# Patient Record
Sex: FEMALE | Race: WHITE | NOT HISPANIC OR LATINO | Employment: UNEMPLOYED | URBAN - METROPOLITAN AREA
[De-identification: names, ages, dates, MRNs, and addresses within clinical notes are randomized per-mention and may not be internally consistent; named-entity substitution may affect disease eponyms.]

---

## 2020-09-29 ENCOUNTER — APPOINTMENT (OUTPATIENT)
Dept: RADIOLOGY | Facility: MEDICAL CENTER | Age: 45
DRG: 023 | End: 2020-09-29
Attending: INTERNAL MEDICINE
Payer: OTHER GOVERNMENT

## 2020-09-29 ENCOUNTER — HOSPITAL ENCOUNTER (INPATIENT)
Facility: MEDICAL CENTER | Age: 45
LOS: 8 days | DRG: 023 | End: 2020-10-07
Attending: EMERGENCY MEDICINE | Admitting: INTERNAL MEDICINE
Payer: OTHER GOVERNMENT

## 2020-09-29 ENCOUNTER — APPOINTMENT (OUTPATIENT)
Dept: RADIOLOGY | Facility: MEDICAL CENTER | Age: 45
DRG: 023 | End: 2020-09-29
Attending: PSYCHIATRY & NEUROLOGY
Payer: OTHER GOVERNMENT

## 2020-09-29 DIAGNOSIS — I63.22 ACUTE ISCHEMIC VERTEBROBASILAR ARTERY BRAINSTEM STROKE INVOLVING LEFT-SIDED VESSEL (HCC): ICD-10-CM

## 2020-09-29 DIAGNOSIS — J96.01 ACUTE RESPIRATORY FAILURE WITH HYPOXIA AND HYPERCAPNIA (HCC): ICD-10-CM

## 2020-09-29 DIAGNOSIS — I10 ESSENTIAL HYPERTENSION: ICD-10-CM

## 2020-09-29 DIAGNOSIS — J96.02 ACUTE RESPIRATORY FAILURE WITH HYPOXIA AND HYPERCAPNIA (HCC): ICD-10-CM

## 2020-09-29 DIAGNOSIS — I63.212 ACUTE ISCHEMIC VERTEBROBASILAR ARTERY BRAINSTEM STROKE INVOLVING LEFT-SIDED VESSEL (HCC): ICD-10-CM

## 2020-09-29 DIAGNOSIS — R56.9 SEIZURE (HCC): ICD-10-CM

## 2020-09-29 PROBLEM — R51.9 HEADACHE: Status: ACTIVE | Noted: 2020-09-29

## 2020-09-29 LAB
ACTION RANGE TRIGGERED IACRT: NO
ALBUMIN SERPL BCP-MCNC: 4.6 G/DL (ref 3.2–4.9)
ALBUMIN/GLOB SERPL: 1.6 G/DL
ALP SERPL-CCNC: 63 U/L (ref 30–99)
ALT SERPL-CCNC: 30 U/L (ref 2–50)
AMMONIA PLAS-SCNC: 25 UMOL/L (ref 11–45)
AMPHET UR QL SCN: NEGATIVE
ANION GAP SERPL CALC-SCNC: 14 MMOL/L (ref 7–16)
APPEARANCE UR: CLEAR
AST SERPL-CCNC: 28 U/L (ref 12–45)
BACTERIA #/AREA URNS HPF: NEGATIVE /HPF
BARBITURATES UR QL SCN: NEGATIVE
BASE EXCESS BLDA CALC-SCNC: -4 MMOL/L (ref -4–3)
BENZODIAZ UR QL SCN: POSITIVE
BILIRUB SERPL-MCNC: 0.7 MG/DL (ref 0.1–1.5)
BILIRUB UR QL STRIP.AUTO: NEGATIVE
BODY TEMPERATURE: ABNORMAL DEGREES
BUN SERPL-MCNC: 12 MG/DL (ref 8–22)
BZE UR QL SCN: NEGATIVE
CA-I SERPL-SCNC: 1.2 MMOL/L (ref 1.1–1.3)
CALCIUM SERPL-MCNC: 9.3 MG/DL (ref 8.5–10.5)
CANNABINOIDS UR QL SCN: NEGATIVE
CHLORIDE SERPL-SCNC: 101 MMOL/L (ref 96–112)
CO2 BLDA-SCNC: 22 MMOL/L (ref 20–33)
CO2 SERPL-SCNC: 21 MMOL/L (ref 20–33)
COLOR UR: YELLOW
CREAT SERPL-MCNC: 0.94 MG/DL (ref 0.5–1.4)
EPI CELLS #/AREA URNS HPF: NEGATIVE /HPF
ETHANOL BLD-MCNC: <10.1 MG/DL (ref 0–10.1)
GLOBULIN SER CALC-MCNC: 2.8 G/DL (ref 1.9–3.5)
GLUCOSE SERPL-MCNC: 153 MG/DL (ref 65–99)
GLUCOSE UR STRIP.AUTO-MCNC: NEGATIVE MG/DL
HCO3 BLDA-SCNC: 20.7 MMOL/L (ref 17–25)
HOROWITZ INDEX BLDA+IHG-RTO: 328 MM[HG]
HYALINE CASTS #/AREA URNS LPF: ABNORMAL /LPF
INST. QUALIFIED PATIENT IIQPT: YES
KETONES UR STRIP.AUTO-MCNC: NEGATIVE MG/DL
LEUKOCYTE ESTERASE UR QL STRIP.AUTO: NEGATIVE
MAGNESIUM SERPL-MCNC: 2 MG/DL (ref 1.5–2.5)
METHADONE UR QL SCN: NEGATIVE
MICRO URNS: ABNORMAL
NITRITE UR QL STRIP.AUTO: NEGATIVE
O2/TOTAL GAS SETTING VFR VENT: 40 %
OPIATES UR QL SCN: NEGATIVE
OXYCODONE UR QL SCN: NEGATIVE
PCO2 BLDA: 36.1 MMHG (ref 26–37)
PCO2 TEMP ADJ BLDA: 36.1 MMHG (ref 26–37)
PCP UR QL SCN: NEGATIVE
PH BLDA: 7.37 [PH] (ref 7.4–7.5)
PH TEMP ADJ BLDA: 7.37 [PH] (ref 7.4–7.5)
PH UR STRIP.AUTO: 5.5 [PH] (ref 5–8)
PHOSPHATE SERPL-MCNC: 2.1 MG/DL (ref 2.5–4.5)
PO2 BLDA: 131 MMHG (ref 64–87)
PO2 TEMP ADJ BLDA: 131 MMHG (ref 64–87)
POTASSIUM SERPL-SCNC: 4 MMOL/L (ref 3.6–5.5)
PROPOXYPH UR QL SCN: NEGATIVE
PROT SERPL-MCNC: 7.4 G/DL (ref 6–8.2)
PROT UR QL STRIP: NEGATIVE MG/DL
RBC # URNS HPF: ABNORMAL /HPF
RBC UR QL AUTO: ABNORMAL
SAO2 % BLDA: 99 % (ref 93–99)
SODIUM SERPL-SCNC: 136 MMOL/L (ref 135–145)
SP GR UR STRIP.AUTO: 1.01
SPECIMEN DRAWN FROM PATIENT: ABNORMAL
TSH SERPL DL<=0.005 MIU/L-ACNC: 2.23 UIU/ML (ref 0.38–5.33)
UROBILINOGEN UR STRIP.AUTO-MCNC: 0.2 MG/DL
VIT B12 SERPL-MCNC: 503 PG/ML (ref 211–911)
WBC #/AREA URNS HPF: ABNORMAL /HPF

## 2020-09-29 PROCEDURE — 94002 VENT MGMT INPAT INIT DAY: CPT

## 2020-09-29 PROCEDURE — 99223 1ST HOSP IP/OBS HIGH 75: CPT | Performed by: PSYCHIATRY & NEUROLOGY

## 2020-09-29 PROCEDURE — 82803 BLOOD GASES ANY COMBINATION: CPT

## 2020-09-29 PROCEDURE — 71045 X-RAY EXAM CHEST 1 VIEW: CPT

## 2020-09-29 PROCEDURE — 99291 CRITICAL CARE FIRST HOUR: CPT | Performed by: INTERNAL MEDICINE

## 2020-09-29 PROCEDURE — 83735 ASSAY OF MAGNESIUM: CPT

## 2020-09-29 PROCEDURE — 82533 TOTAL CORTISOL: CPT

## 2020-09-29 PROCEDURE — 84100 ASSAY OF PHOSPHORUS: CPT

## 2020-09-29 PROCEDURE — 86780 TREPONEMA PALLIDUM: CPT

## 2020-09-29 PROCEDURE — 96368 THER/DIAG CONCURRENT INF: CPT

## 2020-09-29 PROCEDURE — 96365 THER/PROPH/DIAG IV INF INIT: CPT

## 2020-09-29 PROCEDURE — 3E02340 INTRODUCTION OF INFLUENZA VACCINE INTO MUSCLE, PERCUTANEOUS APPROACH: ICD-10-PCS | Performed by: STUDENT IN AN ORGANIZED HEALTH CARE EDUCATION/TRAINING PROGRAM

## 2020-09-29 PROCEDURE — 96375 TX/PRO/DX INJ NEW DRUG ADDON: CPT

## 2020-09-29 PROCEDURE — 82607 VITAMIN B-12: CPT

## 2020-09-29 PROCEDURE — 81001 URINALYSIS AUTO W/SCOPE: CPT

## 2020-09-29 PROCEDURE — 82330 ASSAY OF CALCIUM: CPT

## 2020-09-29 PROCEDURE — 700111 HCHG RX REV CODE 636 W/ 250 OVERRIDE (IP)

## 2020-09-29 PROCEDURE — 5A1945Z RESPIRATORY VENTILATION, 24-96 CONSECUTIVE HOURS: ICD-10-PCS | Performed by: INTERNAL MEDICINE

## 2020-09-29 PROCEDURE — 82140 ASSAY OF AMMONIA: CPT

## 2020-09-29 PROCEDURE — 84443 ASSAY THYROID STIM HORMONE: CPT

## 2020-09-29 PROCEDURE — 36600 WITHDRAWAL OF ARTERIAL BLOOD: CPT

## 2020-09-29 PROCEDURE — 96366 THER/PROPH/DIAG IV INF ADDON: CPT

## 2020-09-29 PROCEDURE — C9803 HOPD COVID-19 SPEC COLLECT: HCPCS | Performed by: INTERNAL MEDICINE

## 2020-09-29 PROCEDURE — 36415 COLL VENOUS BLD VENIPUNCTURE: CPT

## 2020-09-29 PROCEDURE — G0475 HIV COMBINATION ASSAY: HCPCS

## 2020-09-29 PROCEDURE — 700111 HCHG RX REV CODE 636 W/ 250 OVERRIDE (IP): Performed by: PSYCHIATRY & NEUROLOGY

## 2020-09-29 PROCEDURE — 80053 COMPREHEN METABOLIC PANEL: CPT

## 2020-09-29 PROCEDURE — 770022 HCHG ROOM/CARE - ICU (200)

## 2020-09-29 PROCEDURE — 80307 DRUG TEST PRSMV CHEM ANLYZR: CPT

## 2020-09-29 PROCEDURE — 96376 TX/PRO/DX INJ SAME DRUG ADON: CPT

## 2020-09-29 PROCEDURE — 84425 ASSAY OF VITAMIN B-1: CPT

## 2020-09-29 PROCEDURE — 99291 CRITICAL CARE FIRST HOUR: CPT

## 2020-09-29 PROCEDURE — 700111 HCHG RX REV CODE 636 W/ 250 OVERRIDE (IP): Performed by: INTERNAL MEDICINE

## 2020-09-29 PROCEDURE — 85652 RBC SED RATE AUTOMATED: CPT

## 2020-09-29 RX ORDER — BISACODYL 10 MG
10 SUPPOSITORY, RECTAL RECTAL
Status: DISCONTINUED | OUTPATIENT
Start: 2020-09-29 | End: 2020-10-02

## 2020-09-29 RX ORDER — POLYETHYLENE GLYCOL 3350 17 G/17G
1 POWDER, FOR SOLUTION ORAL
Status: DISCONTINUED | OUTPATIENT
Start: 2020-09-29 | End: 2020-10-02

## 2020-09-29 RX ORDER — IPRATROPIUM BROMIDE AND ALBUTEROL SULFATE 2.5; .5 MG/3ML; MG/3ML
3 SOLUTION RESPIRATORY (INHALATION)
Status: DISCONTINUED | OUTPATIENT
Start: 2020-09-29 | End: 2020-10-07 | Stop reason: HOSPADM

## 2020-09-29 RX ORDER — AMOXICILLIN 250 MG
2 CAPSULE ORAL 2 TIMES DAILY
Status: DISCONTINUED | OUTPATIENT
Start: 2020-09-29 | End: 2020-10-02

## 2020-09-29 RX ORDER — FAMOTIDINE 20 MG/1
20 TABLET, FILM COATED ORAL EVERY 12 HOURS
Status: DISCONTINUED | OUTPATIENT
Start: 2020-09-29 | End: 2020-10-01

## 2020-09-29 RX ORDER — LABETALOL HYDROCHLORIDE 5 MG/ML
10 INJECTION, SOLUTION INTRAVENOUS EVERY 4 HOURS PRN
Status: DISCONTINUED | OUTPATIENT
Start: 2020-09-29 | End: 2020-09-30

## 2020-09-29 RX ORDER — LEVETIRACETAM 5 MG/ML
500 INJECTION INTRAVASCULAR EVERY 12 HOURS
Status: DISCONTINUED | OUTPATIENT
Start: 2020-09-29 | End: 2020-10-03

## 2020-09-29 RX ADMIN — FENTANYL CITRATE 100 MCG: 50 INJECTION, SOLUTION INTRAMUSCULAR; INTRAVENOUS at 23:05

## 2020-09-29 RX ADMIN — FENTANYL CITRATE 50 MCG: 50 INJECTION, SOLUTION INTRAMUSCULAR; INTRAVENOUS at 22:01

## 2020-09-29 RX ADMIN — PROPOFOL 5 MCG/KG/MIN: 10 INJECTION, EMULSION INTRAVENOUS at 20:30

## 2020-09-29 RX ADMIN — LEVETIRACETAM INJECTION 500 MG: 5 INJECTION INTRAVENOUS at 21:19

## 2020-09-29 RX ADMIN — FENTANYL CITRATE 100 MCG: 50 INJECTION, SOLUTION INTRAMUSCULAR; INTRAVENOUS at 21:08

## 2020-09-29 RX ADMIN — PROPOFOL 20 MCG/KG/MIN: 10 INJECTION, EMULSION INTRAVENOUS at 21:30

## 2020-09-30 ENCOUNTER — APPOINTMENT (OUTPATIENT)
Dept: RADIOLOGY | Facility: MEDICAL CENTER | Age: 45
DRG: 023 | End: 2020-09-30
Attending: INTERNAL MEDICINE
Payer: OTHER GOVERNMENT

## 2020-09-30 ENCOUNTER — APPOINTMENT (OUTPATIENT)
Dept: RADIOLOGY | Facility: MEDICAL CENTER | Age: 45
DRG: 023 | End: 2020-09-30
Attending: NURSE PRACTITIONER
Payer: OTHER GOVERNMENT

## 2020-09-30 ENCOUNTER — APPOINTMENT (OUTPATIENT)
Dept: RADIOLOGY | Facility: MEDICAL CENTER | Age: 45
DRG: 023 | End: 2020-09-30
Attending: PSYCHIATRY & NEUROLOGY
Payer: OTHER GOVERNMENT

## 2020-09-30 ENCOUNTER — APPOINTMENT (OUTPATIENT)
Dept: RADIOLOGY | Facility: MEDICAL CENTER | Age: 45
DRG: 023 | End: 2020-09-30
Attending: RADIOLOGY
Payer: OTHER GOVERNMENT

## 2020-09-30 PROBLEM — I63.22 ACUTE ISCHEMIC VERTEBROBASILAR ARTERY BRAINSTEM STROKE INVOLVING LEFT-SIDED VESSEL (HCC): Status: ACTIVE | Noted: 2020-09-30

## 2020-09-30 PROBLEM — I63.212 ACUTE ISCHEMIC VERTEBROBASILAR ARTERY BRAINSTEM STROKE INVOLVING LEFT-SIDED VESSEL (HCC): Status: ACTIVE | Noted: 2020-09-30

## 2020-09-30 LAB
ACTION RANGE TRIGGERED IACRT: NO
ANION GAP SERPL CALC-SCNC: 11 MMOL/L (ref 7–16)
ANION GAP SERPL CALC-SCNC: 11 MMOL/L (ref 7–16)
APTT PPP: 43.3 SEC (ref 24.7–36)
BASE EXCESS BLDA CALC-SCNC: -2 MMOL/L (ref -4–3)
BASOPHILS # BLD AUTO: 0.3 % (ref 0–1.8)
BASOPHILS # BLD: 0.03 K/UL (ref 0–0.12)
BODY TEMPERATURE: ABNORMAL DEGREES
BUN SERPL-MCNC: 8 MG/DL (ref 8–22)
BUN SERPL-MCNC: 9 MG/DL (ref 8–22)
CA-I SERPL-SCNC: 1.1 MMOL/L (ref 1.1–1.3)
CALCIUM SERPL-MCNC: 7.4 MG/DL (ref 8.5–10.5)
CALCIUM SERPL-MCNC: 8.3 MG/DL (ref 8.5–10.5)
CFT BLD TEG: 17.9 MIN (ref 5–10)
CHLORIDE SERPL-SCNC: 103 MMOL/L (ref 96–112)
CHLORIDE SERPL-SCNC: 111 MMOL/L (ref 96–112)
CLOT ANGLE BLD TEG: 45.6 DEGREES (ref 53–72)
CLOT LYSIS 30M P MA LENFR BLD TEG: 0 % (ref 0–8)
CO2 BLDA-SCNC: 24 MMOL/L (ref 20–33)
CO2 SERPL-SCNC: 19 MMOL/L (ref 20–33)
CO2 SERPL-SCNC: 22 MMOL/L (ref 20–33)
CORTIS SERPL-MCNC: 14.9 UG/DL (ref 0–23)
COVID ORDER STATUS COVID19: NORMAL
CREAT SERPL-MCNC: 0.6 MG/DL (ref 0.5–1.4)
CREAT SERPL-MCNC: 0.8 MG/DL (ref 0.5–1.4)
CT.EXTRINSIC BLD ROTEM: 3.8 MIN (ref 1–3)
EOSINOPHIL # BLD AUTO: 0.03 K/UL (ref 0–0.51)
EOSINOPHIL NFR BLD: 0.3 % (ref 0–6.9)
ERYTHROCYTE [DISTWIDTH] IN BLOOD BY AUTOMATED COUNT: 42.3 FL (ref 35.9–50)
ERYTHROCYTE [SEDIMENTATION RATE] IN BLOOD BY WESTERGREN METHOD: 2 MM/HOUR (ref 0–20)
FIBRINOGEN PPP-MCNC: 436 MG/DL (ref 215–460)
GLUCOSE SERPL-MCNC: 121 MG/DL (ref 65–99)
GLUCOSE SERPL-MCNC: 93 MG/DL (ref 65–99)
HCO3 BLDA-SCNC: 23 MMOL/L (ref 17–25)
HCT VFR BLD AUTO: 31.9 % (ref 37–47)
HGB BLD-MCNC: 10.8 G/DL (ref 12–16)
HIV 1+2 AB+HIV1 P24 AG SERPL QL IA: NORMAL
HOROWITZ INDEX BLDA+IHG-RTO: 350 MM[HG]
IMM GRANULOCYTES # BLD AUTO: 0.02 K/UL (ref 0–0.11)
IMM GRANULOCYTES NFR BLD AUTO: 0.2 % (ref 0–0.9)
INR PPP: 1.02 (ref 0.87–1.13)
INR PPP: 1.06 (ref 0.87–1.13)
INST. QUALIFIED PATIENT IIQPT: YES
LYMPHOCYTES # BLD AUTO: 1.36 K/UL (ref 1–4.8)
LYMPHOCYTES NFR BLD: 14.3 % (ref 22–41)
MAGNESIUM SERPL-MCNC: 1.6 MG/DL (ref 1.5–2.5)
MCF BLD TEG: 60.1 MM (ref 50–70)
MCH RBC QN AUTO: 31.2 PG (ref 27–33)
MCHC RBC AUTO-ENTMCNC: 33.9 G/DL (ref 33.6–35)
MCV RBC AUTO: 92.2 FL (ref 81.4–97.8)
MONOCYTES # BLD AUTO: 0.75 K/UL (ref 0–0.85)
MONOCYTES NFR BLD AUTO: 7.9 % (ref 0–13.4)
NEUTROPHILS # BLD AUTO: 7.34 K/UL (ref 2–7.15)
NEUTROPHILS NFR BLD: 77 % (ref 44–72)
NRBC # BLD AUTO: 0 K/UL
NRBC BLD-RTO: 0 /100 WBC
O2/TOTAL GAS SETTING VFR VENT: 30 %
PA AA BLD-ACNC: 78.3 %
PA ADP BLD-ACNC: 0 %
PCO2 BLDA: 37.2 MMHG (ref 26–37)
PCO2 TEMP ADJ BLDA: 36.3 MMHG (ref 26–37)
PH BLDA: 7.4 [PH] (ref 7.4–7.5)
PH TEMP ADJ BLDA: 7.41 [PH] (ref 7.4–7.5)
PHOSPHATE SERPL-MCNC: 2.1 MG/DL (ref 2.5–4.5)
PLATELET # BLD AUTO: 234 K/UL (ref 164–446)
PMV BLD AUTO: 9.4 FL (ref 9–12.9)
PO2 BLDA: 105 MMHG (ref 64–87)
PO2 TEMP ADJ BLDA: 102 MMHG (ref 64–87)
POTASSIUM SERPL-SCNC: 3.3 MMOL/L (ref 3.6–5.5)
POTASSIUM SERPL-SCNC: 3.8 MMOL/L (ref 3.6–5.5)
PROTHROMBIN TIME: 13.7 SEC (ref 12–14.6)
PROTHROMBIN TIME: 14.1 SEC (ref 12–14.6)
RBC # BLD AUTO: 3.46 M/UL (ref 4.2–5.4)
SAO2 % BLDA: 98 % (ref 93–99)
SARS-COV-2 RNA RESP QL NAA+PROBE: NOTDETECTED
SODIUM SERPL-SCNC: 136 MMOL/L (ref 135–145)
SODIUM SERPL-SCNC: 141 MMOL/L (ref 135–145)
SPECIMEN DRAWN FROM PATIENT: ABNORMAL
SPECIMEN SOURCE: NORMAL
TEG ALGORITHM TGALG: ABNORMAL
TREPONEMA PALLIDUM IGG+IGM AB [PRESENCE] IN SERUM OR PLASMA BY IMMUNOASSAY: NORMAL
UFH PPP CHRO-ACNC: 0.11 IU/ML
UFH PPP CHRO-ACNC: 0.25 IU/ML
WBC # BLD AUTO: 9.5 K/UL (ref 4.8–10.8)

## 2020-09-30 PROCEDURE — 70551 MRI BRAIN STEM W/O DYE: CPT

## 2020-09-30 PROCEDURE — 85384 FIBRINOGEN ACTIVITY: CPT

## 2020-09-30 PROCEDURE — 95816 EEG AWAKE AND DROWSY: CPT | Performed by: PSYCHIATRY & NEUROLOGY

## 2020-09-30 PROCEDURE — 81240 F2 GENE: CPT

## 2020-09-30 PROCEDURE — A9576 INJ PROHANCE MULTIPACK: HCPCS | Performed by: PSYCHIATRY & NEUROLOGY

## 2020-09-30 PROCEDURE — 82803 BLOOD GASES ANY COMBINATION: CPT

## 2020-09-30 PROCEDURE — 99233 SBSQ HOSP IP/OBS HIGH 50: CPT | Performed by: NURSE PRACTITIONER

## 2020-09-30 PROCEDURE — 85347 COAGULATION TIME ACTIVATED: CPT

## 2020-09-30 PROCEDURE — 700111 HCHG RX REV CODE 636 W/ 250 OVERRIDE (IP): Performed by: RADIOLOGY

## 2020-09-30 PROCEDURE — 700111 HCHG RX REV CODE 636 W/ 250 OVERRIDE (IP): Performed by: INTERNAL MEDICINE

## 2020-09-30 PROCEDURE — 85576 BLOOD PLATELET AGGREGATION: CPT | Mod: 91

## 2020-09-30 PROCEDURE — 85730 THROMBOPLASTIN TIME PARTIAL: CPT

## 2020-09-30 PROCEDURE — 70450 CT HEAD/BRAIN W/O DYE: CPT

## 2020-09-30 PROCEDURE — 36600 WITHDRAWAL OF ARTERIAL BLOOD: CPT

## 2020-09-30 PROCEDURE — 80048 BASIC METABOLIC PNL TOTAL CA: CPT

## 2020-09-30 PROCEDURE — 700101 HCHG RX REV CODE 250: Performed by: INTERNAL MEDICINE

## 2020-09-30 PROCEDURE — 86146 BETA-2 GLYCOPROTEIN ANTIBODY: CPT | Mod: 91

## 2020-09-30 PROCEDURE — 84100 ASSAY OF PHOSPHORUS: CPT

## 2020-09-30 PROCEDURE — 700111 HCHG RX REV CODE 636 W/ 250 OVERRIDE (IP): Performed by: PSYCHIATRY & NEUROLOGY

## 2020-09-30 PROCEDURE — U0003 INFECTIOUS AGENT DETECTION BY NUCLEIC ACID (DNA OR RNA); SEVERE ACUTE RESPIRATORY SYNDROME CORONAVIRUS 2 (SARS-COV-2) (CORONAVIRUS DISEASE [COVID-19]), AMPLIFIED PROBE TECHNIQUE, MAKING USE OF HIGH THROUGHPUT TECHNOLOGIES AS DESCRIBED BY CMS-2020-01-R: HCPCS

## 2020-09-30 PROCEDURE — 85303 CLOT INHIBIT PROT C ACTIVITY: CPT

## 2020-09-30 PROCEDURE — 99153 MOD SED SAME PHYS/QHP EA: CPT

## 2020-09-30 PROCEDURE — 99233 SBSQ HOSP IP/OBS HIGH 50: CPT | Mod: 25 | Performed by: PSYCHIATRY & NEUROLOGY

## 2020-09-30 PROCEDURE — 85301 ANTITHROMBIN III ANTIGEN: CPT

## 2020-09-30 PROCEDURE — 94003 VENT MGMT INPAT SUBQ DAY: CPT

## 2020-09-30 PROCEDURE — 770022 HCHG ROOM/CARE - ICU (200)

## 2020-09-30 PROCEDURE — 85610 PROTHROMBIN TIME: CPT | Mod: 91

## 2020-09-30 PROCEDURE — 81241 F5 GENE: CPT

## 2020-09-30 PROCEDURE — 700117 HCHG RX CONTRAST REV CODE 255: Performed by: RADIOLOGY

## 2020-09-30 PROCEDURE — 85025 COMPLETE CBC W/AUTO DIFF WBC: CPT

## 2020-09-30 PROCEDURE — 4A10X4Z MONITORING OF CENTRAL NERVOUS ELECTRICAL ACTIVITY, EXTERNAL APPROACH: ICD-10-PCS | Performed by: PSYCHIATRY & NEUROLOGY

## 2020-09-30 PROCEDURE — 99291 CRITICAL CARE FIRST HOUR: CPT | Performed by: INTERNAL MEDICINE

## 2020-09-30 PROCEDURE — 85306 CLOT INHIBIT PROT S FREE: CPT

## 2020-09-30 PROCEDURE — 700111 HCHG RX REV CODE 636 W/ 250 OVERRIDE (IP): Performed by: NURSE PRACTITIONER

## 2020-09-30 PROCEDURE — 83735 ASSAY OF MAGNESIUM: CPT

## 2020-09-30 PROCEDURE — 70544 MR ANGIOGRAPHY HEAD W/O DYE: CPT

## 2020-09-30 PROCEDURE — 85300 ANTITHROMBIN III ACTIVITY: CPT

## 2020-09-30 PROCEDURE — 700117 HCHG RX CONTRAST REV CODE 255: Performed by: NURSE PRACTITIONER

## 2020-09-30 PROCEDURE — 95816 EEG AWAKE AND DROWSY: CPT | Mod: 26 | Performed by: PSYCHIATRY & NEUROLOGY

## 2020-09-30 PROCEDURE — 85520 HEPARIN ASSAY: CPT | Mod: 91

## 2020-09-30 PROCEDURE — 99292 CRITICAL CARE ADDL 30 MIN: CPT | Performed by: INTERNAL MEDICINE

## 2020-09-30 PROCEDURE — 86147 CARDIOLIPIN ANTIBODY EA IG: CPT

## 2020-09-30 PROCEDURE — 96366 THER/PROPH/DIAG IV INF ADDON: CPT

## 2020-09-30 PROCEDURE — 700117 HCHG RX CONTRAST REV CODE 255: Performed by: PSYCHIATRY & NEUROLOGY

## 2020-09-30 PROCEDURE — 94770 HCHG CO2 EXPIRED GAS DETERMINATION: CPT

## 2020-09-30 PROCEDURE — 700111 HCHG RX REV CODE 636 W/ 250 OVERRIDE (IP)

## 2020-09-30 PROCEDURE — 70553 MRI BRAIN STEM W/O & W/DYE: CPT

## 2020-09-30 PROCEDURE — 82330 ASSAY OF CALCIUM: CPT

## 2020-09-30 PROCEDURE — 0042T CT-CEREBRAL PERFUSION ANALYSIS: CPT

## 2020-09-30 PROCEDURE — 70498 CT ANGIOGRAPHY NECK: CPT

## 2020-09-30 PROCEDURE — 70496 CT ANGIOGRAPHY HEAD: CPT

## 2020-09-30 RX ORDER — MIDAZOLAM HYDROCHLORIDE 1 MG/ML
INJECTION INTRAMUSCULAR; INTRAVENOUS
Status: COMPLETED
Start: 2020-09-30 | End: 2020-09-30

## 2020-09-30 RX ORDER — HEPARIN SODIUM,PORCINE 1000/ML
VIAL (ML) INJECTION
Status: COMPLETED
Start: 2020-09-30 | End: 2020-09-30

## 2020-09-30 RX ORDER — HEPARIN SODIUM 5000 [USP'U]/100ML
0-30 INJECTION, SOLUTION INTRAVENOUS CONTINUOUS
Status: DISCONTINUED | OUTPATIENT
Start: 2020-09-30 | End: 2020-10-04

## 2020-09-30 RX ORDER — HEPARIN SODIUM 1000 [USP'U]/ML
5000 INJECTION, SOLUTION INTRAVENOUS; SUBCUTANEOUS ONCE
Status: COMPLETED | OUTPATIENT
Start: 2020-09-30 | End: 2020-09-30

## 2020-09-30 RX ORDER — HEPARIN SODIUM 5000 [USP'U]/100ML
INJECTION, SOLUTION INTRAVENOUS CONTINUOUS
Status: DISCONTINUED | OUTPATIENT
Start: 2020-09-30 | End: 2020-09-30

## 2020-09-30 RX ORDER — MAGNESIUM SULFATE HEPTAHYDRATE 40 MG/ML
2 INJECTION, SOLUTION INTRAVENOUS ONCE
Status: COMPLETED | OUTPATIENT
Start: 2020-09-30 | End: 2020-09-30

## 2020-09-30 RX ORDER — MIDAZOLAM HYDROCHLORIDE 1 MG/ML
.5-2 INJECTION INTRAMUSCULAR; INTRAVENOUS PRN
Status: ACTIVE | OUTPATIENT
Start: 2020-09-30 | End: 2020-09-30

## 2020-09-30 RX ORDER — SODIUM CHLORIDE 9 MG/ML
500 INJECTION, SOLUTION INTRAVENOUS
Status: ACTIVE | OUTPATIENT
Start: 2020-09-30 | End: 2020-09-30

## 2020-09-30 RX ADMIN — MIDAZOLAM HYDROCHLORIDE 1 MG: 1 INJECTION INTRAMUSCULAR; INTRAVENOUS at 13:41

## 2020-09-30 RX ADMIN — GADOTERIDOL 20 ML: 279.3 INJECTION, SOLUTION INTRAVENOUS at 01:29

## 2020-09-30 RX ADMIN — FENTANYL CITRATE 25 MCG: 50 INJECTION INTRAMUSCULAR; INTRAVENOUS at 15:59

## 2020-09-30 RX ADMIN — FAMOTIDINE 20 MG: 10 INJECTION INTRAVENOUS at 07:35

## 2020-09-30 RX ADMIN — PROPOFOL 40 MCG/KG/MIN: 10 INJECTION, EMULSION INTRAVENOUS at 15:05

## 2020-09-30 RX ADMIN — IOHEXOL 40 ML: 350 INJECTION, SOLUTION INTRAVENOUS at 11:15

## 2020-09-30 RX ADMIN — FENTANYL CITRATE 100 MCG: 50 INJECTION, SOLUTION INTRAMUSCULAR; INTRAVENOUS at 06:31

## 2020-09-30 RX ADMIN — MAGNESIUM SULFATE 2 G: 2 INJECTION INTRAVENOUS at 15:02

## 2020-09-30 RX ADMIN — PROPOFOL 40 MCG/KG/MIN: 10 INJECTION, EMULSION INTRAVENOUS at 07:33

## 2020-09-30 RX ADMIN — POTASSIUM PHOSPHATE, MONOBASIC AND POTASSIUM PHOSPHATE, DIBASIC 30 MMOL: 224; 236 INJECTION, SOLUTION, CONCENTRATE INTRAVENOUS at 15:04

## 2020-09-30 RX ADMIN — IOHEXOL 100 ML: 350 INJECTION, SOLUTION INTRAVENOUS at 11:52

## 2020-09-30 RX ADMIN — IOHEXOL 150 ML: 300 INJECTION, SOLUTION INTRAVENOUS at 14:12

## 2020-09-30 RX ADMIN — PROPOFOL 60 MCG/KG/MIN: 10 INJECTION, EMULSION INTRAVENOUS at 11:43

## 2020-09-30 RX ADMIN — LEVETIRACETAM INJECTION 500 MG: 5 INJECTION INTRAVENOUS at 06:10

## 2020-09-30 RX ADMIN — HEPARIN SODIUM 5000 UNITS: 1000 INJECTION, SOLUTION INTRAVENOUS; SUBCUTANEOUS at 13:19

## 2020-09-30 RX ADMIN — FENTANYL CITRATE 25 MCG: 50 INJECTION INTRAMUSCULAR; INTRAVENOUS at 13:41

## 2020-09-30 RX ADMIN — MIDAZOLAM HYDROCHLORIDE 1 MG: 1 INJECTION, SOLUTION INTRAMUSCULAR; INTRAVENOUS at 13:41

## 2020-09-30 RX ADMIN — PROPOFOL 40 MCG/KG/MIN: 10 INJECTION, EMULSION INTRAVENOUS at 01:38

## 2020-09-30 RX ADMIN — FAMOTIDINE 20 MG: 10 INJECTION INTRAVENOUS at 20:20

## 2020-09-30 RX ADMIN — FENTANYL CITRATE 100 MCG: 50 INJECTION INTRAMUSCULAR; INTRAVENOUS at 18:26

## 2020-09-30 RX ADMIN — HEPARIN SODIUM 11.39 UNITS/KG/HR: 5000 INJECTION, SOLUTION INTRAVENOUS at 18:03

## 2020-09-30 RX ADMIN — LEVETIRACETAM INJECTION 500 MG: 5 INJECTION INTRAVENOUS at 20:20

## 2020-09-30 ASSESSMENT — PATIENT HEALTH QUESTIONNAIRE - PHQ9
SUM OF ALL RESPONSES TO PHQ9 QUESTIONS 1 AND 2: 0
2. FEELING DOWN, DEPRESSED, IRRITABLE, OR HOPELESS: NOT AT ALL
1. LITTLE INTEREST OR PLEASURE IN DOING THINGS: NOT AT ALL

## 2020-09-30 ASSESSMENT — FIBROSIS 4 INDEX: FIB4 SCORE: 0.96

## 2020-09-30 NOTE — CARE PLAN
Problem: Safety - Medical Restraint  Goal: Remains free of injury from restraints (Restraint for Interference with Medical Device)  Description: INTERVENTIONS:  1. Determine that other, less restrictive measures have been tried or would not be effective before applying the restraint  2. Evaluate the patient's condition at the time of restraint application  3. Inform patient/family regarding the reason for restraint  4. Q2H: Monitor safety, psychosocial status, comfort, nutrition and hydration  Outcome: PROGRESSING AS EXPECTED  Flowsheets (Taken 9/30/2020 7966)  Addressed this shift: Remains free of injury from restraints (restraint for interference with medical device):   Determine that other, less restrictive measures have been tried or would not be effective before applying the restraint   Inform patient/family regarding the reason for restraint   Every 2 hours: Monitor safety, psychosocial status, comfort, nutrition and hydration   Evaluate the patient's condition at the time of restraint application     Problem: Infection  Goal: Will remain free from infection  Outcome: PROGRESSING AS EXPECTED  Intervention: Assess for removal of potential routes of infection, such as IV, central line, intra-arterial or urinary catheters  Note: Only indicated lines in place.

## 2020-09-30 NOTE — PROGRESS NOTES
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 Upon my re-examination at the bedside at appx 1530, patient is moving all extremities; Left > Right, not to command. Eyes midline, pupils briskly reactive, 3-4 mm. Strong cough/gag, + corneal reflexes as well.     Will plan for STAT repeat CT head now to assess for hemorrhage post-thrombectomy; if negative for hemorrhage, will start low-dose Heparin gtt.     Continue q1h neuro assessment. SBP goal 110-180; absolutely avoid hypotension given hypoperfusion risk.     EEG complete; read pending. Continue Keppra.     Please refer to progress note from earlier today, 9/30/20 for full assessment, recommendations and plan.     The plan of care above has been discussed with Dr. Foreman.     RAZA Teran.      **Addendum, 1800 9/30/20: Reviewed CT head wo contrast with Dr. Escalante, Dr. Kim, and with Dr. Fischer; likely contrast-staining (note patient had CTA and formal cerebral angio today, both of which use IV contrast) vs interval development of small subarachnoid hemorrhage bilaterally.   As was discussed with the providers noted above, given high risk for re-occlusion, will plan to initiate low-dose Heparin gtt STAT now (ie, potential benefit outweigh potential risk); will also get STAT MRI Brain wo contrast now to formally rule out/rule in evolving hemorrhage. Will continue to follow closely; will also be followed overnight by Dr. Fischer.

## 2020-09-30 NOTE — H&P
Patient was admitted in critical condition please see critical care consultation note for H&P.     Benigno Mart MD  Critical Care Medicine

## 2020-09-30 NOTE — DISCHARGE PLANNING
Medical Social Work    MSW received a call from bedside RN that pt's spouse and teenage son are in Family Support Room and can come to bedside.  MSW met with pt's spouse, Bill and son.  Pt's spouse states that he was updated by transferring facility and MD here.  Pt's spouse states that he spoke with son about what's going on and requested that son be able to see his mom briefly.  Due to circumstances this worker allowed teenage son to be at bedside with father.  Bedside RN aware.  Emotional support provided to pt's family.

## 2020-09-30 NOTE — CARE PLAN
Ventilator Daily Summary    Vent Day # 2    8.0 @ 22    CMV 18, 420, +8, 30%    Ventilator settings changed this shift: Adjusted Vt      Weaning trials if appropriate    Respiratory Procedures: AM ABG    Plan: Continue current ventilator settings and wean mechanical ventilation as tolerated per physician orders.

## 2020-09-30 NOTE — PROGRESS NOTES
Chief Complaint   Patient presents with   • Seizure   • Headache     Neurology Progress Note    I saw this patient in consultation.  We reviewed the MRI this morning that showed evidence of thrombus in the basilar artery as well as the left PCA.  Since we obtain this information, she was emergently taken for CT angiogram and CT perfusion.  I immediately contacted neuro IR who took her for thrombectomy.  I spoke with  who is a family physician and understood the implications and the emergent need for this procedure.    We are getting an MRI of the brain this evening to look for worsening stroke symptoms.    The initial MRI did show some ischemic changes in the right midbrain anteriorly.  CT perfusion showed bihemispheric involvement in the posterior circulation.  There was no evidence of cord infarction in the occipital lobes.    I had a long conversation with  and reviewed the films with him and explained the need for heparinization.    CT head that was performed after thrombectomy did show some increased contrast versus hemorrhage.  Radiologist was concerned about small amount of subarachnoid hemorrhage in the left parietal convexity.  Given the fact that she has a left vertebral artery dissection and thrombosis, I felt very strongly to treat with anticoagulation.  We will be administering heparin without a bolus.    I appreciate all of the specialties coming together.    There is equipoise in the literature for antiplatelet versus anticoagulation, but given size and territory of infarct, patient may benefit from short-term anticoagulation with a DOAC/NOAC per CADISS Trial.    Brief History of present illness:  45-year old female with unknown PMhx who presented to [facility] on 9/30/20 as a transfer from [facility] ED where she presented for a chief complaint of a ?2-day history of retro orbital headache, Left facial numbness and seizure-like activity. Patient unable to provide details  regarding her HPI, further details obtained via review of medical record.   The patient reportedly presented to the OSH with the above noted complaints around 1900 on 9/29/20; upon arrival to OSH ED, patient had witnessed seizure-like activity. Further details including characteristics and duration of event are unknown. No leukocytosis or fever at OSH. She received Ativan; was ultimately intubated for airway protection and was transferred to [facility] for further work up/higher level of care.   On arrival here around 2000, patient received Keppra 500 mg IV. She had STAT MRI Brain last night; this ultimately revealed brain stem/pontine/midbrain ischemia, prompting STAT CTA this morning. This revealed diffuse thrombosis involving Left and Right PCAs, Left Vert (likely dissected), and Basilar arteries. Patient to STAT thrombectomy at 1130.     Neurology has been consulted by Dr. Benigno Duffy to further evaluate the findings noted above.     Interval, 9/30/20:   Patient to IR thrombectomy at appx 1130; results pending. Upon my examination this morning, patient remains intubated/sedated; not following commands. Further ROS is thus limited.       Past medical history:   Unknown     Past surgical history:   Unknown    Family history:   Unknown    Social history:   Social History     Socioeconomic History   • Marital status:      Spouse name: Not on file   • Number of children: Not on file   • Years of education: Not on file   • Highest education level: Not on file   Occupational History   • Not on file   Social Needs   • Financial resource strain: Not on file   • Food insecurity     Worry: Not on file     Inability: Not on file   • Transportation needs     Medical: Not on file     Non-medical: Not on file   Tobacco Use   • Smoking status: Not on file   Substance and Sexual Activity   • Alcohol use: Not on file   • Drug use: Not on file   • Sexual activity: Not on file   Lifestyle   • Physical activity     Days per  week: Not on file     Minutes per session: Not on file   • Stress: Not on file   Relationships   • Social connections     Talks on phone: Not on file     Gets together: Not on file     Attends Jainism service: Not on file     Active member of club or organization: Not on file     Attends meetings of clubs or organizations: Not on file     Relationship status: Not on file   • Intimate partner violence     Fear of current or ex partner: Not on file     Emotionally abused: Not on file     Physically abused: Not on file     Forced sexual activity: Not on file   Other Topics Concern   • Not on file   Social History Narrative   • Not on file       Current medications:   Current Facility-Administered Medications   Medication Dose   • fentaNYL (SUBLIMAZE) injection 50 mcg  50 mcg    And   • fentaNYL (SUBLIMAZE) injection 100 mcg  100 mcg    And   • fentaNYL (SUBLIMAZE) 50 mcg/mL in 50mL (Continuous Infusion)      And   • propofol (DIPRIVAN) injection  0-80 mcg/kg/min   • potassium phosphate IVPB 30 mmol in 500 mL D5W (premix)  30 mmol   • magnesium sulfate IVPB premix 2 g  2 g   • levETIRAcetam (Keppra) 500 mg in 100 mL NaCl IV premix  500 mg   • Respiratory Therapy Consult     • ipratropium-albuterol (DUONEB) nebulizer solution  3 mL   • famotidine (PEPCID) tablet 20 mg  20 mg    Or   • famotidine (PEPCID) injection 20 mg  20 mg   • senna-docusate (PERICOLACE or SENOKOT S) 8.6-50 MG per tablet 2 Tab  2 Tab    And   • polyethylene glycol/lytes (MIRALAX) PACKET 1 Packet  1 Packet    And   • magnesium hydroxide (MILK OF MAGNESIA) suspension 30 mL  30 mL    And   • bisacodyl (DULCOLAX) suppository 10 mg  10 mg   • MD Alert...ICU Electrolyte Replacement per Pharmacy     • lidocaine (XYLOCAINE) 1 % injection 1-2 mL  1-2 mL       Medication Allergy:  No Known Allergies      Review of systems:   Unable as patient is intubated/sedated.       Physical examination:   Vitals:    09/30/20 1205 09/30/20 1210 09/30/20 1215 09/30/20  1220   BP: 136/82 131/83 139/82 139/86   Pulse: 62 68 63 67   Resp: 12 12 12 12   Temp:       TempSrc:       SpO2: 98% 98% 98% 98%   Weight:       Height:         General: Patient in no acute distress.  HEENT: Normocephalic, no signs of acute trauma.   Neck: supple, no meningeal signs or carotid bruits. There is normal range of motion. No tenderness on exam.   Chest: clear to auscultation. No cough.   CV: RRR, no murmurs.   Skin: no signs of acute rashes or trauma.   Musculoskeletal: joints exhibit full range of motion, without any pain to palpation. There are no signs of joint or muscle swelling. There is no tenderness to deep palpation of muscles.   Psychiatric: No hallucinatory behavior.      NEUROLOGICAL EXAM:   Mental status, orientation: Intubated/sedated.   Speech and language: No verbal output, does not follow commands.   Cranial nerve exam: Pupils are 3-2 mm bilaterally and equally reactive to light. Does not blink to visual threat. Eyes midline. Does not track. Face appears symmetric. Corneal weakly intact, cough/gag weakly intact.  Tongue is midline and without any signs of tongue biting or fasciculations.  Motor/Sensory exam: No spontaneous or purposeful motor activity; patient will intermittently posture (decerebrate) to nox stim, more biskly on the Left. Does not withdrawal or localize. Tone is normal. No abnormal movements (ie seizure like activity) were seen on exam.   Deep tendon reflexes:  Plantar responses are mute. There is no clonus.   Coordination: deferred, patient does not participate.   Gait: Not assessed at this time as patient is unable.       NIH Stroke Scale    1a Level of Consciousness 3  1b Orientation Questions 2  1c Response to Commands 2  2 Gaze   3 Visual Fields   4 Facial Movement   5 Motor Function (arm)   a Left 4  b Right 4  6 Motor Function (leg)   a Left 4  b Right 4  7 Limb Ataxia   8 Sensory 2  9 Language 3  10 Articulation   11 Extinction/Inattention     Score:  28      ANCILLARY DATA REVIEWED:     Lab Data Review:  Recent Results (from the past 24 hour(s))   URINALYSIS    Collection Time: 09/29/20  8:29 PM    Specimen: Urine, Clean Catch   Result Value Ref Range    Color Yellow     Character Clear     Specific Gravity 1.010 <1.035    Ph 5.5 5.0 - 8.0    Glucose Negative Negative mg/dL    Ketones Negative Negative mg/dL    Protein Negative Negative mg/dL    Bilirubin Negative Negative    Urobilinogen, Urine 0.2 Negative    Nitrite Negative Negative    Leukocyte Esterase Negative Negative    Occult Blood Small (A) Negative    Micro Urine Req Microscopic    URINE DRUG SCREEN    Collection Time: 09/29/20  8:29 PM   Result Value Ref Range    Amphetamines Urine Negative Negative    Barbiturates Negative Negative    Benzodiazepines Positive (A) Negative    Cocaine Metabolite Negative Negative    Methadone Negative Negative    Opiates Negative Negative    Oxycodone Negative Negative    Phencyclidine -Pcp Negative Negative    Propoxyphene Negative Negative    Cannabinoid Metab Negative Negative   URINE MICROSCOPIC (W/UA)    Collection Time: 09/29/20  8:29 PM   Result Value Ref Range    WBC 0-2 /hpf    RBC 2-5 (A) /hpf    Bacteria Negative None /hpf    Epithelial Cells Negative /hpf    Hyaline Cast 0-2 /lpf   TSH WITH REFLEX TO FT4    Collection Time: 09/29/20  8:40 PM   Result Value Ref Range    TSH 2.230 0.380 - 5.330 uIU/mL   VITAMIN B12    Collection Time: 09/29/20  8:40 PM   Result Value Ref Range    Vitamin B12 -True Cobalamin 503 211 - 911 pg/mL   T.PALLIDUM AB EIA    Collection Time: 09/29/20  8:40 PM   Result Value Ref Range    Syphilis, Treponemal Qual Non-Reactive Non-Reactive   HIV AG/AB COMBO ASSAY DIAGNOSTIC    Collection Time: 09/29/20  8:40 PM   Result Value Ref Range    HIV Ag/Ab Combo Assay Non-Reactive Non Reactive   Comp Metabolic Panel    Collection Time: 09/29/20  8:40 PM   Result Value Ref Range    Sodium 136 135 - 145 mmol/L    Potassium 4.0 3.6 - 5.5 mmol/L     Chloride 101 96 - 112 mmol/L    Co2 21 20 - 33 mmol/L    Anion Gap 14.0 7.0 - 16.0    Glucose 153 (H) 65 - 99 mg/dL    Bun 12 8 - 22 mg/dL    Creatinine 0.94 0.50 - 1.40 mg/dL    Calcium 9.3 8.5 - 10.5 mg/dL    AST(SGOT) 28 12 - 45 U/L    ALT(SGPT) 30 2 - 50 U/L    Alkaline Phosphatase 63 30 - 99 U/L    Total Bilirubin 0.7 0.1 - 1.5 mg/dL    Albumin 4.6 3.2 - 4.9 g/dL    Total Protein 7.4 6.0 - 8.2 g/dL    Globulin 2.8 1.9 - 3.5 g/dL    A-G Ratio 1.6 g/dL   MAGNESIUM    Collection Time: 09/29/20  8:40 PM   Result Value Ref Range    Magnesium 2.0 1.5 - 2.5 mg/dL   IONIZED CALCIUM    Collection Time: 09/29/20  8:40 PM   Result Value Ref Range    Ionized Calcium 1.2 1.1 - 1.3 mmol/L   PHOSPHORUS    Collection Time: 09/29/20  8:40 PM   Result Value Ref Range    Phosphorus 2.1 (L) 2.5 - 4.5 mg/dL   DIAGNOSTIC ALCOHOL    Collection Time: 09/29/20  8:40 PM   Result Value Ref Range    Diagnostic Alcohol <10.1 0.0 - 10.1 mg/dL   Sed Rate    Collection Time: 09/29/20  8:40 PM   Result Value Ref Range    Sed Rate Westergren 2 0 - 20 mm/hour   CORTISOL    Collection Time: 09/29/20  8:40 PM   Result Value Ref Range    Cortisol 14.9 0.0 - 23.0 ug/dL   ESTIMATED GFR    Collection Time: 09/29/20  8:40 PM   Result Value Ref Range    GFR If African American >60 >60 mL/min/1.73 m 2    GFR If Non African American >60 >60 mL/min/1.73 m 2   ISTAT ARTERIAL BLOOD GAS    Collection Time: 09/29/20  9:03 PM   Result Value Ref Range    Ph 7.367 (L) 7.400 - 7.500    Pco2 36.1 26.0 - 37.0 mmHg    Po2 131 (H) 64 - 87 mmHg    Tco2 22 20 - 33 mmol/L    S02 99 93 - 99 %    Hco3 20.7 17.0 - 25.0 mmol/L    BE -4 -4 - 3 mmol/L    Body Temp 37.0 C degrees    O2 Therapy 40 %    iPF Ratio 328     Ph Temp Vignesh 7.367 (L) 7.400 - 7.500    Pco2 Temp Co 36.1 26.0 - 37.0 mmHg    Po2 Temp Cor 131 (H) 64 - 87 mmHg    Specimen Arterial     Action Range Triggered NO     Inst. Qualified Patient YES    AMMONIA    Collection Time: 09/29/20  9:50 PM   Result Value  Ref Range    Ammonia 25 11 - 45 umol/L   ISTAT ARTERIAL BLOOD GAS    Collection Time: 09/30/20  3:48 AM   Result Value Ref Range    Ph 7.398 (L) 7.400 - 7.500    Pco2 37.2 (H) 26.0 - 37.0 mmHg    Po2 105 (H) 64 - 87 mmHg    Tco2 24 20 - 33 mmol/L    S02 98 93 - 99 %    Hco3 23.0 17.0 - 25.0 mmol/L    BE -2 -4 - 3 mmol/L    Body Temp 97.6 F degrees    O2 Therapy 30 %    iPF Ratio 350     Ph Temp Vignesh 7.406 7.400 - 7.500    Pco2 Temp Co 36.3 26.0 - 37.0 mmHg    Po2 Temp Cor 102 (H) 64 - 87 mmHg    Specimen Arterial     Action Range Triggered NO     Inst. Qualified Patient YES    CBC with Differential    Collection Time: 09/30/20  8:16 AM   Result Value Ref Range    WBC 9.5 4.8 - 10.8 K/uL    RBC 3.46 (L) 4.20 - 5.40 M/uL    Hemoglobin 10.8 (L) 12.0 - 16.0 g/dL    Hematocrit 31.9 (L) 37.0 - 47.0 %    MCV 92.2 81.4 - 97.8 fL    MCH 31.2 27.0 - 33.0 pg    MCHC 33.9 33.6 - 35.0 g/dL    RDW 42.3 35.9 - 50.0 fL    Platelet Count 234 164 - 446 K/uL    MPV 9.4 9.0 - 12.9 fL    Neutrophils-Polys 77.00 (H) 44.00 - 72.00 %    Lymphocytes 14.30 (L) 22.00 - 41.00 %    Monocytes 7.90 0.00 - 13.40 %    Eosinophils 0.30 0.00 - 6.90 %    Basophils 0.30 0.00 - 1.80 %    Immature Granulocytes 0.20 0.00 - 0.90 %    Nucleated RBC 0.00 /100 WBC    Neutrophils (Absolute) 7.34 (H) 2.00 - 7.15 K/uL    Lymphs (Absolute) 1.36 1.00 - 4.80 K/uL    Monos (Absolute) 0.75 0.00 - 0.85 K/uL    Eos (Absolute) 0.03 0.00 - 0.51 K/uL    Baso (Absolute) 0.03 0.00 - 0.12 K/uL    Immature Granulocytes (abs) 0.02 0.00 - 0.11 K/uL    NRBC (Absolute) 0.00 K/uL   Basic Metabolic Panel (BMP)    Collection Time: 09/30/20  8:16 AM   Result Value Ref Range    Sodium 141 135 - 145 mmol/L    Potassium 3.3 (L) 3.6 - 5.5 mmol/L    Chloride 111 96 - 112 mmol/L    Co2 19 (L) 20 - 33 mmol/L    Glucose 93 65 - 99 mg/dL    Bun 9 8 - 22 mg/dL    Creatinine 0.60 0.50 - 1.40 mg/dL    Calcium 7.4 (L) 8.5 - 10.5 mg/dL    Anion Gap 11.0 7.0 - 16.0   Magnesium    Collection Time:  09/30/20  8:16 AM   Result Value Ref Range    Magnesium 1.6 1.5 - 2.5 mg/dL   Phosphorus    Collection Time: 09/30/20  8:16 AM   Result Value Ref Range    Phosphorus 2.1 (L) 2.5 - 4.5 mg/dL   ESTIMATED GFR    Collection Time: 09/30/20  8:16 AM   Result Value Ref Range    GFR If African American >60 >60 mL/min/1.73 m 2    GFR If Non African American >60 >60 mL/min/1.73 m 2   Routine (COVID/SARS COV-2 In-House PCR up to 24 hours)    Collection Time: 09/30/20  8:19 AM    Specimen: Nasopharyngeal; Respirate   Result Value Ref Range    COVID Order Status Received    SARS-CoV-2, PCR (In-House)    Collection Time: 09/30/20  8:19 AM   Result Value Ref Range    SARS-CoV-2 Source NP Swab     SARS-CoV-2 by PCR NotDetected        Labs reviewed by me.       Imaging reviewed by me:     CT-CTA HEAD WITH & W/O-POST PROCESS   Final Result      1.  There is acute mid and distal basilar artery occlusion extending into the left greater than right posterior cerebral arteries.   2.  Findings have already been discussed with the ordering physician and neurologist by Dr. Albert of the IR radiology service.      CT-CEREBRAL PERFUSION ANALYSIS   Final Result      1.  Cerebral blood flow less than 30% likely representing completed infarct = 0 mL.      2.  T Max more than 6 seconds likely representing combination of completed infarct and ischemia = 100 mL.      3.  Mismatched volume likely representing ischemic brain/penumbra = 100 mL      4.  Please note that the cerebral perfusion was performed on the limited brain tissue around the basal ganglia region. Infarct/ischemia outside the CT perfusion sections can be missed in this study.      MR-VENOGRAM (MRV) HEAD   Final Result      Cerebral magnetic resonance venogram within normal limits with no evidence of dural venous sinus thrombosis.      MR-BRAIN-WITH & W/O   Final Result      1.  Findings suggesting distal basilar artery and left posterior cerebral artery thrombosis.   2.  Acute  ischemia within the midbrain/khang.   3.  Partially empty sella and slightly prominent fluid about the optic nerve sheaths. Correlate for any evidence of intracranial hypertension.      These findings were discussed with Dr. Best on 9/30/2020 10:11 AM.      DX-CHEST-PORTABLE (1 VIEW)   Final Result         1.  No acute cardiopulmonary disease.      CT-CTA NECK WITH & W/O-POST PROCESSING    (Results Pending)   IR-THROMBO MECHANICAL ARTERY,INIT    (Results Pending)       Modified Goldonna Scale (MRS): 0 = No symptoms      ASSESSMENT AND PLAN:  45-year old female with unknown PMhx who presented to [facility] on 9/30/20 as a transfer from [facility] ED where she presented for a chief complaint of a ?2-day history of retro orbital headache, Left facial numbness and seizure-like activity on arrival; was ultimately intubated for airway protection and transferred to [facility]. Here, she received Keppra 500 mg IV; later in the evening underwent MRV that revealed no sinus venous thrombosis. She had MRI Brain w/wo contrast as well; this revealed pontine/midbrain restricted diffusion consistent with evolving ischemia; no hemorrhage. STAT CTA head/neck this morning revealed thromboses involving BL PCAs, Basilar, and Left vert with probable dissection. Patient now STAT to Thrombectomy for intervention. NIHSS is 28. Etiology of the above remains unclear.     Recommendations/Plan:     -Will follow up with results of IR thrombectomy and make additional recommendations accordingly.   -Continued ICU care post-thrombectomy; q1h and PRN Neuro assessment and VS. Permissive HTN OK; likely up to 180 systolic (will adjust parameters depending on result of IR thromb)  -Likely plan for repeat MRI Brain wo contrast in coming 1-2 days.   -EEG ordered; pending. For now continue Keppra 500 mg IV q12h.  -Heparin gtt vs antiplatelet tx/DAPT; will await IR input   -Telemetry; currently SR. Screen for Afib/arrhythmia. Obtain TTE with bubble  study.   -Atorvastatin 80 mg PO q HS. Check lipid panel.   -Recommend aggressive BG management per primary team. Avoid IVF with Dextrose. BG goal 140-180. Check hemoglobin A1c.   -In coming days, will consider hypercoag studies (and hope to gain further information regarding patient's PMHX/HPI) given unclear nature and etiology of the above.   -PT/OT/SLP eval and treat, when appropriate. Physiatry consult when appropriate.   -Will follow up with results of the above and make additional recommendations accordingly. All other medical management per primary/ICU team.   -DVT PPX: SCDs.      The plan of care above has been discussed with Dr. Foreman.     NANO Teran.[email or web address].  [facility]

## 2020-09-30 NOTE — PROGRESS NOTES
1045- Patient transported off unit to STAT CT with ACLS RN, CCT, and RT. Transported with monitor and consent.     1145- This RN transported patient to IR and gave report to IR RN.

## 2020-09-30 NOTE — PROCEDURES
ROUTINE ELECTROENCEPHALOGRAM REPORT      Referring provider: ALLEN Hastings.     DOS: 9/30/2020 (total recording of 23 minutes)    INDICATION:  Obdulia High 45 y.o. female presenting with seizure.     CURRENT ANTIEPILEPTIC REGIMEN: Levetiracetam. Propofol for sedation was stopped prior to test.     TECHNIQUE: 30 channel routine electroencephalogram (EEG) was performed in accordance with the international 10-20 system. The study was reviewed in bipolar and referential montages. The recording examined the patient during wakeful state.    DESCRIPTION OF THE RECORD:  During the wakefulness, the background showed a symmetrical 10 Hz alpha activity posteriorly with amplitude of 70 mV.  There was reactivity to eye closure/opening.  A normal anterior-posterior gradient was noted with faster beta frequencies seen anteriorly.      ACTIVATION PROCEDURES:   Not performed.       ICTAL AND/OR INTERICTAL FINDINGS:   No focal or generalized epileptiform activity noted. No regional slowing was seen during this routine study.  No seizures were reported or recorded during the study. Patient was agitated and confused.     EKG: sampling of the EKG recording demonstrated sinus rhythm.       INTERPRETATION:  This is a normal routine EEG recording in the awake state.  Patient was agitated and confused. No seizures captured. Clinical correlation is recommended.    Note: A normal EEG does not rule out epilepsy.  If the clinical suspicion remains high for seizures, a prolonged recording to capture clinical or subclinical events may be helpful.        Andrea Mckeon MD   Epilepsy and Neurodiagnostics.   Clinical  of Neurology [facility] [employer] [facility] [employer].   Diplomate in Neurology, Epilepsy, and Electrodiagnostic Medicine.   Office: [phone]  Fax: [phone]

## 2020-09-30 NOTE — PROGRESS NOTES
"Critical Care Progress Note    Date of admission  9/29/2020    Chief Complaint  45 y.o. female admitted 9/29/2020 with seizure, intubated PTA    Hospital Course    \"45 y.o. female who presented 9/29/2020 with unknown past medical hx that presented to [place] ER for 2 days of retro-orbital headache, nausea and left side facial numbness. She had a 10 minutes seizure requiring valium and ativan and then required intubation with etomidate and succ. She presented with normal vitals afebrile other then hypertension to 180/105 had normal CBC and no cmp could be seen on paper work. CT head that was negative and post intubated CXR that showed good placement of ET tube. She was transferred here and was given rocuronium and fentanyl in route. She present on propofol with ET in place so limited history is able to be obtained and significant other is a flight medical personnel that is on his way to the hospital. With lifting sedation she was able to follow commands. Neurology was consulted and orders were placed. I was asked to admit the patient for ventilator management.      Further hx per  a family practice physician. She had left occipital headache starting on Thursday that progressed to severe today with visual scotoma and left facial numbness. She has had about 6 prior episodes of complex ocular migraines in pass. No family hx of sah, no trauma or hypercoagulable states no recent sickness or fever chills. No hx of hypertension only hx of migraines and PCOS. No prior seizures. No substance abuse.\"       Interval Problem Update  Reviewed last 24 hour events:  Moves all ext, moves L spont, decerb to stim; w/d's R nl  Not following,   PERRL  Propofol 40  SR    - 160  PIVs x 3  CXR Clear  Vent day 2 18/8/30  7.4/36/102  Pepcid, no anticoag  Replace Mg, K    Update: MRI this am - basilar artery and L posterior cerebral artery thrombosis; acute ischemia in midbrain/khang; Partially empty sella and slightly prominent " fluid about the optic nerve sheaths. Correlate for any evidence of intracranial hypertension    Reviewed with Neurology; Stat CTA and IR thrombectomy       Review of Systems  Review of Systems   Unable to perform ROS: Intubated        Vital Signs for last 24 hours   Temp:  [36.4 °C (97.5 °F)-36.9 °C (98.4 °F)] 36.4 °C (97.5 °F)  Pulse:  [63-96] 66  Resp:  [13-38] 18  BP: ()/() 98/63  SpO2:  [97 %-100 %] 99 %    Hemodynamic parameters for last 24 hours       Respiratory Information for the last 24 hours  Vent Mode: APVCMV  Rate (breaths/min): 18  Vt Target (mL): 420  PEEP/CPAP: 8  MAP: 11  Control VTE (exp VT): 533    Physical Exam   Physical Exam  Vitals signs and nursing note reviewed.   Constitutional:       Comments: Intubated, sedated   HENT:      Head: Normocephalic.      Mouth/Throat:      Mouth: Mucous membranes are moist.      Comments: ETT in place  Eyes:      Pupils: Pupils are equal, round, and reactive to light.   Neck:      Musculoskeletal: No neck rigidity or muscular tenderness.   Cardiovascular:      Rate and Rhythm: Normal rate.      Heart sounds: No murmur.   Pulmonary:      Breath sounds: No wheezing.      Comments: Full ventilator support  Abdominal:      General: There is no distension.      Palpations: Abdomen is soft.      Tenderness: There is no guarding.      Comments: Abdominal stria   Musculoskeletal:         General: No swelling or tenderness.   Skin:     General: Skin is warm and dry.      Capillary Refill: Capillary refill takes less than 2 seconds.   Neurological:      Comments: Intubated and sedated; not following, moves R side, L decerebrate to stim   Psychiatric:      Comments: Unable to determine         Medications  Current Facility-Administered Medications   Medication Dose Route Frequency Provider Last Rate Last Dose   • levETIRAcetam (Keppra) 500 mg in 100 mL NaCl IV premix  500 mg Intravenous Q12HRS Eusebio Fischer M.D.   Stopped at 09/30/20 0625   • labetalol  (NORMODYNE/TRANDATE) injection 10 mg  10 mg Intravenous Q4HRS PRN Benigno Mart M.D.       • Respiratory Therapy Consult   Nebulization Continuous RT Benigno Mart M.D.       • ipratropium-albuterol (DUONEB) nebulizer solution  3 mL Nebulization Q2HRS PRN (RT) Benigno Mart M.D.       • famotidine (PEPCID) tablet 20 mg  20 mg Enteral Tube Q12HRS Benigno Mart M.D.   Stopped at 09/30/20 0600    Or   • famotidine (PEPCID) injection 20 mg  20 mg Intravenous Q12HRS Benigno Mart M.D.   20 mg at 09/30/20 0735   • senna-docusate (PERICOLACE or SENOKOT S) 8.6-50 MG per tablet 2 Tab  2 Tab Enteral Tube BID Benigno Mart M.D.   Stopped at 09/30/20 0600    And   • polyethylene glycol/lytes (MIRALAX) PACKET 1 Packet  1 Packet Enteral Tube QDAY PRN Benigno Mart M.D.        And   • magnesium hydroxide (MILK OF MAGNESIA) suspension 30 mL  30 mL Enteral Tube QDAY PRN Benigno Mart M.D.        And   • bisacodyl (DULCOLAX) suppository 10 mg  10 mg Rectal QDAY PRN Benigno Mart M.D.       • MD Alert...ICU Electrolyte Replacement per Pharmacy   Other PHARMACY TO DOSE Benigno Mart M.D.       • lidocaine (XYLOCAINE) 1 % injection 1-2 mL  1-2 mL Tracheal Tube Q30 MIN PRN Benigno Mart M.D.       • fentaNYL (SUBLIMAZE) injection 50 mcg  50 mcg Intravenous Q15 MIN PRN Benigno Mart M.D.   50 mcg at 09/29/20 2201    And   • fentaNYL (SUBLIMAZE) injection 100 mcg  100 mcg Intravenous Q15 MIN PRN Benigno Mart M.D.   100 mcg at 09/30/20 0631    And   • fentaNYL (SUBLIMAZE) 50 mcg/mL in 50mL (Continuous Infusion)   Intravenous Continuous Benigno Mart M.D.   Stopped at 09/29/20 2130    And   • propofol (DIPRIVAN) injection  0-40 mcg/kg/min Intravenous Continuous Benigno Mart M.D. 21.4 mL/hr at 09/30/20 0733 40 mcg/kg/min at 09/30/20 0733       Fluids    Intake/Output Summary (Last 24 hours) at 9/30/2020 0836  Last data filed at 9/30/2020 0800  Gross per 24 hour   Intake 379.98 ml    Output 650 ml   Net -270.02 ml       Laboratory  Recent Labs     09/29/20  2103 09/30/20  0348   ISTATAPH 7.367* 7.398*   ISTATAPCO2 36.1 37.2*   ISTATAPO2 131* 105*   ISTATATCO2 22 24   PRGICYL8QOQ 99 98   ISTATARTHCO3 20.7 23.0   ISTATARTBE -4 -2   ISTATTEMP 37.0 C 97.6 F   ISTATFIO2 40 30   ISTATSPEC Arterial Arterial   ISTATAPHTC 7.367* 7.406   [id number] 131* 102*         Recent Labs     09/29/20 2040   SODIUM 136   POTASSIUM 4.0   CHLORIDE 101   CO2 21   BUN 12   CREATININE 0.94   MAGNESIUM 2.0   PHOSPHORUS 2.1*   CALCIUM 9.3     Recent Labs     09/29/20 2040   ALTSGPT 30   ASTSGOT 28   ALKPHOSPHAT 63   TBILIRUBIN 0.7   GLUCOSE 153*     Recent Labs     09/29/20 2040   ASTSGOT 28   ALTSGPT 30   ALKPHOSPHAT 63   TBILIRUBIN 0.7     No results for input(s): RBC, HEMOGLOBIN, HEMATOCRIT, PLATELETCT, PROTHROMBTM, APTT, INR, IRON, FERRITIN, TOTIRONBC in the last 72 hours.    Imaging  X-Ray:  I have personally reviewed the images and compared with prior images.    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     Seizure (HCC)  Assessment & Plan  Continue keppra, f/u EEG      Acute respiratory failure with hypoxia and hypercapnia (HCC)  Assessment & Plan  Intubated date: 9/29 for airway protection for seizure   LTV/LPS, full support;     Essential hypertension  Assessment & Plan  Hypertension on presentation  Now with basilar art thrombus/disection; SBP < 180        VTE:  On hold pending results of procedure  Ulcer: H2 Antagonist  Lines: None    I have performed a physical exam and reviewed and updated ROS and Plan today (9/30/2020). In review of yesterday's note (9/29/2020), there are no changes except as documented above.     Discussed patient condition and risk of morbidity and/or mortality with RN, RT, Pharmacy, QA team and neurology  The patient remains critically ill.  Critical care time = 80 minutes in directly providing and coordinating critical care and extensive data review.  No time overlap and excludes procedures.

## 2020-09-30 NOTE — ED NOTES
/Obdulia High    Chief Complaint   Patient presents with   • Seizure   • Headache     Pt bib [facility] [employer], transfer from [facility]. Per EMS, pt presented to ED d/t HA that started on 9/27. Per report, pt also c/o vision changed, photophobia, and numbness to LEFT side of face. Pt's  reports once pt was back to ED room, pt had a seizure. Pt was evaluated at out lying facility for stroke as well. CT pta was negative for any acute findings as well as chest XR. Pt was given valium, ativan, fentanyl at [facility] as well as RSI meds, succ and etomidate. EMS reports pt was given 100mg fentanyl, 4mg versed, and 50mg rocc en route to [facility]. EMS reports pt has no known med hx and does not take prescription medications. Upon arrival to ED and prior to propofol gtts initiated, pt was able to respond to verbal commands.   Chart up and ready for ERP now.

## 2020-09-30 NOTE — PROGRESS NOTES
Patient back to unit, EEG tech present for EEG.   Neuro checks and groin checks completed per protocol, see flowsheets.

## 2020-09-30 NOTE — ED PROVIDER NOTES
ED Provider Note    CHIEF COMPLAINT  No chief complaint on file.      HPI  Obdulia High is a 45 y.o. female here for evaluation of seizure.  Patient was seen and evaluated outlying facility at [facility] in [place], because she was having some headaches.  The patient was coming into the hospital, when she began having a seizure in the car.  She was brought into the ER, at the possible stroke versus seizure, and was further evaluated.  The patient was given 4 mg of Ativan, and 10 mg of Valium, and was still seizing.  She was then intubated.  And transferred here for evaluation.  No other history is obtained.   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  He is on his way here.  Patient has no reported history of seizures.  CT scan obtained was negative for any acute finding as was chest x-ray.  Blood work is still pending prior to arrival being sent here.  Patient was intubated prior to arrival for airway management.  No other antiepileptics were given prior to arrival.      ROS  See HPI for further details, o/w negative.     PAST MEDICAL HISTORY   Migraine headache    SOCIAL HISTORY  Social History     Tobacco Use   • Smoking status: Not on file   Substance and Sexual Activity   • Alcohol use: Not on file   • Drug use: Not on file   • Sexual activity: Not on file       Family History  No bleeding disorders noted    SURGICAL HISTORY  patient denies any surgical history    CURRENT MEDICATIONS  Home Medications    **Home medications have not yet been reviewed for this encounter**         ALLERGIES  Not on File    REVIEW OF SYSTEMS  See HPI for further details. Review of systems as above, otherwise all other systems are negative.     PHYSICAL EXAM  Constitutional: Well developed, intubated  HEENT:  atraumatic. Posterior pharynx clear moist but with ET tube in place  Eyes:  Normal sclera.  3 mm and reactive  Neck: Supple, Full range of motion,   Chest/Pulmonary: clear to ausculation. Symmetrical expansion.    Cardio: Regular rate and rhythm with no murmur.   Abdomen: Soft, No palpable masses.  Musculoskeletal: No deformity, no edema, neurovascular intact.   Neuro: Sedated, downgoing toes.  GCS 3  Skin; warm and dry, no petechial rash    PROCEDURES     MEDICAL RECORD  I have reviewed patient's medical record and pertinent results are listed.    COURSE & MEDICAL DECISION MAKING  I have reviewed any medical record information, laboratory studies and radiographic results as noted above.    Results for orders placed or performed during the hospital encounter of 09/29/20   URINALYSIS    Specimen: Urine, Clean Catch   Result Value Ref Range    Color Yellow     Character Clear     Specific Gravity 1.010 <1.035    Ph 5.5 5.0 - 8.0    Glucose Negative Negative mg/dL    Ketones Negative Negative mg/dL    Protein Negative Negative mg/dL    Bilirubin Negative Negative    Urobilinogen, Urine 0.2 Negative    Nitrite Negative Negative    Leukocyte Esterase Negative Negative    Occult Blood Small (A) Negative    Micro Urine Req Microscopic    URINE MICROSCOPIC (W/UA)   Result Value Ref Range    WBC 0-2 /hpf    RBC 2-5 (A) /hpf    Bacteria Negative None /hpf    Epithelial Cells Negative /hpf    Hyaline Cast 0-2 /lpf     MR-BRAIN-WITH & W/O    (Results Pending)         8:20 PM  Patient intubated, without sedation prior to arrival other than fentanyl and some Versed pushes.  I will order propofol at this time.  Patient is hypertensive with a systolic in the 180s.    8:39 PM  I spoke to Dr. Fischer, regarding the patient.  He states nothing further tonight, no antibiotics necessary at this time.  He will likely order Keppra and EEG.  He will be placed in these orders.  He will see as consult.    8:55 PM  icu paged.     9:05 PM  Dr. Mart is here to see the pt.  He will take primarily.    Her blood pressure is trending downward, and she is on the propofol.      CRITICAL CARE  The very real possibility of a deterioration of this patient's  condition required the highest level of my preparedness for sudden, emergent intervention.  I provided critical care services, which included medication orders, frequent reevaluations of the patient's condition and response to treatment, ordering and reviewing test results, and discussing the case with various consultants.  The critical care time associated with the care of the patient was 45 minutes. Review chart for interventions. This time is exclusive of any other billable procedures.       HYDRATION: Based on the patient's presentation of Dehydration the patient was given IV fluids. IV Hydration was used because oral hydration was not adequate alone. Upon recheck following hydration, the patient was improved.           FINAL IMPRESSION  Seizure  Critical care time 45 minutes.       Electronically signed by: Dragan Griffith D.O., 9/29/2020 8:34 PM

## 2020-09-30 NOTE — CONSULTS
Critical Care Consultation    Date of consult: 9/29/2020    Referring Physician  Dragan Griffith D.O.    Reason for Consultation  Seizure respiratory failure requiring intubation    History of Presenting Illness  45 y.o. female who presented 9/29/2020 with unknown past medical hx that presented to [facility] for 2 days of retro-orbital headache, nausea and left side facial numbness. She had a 10 minutes seizure requiring valium and ativan and then required intubation with etomidate and succ. She presented with normal vitals afebrile other then hypertension to 180/105 had normal CBC and no cmp could be seen on paper work. CT head that was negative and post intubated CXR that showed good placement of ET tube. She was transferred here and was given rocuronium and fentanyl in route. She present on propofol with ET in place so limited history is able to be obtained and significant other is a flight medical personnel that is on his way to the hospital. With lifting sedation she was able to follow commands. Neurology was consulted and orders were placed. I was asked to admit the patient for ventilator management.     Further hx per  a family practice physician. She had left occipital headache starting on Thursday that progressed to severe today with visual scotoma and left facial numbness. She has had about 6 prior episodes of complex ocular migraines in pass. No family hx of sah, no trauma or hypercoagulable states no recent sickness or fever chills. No hx of hypertension only hx of migraines and PCOS. No prior seizures. No substance abuse.     Code Status  Full Code    Review of Systems  Review of Systems   Unable to perform ROS: Intubated       Past Medical History   has no past medical history on file.    Surgical History   has no past surgical history on file.    Family History  family history is not on file.    Social History       Medications  Home Medications    **Home medications have not yet been reviewed  for this encounter**       Current Facility-Administered Medications   Medication Dose Route Frequency Provider Last Rate Last Dose   • levETIRAcetam (Keppra) 500 mg in 100 mL NaCl IV premix  500 mg Intravenous Q12HRS Eusebio Fischer M.D. 400 mL/hr at 09/29/20 2119 500 mg at 09/29/20 2119   • labetalol (NORMODYNE/TRANDATE) injection 10 mg  10 mg Intravenous Q4HRS PRN Benigno Mart M.D.       • Respiratory Therapy Consult   Nebulization Continuous RT Benigno Mart M.D.       • ipratropium-albuterol (DUONEB) nebulizer solution  3 mL Nebulization Q2HRS PRN (RT) Benigno Mart M.D.       • famotidine (PEPCID) tablet 20 mg  20 mg Enteral Tube Q12HRS Benigno Mart M.D.        Or   • famotidine (PEPCID) injection 20 mg  20 mg Intravenous Q12HRS Benigno Mart M.D.       • senna-docusate (PERICOLACE or SENOKOT S) 8.6-50 MG per tablet 2 Tab  2 Tab Enteral Tube BID Benigno Mart M.D.        And   • polyethylene glycol/lytes (MIRALAX) PACKET 1 Packet  1 Packet Enteral Tube QDAY PRN Benigno Mart M.D.        And   • magnesium hydroxide (MILK OF MAGNESIA) suspension 30 mL  30 mL Enteral Tube QDAY PRN Benigno Mart M.D.        And   • bisacodyl (DULCOLAX) suppository 10 mg  10 mg Rectal QDAY PRN Benigno Mart M.D.       • MD Alert...ICU Electrolyte Replacement per Pharmacy   Other PHARMACY TO DOSE Benigno Mart M.D.       • lidocaine (XYLOCAINE) 1 % injection 1-2 mL  1-2 mL Tracheal Tube Q30 MIN PRN Benigno Mart M.D.       • fentaNYL (SUBLIMAZE) injection 50 mcg  50 mcg Intravenous Q15 MIN PRN Benigno Mart M.D.        And   • fentaNYL (SUBLIMAZE) injection 100 mcg  100 mcg Intravenous Q15 MIN PRN Benigno Mart M.D.        And   • fentaNYL (SUBLIMAZE) 50 mcg/mL in 50mL (Continuous Infusion)   Intravenous Continuous Benigno Mart M.D.        And   • propofol (DIPRIVAN) injection  0-40 mcg/kg/min Intravenous Continuous Benigno Mart M.D.         No current outpatient  medications on file.       Allergies  No Known Allergies    Vital Signs last 24 hours  Temp:  [36.6 °C (97.9 °F)] 36.6 °C (97.9 °F)  Pulse:  [71-96] 78  Resp:  [15-26] 15  BP: (134-192)/() 144/88  SpO2:  [99 %-100 %] 100 %    Physical Exam  Physical Exam  Vitals signs reviewed.   Constitutional:       General: She is not in acute distress.     Appearance: She is not ill-appearing, toxic-appearing or diaphoretic.      Comments: Intubated sitting upright   HENT:      Head: Normocephalic.      Mouth/Throat:      Mouth: Mucous membranes are moist.      Comments: ET in place  Eyes:      Pupils: Pupils are equal, round, and reactive to light.   Neck:      Musculoskeletal: No neck rigidity or muscular tenderness.   Cardiovascular:      Rate and Rhythm: Normal rate.      Heart sounds: No murmur.   Pulmonary:      Effort: No respiratory distress.      Breath sounds: No stridor. Rhonchi present. No wheezing or rales.      Comments: Mechanical breath bilateral ronchi  Chest:      Chest wall: No tenderness.   Abdominal:      General: There is no distension.      Palpations: There is no mass.      Tenderness: There is no abdominal tenderness. There is no guarding or rebound.      Hernia: No hernia is present.      Comments: Abdominal stria   Musculoskeletal:         General: No swelling or tenderness.   Skin:     General: Skin is warm and dry.      Coloration: Skin is not jaundiced or pale.      Findings: No bruising, erythema, lesion or rash.   Neurological:      General: No focal deficit present.      Mental Status: She is alert.      Cranial Nerves: No cranial nerve deficit.      Sensory: No sensory deficit.      Motor: No weakness.      Coordination: Coordination normal.      Comments: Intubated and sedated but with lifting sedation she can follow commands bilateral   Psychiatric:      Comments: Unable to determine         Fluids  No intake or output data in the 24 hours ending 09/29/20 2633    Laboratory  Recent  Results (from the past 48 hour(s))   URINALYSIS    Collection Time: 09/29/20  8:29 PM    Specimen: Urine, Clean Catch   Result Value Ref Range    Color Yellow     Character Clear     Specific Gravity 1.010 <1.035    Ph 5.5 5.0 - 8.0    Glucose Negative Negative mg/dL    Ketones Negative Negative mg/dL    Protein Negative Negative mg/dL    Bilirubin Negative Negative    Urobilinogen, Urine 0.2 Negative    Nitrite Negative Negative    Leukocyte Esterase Negative Negative    Occult Blood Small (A) Negative    Micro Urine Req Microscopic    URINE MICROSCOPIC (W/UA)    Collection Time: 09/29/20  8:29 PM   Result Value Ref Range    WBC 0-2 /hpf    RBC 2-5 (A) /hpf    Bacteria Negative None /hpf    Epithelial Cells Negative /hpf    Hyaline Cast 0-2 /lpf   ISTAT ARTERIAL BLOOD GAS    Collection Time: 09/29/20  9:03 PM   Result Value Ref Range    Ph 7.367 (L) 7.400 - 7.500    Pco2 36.1 26.0 - 37.0 mmHg    Po2 131 (H) 64 - 87 mmHg    Tco2 22 20 - 33 mmol/L    S02 99 93 - 99 %    Hco3 20.7 17.0 - 25.0 mmol/L    BE -4 -4 - 3 mmol/L    Body Temp 37.0 C degrees    O2 Therapy 40 %    iPF Ratio 328     Ph Temp Vignesh 7.367 (L) 7.400 - 7.500    Pco2 Temp Co 36.1 26.0 - 37.0 mmHg    Po2 Temp Cor 131 (H) 64 - 87 mmHg    Specimen Arterial     Action Range Triggered NO     Inst. Qualified Patient YES        Imaging  MR-BRAIN-WITH & W/O    (Results Pending)   DX-CHEST-PORTABLE (1 VIEW)    (Results Pending)   DX-CHEST-PORTABLE (1 VIEW)    (Results Pending)       Assessment/Plan  Seizure (HCC)  Assessment & Plan  Monitor neuro exam and EEG  Titrate and monitor AED's drug/levels.   Avoid seizure lowering medication/antibiotics  Seizure precautions    It status epilepticus: provide rapid control of seizure (<5mins) and or burst suppression for 48-72 hrs    Consider: meningitis/encephalitis, CVA/structural, drug withdrawal, toxicologic, NMDA encephalitis, TCA's or electrolyte disturbances, hypertension.      Lorazepam 0.1mg/kg IV stat up to  8mg  Keppra 60mg/kg IV over 10 minutes or up to 4.5gr/day (maintenance 1-2gr Q12)  Valproic acid 40mg/kg over 10 minutes up to 3000mg/day can rebolus 20mg/kg (maintenance 20-60mg/kg divided dose)    Refractory status epilepticus 2+ drugs  Consider Ketamine for glutamate receptor antagonist in refractory cases    Neurology consulted seems unlikely bacterial meningitis, check ESR for temporal arteritis, follow up MRI/MRA  Aggressive BP control to rule out PRESS        Acute respiratory failure with hypoxia and hypercapnia (HCC)  Assessment & Plan  Intubated date: 9/29 for airway protection for seizure   Goal saturation > 92%    Monitor pulse oximetry and adjust peep and FIO2 to abg/vbg, and peep optimization.  Monitor ventilator waveforms and titrate flow/peep and volumes according.   CXR: monitor lung volumes and tube/line placement  VAP bundle prevention, oral care, post pyloric feeding  Head of bed > 30 degree  GI prophylaxis  Daily awakening and SBT trials unless contraindicated  Monitor for liberation/extubation  Respiratory treatments: prn    SBT in am with likely extubation since following commands    Essential hypertension  Assessment & Plan  Hypertension on presentation  Check UDS  Labetalol prn goal SBP < 160    Headache  Assessment & Plan  Complex migraine, temporal arteritis  Follow up MRI/MRA, ESR neurology consulting  Consider MRV    May need LP   Seems unlikely SAH with seizure and no blood seen on CT head but this is > 48 hours since symptoms onset      Discussed patient condition and risk of morbidity and/or mortality with RN, RT, Pharmacy, Charge nurse / hot rounds, Patient and neurology.    The patient remains critically ill from seizure headache and ventilator dependency.  Critical care time = 59 minutes in directly providing and coordinating critical care and extensive data review.  No time overlap and excludes procedures.

## 2020-09-30 NOTE — PROGRESS NOTES
Patient brought to IR suite by Sophia ICU RN and Ovidio CCT, monitored, patient on ventilator, intubated and sedated. Report received by Sophia ICU RN. Unable to complete NIH score pre procedure as patient is sedated. Patient was placed in a supine position. Patient underwent a cerebral angiogram with thrombectomy by Dr. Albert. MD Abel began case and established femoral access, MD Albert took over at 1212. Second time out for patient safety taken at this time. Vitals were taken every 5 minutes and remained stable during procedure (see doc flow sheet for results). Penumbra and Trevo systems used during procedure. Angioseal deployed to right femoral artery, then a tegaderm and gauze dressing was placed over surgical site. Report called to Sophia ANDUJAR. Pt transported by ICU bed with Sophia ANDUJAR, Ovidio CCT and RT to [place] [id number].     Terumo Angio-Seal VIP Vascular Closure Device 8Fr placed in right femoral artery  Ref# [id number] Lot# [id number] Exp Date 05/31/2021     Angioseal at : 1408  NIH score post procedure : unable to perform, patient is sedated/intubated

## 2020-09-30 NOTE — ED NOTES
Bedside report given to Kerry ANDUJAR, questions and comments addressed. Pt transported now to R102.

## 2020-09-30 NOTE — CARE PLAN
Ventilator Daily Summary    Vent Day #  2  Ventilator settings changed this shift:  no  Weaning trials:  no  Respiratory Procedures:  no  Plan: Continue current ventilator settings and wean mechanical ventilation as tolerated per physician orders.   [phone]

## 2020-09-30 NOTE — ASSESSMENT & PLAN NOTE
Intubated date: 9/29 for airway protection for seizure   Liberated from mechanical ventilation 10/1

## 2020-09-30 NOTE — ED NOTES
Unable to complete med rec at this time. Pt is unable to participate in an interview at this time. No home pharmacy listed. Unable to reach family at this time.

## 2020-09-30 NOTE — OR SURGEON
Immediate Post- Operative Note        PostOp Diagnosis: Basilar artery thrombosis       Procedure(s): Mechanical thrombectomy.    Findings:The basilar artery is ope.There is a thrombus in the left Vertebral artery due to the dissection.There is antegrade flow.There is also thrombus in the left P2 segment.      Estimated Blood Loss: Less than 5 ml        Complications: None            9/30/2020     2:17 PM     Gallito Albert M.D.

## 2020-09-30 NOTE — CONSULTS
"Neurology Initial Consult H&P  Neurohospitalist Service, [facility]    Referring Physician: Benigno Mart M.D.    Chief Complaint   Patient presents with   • Seizure   • Headache       HPI: Obdulia High is a 45 y.o. woman presenting for whom neurology has been consulted for seizure.  Unfortunately, patient is currently intubated and sedated thus unable to obtain any subjective history.  No family at bedside to provide corollary.  As documented by Dr. Benigno Mart 9/29/20, \"presented 9/29/2020 with unknown past medical hx that presented to [place] [facility] for 2 days of retro-orbital headache, nausea and left side facial numbness. She had a 10 minutes seizure requiring valium and ativan and then required intubation with etomidate and succ. She presented with normal vitals afebrile other then hypertension to 180/105 had normal CBC and no cmp could be seen on paper work. CT head that was negative and post intubated CXR that showed good placement of ET tube. She was transferred here and was given rocuronium and fentanyl in route. She present on propofol with ET in place.\"  Auxillary history suggests patient has a history of migraines and was LKN Thursday, 9/29/20 when the current bout of headaches started.  Seizure semiology described as GTC, as per Dr. Mart who spoke with family earlier tonight 9/29/20.    Review of systems: In addition to what is detailed in the HPI above, all other systems reviewed and are negative.    Past Medical History:   Migraines, PCOS    FHx:  Unable to obtain given intubation and sedation    SHx:    Unable to obtain given intubation and sedation    Allergies:  No Known Allergies    Medications:    Current Facility-Administered Medications:   •  levETIRAcetam (Keppra) 500 mg in 100 mL NaCl IV premix, 500 mg, Intravenous, Q12HRS, Eusebio Fischer M.D., Stopped at 09/29/20 2134  •  labetalol (NORMODYNE/TRANDATE) injection 10 mg, 10 mg, Intravenous, Q4HRS PRN, Benigno MODI " CADE Mart  •  Respiratory Therapy Consult, , Nebulization, Continuous RT, Benigno Mart M.D.  •  ipratropium-albuterol (DUONEB) nebulizer solution, 3 mL, Nebulization, Q2HRS PRN (RT), Benigno Mart M.D.  •  famotidine (PEPCID) tablet 20 mg, 20 mg, Enteral Tube, Q12HRS **OR** famotidine (PEPCID) injection 20 mg, 20 mg, Intravenous, Q12HRS, Benigno Mart M.D.  •  senna-docusate (PERICOLACE or SENOKOT S) 8.6-50 MG per tablet 2 Tab, 2 Tab, Enteral Tube, BID **AND** polyethylene glycol/lytes (MIRALAX) PACKET 1 Packet, 1 Packet, Enteral Tube, QDAY PRN **AND** magnesium hydroxide (MILK OF MAGNESIA) suspension 30 mL, 30 mL, Enteral Tube, QDAY PRN **AND** bisacodyl (DULCOLAX) suppository 10 mg, 10 mg, Rectal, QDAY PRN, Benigno Mart M.D.  •  MD Alert...ICU Electrolyte Replacement per Pharmacy, , Other, PHARMACY TO DOSE, Benigno Mart M.D.  •  lidocaine (XYLOCAINE) 1 % injection 1-2 mL, 1-2 mL, Tracheal Tube, Q30 MIN PRN, Benigno Mart M.D.  •  fentaNYL (SUBLIMAZE) injection 50 mcg, 50 mcg, Intravenous, Q15 MIN PRN, 50 mcg at 09/29/20 2201 **AND** fentaNYL (SUBLIMAZE) injection 100 mcg, 100 mcg, Intravenous, Q15 MIN PRN **AND** fentaNYL (SUBLIMAZE) 50 mcg/mL in 50mL (Continuous Infusion), , Intravenous, Continuous **AND** propofol (DIPRIVAN) injection, 0-40 mcg/kg/min, Intravenous, Continuous **AND** [START ON 9/30/2020] Triglyceride, , , Every 3 Days (0300), Benigno Mart M.D.  No current outpatient medications on file.    Physical Examination:     Vitals:    09/29/20 2145 09/29/20 2150 09/29/20 2155 09/29/20 2200   BP: 136/103 138/96 157/87 143/83   Pulse: 73 75 86 78   Resp: (!) 23 20 14 19   Temp:       TempSrc:       SpO2: 100% 100% 100% 100%   Weight:       Height:           General: Patient is intubated and sedated with propofol  Eyes: examination of optic disks not indicated at this time given acuity of consult  CV: RRR    NEUROLOGICAL EXAM:     Mental status: does not open eyes to  noxious stimulus, does not follow commands  Speech and language: intubated  Cranial nerve exam: Pupils are equal, round and reactive to light bilaterally. Visual fields: does not blink to threat bilaterally. Gaze in neutral position. Corneal reflexes intact bilaterally.  Extraocular muscles are intact to oculocephalic maneuver ie VOR intact. Face is symmetric. Unable to assess sensation in the face due to mental status. Cough and gag reflexes are intact.  Motor exam: Does not participate in formal strength testing. Tone is normal. No abnormal movements were seen on exam.  Sensory exam: no withdrawal from noxious stim RUE, brisk withdrawal from noxious LUE, triple flexion b/l LE  Deep tendon reflexes:  1+ throughout. Toes mute bilaterally  Coordination: not participatory due to mental status  Gait: deferred due to ICU status    Objective Data:    Labs:  No results found for: PROTHROMBTM, INR   No results found for: WBC, RBC, HEMOGLOBIN, HEMATOCRIT, MCV, MCH, MCHC, MPV, NEUTSPOLYS, LYMPHOCYTES, MONOCYTES, EOSINOPHILS, BASOPHILS, HYPOCHROMIA, ANISOCYTOSIS   No results found for: SODIUM, POTASSIUM, CHLORIDE, CO2, GLUCOSE, BUN, CREATININE, BUNCREATRAT, GLOMRATE   No results found for: CHOLSTRLTOT, LDL, HDL, TRIGLYCERIDE    No results found for: ALKPHOSPHAT, ASTSGOT, ALTSGPT, TBILIRUBIN     Imaging/Testing:    I interpreted and/or reviewed the patient's neuroimaging    DX-CHEST-PORTABLE (1 VIEW)   Final Result         1.  No acute cardiopulmonary disease.      MR-BRAIN-WITH & W/O    (Results Pending)   DX-CHEST-PORTABLE (1 VIEW)    (Results Pending)   MR-VENOGRAM (MRV) HEAD    (Results Pending)       Assessment and Plan:    Obdulia High is a 45 y.o. woman presenting for whom neurology has been consulted for seizure and HA.  Differential includes CVT and warrants dedicated neuroimaging.  Patient now stable and pending transfer to the ICU.  Will workup factors that could serve to lower seizure threshold.    Plan:    - MRI  brain and MRV head  - wean sedation and extubate when able  - continue Keppra 500mg BID; may convert IV to PO when safe to swallow 1:1 conversion  - seizure precautions  - workup for altered mental status: TSH, B12, thiamine, RPR, HIV, ammonia, UA, utox, EEG    The evaluation of the patient, and recommended management, was discussed with Dr. Mart at bedside in the ED.    Eusebio Fischer MD  Neurohospitalist, Acute Care Services   of Neurology

## 2020-10-01 ENCOUNTER — APPOINTMENT (OUTPATIENT)
Dept: RADIOLOGY | Facility: MEDICAL CENTER | Age: 45
DRG: 023 | End: 2020-10-01
Attending: INTERNAL MEDICINE
Payer: OTHER GOVERNMENT

## 2020-10-01 ENCOUNTER — APPOINTMENT (OUTPATIENT)
Dept: CARDIOLOGY | Facility: MEDICAL CENTER | Age: 45
DRG: 023 | End: 2020-10-01
Attending: NURSE PRACTITIONER
Payer: OTHER GOVERNMENT

## 2020-10-01 LAB
ACTION RANGE TRIGGERED IACRT: NO
ANION GAP SERPL CALC-SCNC: 10 MMOL/L (ref 7–16)
BASE EXCESS BLDA CALC-SCNC: -2 MMOL/L (ref -4–3)
BASOPHILS # BLD AUTO: 0.3 % (ref 0–1.8)
BASOPHILS # BLD: 0.04 K/UL (ref 0–0.12)
BODY TEMPERATURE: ABNORMAL DEGREES
BUN SERPL-MCNC: 7 MG/DL (ref 8–22)
CALCIUM SERPL-MCNC: 8 MG/DL (ref 8.5–10.5)
CHLORIDE SERPL-SCNC: 103 MMOL/L (ref 96–112)
CHOLEST SERPL-MCNC: 112 MG/DL (ref 100–199)
CO2 BLDA-SCNC: 23 MMOL/L (ref 20–33)
CO2 SERPL-SCNC: 23 MMOL/L (ref 20–33)
CREAT SERPL-MCNC: 0.8 MG/DL (ref 0.5–1.4)
EOSINOPHIL # BLD AUTO: 0.05 K/UL (ref 0–0.51)
EOSINOPHIL NFR BLD: 0.4 % (ref 0–6.9)
ERYTHROCYTE [DISTWIDTH] IN BLOOD BY AUTOMATED COUNT: 42.5 FL (ref 35.9–50)
EST. AVERAGE GLUCOSE BLD GHB EST-MCNC: 97 MG/DL
GLUCOSE SERPL-MCNC: 109 MG/DL (ref 65–99)
HBA1C MFR BLD: 5 % (ref 0–5.6)
HCO3 BLDA-SCNC: 21.8 MMOL/L (ref 17–25)
HCT VFR BLD AUTO: 34.2 % (ref 37–47)
HDLC SERPL-MCNC: 58 MG/DL
HGB BLD-MCNC: 11.9 G/DL (ref 12–16)
HOROWITZ INDEX BLDA+IHG-RTO: 100 MM[HG]
IMM GRANULOCYTES # BLD AUTO: 0.04 K/UL (ref 0–0.11)
IMM GRANULOCYTES NFR BLD AUTO: 0.3 % (ref 0–0.9)
INR PPP: 1.05 (ref 0.87–1.13)
INST. QUALIFIED PATIENT IIQPT: YES
LDLC SERPL CALC-MCNC: 32 MG/DL
LV EJECT FRACT  99904: 60
LV EJECT FRACT MOD 2C 99903: 61.3
LV EJECT FRACT MOD 4C 99902: 60.1
LV EJECT FRACT MOD BP 99901: 61.01
LYMPHOCYTES # BLD AUTO: 2.81 K/UL (ref 1–4.8)
LYMPHOCYTES NFR BLD: 22.4 % (ref 22–41)
MAGNESIUM SERPL-MCNC: 2.1 MG/DL (ref 1.5–2.5)
MCH RBC QN AUTO: 31.8 PG (ref 27–33)
MCHC RBC AUTO-ENTMCNC: 34.8 G/DL (ref 33.6–35)
MCV RBC AUTO: 91.4 FL (ref 81.4–97.8)
MONOCYTES # BLD AUTO: 1.02 K/UL (ref 0–0.85)
MONOCYTES NFR BLD AUTO: 8.1 % (ref 0–13.4)
NEUTROPHILS # BLD AUTO: 8.61 K/UL (ref 2–7.15)
NEUTROPHILS NFR BLD: 68.5 % (ref 44–72)
NRBC # BLD AUTO: 0 K/UL
NRBC BLD-RTO: 0 /100 WBC
O2/TOTAL GAS SETTING VFR VENT: 98 %
PCO2 BLDA: 31.9 MMHG (ref 26–37)
PCO2 TEMP ADJ BLDA: 31.1 MMHG (ref 26–37)
PH BLDA: 7.44 [PH] (ref 7.4–7.5)
PH TEMP ADJ BLDA: 7.45 [PH] (ref 7.4–7.5)
PHOSPHATE SERPL-MCNC: 3.4 MG/DL (ref 2.5–4.5)
PLATELET # BLD AUTO: 270 K/UL (ref 164–446)
PMV BLD AUTO: 9.5 FL (ref 9–12.9)
PO2 BLDA: 98 MMHG (ref 64–87)
PO2 TEMP ADJ BLDA: 95 MMHG (ref 64–87)
POTASSIUM SERPL-SCNC: 3.4 MMOL/L (ref 3.6–5.5)
PROTHROMBIN TIME: 14 SEC (ref 12–14.6)
RBC # BLD AUTO: 3.74 M/UL (ref 4.2–5.4)
SAO2 % BLDA: 98 % (ref 93–99)
SODIUM SERPL-SCNC: 136 MMOL/L (ref 135–145)
SPECIMEN DRAWN FROM PATIENT: ABNORMAL
TRIGL SERPL-MCNC: 109 MG/DL (ref 0–149)
UFH PPP CHRO-ACNC: 0.28 IU/ML
UFH PPP CHRO-ACNC: 0.43 IU/ML
UFH PPP CHRO-ACNC: 0.48 IU/ML
WBC # BLD AUTO: 12.6 K/UL (ref 4.8–10.8)

## 2020-10-01 PROCEDURE — A9270 NON-COVERED ITEM OR SERVICE: HCPCS | Performed by: INTERNAL MEDICINE

## 2020-10-01 PROCEDURE — 85520 HEPARIN ASSAY: CPT

## 2020-10-01 PROCEDURE — 80048 BASIC METABOLIC PNL TOTAL CA: CPT

## 2020-10-01 PROCEDURE — 99291 CRITICAL CARE FIRST HOUR: CPT | Performed by: INTERNAL MEDICINE

## 2020-10-01 PROCEDURE — 94770 HCHG CO2 EXPIRED GAS DETERMINATION: CPT

## 2020-10-01 PROCEDURE — 93306 TTE W/DOPPLER COMPLETE: CPT | Mod: 26 | Performed by: INTERNAL MEDICINE

## 2020-10-01 PROCEDURE — 94003 VENT MGMT INPAT SUBQ DAY: CPT

## 2020-10-01 PROCEDURE — 83735 ASSAY OF MAGNESIUM: CPT

## 2020-10-01 PROCEDURE — 83036 HEMOGLOBIN GLYCOSYLATED A1C: CPT

## 2020-10-01 PROCEDURE — 700102 HCHG RX REV CODE 250 W/ 637 OVERRIDE(OP): Performed by: NURSE PRACTITIONER

## 2020-10-01 PROCEDURE — 82803 BLOOD GASES ANY COMBINATION: CPT

## 2020-10-01 PROCEDURE — 36600 WITHDRAWAL OF ARTERIAL BLOOD: CPT

## 2020-10-01 PROCEDURE — 85025 COMPLETE CBC W/AUTO DIFF WBC: CPT

## 2020-10-01 PROCEDURE — 700105 HCHG RX REV CODE 258: Performed by: INTERNAL MEDICINE

## 2020-10-01 PROCEDURE — 700102 HCHG RX REV CODE 250 W/ 637 OVERRIDE(OP): Performed by: INTERNAL MEDICINE

## 2020-10-01 PROCEDURE — 03CQ3Z7 EXTIRPATION OF MATTER FROM LEFT VERTEBRAL ARTERY USING STENT RETRIEVER, PERCUTANEOUS APPROACH: ICD-10-PCS | Performed by: RADIOLOGY

## 2020-10-01 PROCEDURE — 84100 ASSAY OF PHOSPHORUS: CPT

## 2020-10-01 PROCEDURE — 93306 TTE W/DOPPLER COMPLETE: CPT

## 2020-10-01 PROCEDURE — 700111 HCHG RX REV CODE 636 W/ 250 OVERRIDE (IP): Performed by: PSYCHIATRY & NEUROLOGY

## 2020-10-01 PROCEDURE — A9270 NON-COVERED ITEM OR SERVICE: HCPCS | Performed by: NURSE PRACTITIONER

## 2020-10-01 PROCEDURE — 770022 HCHG ROOM/CARE - ICU (200)

## 2020-10-01 PROCEDURE — 80061 LIPID PANEL: CPT

## 2020-10-01 PROCEDURE — B31R1ZZ FLUOROSCOPY OF INTRACRANIAL ARTERIES USING LOW OSMOLAR CONTRAST: ICD-10-PCS | Performed by: RADIOLOGY

## 2020-10-01 PROCEDURE — 700111 HCHG RX REV CODE 636 W/ 250 OVERRIDE (IP): Performed by: INTERNAL MEDICINE

## 2020-10-01 PROCEDURE — 71045 X-RAY EXAM CHEST 1 VIEW: CPT

## 2020-10-01 PROCEDURE — 74018 RADEX ABDOMEN 1 VIEW: CPT

## 2020-10-01 PROCEDURE — 85610 PROTHROMBIN TIME: CPT

## 2020-10-01 PROCEDURE — 99233 SBSQ HOSP IP/OBS HIGH 50: CPT | Performed by: NURSE PRACTITIONER

## 2020-10-01 PROCEDURE — 700111 HCHG RX REV CODE 636 W/ 250 OVERRIDE (IP): Performed by: NURSE PRACTITIONER

## 2020-10-01 RX ORDER — ATORVASTATIN CALCIUM 40 MG/1
80 TABLET, FILM COATED ORAL EVERY EVENING
Status: DISCONTINUED | OUTPATIENT
Start: 2020-10-01 | End: 2020-10-01

## 2020-10-01 RX ORDER — ENALAPRILAT 1.25 MG/ML
1.25 INJECTION INTRAVENOUS EVERY 6 HOURS PRN
Status: DISCONTINUED | OUTPATIENT
Start: 2020-10-01 | End: 2020-10-07 | Stop reason: HOSPADM

## 2020-10-01 RX ORDER — ATORVASTATIN CALCIUM 40 MG/1
40 TABLET, FILM COATED ORAL EVERY EVENING
Status: DISCONTINUED | OUTPATIENT
Start: 2020-10-01 | End: 2020-10-02

## 2020-10-01 RX ORDER — NOREPINEPHRINE BITARTRATE 0.03 MG/ML
0-30 INJECTION, SOLUTION INTRAVENOUS CONTINUOUS
Status: DISCONTINUED | OUTPATIENT
Start: 2020-10-01 | End: 2020-10-03

## 2020-10-01 RX ORDER — LABETALOL HYDROCHLORIDE 5 MG/ML
10 INJECTION, SOLUTION INTRAVENOUS EVERY 4 HOURS PRN
Status: DISCONTINUED | OUTPATIENT
Start: 2020-10-01 | End: 2020-10-07 | Stop reason: HOSPADM

## 2020-10-01 RX ADMIN — PROPOFOL 30 MCG/KG/MIN: 10 INJECTION, EMULSION INTRAVENOUS at 01:26

## 2020-10-01 RX ADMIN — ATORVASTATIN CALCIUM 40 MG: 40 TABLET, FILM COATED ORAL at 18:12

## 2020-10-01 RX ADMIN — LEVETIRACETAM INJECTION 500 MG: 5 INJECTION INTRAVENOUS at 05:33

## 2020-10-01 RX ADMIN — CALCIUM CHLORIDE 1000 MG: 100 INJECTION, SOLUTION INTRAVENOUS at 11:00

## 2020-10-01 RX ADMIN — PROPOFOL 40 MCG/KG/MIN: 10 INJECTION, EMULSION INTRAVENOUS at 05:32

## 2020-10-01 RX ADMIN — LEVETIRACETAM INJECTION 500 MG: 5 INJECTION INTRAVENOUS at 18:12

## 2020-10-01 RX ADMIN — HEPARIN SODIUM 13.4 UNITS/KG/HR: 5000 INJECTION, SOLUTION INTRAVENOUS at 16:01

## 2020-10-01 RX ADMIN — FENTANYL CITRATE 100 MCG: 50 INJECTION INTRAMUSCULAR; INTRAVENOUS at 02:52

## 2020-10-01 RX ADMIN — DOCUSATE SODIUM 50 MG AND SENNOSIDES 8.6 MG 2 TABLET: 8.6; 5 TABLET, FILM COATED ORAL at 05:32

## 2020-10-01 RX ADMIN — POTASSIUM BICARBONATE 50 MEQ: 978 TABLET, EFFERVESCENT ORAL at 08:12

## 2020-10-01 RX ADMIN — FAMOTIDINE 20 MG: 10 INJECTION INTRAVENOUS at 05:32

## 2020-10-01 RX ADMIN — DOCUSATE SODIUM 50 MG AND SENNOSIDES 8.6 MG 2 TABLET: 8.6; 5 TABLET, FILM COATED ORAL at 18:12

## 2020-10-01 ASSESSMENT — COPD QUESTIONNAIRES
HAVE YOU SMOKED AT LEAST 100 CIGARETTES IN YOUR ENTIRE LIFE: NO/DON'T KNOW
DURING THE PAST 4 WEEKS HOW MUCH DID YOU FEEL SHORT OF BREATH: NONE/LITTLE OF THE TIME
COPD SCREENING SCORE: 0
DO YOU EVER COUGH UP ANY MUCUS OR PHLEGM?: NO/ONLY WITH OCCASIONAL COLDS OR INFECTIONS

## 2020-10-01 NOTE — CARE PLAN
Ventilator Daily Summary    Vent Day #3    8.0@22    APVCMV  18  420  +8  30%    AM ABG  7.45  31  95  21    Ventilator settings changed this shift:None    Weaning trials: No pt agitated off sedation    Respiratory Procedures: None    Plan: Continue current ventilator settings and wean mechanical ventilation as tolerated per physician orders.

## 2020-10-01 NOTE — PROGRESS NOTES
"Critical Care Progress Note    Date of admission  9/29/2020    Chief Complaint  45 y.o. female admitted 9/29/2020 with seizure, intubated PTA    Hospital Course    \"45 y.o. female who presented 9/29/2020 with unknown past medical hx that presented to [facility] for 2 days of retro-orbital headache, nausea and left side facial numbness. She had a 10 minutes seizure requiring valium and ativan and then required intubation with etomidate and succ. She presented with normal vitals afebrile other then hypertension to 180/105 had normal CBC and no cmp could be seen on paper work. CT head that was negative and post intubated CXR that showed good placement of ET tube. She was transferred here and was given rocuronium and fentanyl in route. She present on propofol with ET in place so limited history is able to be obtained and significant other is a flight medical personnel that is on his way to the hospital. With lifting sedation she was able to follow commands. Neurology was consulted and orders were placed. I was asked to admit the patient for ventilator management.      Further hx per  a family practice physician. She had left occipital headache starting on Thursday that progressed to severe today with visual scotoma and left facial numbness. She has had about 6 prior episodes of complex ocular migraines in pass. No family hx of sah, no trauma or hypercoagulable states no recent sickness or fever chills. No hx of hypertension only hx of migraines and PCOS. No prior seizures. No substance abuse.\"     10/1 thrombectomy yesterday; now awake following commands, extubated today        Interval Problem Update  Reviewed last 24 hour events:  Off prop  Moves all 4, L weaker  Follows, nods, denies pain  SR  Goal -180  Groin site intact  elder TF  Vent day 3, 8/30  ABG  Heparin gtt for thrombus  Replace K  Replace calcium      Review of Systems  Review of Systems   Unable to perform ROS: Intubated        Vital Signs for " last 24 hours   Temp:  [36.1 °C (96.9 °F)-37.5 °C (99.5 °F)] 37 °C (98.6 °F)  Pulse:  [61-89] 79  Resp:  [10-63] 18  BP: (109-173)/(69-96) 114/71  SpO2:  [96 %-100 %] 99 %    Hemodynamic parameters for last 24 hours       Respiratory Information for the last 24 hours  Vent Mode: APVCMV  Rate (breaths/min): 18  Vt Target (mL): 420  PEEP/CPAP: 8  MAP: 11  Control VTE (exp VT): 418    Physical Exam   Physical Exam  Vitals signs and nursing note reviewed.   Constitutional:       Comments: Intubated, sedated   HENT:      Head: Normocephalic.      Mouth/Throat:      Mouth: Mucous membranes are moist.      Comments: ETT in place  Eyes:      Pupils: Pupils are equal, round, and reactive to light.   Neck:      Musculoskeletal: No neck rigidity or muscular tenderness.   Cardiovascular:      Rate and Rhythm: Normal rate.      Heart sounds: No murmur.   Pulmonary:      Breath sounds: No wheezing.      Comments: Full ventilator support  Abdominal:      General: There is no distension.      Palpations: Abdomen is soft.      Tenderness: There is no guarding.      Comments: Abdominal stria   Musculoskeletal:         General: No swelling or tenderness.   Skin:     General: Skin is warm and dry.      Capillary Refill: Capillary refill takes less than 2 seconds.   Neurological:      Comments: Intubated and sedated; not following, moves R side, L decerebrate to stim   Psychiatric:      Comments: Unable to determine         Medications  Current Facility-Administered Medications   Medication Dose Route Frequency Provider Last Rate Last Dose   • Pharmacy Consult: Enteral tube insertion - review meds/change route/product selection  1 Each Other PHARMACY TO DOSE Constantine Sanon D.O.       • potassium bicarbonate (KLYTE) effervescent tablet 50 mEq  50 mEq Enteral Tube Once Wang Best M.D.       • fentaNYL (SUBLIMAZE) injection 50 mcg  50 mcg Intravenous Q15 MIN PRN Wang Best M.D.   25 mcg at 09/30/20 1559    And   • fentaNYL  (SUBLIMAZE) injection 100 mcg  100 mcg Intravenous Q15 MIN PRN Wang Best M.D.   100 mcg at 10/01/20 0252    And   • fentaNYL (SUBLIMAZE) 50 mcg/mL in 50mL (Continuous Infusion)   Intravenous Continuous Wang Best M.D.   Stopped at 09/30/20 1045    And   • propofol (DIPRIVAN) injection  0-80 mcg/kg/min Intravenous Continuous Wang Best M.D. 10.7 mL/hr at 10/01/20 0700 20 mcg/kg/min at 10/01/20 0700   • heparin infusion 25,000 units in 500 mL 0.45% NACL  0-30 Units/kg/hr Intravenous Continuous MATTHEW Teran 23.5 mL/hr at 10/01/20 0702 13.4 Units/kg/hr at 10/01/20 0702   • levETIRAcetam (Keppra) 500 mg in 100 mL NaCl IV premix  500 mg Intravenous Q12HRS Eusebio Fischer M.D.   Stopped at 10/01/20 0548   • Respiratory Therapy Consult   Nebulization Continuous RT Benigno aMrt M.D.       • ipratropium-albuterol (DUONEB) nebulizer solution  3 mL Nebulization Q2HRS PRN (RT) Benigno Mart M.D.       • famotidine (PEPCID) tablet 20 mg  20 mg Enteral Tube Q12HRS Benigno Mart M.D.   Stopped at 09/30/20 0600    Or   • famotidine (PEPCID) injection 20 mg  20 mg Intravenous Q12HRS Benigno Mart M.D.   20 mg at 10/01/20 0532   • senna-docusate (PERICOLACE or SENOKOT S) 8.6-50 MG per tablet 2 Tab  2 Tab Enteral Tube BID Benigno Mart M.D.   2 Tab at 10/01/20 0532    And   • polyethylene glycol/lytes (MIRALAX) PACKET 1 Packet  1 Packet Enteral Tube QDAY PRN Benigno Mart M.D.        And   • magnesium hydroxide (MILK OF MAGNESIA) suspension 30 mL  30 mL Enteral Tube QDAY PRN Benigno Mart M.D.        And   • bisacodyl (DULCOLAX) suppository 10 mg  10 mg Rectal QDAY PRN Benigno Mart M.D.       • MD Alert...ICU Electrolyte Replacement per Pharmacy   Other PHARMACY TO DOSE Benigno Mart M.D.       • lidocaine (XYLOCAINE) 1 % injection 1-2 mL  1-2 mL Tracheal Tube Q30 MIN PRN Benigno Mart M.D.           Fluids    Intake/Output Summary (Last 24 hours) at 10/1/2020  0806  Last data filed at 10/1/2020 0600  Gross per 24 hour   Intake 1047.46 ml   Output 1225 ml   Net -177.54 ml       Laboratory  Recent Labs     09/29/20  2103 09/30/20  0348 10/01/20  0247   ISTATAPH 7.367* 7.398* 7.443   ISTATAPCO2 36.1 37.2* 31.9   ISTATAPO2 131* 105* 98*   ISTATATCO2 22 24 23   NOENMJL4IYD 99 98 98   ISTATARTHCO3 20.7 23.0 21.8   ISTATARTBE -4 -2 -2   ISTATTEMP 37.0 C 97.6 F 97.5 F   ISTATFIO2 40 30 98   ISTATSPEC Arterial Arterial Arterial   ISTATAPHTC 7.367* 7.406 7.452   OBSKNDPU1FN 131* 102* 95*         Recent Labs     09/29/20 2040 09/30/20  0816 09/30/20  1600 10/01/20  0620   SODIUM 136 141 136 136   POTASSIUM 4.0 3.3* 3.8 3.4*   CHLORIDE 101 111 103 103   CO2 21 19* 22 23   BUN 12 9 8 7*   CREATININE 0.94 0.60 0.80 0.80   MAGNESIUM 2.0 1.6  --  2.1   PHOSPHORUS 2.1* 2.1*  --  3.4   CALCIUM 9.3 7.4* 8.3* 8.0*     Recent Labs     09/29/20 2040 09/30/20  0816 09/30/20  1600 10/01/20  0620   ALTSGPT 30  --   --   --    ASTSGOT 28  --   --   --    ALKPHOSPHAT 63  --   --   --    TBILIRUBIN 0.7  --   --   --    GLUCOSE 153* 93 121* 109*     Recent Labs     09/29/20 2040 09/30/20  0816 10/01/20  0620   WBC  --  9.5 12.6*   NEUTSPOLYS  --  77.00* 68.50   LYMPHOCYTES  --  14.30* 22.40   MONOCYTES  --  7.90 8.10   EOSINOPHILS  --  0.30 0.40   BASOPHILS  --  0.30 0.30   ASTSGOT 28  --   --    ALTSGPT 30  --   --    ALKPHOSPHAT 63  --   --    TBILIRUBIN 0.7  --   --      Recent Labs     09/30/20  0816 09/30/20  1600 09/30/20  2035 10/01/20  0015 10/01/20  0620   RBC 3.46*  --   --   --  3.74*   HEMOGLOBIN 10.8*  --   --   --  11.9*   HEMATOCRIT 31.9*  --   --   --  34.2*   PLATELETCT 234  --   --   --  270   PROTHROMBTM  --  14.1 13.7 14.0  --    APTT  --   --  43.3*  --   --    INR  --  1.06 1.02 1.05  --        Imaging  X-Ray:  I have personally reviewed the images and compared with prior images.    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  Now on heparin drip, modified protocol with no bolus  SAT/SBT and extubate if appropriate  Follow closely in ICU  Replace electrolytes    VTE:  On hold pending results of procedure  Ulcer: H2 Antagonist  Lines: None    I have performed a physical exam and reviewed and updated ROS and Plan today (10/1/2020). In review of yesterday's note (9/30/2020), there are no changes except as documented above.     Discussed patient condition and risk of morbidity and/or mortality with RN, RT, Pharmacy, QA team and neurology  The patient remains critically ill.  Critical care time = 35 minutes in directly providing and coordinating critical care and extensive data review.  No time overlap and excludes procedures.

## 2020-10-01 NOTE — CARE PLAN
Problem: Nutritional:  Goal: Nutrition support tolerated and meeting greater than 85% of estimated needs  Outcome: NOT MET   Start Replete @ 25 mL/hr and advance per protocol to goal rate of 80 mL/hr. See RD note.    RD following.

## 2020-10-01 NOTE — CARE PLAN
Problem: Safety - Medical Restraint  Goal: Remains free of injury from restraints (Restraint for Interference with Medical Device)  Description: INTERVENTIONS:  1. Determine that other, less restrictive measures have been tried or would not be effective before applying the restraint  2. Evaluate the patient's condition at the time of restraint application  3. Inform patient/family regarding the reason for restraint  4. Q2H: Monitor safety, psychosocial status, comfort, nutrition and hydration  Outcome: MET  Goal: Free from restraint(s) (Restraint for Interference with Medical Device)  Description: INTERVENTIONS:  1. ONCE/SHIFT or MINIMUM Q12H: Assess and document the continuing need for restraints  2. Q24H: Continued use of restraint requires LIP to perform face to face examination and written order  3. Identify and implement measures to help patient regain control  Outcome: MET     Problem: Communication  Goal: The ability to communicate needs accurately and effectively will improve  Outcome: PROGRESSING AS EXPECTED  Intervention: [place] patient and significant other/support system to call light to alert staff of needs  Note: Able to nod appropriately to questions       Problem: Safety  Goal: Will remain free from injury  Outcome: MET  Goal: Will remain free from falls  Outcome: MET     Problem: Bowel/Gastric:  Goal: Normal bowel function is maintained or improved  Outcome: PROGRESSING AS EXPECTED     Problem: Knowledge Deficit  Goal: Knowledge of disease process/condition, treatment plan, diagnostic tests, and medications will improve  Outcome: PROGRESSING AS EXPECTED  Goal: Knowledge of the prescribed therapeutic regimen will improve  Outcome: PROGRESSING AS EXPECTED     Problem: Respiratory:  Goal: Respiratory status will improve  Outcome: PROGRESSING AS EXPECTED     Problem: Pain Management  Goal: Pain level will decrease to patient's comfort goal  Outcome: PROGRESSING AS EXPECTED     Problem: Psychosocial  Needs:  Goal: Level of anxiety will decrease  Outcome: MET     Problem: Communication:  Goal: The ability to communicate needs accurately and effectively will improve  Outcome: PROGRESSING AS EXPECTED     Problem: Safety:  Goal: Will remain free from injury  Outcome: MET  Goal: Risk of aspiration will decrease  Outcome: MET     Problem: Psychosocial Needs:  Goal: Ability to identify and develop effective coping behavior will improve  Outcome: PROGRESSING AS EXPECTED  Goal: Ability to identify appropriate support needs will improve  Outcome: PROGRESSING AS EXPECTED  Goal: Ability to verbalize feelings about condition will improve  Outcome: PROGRESSING AS EXPECTED     Problem: Peripheral Vascular Perfusion:  Goal: Neurologic status will improve  Outcome: MET  Goal: Will show no signs and symptoms of excessive bleeding  Outcome: MET     Problem: Respiratory:  Goal: Ability to maintain a clear airway will improve  Outcome: PROGRESSING AS EXPECTED  Goal: Ability to maintain adequate ventilation will improve  Outcome: PROGRESSING AS EXPECTED     Problem: Urinary:  Goal: Ability to maintain continence will improve  Outcome: MET  Goal: Ability to reestablish a normal urinary elimination pattern will improve  Outcome: MET     Problem: Knowledge Deficit:  Goal: Knowledge of disease process/condition, treatment plan, diagnostic tests, and medications will improve  Outcome: PROGRESSING AS EXPECTED

## 2020-10-01 NOTE — DIETARY
"Nutrition Support Assessment:  Day 2 of admit.  Obdulia High is a 45 y.o. female with admitting DX of seizure.     Current problem list:  1. Acute ischemic vertebrobasilar artery brainstem stroke  2. Acute respiratory failure with hypoxia and hypercapnia  3. Seizure  4. Headache  5. Essential HTN     Assessment:  Estimated Nutritional Needs based on:   Height: 180.3 cm (5' 11\")  Weight: 87.8 kg (193 lb 9 oz) via bed scale 9/30.  Body mass index is 27 kg/m²., BMI classification: overweight    Calculation/Equation: MSJ x 1.2 = 1950.5 kcal/day; PSU = 1845 kcal/day (Ve 7.6, Tmax 37.5)  Calories: 2407-6616 kcal/day (21-24 kcal/kg admit wt)  Protein: 105-131 g/day (1.2-1.5 g/kg admit wt)     Evaluation:   1. Nutrition support indicated secondary to intubation.   2. Enteral access via gastric Cortrak.   3. Propofol stopped this AM per MAR.  4. K 3.4 (L)- ICU electrolyte replacement per pharmacy for repletion.  5. Last bowel movement prior to admit; on bowel protocol.   6. Unknown PMH, A1C pending. Glu  since admit (acceptable).   7. POD#1 thrombectomy.   8. High protein formula appropriate to meet patient's needs at this time.     Malnutrition Risk: NA     Recommendations/Plan:  1. Start Replete with Fiber @ 25 mL/h and advance per protocol to goal rate of 80 mL/hr to provide 1920 kcal, 123 g pro, and 1594 mL free water per day.    2. Fluids per MD.    RD following.               "

## 2020-10-01 NOTE — ASSESSMENT & PLAN NOTE
45-year-old female admitted on 9/30/2020 for 2-day history of retro-orbital headache, nausea and left-sided facial numbness.  Underwent emergent endotracheal intubation for airway protection secondary to seizure on admission  Further work-up revealed brainstem ischemia secondary to diffuse thrombosis in basilar, B/L PCA and vertebral artery.   Underwent emergent basilar artery thrombectomy on 9/30.  Neurology on board, due to diffuse thrombosis and increased risk of re thrombosis decision was made to start the patient on heparin drip.  EEG obtained on 9/30- for seizure.Neurology recommends to continue Keppra for now.  Started coumadin per neurology recs   PT/OT/Physiatry following, likely dc rehab   10/6 on coumadin 5mg, lovenox bridging. PT/OT/Physiatry following  pending auth;

## 2020-10-01 NOTE — PROGRESS NOTES
[facility] Progress Note      Admit Date: 9/29/2020    Resident(s): Traci Colon M.D.   Attending:  GUS SARABIA/ Dr. Best     Patient ID:    Name:  Obdulia High     YOB: 1975  Age:  45 y.o.  female   MRN:  [id number]    Hospital Course (carried forward and updated):  Obdulia High is a 45 y.o. female with unknown past medical history who was transferred from [place] ED on 9/30/2020 where she presented with 2-day history of retro-orbital headache and left-sided facial numbness.  In the ED in [place] she has had an episode of seizure requiring intubation .  She was transferred to [facility] for higher level of care.  In the ED she was hypertensive, afebrile.  CT head was unremarkable.  Neurology was consulted and patient was admitted to ICU for further monitoring and ventilator management.  MRI was reviewed on 9/30 which brainstem ischemia.  Her last CT we were all revealed diffuse thrombosis involving left and right PCA, vertebral with dissection and basilar artery.  Patient underwent stat thrombectomy on 9/30/2020 .     consultants:  Critical Care  Neurology     Interval Events:  No overnight events  On ventilator, off of propofol drip.  Patient able to move all extremities, left> right side.  Follows commands.  Denies pain.  Afebrile, SR 80-90, -130/70- 80, goal 110-180.  Blood work noted for potassium 3.4-repleted, mild increase in WBC down to 12.6.  Will closely monitor with every hour neurochecks.  On heparin drip for thrombosis per neurology recommendation.  EEG negative for seizure.  Per neurosurgery continue Keppra 500 mg Q12.         Vitals Range last 24h:  Temp:  [36.1 °C (96.9 °F)-37.5 °C (99.5 °F)] 36.9 °C (98.5 °F)  Pulse:  [] 100  Resp:  [15-63] 18  BP: (114-155)/(66-89) 136/76  SpO2:  [97 %-100 %] 100 %      Intake/Output Summary (Last 24 hours) at 10/1/2020 1435  Last data filed at 10/1/2020 1400  Gross per 24 hour   Intake 1678.76 ml   Output 1975 ml   Net -296.24 ml         Review of Systems   Unable to perform ROS: Intubated       PHYSICAL EXAM:  Vitals:    10/01/20 1200 10/01/20 1300 10/01/20 1400 10/01/20 1408   BP: 133/77 129/66 136/75 136/76   Pulse: 75 87 (!) 104 100   Resp: 18 18 19 18   Temp: 36.7 °C (98.1 °F)  36.9 °C (98.5 °F)    TempSrc: Temporal  Temporal    SpO2: 100% 100% 100% 100%   Weight:       Height:        Body mass index is 27.01 kg/m².    O2 therapy: Pulse Oximetry: 100 %, O2 Delivery Device: Ventilator    Date 10/01/20 0700 - 10/02/20 0659   Shift 7937-7666 2226-7250 8713-0493 24 Hour Total   INTAKE   I.V. 220.1   220.1     Propofol Volume 32.1   32.1     Heparin Volume 188   188   Other 120   120     Medications (PO/Enteral Liquids) 120   120   NG/   200     Intake (mL) (Enteral Tube 10/01/20 Cortrak - Gastric Left nare) 200   200   IV Piggyback 200   200     Volume (mL) (calcium CHLORIDE 1,000 mg in 5% dextrose 100 mL IVPB) 200   200   Shift Total 740.1   740.1   OUTPUT   Urine 850   850     Output (mL) (Urethral Catheter Latex 16 Fr.) 850   850   Shift Total 850   850   NET -109.9   -109.9        Physical Exam   Constitutional: No distress.   On ventilator    HENT:   Head: Normocephalic and atraumatic.   Eyes: Pupils are equal, round, and reactive to light.   Cardiovascular: Normal rate, regular rhythm, normal heart sounds and intact distal pulses.   No murmur heard.  Pulmonary/Chest: She has no wheezes. She has no rales.   On ventilator support    Abdominal: Soft. Bowel sounds are normal. She exhibits no distension. There is no abdominal tenderness.   Musculoskeletal:         General: No edema.   Neurological:   Intubated. Follow commands. Move all 4 extremities. Left > right.    Skin: Skin is warm.       Recent Labs     09/29/20  2103 09/30/20  0348 10/01/20  0247   ISTATAPH 7.367* 7.398* 7.443   ISTATAPCO2 36.1 37.2* 31.9   ISTATAPO2 131* 105* 98*   ISTATATCO2 22 24 23   [id number] 99 98 98   ISTATARTHCO3 20.7 23.0 21.8   ISTATARTBE -4 -2 -2    ISTATTEMP 37.0 C 97.6 F 97.5 F   ISTATFIO2 40 30 98   ISTATSPEC Arterial Arterial Arterial   ISTATAPHTC 7.367* 7.406 7.452   [id number] 131* 102* 95*     Recent Labs     09/29/20  2040 09/30/20  0816 09/30/20  1600 10/01/20  0620   SODIUM 136 141 136 136   POTASSIUM 4.0 3.3* 3.8 3.4*   CHLORIDE 101 111 103 103   CO2 21 19* 22 23   BUN 12 9 8 7*   CREATININE 0.94 0.60 0.80 0.80   MAGNESIUM 2.0 1.6  --  2.1   PHOSPHORUS 2.1* 2.1*  --  3.4   CALCIUM 9.3 7.4* 8.3* 8.0*     Recent Labs     09/29/20  2040 09/30/20  0816 09/30/20  1600 10/01/20  0620   ALTSGPT 30  --   --   --    ASTSGOT 28  --   --   --    ALKPHOSPHAT 63  --   --   --    TBILIRUBIN 0.7  --   --   --    GLUCOSE 153* 93 121* 109*     Recent Labs     09/30/20  0816 09/30/20  1600 09/30/20  2035 10/01/20  0015 10/01/20  0620   RBC 3.46*  --   --   --  3.74*   HEMOGLOBIN 10.8*  --   --   --  11.9*   HEMATOCRIT 31.9*  --   --   --  34.2*   PLATELETCT 234  --   --   --  270   PROTHROMBTM  --  14.1 13.7 14.0  --    APTT  --   --  43.3*  --   --    INR  --  1.06 1.02 1.05  --      Recent Labs     09/29/20  2040 09/30/20  0816 10/01/20  0620   WBC  --  9.5 12.6*   NEUTSPOLYS  --  77.00* 68.50   LYMPHOCYTES  --  14.30* 22.40   MONOCYTES  --  7.90 8.10   EOSINOPHILS  --  0.30 0.40   BASOPHILS  --  0.30 0.30   ASTSGOT 28  --   --    ALTSGPT 30  --   --    ALKPHOSPHAT 63  --   --    TBILIRUBIN 0.7  --   --        Meds:  • Pharmacy  1 Each     • atorvastatin  40 mg     • norepinephrine (Levophed) infusion  0-30 mcg/min Stopped (10/01/20 1030)   • labetalol  10 mg     • enalaprilat  1.25 mg     • fentaNYL  50 mcg      And   • fentaNYL  100 mcg      And   • fentaNYL   Stopped (09/30/20 1045)    And   • propofol  0-80 mcg/kg/min Stopped (10/01/20 0800)   • heparin  0-30 Units/kg/hr 13.4 Units/kg/hr (10/01/20 1319)   • levETIRAcetam (Keppra) IV  500 mg Stopped (10/01/20 0548)   • Respiratory Therapy Consult       • ipratropium-albuterol  3 mL     • famotidine  20 mg       Or   • famotidine  20 mg     • senna-docusate  2 Tab      And   • polyethylene glycol/lytes  1 Packet      And   • magnesium hydroxide  30 mL      And   • bisacodyl  10 mg     • MD Alert...Adult ICU Electrolyte Replacement per Pharmacy       • lidocaine  1-2 mL          Procedures:  Basilar thrombectomy on 9/30/20     Imaging:  EC-ECHOCARDIOGRAM COMPLETE W/O CONT   Final Result      [id number] VIEW   Final Result         1.  Nonspecific bowel gas pattern.   2.  Dobbhoff tube is coiled within the stomach, the tip terminates overlying the expected location of the gastric antrum.      DX-CHEST-PORTABLE (1 VIEW)   Final Result         1.  No acute cardiopulmonary disease.      MR-BRAIN-W/O   Final Result      1.  Subarachnoid hemorrhage within sulci near the vertex, left greater than right      2.  Acute mid brain and punctate left pontine infarct without mass effect      3.  No other significant finding      Findings were discussed with Dr. Mart on 2015 hours 9/30/2020      CT-HEAD W/O   Final Result   Addendum 1 of 1   Addendum:   Please note that the patient has had a CTA head and neck as well as    angiogram with thrombectomy earlier today. A portion of the high    attenuation along the arterial vascular structures and dural venous    sinuses is likely related to contrast staining from    the previous contrast-enhanced procedures. However, the high attenuation    in the sulci over the vertex is suspicious for a small amount of new    subarachnoid hemorrhage. Follow-up MRI could be performed for    confirmation.      Findings were discussed with LAMXI BROWN on 9/30/2020 at 5:21 PM.      Final      IR-THROMBO MECHANICAL ARTERY,INIT   Final Result      1.  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   2.  Basilar artery thrombosis.   3.  Successful mechanical thrombectomy of a basilar artery thrombosis.   4.  The final angiogram demonstrates patent basilar, bilateral superior cerebellar and right  posterior cerebral arteries. The left P1 segment is patent. The left distal P2 segment is not visualized.      CT-CTA HEAD WITH & W/O-POST PROCESS   Final Result      1.  There is acute mid and distal basilar artery occlusion extending into the left greater than right posterior cerebral arteries.   2.  Findings have already been discussed with the ordering physician and neurologist by Dr. Albert of the IR radiology service.      CT-CTA NECK WITH & W/O-POST PROCESSING   Final Result      1.  There is abnormality involving the distal left vertebral artery at the level of C2 which may represent combination of focal dissection/thrombus with possible less likely partially thrombosed vascular malformation.   2.  There is an occlusion of the mid and distal basilar artery extending into the left greater than right posterior cerebral arteries, P1 segments.   3.  These findings have already been discussed with the clinical service by Dr. Albert of the IR radiology service.      CT-CEREBRAL PERFUSION ANALYSIS   Final Result      1.  Cerebral blood flow less than 30% likely representing completed infarct = 0 mL.      2.  T Max more than 6 seconds likely representing combination of completed infarct and ischemia = 100 mL.      3.  Mismatched volume likely representing ischemic brain/penumbra = 100 mL      4.  Please note that the cerebral perfusion was performed on the limited brain tissue around the basal ganglia region. Infarct/ischemia outside the CT perfusion sections can be missed in this study.      MR-VENOGRAM (MRV) HEAD   Final Result      Cerebral magnetic resonance venogram within normal limits with no evidence of dural venous sinus thrombosis.      MR-BRAIN-WITH & W/O   Final Result      1.  Findings suggesting distal basilar artery and left posterior cerebral artery thrombosis.   2.  Acute ischemia within the midbrain/khang.   3.  Partially empty sella and slightly prominent fluid about the optic nerve  sheaths. Correlate for any evidence of intracranial hypertension.      These findings were discussed with Dr. Best on 9/30/2020 10:11 AM.      DX-CHEST-PORTABLE (1 VIEW)   Final Result         1.  No acute cardiopulmonary disease.           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  Underwent emergent endotracheal intubation for airway protection an episode of seizure.    Further work-up revealed brainstem ischemia secondary to diffuse thrombosis in basilar, B/L PCA and vertebral artery.   Underwent emergent basilar artery thrombectomy on 9/30.  Neurology on board, due to diffuse thrombosis and increased risk of rethrombosis decision was made to start the patient on heparin drip.  EEG obtained on 9/30- for seizure.  Neurology recommends to continue Keppra for now.    Plan  Neurology following, appreciate recommendation  Will monitor on telemetry.  Close monitoring with neuro checks every 1 hour  Consider stat head CT in case of new onset neurological deficits  Continue heparin drip per neuro recs, transition to oral when indicated  PT/OT/SP when appropriate      Headache  Assessment & Plan  History of complex migraine  Diagnosed with vertebral artery dissection, basilar and PCA thrombosis  Neurology following, appreciate recommendations    Seizure (HCC)  Assessment & Plan  No history of seizures in the past  One episode of seizure in the ED, intubated for airway protection  Currently on Keppra per neurology  EEG on 9/30-negative for seizure    Acute respiratory failure with hypoxia and hypercapnia (HCC)  Assessment & Plan  Intubated on 9/29 for airway protection in the setting of seizure  Currently off sedation.  Following commands.  Plan  Monitor pulse oximetry  Daily chest x-ray  Daily SBT trials  per critical care team    Essential hypertension  Assessment & Plan  Hypertension on presentation  Unsure if patient has history of hypertension  Labetalol, enalaprilat prn.  Goal SBP less than 160        CODE STATUS: full code      Quality Measures:  Feeding: feeding tube   Sedation: Fentanyl/propofol   Thromboprophylaxis: Heparin drip   Head of bed: >30 degrees  Ulcer prophylaxis: Famotidine   Glycemic control: Correctional: Non-diabetic   Bowel care: bowel regimen PRN   Indwelling lines:  Deescalation of antibiotics: Not on antibiotics     Traci Colon M.D.

## 2020-10-01 NOTE — CARE PLAN
Problem: Infection  Goal: Will remain free from infection  Outcome: PROGRESSING AS EXPECTED     Problem: Bowel/Gastric:  Goal: Normal bowel function is maintained or improved  Outcome: PROGRESSING SLOWER THAN EXPECTED     Problem: Respiratory:  Goal: Respiratory status will improve  Outcome: PROGRESSING SLOWER THAN EXPECTED

## 2020-10-01 NOTE — ASSESSMENT & PLAN NOTE
History of complex migraine  Diagnosed with vertebral artery dissection, basilar and PCA thrombosis  Neurology following  Continue to monitor

## 2020-10-01 NOTE — RESPIRATORY CARE
COPD EDUCATION by COPD CLINICAL EDUCATOR  10/1/2020 at 11:33 AM by Zelda Caballero RRT     Patient reviewed by COPD education team. Patient does not have a history or diagnosis of COPD and is a non-smoker.  Therefore does not qualify for the COPD program.

## 2020-10-01 NOTE — PROGRESS NOTES
Chief Complaint   Patient presents with   • Seizure   • Headache     Neurology Progress Note    Brief History of present illness:  45-year old female with unknown PMhx who presented to [facility] on 9/30/20 as a transfer from [facility] ED where she presented for a chief complaint of a ?2-day history of retro orbital headache, Left facial numbness and seizure-like activity. Patient unable to provide details regarding her HPI, further details obtained via review of medical record.   The patient reportedly presented to the OSH with the above noted complaints around 1800 on 9/29/20; upon arrival to OSH ED, patient had witnessed seizure-like activity. Further details including characteristics and duration of event are unknown. No leukocytosis or fever at OSH. She received Ativan; was ultimately intubated for airway protection and was transferred to [facility] for further work up/higher level of care.   On arrival here around 2000, patient received Keppra 500 mg IV. She had MRI Brain last night; this ultimately revealed brain stem/pontine/midbrain ischemia, prompting STAT CTA this morning. This revealed diffuse thrombosis involving Left and Right PCAs, Left Vert (likely dissected), and Basilar arteries. Patient to STAT thrombectomy at 1130.     Neurology has been consulted by Dr. Benigno Duffy to further evaluate the findings noted above.     Interval, 10/1/20:   Patient remains intubated; sedated. Sedation held for exam; patient moving all extremities; Left > Right. No seizure like events overnight. SBP 110s- 140s overnight. Heparin gtt infusing.     Repeat MRI Brain at 1800 yesterday (9/30) with no change to known area of midbrain infarct; several tiny/punctate areas of new infarction to Left internal capsule, Right cerebellum, and Left khang. Per my review, no overt hemorrhage per GRE sequence; radiology notes again [facility] (vs Contrast staining).     Of note, I had the opportunity to speak with patient's   Bill yesterday; he reports that patient had recently started a strenuous work out program several days before the onset of her symptoms-- likely attributing to dissection.       Past medical history:   Unknown     Past surgical history:   Unknown    Family history:   Unknown    Social history:   Social History     Socioeconomic History   • Marital status:      Spouse name: Not on file   • Number of children: Not on file   • Years of education: Not on file   • Highest education level: Not on file   Occupational History   • Not on file   Social Needs   • Financial resource strain: Not on file   • Food insecurity     Worry: Not on file     Inability: Not on file   • Transportation needs     Medical: Not on file     Non-medical: Not on file   Tobacco Use   • Smoking status: Not on file   Substance and Sexual Activity   • Alcohol use: Not on file   • Drug use: Not on file   • Sexual activity: Not on file   Lifestyle   • Physical activity     Days per week: Not on file     Minutes per session: Not on file   • Stress: Not on file   Relationships   • Social connections     Talks on phone: Not on file     Gets together: Not on file     Attends Rastafarian service: Not on file     Active member of club or organization: Not on file     Attends meetings of clubs or organizations: Not on file     Relationship status: Not on file   • Intimate partner violence     Fear of current or ex partner: Not on file     Emotionally abused: Not on file     Physically abused: Not on file     Forced sexual activity: Not on file   Other Topics Concern   • Not on file   Social History Narrative   • Not on file       Current medications:   Current Facility-Administered Medications   Medication Dose   • Pharmacy Consult: Enteral tube insertion - review meds/change route/product selection  1 Each   • fentaNYL (SUBLIMAZE) injection 50 mcg  50 mcg    And   • fentaNYL (SUBLIMAZE) injection 100 mcg  100 mcg    And   • fentaNYL (SUBLIMAZE)  50 mcg/mL in 50mL (Continuous Infusion)      And   • propofol (DIPRIVAN) injection  0-80 mcg/kg/min   • heparin infusion 25,000 units in 500 mL 0.45% NACL  0-30 Units/kg/hr   • levETIRAcetam (Keppra) 500 mg in 100 mL NaCl IV premix  500 mg   • Respiratory Therapy Consult     • ipratropium-albuterol (DUONEB) nebulizer solution  3 mL   • famotidine (PEPCID) tablet 20 mg  20 mg    Or   • famotidine (PEPCID) injection 20 mg  20 mg   • senna-docusate (PERICOLACE or SENOKOT S) 8.6-50 MG per tablet 2 Tab  2 Tab    And   • polyethylene glycol/lytes (MIRALAX) PACKET 1 Packet  1 Packet    And   • magnesium hydroxide (MILK OF MAGNESIA) suspension 30 mL  30 mL    And   • bisacodyl (DULCOLAX) suppository 10 mg  10 mg   • MD Alert...ICU Electrolyte Replacement per Pharmacy     • lidocaine (XYLOCAINE) 1 % injection 1-2 mL  1-2 mL       Medication Allergy:  No Known Allergies      Review of systems:   Unable as patient is intubated/sedated.       Physical examination:   Vitals:    10/01/20 0500 10/01/20 0600 10/01/20 0622 10/01/20 0700   BP: 115/76 122/69  114/71   Pulse: 75 85 83 79   Resp: 18 (!) 22 (!) 36 18   Temp:  37 °C (98.6 °F)     TempSrc:  Temporal     SpO2: 100% 99% 100% 99%   Weight:       Height:         General: Patient in no acute distress.  HEENT: Normocephalic, no signs of acute trauma.   Neck: supple, no meningeal signs or carotid bruits. There is normal range of motion. No tenderness on exam.   Chest: clear to auscultation. No cough.   CV: RRR, no murmurs.   Skin: no signs of acute rashes or trauma.   Musculoskeletal: joints exhibit full range of motion, without any pain to palpation. There are no signs of joint or muscle swelling. There is no tenderness to deep palpation of muscles.   Psychiatric: No hallucinatory behavior.      NEUROLOGICAL EXAM:   Mental status, orientation: Intubated/sedated.   Speech and language: No verbal output, opens eyes weakly to voice/own name. Follows commands bilaterally (shows  thumb, two fingers, wiggles toes to command), more briskly on the Left.   Cranial nerve exam: Pupils are 3-2 mm bilaterally and equally reactive to light. Blinks to visual threat bilaterally. Eyes midline. Does not track. Face appears symmetric. Corneals intact BL, cough/gag intact.  Tongue is midline and without any signs of tongue biting or fasciculations.  Motor/Sensory exam: Patient moves all extremities spontaneously, Left > Right. LUE 4+/5 and semi purposeful, 4/5 to LLE. Approx. 3-/5 to RUE with some/minimal antigravity, 2+/5 to RLE with side to side movement only. Briskly responsive to nox stim/localized on the Left; Delayed response to nox stim on the Right, withdrawal only.   Deep tendon reflexes:  Plantar responses are up-going on the Right; appear to be down going on the Left. There is no clonus.   Coordination: deferred, patient does not participate.   Gait: Not assessed at this time as patient is unable.       NIH Stroke Scale    1a Level of Consciousness 2  1b Orientation Questions 2  1c Response to Commands   2 Gaze   3 Visual Fields   4 Facial Movement   5 Motor Function (arm)   a Left 1  b Right 3  6 Motor Function (leg)   a Left 2  b Right 3  7 Limb Ataxia   8 Sensory 1  9 Language 3  10 Articulation   11 Extinction/Inattention     Score: 17      ANCILLARY DATA REVIEWED:     Lab Data Review:  Recent Results (from the past 24 hour(s))   IONIZED CALCIUM    Collection Time: 09/30/20  4:00 PM   Result Value Ref Range    Ionized Calcium 1.1 1.1 - 1.3 mmol/L   Basic Metabolic Panel    Collection Time: 09/30/20  4:00 PM   Result Value Ref Range    Sodium 136 135 - 145 mmol/L    Potassium 3.8 3.6 - 5.5 mmol/L    Chloride 103 96 - 112 mmol/L    Co2 22 20 - 33 mmol/L    Glucose 121 (H) 65 - 99 mg/dL    Bun 8 8 - 22 mg/dL    Creatinine 0.80 0.50 - 1.40 mg/dL    Calcium 8.3 (L) 8.5 - 10.5 mg/dL    Anion Gap 11.0 7.0 - 16.0   Prothrombin Time    Collection Time: 09/30/20  4:00 PM   Result Value Ref Range    PT  14.1 12.0 - 14.6 sec    INR 1.06 0.87 - 1.13   FIBRINOGEN    Collection Time: 09/30/20  4:00 PM   Result Value Ref Range    Fibrinogen 436 215 - 460 mg/dL   PLATELET MAPPING WITH BASIC TEG    Collection Time: 09/30/20  4:00 PM   Result Value Ref Range    Reaction Time Initial-R 17.9 (H) 5.0 - 10.0 min    Clot Kinetics-K 3.8 (H) 1.0 - 3.0 min    Clot Angle-Angle 45.6 (L) 53.0 - 72.0 degrees    Maximum Clot Strength-MA 60.1 50.0 - 70.0 mm    Lysis 30 minutes-LY30 0.0 0.0 - 8.0 %    % Inhibition ADP 0.0 %    % Inhibition AA 78.3 %    TEG Algorithm Link Algorithm    Heparin Xa (Unfractionated)    Collection Time: 09/30/20  4:00 PM   Result Value Ref Range    Heparin Xa (UFH) 0.25 IU/mL   ESTIMATED GFR    Collection Time: 09/30/20  4:00 PM   Result Value Ref Range    GFR If African American >60 >60 mL/min/1.73 m 2    GFR If Non African American >60 >60 mL/min/1.73 m 2   aPTT    Collection Time: 09/30/20  8:35 PM   Result Value Ref Range    APTT 43.3 (H) 24.7 - 36.0 sec   Prothrombin Time    Collection Time: 09/30/20  8:35 PM   Result Value Ref Range    PT 13.7 12.0 - 14.6 sec    INR 1.02 0.87 - 1.13   Heparin Xa (Unfractionated)    Collection Time: 09/30/20  8:35 PM   Result Value Ref Range    Heparin Xa (UFH) 0.11 IU/mL   Heparin Xa (Unfractionated)    Collection Time: 10/01/20 12:15 AM   Result Value Ref Range    Heparin Xa (UFH) 0.28 IU/mL   Prothrombin Time    Collection Time: 10/01/20 12:15 AM   Result Value Ref Range    PT 14.0 12.0 - 14.6 sec    INR 1.05 0.87 - 1.13   ISTAT ARTERIAL BLOOD GAS    Collection Time: 10/01/20  2:47 AM   Result Value Ref Range    Ph 7.443 7.400 - 7.500    Pco2 31.9 26.0 - 37.0 mmHg    Po2 98 (H) 64 - 87 mmHg    Tco2 23 20 - 33 mmol/L    S02 98 93 - 99 %    Hco3 21.8 17.0 - 25.0 mmol/L    BE -2 -4 - 3 mmol/L    Body Temp 97.5 F degrees    O2 Therapy 98 %    iPF Ratio 100     Ph Temp Vignesh 7.452 7.400 - 7.500    Pco2 Temp Co 31.1 26.0 - 37.0 mmHg    Po2 Temp Cor 95 (H) 64 - 87 mmHg     Specimen Arterial     Action Range Triggered NO     Inst. Qualified Patient YES    Heparin Xa (Unfractionated)    Collection Time: 10/01/20  6:20 AM   Result Value Ref Range    Heparin Xa (UFH) 0.43 IU/mL   Basic Metabolic Panel    Collection Time: 10/01/20  6:20 AM   Result Value Ref Range    Sodium 136 135 - 145 mmol/L    Potassium 3.4 (L) 3.6 - 5.5 mmol/L    Chloride 103 96 - 112 mmol/L    Co2 23 20 - 33 mmol/L    Glucose 109 (H) 65 - 99 mg/dL    Bun 7 (L) 8 - 22 mg/dL    Creatinine 0.80 0.50 - 1.40 mg/dL    Calcium 8.0 (L) 8.5 - 10.5 mg/dL    Anion Gap 10.0 7.0 - 16.0   CBC WITH DIFFERENTIAL    Collection Time: 10/01/20  6:20 AM   Result Value Ref Range    WBC 12.6 (H) 4.8 - 10.8 K/uL    RBC 3.74 (L) 4.20 - 5.40 M/uL    Hemoglobin 11.9 (L) 12.0 - 16.0 g/dL    Hematocrit 34.2 (L) 37.0 - 47.0 %    MCV 91.4 81.4 - 97.8 fL    MCH 31.8 27.0 - 33.0 pg    MCHC 34.8 33.6 - 35.0 g/dL    RDW 42.5 35.9 - 50.0 fL    Platelet Count 270 164 - 446 K/uL    MPV 9.5 9.0 - 12.9 fL    Neutrophils-Polys 68.50 44.00 - 72.00 %    Lymphocytes 22.40 22.00 - 41.00 %    Monocytes 8.10 0.00 - 13.40 %    Eosinophils 0.40 0.00 - 6.90 %    Basophils 0.30 0.00 - 1.80 %    Immature Granulocytes 0.30 0.00 - 0.90 %    Nucleated RBC 0.00 /100 WBC    Neutrophils (Absolute) 8.61 (H) 2.00 - 7.15 K/uL    Lymphs (Absolute) 2.81 1.00 - 4.80 K/uL    Monos (Absolute) 1.02 (H) 0.00 - 0.85 K/uL    Eos (Absolute) 0.05 0.00 - 0.51 K/uL    Baso (Absolute) 0.04 0.00 - 0.12 K/uL    Immature Granulocytes (abs) 0.04 0.00 - 0.11 K/uL    NRBC (Absolute) 0.00 K/uL   MAGNESIUM    Collection Time: 10/01/20  6:20 AM   Result Value Ref Range    Magnesium 2.1 1.5 - 2.5 mg/dL   PHOSPHORUS    Collection Time: 10/01/20  6:20 AM   Result Value Ref Range    Phosphorus 3.4 2.5 - 4.5 mg/dL   ESTIMATED GFR    Collection Time: 10/01/20  6:20 AM   Result Value Ref Range    GFR If African American >60 >60 mL/min/1.73 m 2    GFR If Non African American >60 >60 mL/min/1.73 m 2        Labs reviewed by me.       Imaging reviewed by me:     [id number] [id number]   Final Result         1.  Nonspecific bowel gas pattern.   2.  Dobbhoff tube is coiled within the stomach, the tip terminates overlying the expected location of the gastric antrum.      DX-CHEST-PORTABLE (1 VIEW)   Final Result         1.  No acute cardiopulmonary disease.      MR-BRAIN-W/O   Final Result      1.  Subarachnoid hemorrhage within sulci near the vertex, left greater than right      2.  Acute mid brain and punctate left pontine infarct without mass effect      3.  No other significant finding      Findings were discussed with Dr. Mart on 2015 hours 9/30/2020      CT-HEAD W/O   Final Result   Addendum 1 of 1   Addendum:   Please note that the patient has had a CTA head and neck as well as    angiogram with thrombectomy earlier today. A portion of the high    attenuation along the arterial vascular structures and dural venous    sinuses is likely related to contrast staining from    the previous contrast-enhanced procedures. However, the high attenuation    in the sulci over the vertex is suspicious for a small amount of new    subarachnoid hemorrhage. Follow-up MRI could be performed for    confirmation.      Findings were discussed with LAXMI BROWN on 9/30/2020 at 5:21 PM.      Final      CT-CTA HEAD WITH & W/O-POST PROCESS   Final Result      1.  There is acute mid and distal basilar artery occlusion extending into the left greater than right posterior cerebral arteries.   2.  Findings have already been discussed with the ordering physician and neurologist by Dr. Albert of the IR radiology service.      CT-CTA NECK WITH & W/O-POST PROCESSING   Final Result      1.  There is abnormality involving the distal left vertebral artery at the level of C2 which may represent combination of focal dissection/thrombus with possible less likely partially thrombosed vascular malformation.   2.  There is an occlusion of the mid  and distal basilar artery extending into the left greater than right posterior cerebral arteries, P1 segments.   3.  These findings have already been discussed with the clinical service by Dr. Albert of the IR radiology service.      CT-CEREBRAL PERFUSION ANALYSIS   Final Result      1.  Cerebral blood flow less than 30% likely representing completed infarct = 0 mL.      2.  T Max more than 6 seconds likely representing combination of completed infarct and ischemia = 100 mL.      3.  Mismatched volume likely representing ischemic brain/penumbra = 100 mL      4.  Please note that the cerebral perfusion was performed on the limited brain tissue around the basal ganglia region. Infarct/ischemia outside the CT perfusion sections can be missed in this study.      MR-VENOGRAM (MRV) HEAD   Final Result      Cerebral magnetic resonance venogram within normal limits with no evidence of dural venous sinus thrombosis.      MR-BRAIN-WITH & W/O   Final Result      1.  Findings suggesting distal basilar artery and left posterior cerebral artery thrombosis.   2.  Acute ischemia within the midbrain/khang.   3.  Partially empty sella and slightly prominent fluid about the optic nerve sheaths. Correlate for any evidence of intracranial hypertension.      These findings were discussed with Dr. Best on 9/30/2020 10:11 AM.      DX-CHEST-PORTABLE (1 VIEW)   Final Result         1.  No acute cardiopulmonary disease.      IR-THROMBO MECHANICAL ARTERY,INIT    (Results Pending)       Modified Keenes Scale (MRS): 0 = No symptoms      ASSESSMENT AND PLAN:  45-year old female with unknown PMhx who presented to [facility] on 9/30/20 as a transfer from [facility] ED where she presented for a chief complaint of a ?2-day history of retro orbital headache, Left facial numbness and seizure-like activity on arrival; was ultimately intubated for airway protection and transferred to [facility]. Here, she received Keppra 500 mg IV; later  in the evening underwent MRV that revealed no sinus venous thrombosis.   She had MRI Brain w/wo contrast as well; this revealed pontine/midbrain restricted diffusion consistent with evolving ischemia; no hemorrhage. STAT CTA head/neck on morning on 9/30 revealed thromboses involving BL PCAs, Basilar, and Left vert with probable dissection. Patient STAT to Thrombectomy for intervention; now s/p basilar artery thrombectomy with TICI 3; nearby vessels including BL PCAs however remain occluded-- posing high risk for re-occlusion/thrombosis. Patient was thus started on Heparin gtt following repeat CT head/MRI Brain (potential benefit of Heparin gtt outweighs risk of bleeding. NIHSS 28-->17 this morning.    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  Ischemic strokes involving midbrain, Left internal capsule, and Right cerebellum, secondary to the above.  New onset seizure, secondary to the above.     Recommendations/Plan:      -Continue q1h and PRN Neuro assessment, **Low threshold for repeat CT head with changes/decline in neurological exam. -180.   -Telemetry; currently SR. Screen for Afib/arrhythmia. Obtain TTE with bubble study.   -Continue Heparin gtt. Will likely plan to transition to DAPT vs full anticoagulation in coming days (per CADISS trial)  -Note, EEG from 9/30-- normal study. Continue Keppra 500 mg IV q12h.   -Check lipid panel, hemoglobin A1c; hypercoag studies also sent, pending (though suspect etiology likely related to dissection)  -PT/OT/SLP eval and treat, when appropriate. Physiatry consult when appropriate.   -Will follow up with results of the above and make additional recommendations accordingly. All other medical management per primary/ICU team.   -DVT PPX: SCDs.      The plan of care above has been discussed with Dr. Foreman.     Miya Lott, A.P.R.N.  [facility]

## 2020-10-01 NOTE — CARE PLAN
Ventilator Daily Summary    Vent Day #  3  Ventilator settings changed this shift:  no  Weaning trials:  no  Respiratory Procedures:  no  Plan: Continue current ventilator settings and wean mechanical ventilation as tolerated per physician orders.   [phone]

## 2020-10-01 NOTE — ASSESSMENT & PLAN NOTE
No history of seizures in the past  One episode of seizure in the ED, intubated for airway protection  Currently on Keppra per neurology  EEG on 9/30-negative for seizure

## 2020-10-01 NOTE — ASSESSMENT & PLAN NOTE
Intubated on 9/29 for airway protection in the setting of seizure  Extubated on 10/1, tolerated well, incentive spirometry, currently on room air  10/6 tolerating room air

## 2020-10-01 NOTE — PROGRESS NOTES
Pt extubated at 1455 placed on 4L NC by RT. Pt tolerated well VSS 's. No signs of distress. Education provided regarding resting her voice and not pulling at lines or IV's.

## 2020-10-02 ENCOUNTER — APPOINTMENT (OUTPATIENT)
Dept: RADIOLOGY | Facility: MEDICAL CENTER | Age: 45
DRG: 023 | End: 2020-10-02
Attending: INTERNAL MEDICINE
Payer: OTHER GOVERNMENT

## 2020-10-02 LAB
ANION GAP SERPL CALC-SCNC: 11 MMOL/L (ref 7–16)
B2 GLYCOPROT1 IGG SERPL IA-ACNC: 1 SGU (ref 0–20)
B2 GLYCOPROT1 IGM SERPL IA-ACNC: 4 SMU (ref 0–20)
BASOPHILS # BLD AUTO: 0.3 % (ref 0–1.8)
BASOPHILS # BLD: 0.04 K/UL (ref 0–0.12)
BUN SERPL-MCNC: 11 MG/DL (ref 8–22)
CALCIUM SERPL-MCNC: 9.2 MG/DL (ref 8.5–10.5)
CARDIOLIPIN IGA SER IA-ACNC: 1 APL (ref 0–11)
CARDIOLIPIN IGG SER IA-ACNC: 2 GPL (ref 0–14)
CARDIOLIPIN IGM SER IA-ACNC: 5 MPL (ref 0–12)
CHLORIDE SERPL-SCNC: 105 MMOL/L (ref 96–112)
CO2 SERPL-SCNC: 24 MMOL/L (ref 20–33)
CREAT SERPL-MCNC: 0.83 MG/DL (ref 0.5–1.4)
CRP SERPL HS-MCNC: 14.7 MG/DL (ref 0–0.75)
EOSINOPHIL # BLD AUTO: 0.07 K/UL (ref 0–0.51)
EOSINOPHIL NFR BLD: 0.5 % (ref 0–6.9)
ERYTHROCYTE [DISTWIDTH] IN BLOOD BY AUTOMATED COUNT: 42 FL (ref 35.9–50)
GLUCOSE SERPL-MCNC: 130 MG/DL (ref 65–99)
HCT VFR BLD AUTO: 34.4 % (ref 37–47)
HGB BLD-MCNC: 11.9 G/DL (ref 12–16)
IMM GRANULOCYTES # BLD AUTO: 0.08 K/UL (ref 0–0.11)
IMM GRANULOCYTES NFR BLD AUTO: 0.6 % (ref 0–0.9)
LYMPHOCYTES # BLD AUTO: 1.93 K/UL (ref 1–4.8)
LYMPHOCYTES NFR BLD: 14.2 % (ref 22–41)
MAGNESIUM SERPL-MCNC: 1.9 MG/DL (ref 1.5–2.5)
MCH RBC QN AUTO: 31.5 PG (ref 27–33)
MCHC RBC AUTO-ENTMCNC: 34.6 G/DL (ref 33.6–35)
MCV RBC AUTO: 91 FL (ref 81.4–97.8)
MONOCYTES # BLD AUTO: 1.02 K/UL (ref 0–0.85)
MONOCYTES NFR BLD AUTO: 7.5 % (ref 0–13.4)
NEUTROPHILS # BLD AUTO: 10.48 K/UL (ref 2–7.15)
NEUTROPHILS NFR BLD: 76.9 % (ref 44–72)
NRBC # BLD AUTO: 0 K/UL
NRBC BLD-RTO: 0 /100 WBC
PHOSPHATE SERPL-MCNC: 2.8 MG/DL (ref 2.5–4.5)
PLATELET # BLD AUTO: 294 K/UL (ref 164–446)
PMV BLD AUTO: 9.3 FL (ref 9–12.9)
POTASSIUM SERPL-SCNC: 3.8 MMOL/L (ref 3.6–5.5)
PREALB SERPL-MCNC: 14.5 MG/DL (ref 18–38)
RBC # BLD AUTO: 3.78 M/UL (ref 4.2–5.4)
SODIUM SERPL-SCNC: 140 MMOL/L (ref 135–145)
UFH PPP CHRO-ACNC: 0.69 IU/ML
WBC # BLD AUTO: 13.6 K/UL (ref 4.8–10.8)

## 2020-10-02 PROCEDURE — 84134 ASSAY OF PREALBUMIN: CPT

## 2020-10-02 PROCEDURE — 80048 BASIC METABOLIC PNL TOTAL CA: CPT

## 2020-10-02 PROCEDURE — 86140 C-REACTIVE PROTEIN: CPT

## 2020-10-02 PROCEDURE — 83735 ASSAY OF MAGNESIUM: CPT

## 2020-10-02 PROCEDURE — A9270 NON-COVERED ITEM OR SERVICE: HCPCS | Performed by: INTERNAL MEDICINE

## 2020-10-02 PROCEDURE — 770022 HCHG ROOM/CARE - ICU (200)

## 2020-10-02 PROCEDURE — 92610 EVALUATE SWALLOWING FUNCTION: CPT

## 2020-10-02 PROCEDURE — 700111 HCHG RX REV CODE 636 W/ 250 OVERRIDE (IP): Performed by: PSYCHIATRY & NEUROLOGY

## 2020-10-02 PROCEDURE — 700111 HCHG RX REV CODE 636 W/ 250 OVERRIDE (IP): Performed by: INTERNAL MEDICINE

## 2020-10-02 PROCEDURE — 700102 HCHG RX REV CODE 250 W/ 637 OVERRIDE(OP): Performed by: INTERNAL MEDICINE

## 2020-10-02 PROCEDURE — 700111 HCHG RX REV CODE 636 W/ 250 OVERRIDE (IP): Performed by: NURSE PRACTITIONER

## 2020-10-02 PROCEDURE — 700101 HCHG RX REV CODE 250: Performed by: INTERNAL MEDICINE

## 2020-10-02 PROCEDURE — 71045 X-RAY EXAM CHEST 1 VIEW: CPT

## 2020-10-02 PROCEDURE — 99233 SBSQ HOSP IP/OBS HIGH 50: CPT | Performed by: INTERNAL MEDICINE

## 2020-10-02 PROCEDURE — 99223 1ST HOSP IP/OBS HIGH 75: CPT | Performed by: PHYSICAL MEDICINE & REHABILITATION

## 2020-10-02 PROCEDURE — 85520 HEPARIN ASSAY: CPT

## 2020-10-02 PROCEDURE — 85025 COMPLETE CBC W/AUTO DIFF WBC: CPT

## 2020-10-02 PROCEDURE — A9270 NON-COVERED ITEM OR SERVICE: HCPCS | Performed by: PHYSICAL MEDICINE & REHABILITATION

## 2020-10-02 PROCEDURE — 700102 HCHG RX REV CODE 250 W/ 637 OVERRIDE(OP): Performed by: PHYSICAL MEDICINE & REHABILITATION

## 2020-10-02 PROCEDURE — 99232 SBSQ HOSP IP/OBS MODERATE 35: CPT | Performed by: NURSE PRACTITIONER

## 2020-10-02 PROCEDURE — 84100 ASSAY OF PHOSPHORUS: CPT

## 2020-10-02 RX ORDER — AMOXICILLIN 250 MG
2 CAPSULE ORAL 2 TIMES DAILY
Status: DISCONTINUED | OUTPATIENT
Start: 2020-10-02 | End: 2020-10-07 | Stop reason: HOSPADM

## 2020-10-02 RX ORDER — ACETAMINOPHEN 325 MG/1
650 TABLET ORAL EVERY 4 HOURS PRN
Status: DISCONTINUED | OUTPATIENT
Start: 2020-10-02 | End: 2020-10-02

## 2020-10-02 RX ORDER — POLYETHYLENE GLYCOL 3350 17 G/17G
1 POWDER, FOR SOLUTION ORAL
Status: DISCONTINUED | OUTPATIENT
Start: 2020-10-02 | End: 2020-10-07 | Stop reason: HOSPADM

## 2020-10-02 RX ORDER — ACETAMINOPHEN 325 MG/1
650 TABLET ORAL EVERY 4 HOURS PRN
Status: DISCONTINUED | OUTPATIENT
Start: 2020-10-02 | End: 2020-10-07 | Stop reason: HOSPADM

## 2020-10-02 RX ORDER — BUTALBITAL, ACETAMINOPHEN AND CAFFEINE 50; 325; 40 MG/1; MG/1; MG/1
1 TABLET ORAL EVERY 6 HOURS PRN
Status: DISCONTINUED | OUTPATIENT
Start: 2020-10-02 | End: 2020-10-07 | Stop reason: HOSPADM

## 2020-10-02 RX ORDER — POTASSIUM CHLORIDE 20 MEQ/1
40 TABLET, EXTENDED RELEASE ORAL ONCE
Status: COMPLETED | OUTPATIENT
Start: 2020-10-02 | End: 2020-10-02

## 2020-10-02 RX ORDER — MAGNESIUM SULFATE HEPTAHYDRATE 40 MG/ML
2 INJECTION, SOLUTION INTRAVENOUS ONCE
Status: COMPLETED | OUTPATIENT
Start: 2020-10-02 | End: 2020-10-02

## 2020-10-02 RX ORDER — ATORVASTATIN CALCIUM 40 MG/1
40 TABLET, FILM COATED ORAL EVERY EVENING
Status: DISCONTINUED | OUTPATIENT
Start: 2020-10-02 | End: 2020-10-07 | Stop reason: HOSPADM

## 2020-10-02 RX ORDER — BISACODYL 10 MG
10 SUPPOSITORY, RECTAL RECTAL
Status: DISCONTINUED | OUTPATIENT
Start: 2020-10-02 | End: 2020-10-07 | Stop reason: HOSPADM

## 2020-10-02 RX ORDER — GABAPENTIN 300 MG/1
300 CAPSULE ORAL 3 TIMES DAILY
Status: DISCONTINUED | OUTPATIENT
Start: 2020-10-02 | End: 2020-10-03

## 2020-10-02 RX ADMIN — LEVETIRACETAM INJECTION 500 MG: 5 INJECTION INTRAVENOUS at 17:46

## 2020-10-02 RX ADMIN — DOCUSATE SODIUM 50 MG AND SENNOSIDES 8.6 MG 2 TABLET: 8.6; 5 TABLET, FILM COATED ORAL at 05:44

## 2020-10-02 RX ADMIN — GABAPENTIN 300 MG: 300 CAPSULE ORAL at 14:03

## 2020-10-02 RX ADMIN — POTASSIUM CHLORIDE 40 MEQ: 1500 TABLET, EXTENDED RELEASE ORAL at 08:38

## 2020-10-02 RX ADMIN — ATORVASTATIN CALCIUM 40 MG: 40 TABLET, FILM COATED ORAL at 17:46

## 2020-10-02 RX ADMIN — HEPARIN SODIUM 13.4 UNITS/KG/HR: 5000 INJECTION, SOLUTION INTRAVENOUS at 14:03

## 2020-10-02 RX ADMIN — POLYETHYLENE GLYCOL 3350 1 PACKET: 17 POWDER, FOR SOLUTION ORAL at 05:44

## 2020-10-02 RX ADMIN — LEVETIRACETAM INJECTION 500 MG: 5 INJECTION INTRAVENOUS at 05:44

## 2020-10-02 RX ADMIN — GABAPENTIN 300 MG: 300 CAPSULE ORAL at 17:46

## 2020-10-02 RX ADMIN — MAGNESIUM SULFATE 2 G: 2 INJECTION INTRAVENOUS at 08:39

## 2020-10-02 NOTE — PREADMISSION SCREENING NOTE
Updated Pre-Screen Assessment     Name: Obdulia High  MRN: [id number]  : 1975    Medical Status/ Changes:     Livier Crowder M.D.   Physician   Hospital Medicine   Progress Notes   Signed   Date of Service:  10/6/2020  1:54 PM                    []Hide copied text    []Alisa for details  Hospital Medicine Daily Progress Note     Date of Service  10/6/2020     Chief Complaint  45 y.o. female admitted 2020 with headache and facial numbness      Hospital Course    45 y.o. female with unknown past medical history who was transferred from [place] ED on 2020 where she presented with 2-day history of retro-orbital headache and left-sided facial numbness.  In the ED she had an episode of seizure requiring intubation for airway protection.  She was transferred to [facility] for higher level of care.  In the ED she was hypertensive, afebrile.  CT head was unremarkable.  Neurology was consulted and patient was admitted to ICU for further monitoring and ventilator management.  MRI was reviewed on  which revealed  brainstem ischemia. CTA revealed diffuse thrombosis involving left and right PCA,basilar and  vertebral artery with dissection. Patient underwent stat thrombectomy on 2020. Transition to coumadin. INR goal is 2-3, today is 1.12.  Interval Problem Update  Yesterday patient started on Coumadin per neurology. Overnight nursing noted that patient had dysconjugate eye movements and left eye was not tracking, stat CT head done that showed diminishing parenchymal and sulcal contrast staining and or hemorrhage, no evidence of new hemorrhage again redemonstrates acute left thalamus and brainstem infarct seen on previous MRI.  Pending insurance authorization for DC to rehab. Patient has no complaints this morning states she is getting stronger.   10/6 Coumadin 5mg per neurology, int 1.12 today.  Complaints of diplopia which bothers her, eye patchy was not on; explained her about the diplopia is stroked related,  need have one eye patch up.  Mild weakness on right side of the body     Consultants/Specialty  Pulmonology/ Critical Care  Neurology   Physiatry         Code Status  Full Code     Disposition  Pending rehab placement       Review of Systems  Review of Systems   Constitutional: Negative for chills, fever, malaise/fatigue and weight loss.   HENT: Negative for congestion, ear discharge, ear pain, hearing loss, nosebleeds, sore throat and tinnitus.    Eyes: Positive for double vision. Negative for blurred vision, photophobia, pain, discharge and redness.   Respiratory: Negative for cough, hemoptysis, sputum production, shortness of breath, wheezing and stridor.    Cardiovascular: Negative for chest pain, palpitations, orthopnea, claudication and leg swelling.   Gastrointestinal: Negative for abdominal pain, blood in stool, constipation, diarrhea, heartburn, melena, nausea and vomiting.   Genitourinary: Negative for dysuria, flank pain, frequency, hematuria and urgency.   Musculoskeletal: Negative for back pain, falls, joint pain, myalgias and neck pain.        Rt side of body mild weakness   Skin: Negative for itching and rash.   Neurological: Negative for dizziness, tingling, tremors, sensory change, focal weakness, seizures, loss of consciousness and headaches.   Endo/Heme/Allergies: Negative for environmental allergies and polydipsia. Does not bruise/bleed easily.   Psychiatric/Behavioral: Negative for depression, hallucinations, substance abuse and suicidal ideas. The patient is not nervous/anxious and does not have insomnia.    All other systems reviewed and are negative.        Physical Exam  Temp:  [36.4 °C (97.5 °F)-36.7 °C (98.1 °F)] 36.4 °C (97.5 °F)  Pulse:  [] 92  Resp:  [16] 16  BP: (116-142)/(78-90) 116/84  SpO2:  [95 %-98 %] 95 %     Physical Exam  Constitutional:       General: She is not in acute distress.     Appearance: Normal appearance.   HENT:      Head: Normocephalic and atraumatic.   Eyes:       Conjunctiva/sclera: Conjunctivae normal.   Neck:      Musculoskeletal: Normal range of motion.   Cardiovascular:      Rate and Rhythm: Normal rate and regular rhythm.      Pulses: Normal pulses.   Pulmonary:      Effort: Pulmonary effort is normal. No respiratory distress.      Breath sounds: Normal breath sounds.   Abdominal:      General: Abdomen is flat. Bowel sounds are normal.      Palpations: Abdomen is soft.      Tenderness: There is no abdominal tenderness.   Skin:     General: Skin is warm and dry.   Neurological:      Mental Status: She is alert and oriented to person, place, and time.      Motor: Weakness (right upper and lower extremity, mild, improving) present.   Psychiatric:         Mood and Affect: Mood normal.         Behavior: Behavior normal.            Fluids     Intake/Output Summary (Last 24 hours) at 10/6/2020 1355  Last data filed at 10/6/2020 0900      Gross per 24 hour   Intake 290 ml   Output 850 ml   Net -560 ml         Laboratory      Recent Labs     10/04/20  0319   WBC 11.5*   RBC 4.07*   HEMOGLOBIN 12.4   HEMATOCRIT 37.2   MCV 91.4   MCH 30.5   MCHC 33.3*   RDW 41.2   PLATELETCT 372   MPV 9.1           Recent Labs     10/04/20  0319 10/05/20  0309   SODIUM 136 134*   POTASSIUM 4.2 4.1   CHLORIDE 100 99   CO2 23 22   GLUCOSE 116* 120*   BUN 14 16   CREATININE 0.70 0.76   CALCIUM 9.4 9.5            Recent Labs     10/04/20  1547 10/05/20  0309 10/06/20  0701   INR 1.02 1.06 1.12                 Imaging  CT-HEAD W/O   Final Result       1.  Diminishing parenchymal and sulcal contrast staining and/or hemorrhage in the parasagittal high frontal lobes. No evidence of new/progressive hemorrhage.   2.  Acute lacunar size infarct left thalamus.   3.  Acute brainstem infarct evident in the right side of the khang, best seen on MRI.       EC-ECHOCARDIOGRAM COMPLETE W/O CONT   Final Result       [id number] VIEW   Final Result           1.  Nonspecific bowel gas pattern.   2.  Dobbhoff tube  is coiled within the stomach, the tip terminates overlying the expected location of the gastric antrum.       MR-BRAIN-W/O   Final Result       1.  Subarachnoid hemorrhage within sulci near the vertex, left greater than right       2.  Acute mid brain and punctate left pontine infarct without mass effect       3.  No other significant finding       Findings were discussed with Dr. Mart on 2015 hours 9/30/2020       CT-HEAD W/O   Final Result   Addendum 1 of 1   Addendum:   Please note that the patient has had a CTA head and neck as well as    angiogram with thrombectomy earlier today. A portion of the high    attenuation along the arterial vascular structures and dural venous    sinuses is likely related to contrast staining from    the previous contrast-enhanced procedures. However, the high attenuation    in the sulci over the vertex is suspicious for a small amount of new    subarachnoid hemorrhage. Follow-up MRI could be performed for    confirmation.       Findings were discussed with LAXMI BROWN on 9/30/2020 at 5:21 PM.       Final       IR-THROMBO MECHANICAL ARTERY,INIT   Final Result       1.  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   2.  Basilar artery thrombosis.   3.  Successful mechanical thrombectomy of a basilar artery thrombosis.   4.  The final angiogram demonstrates patent basilar, bilateral superior cerebellar and right posterior cerebral arteries. The left P1 segment is patent. The left distal P2 segment is not visualized.       CT-CTA HEAD WITH & W/O-POST PROCESS   Final Result       1.  There is acute mid and distal basilar artery occlusion extending into the left greater than right posterior cerebral arteries.   2.  Findings have already been discussed with the ordering physician and neurologist by Dr. Albert of the IR radiology service.       CT-CTA NECK WITH & W/O-POST PROCESSING   Final Result       1.  There is abnormality involving the distal left vertebral artery at  the level of C2 which may represent combination of focal dissection/thrombus with possible less likely partially thrombosed vascular malformation.   2.  There is an occlusion of the mid and distal basilar artery extending into the left greater than right posterior cerebral arteries, P1 segments.   3.  These findings have already been discussed with the clinical service by Dr. Albert of the IR radiology service.       CT-CEREBRAL PERFUSION ANALYSIS   Final Result       1.  Cerebral blood flow less than 30% likely representing completed infarct = 0 mL.       2.  T Max more than 6 seconds likely representing combination of completed infarct and ischemia = 100 mL.       3.  Mismatched volume likely representing ischemic brain/penumbra = 100 mL       4.  Please note that the cerebral perfusion was performed on the limited brain tissue around the basal ganglia region. Infarct/ischemia outside the CT perfusion sections can be missed in this study.       MR-VENOGRAM (MRV) HEAD   Final Result       Cerebral magnetic resonance venogram within normal limits with no evidence of dural venous sinus thrombosis.       MR-BRAIN-WITH & W/O   Final Result       1.  Findings suggesting distal basilar artery and left posterior cerebral artery thrombosis.   2.  Acute ischemia within the midbrain/khang.   3.  Partially empty sella and slightly prominent fluid about the optic nerve sheaths. Correlate for any evidence of intracranial hypertension.       These findings were discussed with Dr. Best on 9/30/2020 10:11 AM.       DX-CHEST-PORTABLE (1 VIEW)   Final Result           1.  No acute cardiopulmonary disease.             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  Underwent emergent endotracheal intubation for airway  protection secondary to seizure on admission  Further work-up revealed brainstem ischemia secondary to diffuse thrombosis in basilar, B/L PCA and vertebral artery.   Underwent emergent basilar artery thrombectomy on 9/30.  Neurology on board, due to diffuse thrombosis and increased risk of re thrombosis decision was made to start the patient on heparin drip.  EEG obtained on 9/30- for seizure.Neurology recommends to continue Keppra for now.  Started coumadin per neurology recs   PT/OT/Physiatry following, likely dc rehab   10/6 on coumadin 5mg, pending auth; PT/OT/Physiatry following     Headache- (present on admission)  Assessment & Plan  History of complex migraine  Diagnosed with vertebral artery dissection, basilar and PCA thrombosis  Neurology following  Continue to monitor      Seizure (HCC)- (present on admission)  Assessment & Plan  No history of seizures in the past  One episode of seizure in the ED, intubated for airway protection  Currently on Keppra per neurology  EEG on 9/30-negative for seizure     Acute respiratory failure with hypoxia and hypercapnia (HCC)  Assessment & Plan  Intubated on 9/29 for airway protection in the setting of seizure  Extubated on 10/1, tolerated well, incentive spirometry, currently on room air  10/6 tolerating room air        Essential hypertension- (present on admission)  Assessment & Plan  Hypertension on presentation  BP at goal  Labetalol, enalaprilat prn.  Goal SBP less than 160        VTE prophylaxis: coumadin     I have performed a physical exam and reviewed and updated ROS and Plan today (10/6/2020). In review of yesterday's note (10/5/2020), there are no changes except as documented above.                  Functional Status/ Changes:     Geni Becker PTA   Physical Therapy Assistant      Therapy   Signed   Date of Service:  10/7/2020  9:45 AM                    []Hide copied text    []Alisa for details  Physical Therapy   Daily Treatment     Patient  Name: Obdulia High  Age:  45 y.o., Sex:  female  Medical Record #: [id number]  Today's Date: 10/7/2020     Precautions: (P) Fall Risk, Swallow Precautions ( See Comments)     Assessment     Pt fatigued again today, roommate keeping her up. Pt functioning @ min<->mod assist w/her tasks. Pt fatigued following showering/toileting, only managing 12' w/FWW today. Pt conts to demo Rt UE weakness > Rt LE. Pt following commands throughout her tx efforts, still demo hypophonia.   Excellent rehab candidate.      Plan     Continue current treatment plan.     DC Equipment Recommendations: Unable to determine at this time           Objective          10/07/20 0945   Other Treatments   Other Treatments Provided Assisted pt w/showering today. Pt cued to use her Lft UE to assist. Sit-> shower for pericare.    Balance   Sitting Balance (Static) Fair -   Sitting Balance (Dynamic) Poor +   Standing Balance (Static) Poor +   Standing Balance (Dynamic) Poor   Weight Shift Sitting Fair   Weight Shift Standing Poor   Comments standing w/FWW   Gait Analysis   Gait Level Of Assist Minimal Assist   Assistive Device Front Wheel Walker   Distance (Feet) 12   # of Times Distance was Traveled 1   Skilled Intervention Verbal Cuing;Postural Facilitation   Comments Pt amb out of the BR following showering/toileting. Pt fatigued, needing cueing for walker management.    Bed Mobility    Supine to Sit Minimal Assist  (out the Rt side w/railing assistance)   Sit to Supine Supervised  (HOB flat and no railing)   Scooting Minimal Assist  (seated)   Rolling Minimum Assist to Lt.   Comments Pt managing LE's on/off the bed. Pt needing trunk assist to get seated.    Functional Mobility   Sit to Stand Minimal Assist  (from shower chair/toilet)   Bed, Chair, Wheelchair Transfer Moderate Assist  (bed->shower chair->toilet)   Toilet Transfers Moderate Assist   Skilled Intervention Verbal Cuing;Postural Facilitation;Compensatory Strategies   Comments  Multiple transfers today to different height surfaces.                   Lo Ashley, SLP   Speech Language Pathologist      Therapy   Signed   Date of Service:  10/6/2020  2:45 PM                    []Hide copied text    []Alisa for details  Speech Language Pathology   Fiberoptic Endoscopic Evaluation of Swallow     Patient Name: Obdulia High  AGE:  45 y.o., SEX:  female  Medical Record #: [id number]  Today's Date: 10/6/2020     Precautions  Precautions: (P) Fall Risk, Swallow Precautions ( See Comments)  Comments: (P) left diplopia-uses eye patch.      Assessment     Patient is 45 y.o. 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  Additional factors influencing patient status/progress: pt with aphonia/diplopia with patch to left eye.  Pt agreed to the FEES and tolerated the procedure without difficulty. Pt with slight amount of thinnish and whitish pharyngeal secretions visualized prior to PO presentations. Right arytenoid and fold moving greater than left with incomplete fold adduction with phonation. Pt with pink and reddish appearing folds with possible contact ulcers posterior 1/3 of the folds with bilat white appearing tissue. Variable visualization of the vallecular space during the FEES related to positioning of the epiglottis. Slight to mild residue post swallow intermittently visualized after the first swallow with good clearance with repeat swallows. Pt with slight to mild pyriform sinus residue post swallow, greater on the left versus the right, that clears with a 2nd swallow. Deep penetration, to the epiglottis petiole area with sip of NTL from the straw with spont throat clearing. Penetration to the left aryepiglottic fold area with oatmeal probably occurring during the swallow with spontaneous coughing. 1/3-1/2 tsp amounts of oatmeal subsequently were assessed with no further penetration with 1/3-1/2  tsp amounts.  Silent penetration with slight amount of thin milk from the spoon to the lower 1/3 portion of the epiglottis. Overall, pt presenting with moderate dysphagia for assessed food items. Chopped fruit not assessed related to the penetration with the oatmeal. Consider f/u MBS in the next 1-2 weeks to further assess swallowing especially airway protection during the swallow. Consider request for ENT consult in the near future if voice continues to be at a whisper quality.      Plan  Cont with LQ3/MT2 with posted and recommended swallow strategies.   Recommend Speech Therapy 5 times per week until therapy goals are met for the following treatments:  Dysphagia Training.     Discharge Recommendations: (P) Recommend post-acute placement for additional speech therapy services prior to discharge home          10/06/20 6636   FEES Evaluation Prior To Procedure   Onset Date Of Dysphagia    (since admit)   Dysphagia Symptoms Warranting Video Swallow s/s of difficulty during intake of LQ3/MT2   Procedure Performed While Patient Sitting In Bed   Seated at (Degrees) 90   A Flexible Endoscope Was Introduced Transnasally In The Right Nare   FEES  Anatomy Assessment   Anatomy For A Functional Swallow: Small Vallecular Space;Other (Comments)  (decreased adduction bilat folds, post contact ulcers)   FEES Laryngeal Adduction Assessment   Breath Holding Inadequate   Clearing Throat Inadequate   Cough Inadequate   Phonation Inadequate   Onset Of Swallow Inadequate   FEES Velopharyngeal Assessment    Velopharyngeal Closure: Adequate   FEES Sensation Assessment    Presence of Scope Adequate   Presence of Residue Adequate   Presence of Penetration Immediate Response   Presence of Aspiration No Aspiration   FEES Swallow Function Assessment   Swallow Trigger Level of the Valleculae;Level of the Pyriform Sinuses   Base of Tongue Retraction Functional   Pharyngeal Constrictor Movement Functional   White Out Phase Complete White Out  "  Epiglottic Movement Functional   Laryngeal Elevation Functional   Cricopharyngeal Function Functional   Swallow Observations / Symptoms   Swallow Observations / Symptoms Yes   Vallecular Residue Mildly Thick (2) - (Nectar Thick);Liquidised (3);Pureed (4)   Mildly Thick (2) - (Nectar Thick) Teaspoon;Cup   Pyriform Sinus Residue Liquidised (3);Pureed (4);Slightly Thick (1);Thin (0)   Penetration Suspected During the Swallow Mildly Thick (2) - (Nectar Thick);Thin (0)  (penetration to left aeryepiglottic fold-cough triggered)   Mildly Thick (2) - (Nectar Thick)    (slight penetration with straw sip NTL)   Thin (0) Teaspoon  (slight penetration petiole of the epiglottis-deep pene)   Compensatory Strategies Attempted   Compensatory Strategies Attempted Yes   Patient Ability To Protect Airway   Patient Ability To Protect Airway  Adequate   Penetration Aspiration Scale   Penetration Aspiration Scale 3 - Material remains above vocal folds, visible stasis remains   [place] Pharyngeal Residue Severity   Vallecular Residue Mild, epiglottic ligament visable   Pyriform Sinus Residue  Mild, up wall to ¼ full   Dysphagia Rating   Nutritional Liquid Intake Rating Scale Thickened beverages (mildly thick unless otherwise specified)   Nutritional Food Intake Rating Scale Total oral diet of a single consistency   Problem List   Problem List Dysphagia;Voice Quality Deficit   Evaluation Recommendations   Swallow Evaluations Recommendations (Yes / No) Yes   Diet / Liquid Recommendation Mildly Thick (2) - (Nectar Thick);Liquidised (3) - (Nectar Thick Full Liquid)   Medication Administration  Crush all Medications in Puree   Evaluation Recommended Techniques   Swallow Techniques Yes   Techniques Oral Stage Chew Each Bite Thoroughly;Other (Comment)  (HOB 90 degrees, sm amounts, slow rate, dble swallow)   Patient / Family Goals   Patient / Family Goal #1 \"I just want to get better\"   Goal #1 Outcome Progressing as expected   Short Term Goals "   Short Term Goal # 1 Pt will consume PO diet of LQ3/MT2 with no clinical s/sx of aspiration/penetration.   Goal Outcome # 1 Progressing as expected   Short Term Goal # 2 Pt will verbalize 1-2 strategies to facilitate recall of information during SLP session with 100% accuracy and min cues   Goal Outcome # 2  Progressing as expected   Session Information   Priority 3  (5x,brainstemCVA/postextub,blurredvision, LQ3/[id number])                    Samantha Lund, OT   Occupational Therapist      Therapy   Signed   Date of Service:  10/6/2020 12:25 PM                    []Hide copied text    []Alisa for details  Occupational Therapy  Daily Treatment     Patient Name: Obdulia High  Age:  45 y.o., Sex:  female  Medical Record #: [id number]  Today's Date: 10/6/2020     Precautions: Fall Risk, Swallow Precautions  Comments: L diplopia, uses eye patch      Assessment     Pt seen for OT tx to include: bed mobility, sit to stands, LB dressing, seated grooming, neuromuscular re-education to RUE. Pt is L-hand dominant. Required cues to incorporate RUE into bimanual tasks (such as opening/closing toothpaste). Engaged pt in grasp/reach/release tasks with emphasis on completion of each motor component (i.e.: fully extending digits to release object without dragging hand). Pt's RUE in minimally limited by weakness, primary deficit is motor incoordination/apraxia. Pt able to sit statically with SBA but required min A during dynamic task of LB dressing. Pt expresses fear of falling. Pt continues to endorse diplopia, demos delayed tracking with L eye. Utilizing eye patch to compensate. Pt appears highly motivated with fair/good awareness of deficits. Voice is hypophonic, but no aphasia observed. Will continue to benefit from acute OT with recommendation for intensive post-acute therapies.      Plan     Continue current treatment plan.     DC Equipment Recommendations: Unable to determine at this time  Discharge Recommendations:  "Recommend post-acute placement for additional occupational therapy services prior to discharge home     Subjective     \"I'm still seeing double. It affects my balance.\"     Objective          10/06/20 1225   Cognition    Speech/ Communication    (Hypophonic, but clear articulation and no aphasia noted )   Ability To Follow Commands 2 Step   New Learning Impaired   Attention Impaired   Sequencing Impaired   Comments Pt oriented x 4, improved response time   Active ROM Upper Body   Dominant Hand Left   Rt Shoulder Flexion Degrees 150   Strength Upper Body   Comments RUE mild proximal weakness (shoulder 3+/5, elbow flex/ext 4+/5)   Fine Motor / Dexterity    Fine Motor / Dexterity Interventions Grasp/release/reach using RUE   Bed Mobility    Supine to Sit Minimal Assist  (to L via logroll)   Sit to Supine Minimal Assist   Scooting Minimal Assist  (seated)   Rolling Minimum Assist to Lt.   Activities of Daily Living   Grooming Minimal Assist;Seated  (oral care EOB)   Lower Body Dressing Maximal Assist  (doff/don L sock in tailor sit; unable to tailor sit RLE)   Functional Mobility   Sit to Stand Minimal Assist   Mobility Supine > < EOB, side-stepping towards HOB   Visual Perception   Comments mild L eye pstois; delayed tracking L eye all directions, limited tracking to lateral L   Short Term Goals   Short Term Goal # 1 Pt will perform LB dressing with mod a    Goal Outcome # 1 Goal not met   Short Term Goal # 2 Pt will demonstrate R shoulder AROM 120   Goal Outcome # 2 Goal met, new goal added   Short Term Goal # 2 B  Pt will complete seated grooming with supv    Goal Outcome # 2 B Progressing as expected   Short Term Goal # 3 Pt will improve R bicep strength to 4/5   Goal Outcome # 3 Goal met, new goal added   Short Term Goal # 3 B Pt will complete ADL transfers with supv    Goal Outcome # 3 B Goal not met   Short Term Goal # 4 Pt will incorporate RUE into bimanual task with no cues                   Reviewer: Fish " HELDER Shea L.P.N.  Date: 10/7/2020  Time: 11:55 AM  Pre-Admission Screening Form    Patient Information:   Name: Obdulia High     MRN: [id number]       : 1975      Age: 45 y.o.   Gender: female      Race: White [7]       Marital Status:  [2]  Family Contact: Bill High        Relationship: Spouse [17]  Home Phone:            Cell Phone: [phone]  Advanced Directives: None  Code Status:  FULL  Current Attending Provider: Wang Best M.D.  Referring Physician: ALLEN Hastings      Physiatrist Consult: Dr Conde      Referral Date: 10/2/2020   Primary Payor Source:  [facility]  Secondary Payor Source:      Medical Information:   Date of Admission to [facility] Care [facility]  Room Number: R102/00  Rehabilitation Diagnosis: IGC Code / Diagnosis to Support: 0001.3 - Stroke: Bilateral Involvement  There is no immunization history on file for this patient.  No Known Allergies  History reviewed. No pertinent past medical history.  History reviewed. No pertinent surgical history.    History Leading to Admission, Conditions that Caused the Need for Rehab (CMS):     Benigno Mart M.D.   Physician   Pulmonary        Critical Care Consultation     Date of consult: 2020     Referring Physician  Dragan Griffith D.O.     Reason for Consultation  Seizure respiratory failure requiring intubation     History of Presenting Illness  45 y.o. female who presented 2020 with unknown past medical hx that presented to [place] [facility] for 2 days of retro-orbital headache, nausea and left side facial numbness. She had a 10 minutes seizure requiring valium and ativan and then required intubation with etomidate and succ. She presented with normal vitals afebrile other then hypertension to 180/105 had normal CBC and no cmp could be seen on paper work. CT head that was negative and post intubated CXR that showed good placement of ET tube. She was transferred here and was given rocuronium and fentanyl in route.  She present on propofol with ET in place so limited history is able to be obtained and significant other is a flight medical personnel that is on his way to the hospital. With lifting sedation she was able to follow commands. Neurology was consulted and orders were placed. I was asked to admit the patient for ventilator management.      Further hx per  a family practice physician. She had left occipital headache starting on Thursday that progressed to severe today with visual scotoma and left facial numbness. She has had about 6 prior episodes of complex ocular migraines in pass. No family hx of sah, no trauma or hypercoagulable states no recent sickness or fever chills. No hx of hypertension only hx of migraines and PCOS. No prior seizures. No substance abuse.        Assessment/Plan  Seizure (HCC)  Assessment & Plan  Monitor neuro exam and EEG  Titrate and monitor AED's drug/levels.   Avoid seizure lowering medication/antibiotics  Seizure precautions     It status epilepticus: provide rapid control of seizure (<5mins) and or burst suppression for 48-72 hrs     Consider: meningitis/encephalitis, CVA/structural, drug withdrawal, toxicologic, NMDA encephalitis, TCA's or electrolyte disturbances, hypertension.       Lorazepam 0.1mg/kg IV stat up to 8mg  Keppra 60mg/kg IV over 10 minutes or up to 4.5gr/day (maintenance 1-2gr Q12)  Valproic acid 40mg/kg over 10 minutes up to 3000mg/day can rebolus 20mg/kg (maintenance 20-60mg/kg divided dose)     Refractory status epilepticus 2+ drugs  Consider Ketamine for glutamate receptor antagonist in refractory cases     Neurology consulted seems unlikely bacterial meningitis, check ESR for temporal arteritis, follow up MRI/MRA  Aggressive BP control to rule out PRESS          Acute respiratory failure with hypoxia and hypercapnia (HCC)  Assessment & Plan  Intubated date: 9/29 for airway protection for seizure   Goal saturation > 92%     Monitor pulse oximetry and adjust  "peep and FIO2 to abg/vbg, and peep optimization.  Monitor ventilator waveforms and titrate flow/peep and volumes according.   CXR: monitor lung volumes and tube/line placement  VAP bundle prevention, oral care, post pyloric feeding  Head of bed > 30 degree  GI prophylaxis  Daily awakening and SBT trials unless contraindicated  Monitor for liberation/extubation  Respiratory treatments: prn     SBT in am with likely extubation since following commands     Essential hypertension  Assessment & Plan  Hypertension on presentation  Check UDS  Labetalol prn goal SBP < 160     Headache  Assessment & Plan  Complex migraine, temporal arteritis  Follow up MRI/MRA, ESR neurology consulting  Consider MRV     May need LP   Seems unlikely SAH with seizure and no blood seen on CT head but this is > 48 hours since symptoms onset        Discussed patient condition and risk of morbidity and/or mortality with RN, RT, Pharmacy, Charge nurse / hot rounds, Patient and neurology.    The patient remains critically ill from seizure headache and ventilator dependency.  Critical care time = 59 minutes in directly providing and coordinating critical care and extensive data review.  No time overlap and excludes procedures.                      Eusebio Fischer M.D.   Physician   Neurology       Neurology Initial Consult H&P  Neurohospitalist Service, [facility]     Referring Physician: Benigno Mart M.D.         Chief Complaint   Patient presents with   • Seizure   • Headache         HPI: Obdulia High is a 45 y.o. woman presenting for whom neurology has been consulted for seizure.  Unfortunately, patient is currently intubated and sedated thus unable to obtain any subjective history.  No family at bedside to provide corollary.  As documented by Dr. Benigno Mart 9/29/20, \"presented 9/29/2020 with unknown past medical hx that presented to [place] ER for 2 days of retro-orbital headache, nausea and left side facial " "numbness. She had a 10 minutes seizure requiring valium and ativan and then required intubation with etomidate and succ. She presented with normal vitals afebrile other then hypertension to 180/105 had normal CBC and no cmp could be seen on paper work. CT head that was negative and post intubated CXR that showed good placement of ET tube. She was transferred here and was given rocuronium and fentanyl in route. She present on propofol with ET in place.\"  Auxillary history suggests patient has a history of migraines and was LKN Thursday, 9/29/20 when the current bout of headaches started.  Seizure semiology described as GTC, as per Dr. Mart who spoke with family earlier tonight 9/29/20.     Review of systems: In addition to what is detailed in the HPI above, all other systems reviewed and are negative.      General: Patient is intubated and sedated with propofol  Eyes: examination of optic disks not indicated at this time given acuity of consult  CV: RRR     NEUROLOGICAL EXAM:      Mental status: does not open eyes to noxious stimulus, does not follow commands  Speech and language: intubated  Cranial nerve exam: Pupils are equal, round and reactive to light bilaterally. Visual fields: does not blink to threat bilaterally. Gaze in neutral position. Corneal reflexes intact bilaterally.  Extraocular muscles are intact to oculocephalic maneuver ie VOR intact. Face is symmetric. Unable to assess sensation in the face due to mental status. Cough and gag reflexes are intact.  Motor exam: Does not participate in formal strength testing. Tone is normal. No abnormal movements were seen on exam.  Sensory exam: no withdrawal from noxious stim RUE, brisk withdrawal from noxious LUE, triple flexion b/l LE  Deep tendon reflexes:  1+ throughout. Toes mute bilaterally  Coordination: not participatory due to mental status  Gait: deferred due to ICU status    HEAD    (Results Pending)         Assessment and Plan:     Obdulia High is " a 45 y.o. woman presenting for whom neurology has been consulted for seizure and HA.  Differential includes CVT and warrants dedicated neuroimaging.  Patient now stable and pending transfer to the ICU.  Will workup factors that could serve to lower seizure threshold.     Plan:     - MRI brain and MRV head  - wean sedation and extubate when able  - continue Keppra 500mg BID; may convert IV to PO when safe to swallow 1:1 conversion  - seizure precautions  - workup for altered mental status: TSH, B12, thiamine, RPR, HIV, ammonia, UA, utox, EEG     The evaluation of the patient, and recommended management, was discussed with Dr. Mart at bedside in the ED.     Eusebio Fischer MD  Neurohospitalist, Acute Care Services   of Neurology     Delvin Conde D.O.   Physician   Physical Medicine & Rehab      Physical Medicine and Rehabilitation Consultation                                                                                      Initial Consult        Date of initial consultation: 10/2/2020  Consulting provider: AYO Hastings  Reason for consultation: assess for acute inpatient rehab appropriateness  LOS: 3 Day(s)     Chief complaint: right hemiparesis     HPI: The patient is a 45 y.o. left hand dominant female with no known past medical history;  who presented on 9/29/2020  8:22 PM as a transfer from outside hospital where she initially presented with a 2-day history of retro-orbital headache, left facial numbness and seizure-like activity.  Patient received Ativan and was intubated for airway protection prior to being transferred to [facility].  Work-up with MRI brain found brainstem/pontine/midbrain ischemia, and follow-up CTA showed diffuse thrombosis in the left and right PCAs, left vertebral, and basilar arteries. Patient then received basilar artery thrombectomy. NIH SS 28 on admission, and NIH SS of 9 today 10/2/2020.      Patient is feeling better today, however does report right arm  and leg numbness and tingling, constipation, Faust dependency, some ptosis, lethargy and weakness.      THERAPY:  Restrictions: none  PT: Functional mobility   Pending      OT: ADLs  Pending      SLP:   10/2 Dysphagia     Social Hx:  1 SH  1 TRE  With:  who is a family practice physician with the [employer].  Family lives in [place]     IMAGIN/30 MRI brain without  1.  Subarachnoid hemorrhage within sulci near the vertex, left greater than right  2.  Acute mid brain and punctate left pontine infarct without mass effect  3.  No other significant finding      MR brain with/without  1.  Findings suggesting distal basilar artery and left posterior cerebral artery thrombosis.  2.  Acute ischemia within the midbrain/khang.  3.  Partially empty sella and slightly prominent fluid about the optic nerve sheaths. Correlate for any evidence of intracranial hypertension.     PROCEDURES:   Gallito Albert MD   Mechanical thrombectomy. Findings:The basilar artery is ope.There is a thrombus in the left Vertebral artery due to the dissection.There is antegrade flow.There is also thrombus in the left P2 segment.       PMH:      Past Medical History       PSH:      Past Surgical History       FHX:    Non-pertinent to today's issues      Medications:       Current Facility-Administered Medications       Medication     Dose       •     acetaminophen (TYLENOL) tablet 650 mg      650 mg       •     Pharmacy Consult: Enteral tube insertion - review meds/change route/product selection      1 Each       •     atorvastatin (LIPITOR) tablet 40 mg      40 mg       •     norepinephrine (Levophed) infusion 8 mg/250 mL (premix)      0-30 mcg/min       •     labetalol (NORMODYNE/TRANDATE) injection 10 mg      10 mg       •     enalaprilat (VASOTEC) injection 1.25 mg      1.25 mg       •     heparin infusion 25,000 units in 500 mL 0.45% NACL      0-30 Units/kg/hr       •     levETIRAcetam (Keppra) 500 mg in 100 mL NaCl IV  "premix      500 mg       •     Respiratory Therapy Consult             •     ipratropium-albuterol (DUONEB) nebulizer solution      3 mL       •     senna-docusate (PERICOLACE or SENOKOT S) 8.6-50 MG per tablet 2 Tab      2 Tab             And       •     polyethylene glycol/lytes (MIRALAX) PACKET 1 Packet      1 Packet             And       •     magnesium hydroxide (MILK OF MAGNESIA) suspension 30 mL      30 mL             And       •     bisacodyl (DULCOLAX) suppository 10 mg      10 mg       •     MD Alert...ICU Electrolyte Replacement per Pharmacy                       Allergies:    No Known Allergies         Physical Exam:    Vitals: /79   Pulse 94   Temp 36.6 °C (97.8 °F) (Temporal)   Resp (!) 56   Ht 1.803 m (5' 10.98\")   Wt 87.8 kg (193 lb 9 oz)   SpO2 94%     Gen: NAD    Head: NC/AT    Eyes/ Nose/ Mouth: Eyes do not fully open, however PERRLA, moist mucous membranes    Cardio: RRR, no mumurs    Pulm: CTAB, with normal respiratory effort    Abd: Soft NTND, active bowel sounds,     Ext: No peripheral edema. No calf tenderness. No clubbing/cyanosis. +         Mental status: answers questions appropriately follows commands    Speech: fluent, no aphasia or dysarthria         CRANIAL NERVES:    2,3: visual acuity grossly intact, PERRL    3,4,6: EOMI bilaterally, no nystagmus or diplopia    5: sensation intact to light touch bilaterally and symmetric    7: slight right facial droop     8: hearing grossly intact    9,10: symmetric palate elevation    11: SCM/Trapezius strength 5/5 bilaterally    12: tongue protrudes midline         Motor:                                  Upper Extremity      Myotome     R     L       Shoulder flexion     C5     3/5     5       Elbow flexion     C5     4/5     5       Wrist extension     C6     4/5     5       Elbow extension     C7     4/5     5       Finger flexion     C8     4/5     5       Finger abduction     T1     4/5     5              Lower Extremity "     Myotome     R     L       Hip flexion     L2     3/5     5       Knee extension     L3     4/5     5       Ankle dorsiflexion     L4     4/5     5       Toe extension     L5     4/5     5       Ankle plantarflexion     S1     4/5     5            Sensory:     intact to light touch through out         DTRs: 2+ in bilateral  brachioradialis, 2+ in bilateral patellar tendons    No clonus at bilateral ankles    Positive babinski left    Negative Dominguez b/l          Tone: no spasticity noted, no cogwheeling noted         Labs: Reviewed and significant for                Recent Labs             09/30/20    0816     09/30/20    1600     09/30/20    2035     10/01/20    0015     10/01/20    0620     10/02/20    0500       RBC     3.46*      --       --       --      3.74*     3.78*       HEMOGLOBIN     10.8*      --       --       --      11.9*     11.9*       HEMATOCRIT     31.9*      --       --       --      34.2*     34.4*       PLATELETCT     234      --       --       --      270     294       PROTHROMBTM      --      14.1     13.7     14.0      --       --        APTT      --       --      43.3*      --       --       --        INR      --      1.06     1.02     1.05      --       --                     Recent Labs             09/30/20    1600     10/01/20    0620     10/02/20    0500       SODIUM     136     136     140       POTASSIUM     3.8     3.4*     3.8       CHLORIDE     103     103     105       CO2     22     23     24       GLUCOSE     121*     109*     130*       BUN     8     7*     11       CREATININE     0.80     0.80     0.83       CALCIUM     8.3*     8.0*     9.2            Recent Results               Recent Results (from the past 24 hour(s))       Lipid Profile             Collection Time: 10/01/20 12:40 PM       Result     Value     Ref Range             Cholesterol,Tot     112     100 - 199 mg/dL             Triglycerides     109     0 - 149 mg/dL             HDL     58     >=40 mg/dL              LDL     32     <100 mg/dL       HEMOGLOBIN A1C             Collection Time: 10/01/20 12:40 PM       Result     Value     Ref Range             Glycohemoglobin     5.0     0.0 - 5.6 %             Est Avg Glucose     97     mg/dL       Heparin Xa (Unfractionated)             Collection Time: 10/01/20 12:40 PM       Result     Value     Ref Range             Heparin Xa (UFH)     0.48     IU/mL       CBC with Differential             Collection Time: 10/02/20  5:00 AM       Result     Value     Ref Range             WBC     13.6 (H)     4.8 - 10.8 K/uL             RBC     3.78 (L)     4.20 - 5.40 M/uL             Hemoglobin     11.9 (L)     12.0 - 16.0 g/dL             Hematocrit     34.4 (L)     37.0 - 47.0 %             MCV     91.0     81.4 - 97.8 fL             MCH     31.5     27.0 - 33.0 pg             MCHC     34.6     33.6 - 35.0 g/dL             RDW     42.0     35.9 - 50.0 fL             Platelet Count     294     164 - 446 K/uL             MPV     9.3     9.0 - 12.9 fL             Neutrophils-Polys     76.90 (H)     44.00 - 72.00 %             Lymphocytes     14.20 (L)     22.00 - 41.00 %             Monocytes     7.50     0.00 - 13.40 %             Eosinophils     0.50     0.00 - 6.90 %             Basophils     0.30     0.00 - 1.80 %             Immature Granulocytes     0.60     0.00 - 0.90 %             Nucleated RBC     0.00     /100 WBC             Neutrophils (Absolute)     10.48 (H)     2.00 - 7.15 K/uL             Lymphs (Absolute)     1.93     1.00 - 4.80 K/uL             Monos (Absolute)     1.02 (H)     0.00 - 0.85 K/uL             Eos (Absolute)     0.07     0.00 - 0.51 K/uL             Baso (Absolute)     0.04     0.00 - 0.12 K/uL             Immature Granulocytes (abs)     0.08     0.00 - 0.11 K/uL             NRBC (Absolute)     0.00     K/uL       Basic Metabolic Panel (BMP)             Collection Time: 10/02/20  5:00 AM       Result     Value     Ref Range             Sodium      140     135 - 145 mmol/L             Potassium     3.8     3.6 - 5.5 mmol/L             Chloride     105     96 - 112 mmol/L             Co2     24     20 - 33 mmol/L             Glucose     130 (H)     65 - 99 mg/dL             Bun     11     8 - 22 mg/dL             Creatinine     0.83     0.50 - 1.40 mg/dL             Calcium     9.2     8.5 - 10.5 mg/dL             Anion Gap     11.0     7.0 - 16.0       Magnesium             Collection Time: 10/02/20  5:00 AM       Result     Value     Ref Range             Magnesium     1.9     1.5 - 2.5 mg/dL       Phosphorus             Collection Time: 10/02/20  5:00 AM       Result     Value     Ref Range             Phosphorus     2.8     2.5 - 4.5 mg/dL       Heparin Xa (Unfractionated)             Collection Time: 10/02/20  5:00 AM       Result     Value     Ref Range             Heparin Xa (UFH)     0.69     IU/mL       CRP QUANTITIVE (NON-CARDIAC)             Collection Time: 10/02/20  5:00 AM       Result     Value     Ref Range             Stat C-Reactive Protein     14.70 (H)     0.00 - 0.75 mg/dL       PREALBUMIN             Collection Time: 10/02/20  5:00 AM       Result     Value     Ref Range             Pre-Albumin     14.5 (L)     18.0 - 38.0 mg/dL       ESTIMATED GFR             Collection Time: 10/02/20  5:00 AM       Result     Value     Ref Range             GFR If      >60     >60 mL/min/1.73 m 2             GFR If Non      >60     >60 mL/min/1.73 m 2                        ASSESSMENT:    Patient is a 45 y.o. female admitted with right hemiparesis, dysphagia, facial droop and dysarthria 2/2 diffuse brainstem ischemia due to thrombosis in the left and right PCAs, left vertebral, and basilar arteries; s/p basilar artery thrombectomy on 9/30.         [facility] Code / Diagnosis to Support: 0001.3 - Stroke: Bilateral Involvement         Rehabilitation: Impaired ADLs and mobility    Patient is a good candidate for inpatient rehab  based on anticipated needs for PT, OT, and known speech therapy needs.  Patient will also benefit from family training.  Patient has a good discharge situation which will be home with spouse and son.          PT/OT notes are pending however she is expected to meet criteria for IPR based on my exam today. She has significant deficits affecting mobility and ADLs that require IPR. She has good support at home from her  who is a family physician and her son.          Barriers to transfer include: Insurance authorization, TCCs to verify disposition, medical clearance and bed availability          Additional Recommendations:    - Will submit to insurance today     - Continue PT/OT and SLP while in house     - Control BP <140/90 while in house     - continue keppra for seizure PPX    - Increase use of PRN bowel meds for constipation    - Fioricet ok for headaches, added today     - Gabapentin 300mg TID for neuropathic pain started today         Medical Complexity:    Leukocytosis     Anemia     Neuropathic pain               DVT PPX: SCDs              Thank you for allowing us to participate in the care of this patient.          Patient was seen for 85 minutes on unit/floor of which > 50% of time was spent on counseling and coordination of care regarding the above, including prognosis, risk reduction, benefits of treatment, and options for next stage of care.         Delvin Conde, DO     Physical Medicine and Rehabilitation        Gallito Albert M.D.   Physician   Radiology          Date of Service:  9/30/2020  2:17 PM                  Immediate Post- Operative Note           PostOp Diagnosis: Basilar artery thrombosis         Procedure(s): Mechanical thrombectomy.     Findings:The basilar artery is ope.There is a thrombus in the left Vertebral artery due to the dissection.There is antegrade flow.There is also thrombus in the left P2 segment.                  Andrea Mckeon M.D.   Physician   Neurology      ROUTINE ELECTROENCEPHALOGRAM REPORT        Referring provider: ALLEN Hastings.      DOS: 2020 (total recording of 23 minutes)     INDICATION:  Obdulia High 45 y.o. female presenting with seizure.      CURRENT ANTIEPILEPTIC REGIMEN: Levetiracetam. Propofol for sedation was stopped prior to test.      TECHNIQUE: 30 channel routine electroencephalogram (EEG) was performed in accordance with the international 10-20 system. The study was reviewed in bipolar and referential montages. The recording examined the patient during wakeful state.     DESCRIPTION OF THE RECORD:  During the wakefulness, the background showed a symmetrical 10 Hz alpha activity posteriorly with amplitude of 70 mV.  There was reactivity to eye closure/opening.  A normal anterior-posterior gradient was noted with faster beta frequencies seen anteriorly.       ACTIVATION PROCEDURES:   Not performed.         ICTAL AND/OR INTERICTAL FINDINGS:   No focal or generalized epileptiform activity noted. No regional slowing was seen during this routine study.  No seizures were reported or recorded during the study. Patient was agitated and confused.      EKG: sampling of the EKG recording demonstrated sinus rhythm.         INTERPRETATION:  This is a normal routine EEG recording in the awake state.  Patient was agitated and confused. No seizures captured. Clinical correlation is recommended.     Note: A normal EEG does not rule out epilepsy.  If the clinical suspicion remains high for seizures, a prolonged recording to capture clinical or subclinical events may be helpful.         IMAGIN/30 MRI brain without  1.  Subarachnoid hemorrhage within sulci near the vertex, left greater than right  2.  Acute mid brain and punctate left pontine infarct without mass effect  3.  No other significant finding      MR brain with/without  1.  Findings suggesting distal basilar artery and left posterior cerebral artery thrombosis.  2.  Acute ischemia  "within the midbrain/khang.  3.  Partially empty sella and slightly prominent fluid about the optic nerve sheaths. Correlate for any evidence of intracranial hypertension.       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 ([facility]) 09/30/2020     Priority: High   • Acute respiratory failure with hypoxia and hypercapnia ([facility]) 09/29/2020     Priority: High   • Seizure ([facility]) 09/29/2020     Priority: High   • Headache 09/29/2020     Priority: High   • Essential hypertension 09/29/2020     Priority: Medium       Current Vital Signs:   Temperature: 36.6 °C (97.8 °F) Pulse: (!) 104 Respiration: (!) 25 Blood Pressure: 144/86  Weight: 87.8 kg (193 lb 9 oz) Height: 180.3 cm (5' 10.98\")  Pulse Oximetry: 96 % O2 (LPM): 2      Completed Laboratory Reports:  Recent Labs     09/29/20 2040 09/30/20  0816 09/30/20  1600 09/30/20 2035 10/01/20  0015 10/01/20  0620 10/02/20  0500   WBC  --  9.5  --   --   --  12.6* 13.6*   HEMOGLOBIN  --  10.8*  --   --   --  11.9* 11.9*   HEMATOCRIT  --  31.9*  --   --   --  34.2* 34.4*   PLATELETCT  --  234  --   --   --  270 294   SODIUM 136 141 136  --   --  136 140   POTASSIUM 4.0 3.3* 3.8  --   --  3.4* 3.8   BUN 12 9 8  --   --  7* 11   CREATININE 0.94 0.60 0.80  --   --  0.80 0.83   ALBUMIN 4.6  --   --   --   --   --   --    GLUCOSE 153* 93 121*  --   --  109* 130*   INR  --   --  1.06 1.02 1.05  --   --      Additional Labs: Not Applicable    Prior Living Situation: lives in [place] with spouse.    Lives with - Patient's Self Care Capacity: Unable To Determine At This Time    Prior Level of Function / Living " Situation:   Physical Therapy: Prior Services: Home-Independent  Lives with - Patient's Self Care Capacity: Unable To Determine At This Time    Occupational Therapy:   Prior Services: Home-Independent  Current Level of Function:      Speech Language Pathology:   Problem List: Dysphagia, Voice Quality Deficit  Diet / Liquid Recommendation: Liquidised (3) - (Nectar Thick Full Liquid), Moderately Thick (3) - (Honey Thick)  Rehabilitation Prognosis/Potential: Good  Estimated Length of Stay: 10- 12  days    Nursing:   Orientation : Oriented x 4  Continent    Scope/Intensity of Services Recommended:  Physical Therapy: 1 hr / day  5 days / week. Therapeutic Interventions Required: Maximize Endurance, Mobility, Strength and Safety  Occupational Therapy: 1 hr / day 5 days / week. Therapeutic Interventions Required: Maximize Self Care, ADLs, IADLs, Energy Conservation and family training  Speech & Language Pathology: 1 hr / day 5 days / week. Therapeutic Interventions Required: Maximize Cognition, Swallowing, Safety and family training  Rehabilitation Nursin/7. Please see below.   Rehabilitation Physician: 3 - 5 days / week. Therapeutic Interventions Required: Medical Management  Respiratory Care: Eval and Tx. Therapeutic Interventions Required: Per Protocol.  Dietician: Eval and Tx.  Therapeutic Interventions Required:  Per Protocol.     He/She requires 24-hour rehabilitation nursing to manage bowel and bladder function, skin care, surgical incision, nutrition and fluid intake, pain control, safety, medication management and patient/family goals. In addition, rehabilitation nursing will reiterate and reinforce therapy skills and equipment use, including ADLs, as well as provide education to the patient and family. He/She is willing to participate in and is able to tolerate the proposed plan of care.    Rehabilitation Goals and Plan (Expected frequency & duration of treatment in the IRF):   Return to the  Community  Anticipated Date of Rehabilitation Admission:  10/ 5/2020   Patient/Family oriented IRF level of care/facility/plan: Yes  Patient/Family willing to participate in IRF care/facility/plan: YES  Patient able to tolerate IRF level of care proposed: YES  Patient has potential to benefit IRF level of care proposed: yes    Special Needs or Precautions - Medical Necessity:  Swallow Precautions (See Comments), Nasogastric Tube    Diet:   DIET ORDERS (From admission to next 24h)     Start     Ordered    10/02/20 1124  Diet Order Regular (Direct supervision. Meds crushed)  ALL MEALS     Question Answer Comment   Diet: Regular Direct supervision. Meds crushed   Texture Modifier Level 3 - Liquidised (Nectar Thick Full Liquid)    Liquid Level Level 2 - Mildly Thick    Miscellaneous modifications: SLP - 1:1 Supervision by Nursing    Miscellaneous modifications: SLP - Deliver to Nursing Station        10/02/20 1124    10/02/20 0715  Diet Tube Feeding  CONTINUOUS DIET     Question Answer Comment   Which Rate/Volume? 80 mL/hr    Formula: REPLETE FIBER    Specify Type: CONTINUOUS        10/02/20 0714                Anticipated Discharge Destination / Patient/Family Goal:  Destination: Home with Assistance/ Lives wit spouse Bill High  who is an MD @ [employer] [facility]  Support System: Spouse and teenage son.    Anticipated home health services: OT, PT, SLP and Nursing  Previously used HH service/ provider: Not Applicable  Anticipated DME Needs: Walker  Outpatient Services: Possible out pt therapy - depending on how pt progresses  Alternative resources to address additional identified needs:   PCP follow up.     Pre-Screen Completed: 10/2/2020 3:18 PM Mary Ann Urbina

## 2020-10-02 NOTE — PROGRESS NOTES
Brief neurology note:    Patient was seen earlier in the day.  She was still intubated and following all commands.  She had incomplete ptosis and I thought she had a vertical gaze deficit.    Motor strength was equal and antigravity in both upper and lower extremities.    She had bilateral upgoing toes.    Continue heparin drip.

## 2020-10-02 NOTE — THERAPY
"Speech Language Pathology   Clinical Swallow Evaluation     Patient Name: Obdulia High  AGE:  45 y.o., SEX:  female  Medical Record #: [id number]  Today's Date: 10/2/2020     Precautions: Swallow Precautions (See Comments), Nasogastric Tube    Assessment    Patient is 45 y.o. female \"with unknown PMHx who presented to [facility] on 9/30/20 as a transfer from [facility] ED where she presented for a chief complaint of a ?2-day history of retro orbital headache, Left facial numbness and seizure-like activity. Patient unable to provide details regarding her HPI, further details obtained via review of medical record.\" Pt was intubated 9/29-10/1 and now NPO with TF's via Cortrak. CXR 10/2 was negative for acute cardiopulmonary disease. MRI 9/30 revealed: \"Subarachnoid hemorrhage within sulci near the vertex, left greater than right. 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    Pt was seen today for CSE. Pt was AAOx3 (with confusion to city, \"[place].\") Pt denied hx of dysphagia, PNA; however, reported feeling tired due to poor sleep last night. Additionally, Pt with weak voice likely due to recent extubation yesterday. Oral mech exam revealed reduced lingual strength and ROM. Pt was provided with PO trials (as noted below) and had coughing after the swallow for 25% of tsps of thin liquids, and coughing after the swallow for 100% of cup sips of thins which is concerning for possible aspiration/penetration. With tsps, cup sips and straw sips of mildly thick liquids, no further coughing/choking or other overt clinical s/sx of aspiration/penetration appreciated. With applesauce and pudding, Pt required feeding assistance due to UE weakness; however, tolerated well with no clinical s/sx of asp/pen and/or c/o globus sensation. Pt declined fruit cocktail and requested to rest.    At this time, recommend modified PO diet of Liquidised solids, Mildly thick liquids and " meds crushed in puree. Please provide 1:1 feeding/assistance to ensure adherence to safe swallow precautions. Please hold PO if any difficulties/concerns or change in status. Please leave Cortrak in for 1-2 meals to assess Pt's tolerance with PO diet.    Note: cognitive-linguistic evaluation received and verified; will complete as able/appropriate.    Plan    Recommend Speech Therapy 5 times per week until therapy goals are met for the following treatments:  Dysphagia Training and Patient / Family / Caregiver Education.    Discharge Recommendations: Recommend post-acute placement for additional speech therapy services prior to discharge home     Objective       10/02/20 1125   Prior Level Of Function   Communication Within Functional Limits   Swallow Within Functional Limits   Dentition Intact   Oral Motor Eval    Is Patient Able to Complete Oral Motor Eval Yes but Impaired   Lingual Function   Lingual Structure At Rest Within Functional Limits   Lingual Protrude Moderate   Lingual Retract Minimal   Elevate In Mouth Minimal   Elevate Outside Mouth Minimal   Lateralization Minimal Right;Minimal Left   Lick Lips (Circular) Minimal   Laryngeal Function   Voice Quality Moderate  (weak voice, hypophonic)   Volutional Cough Minimal  (weak cough)   Excursion Upon Swallow Complete   Oral Food Presentation   Ice Chips Within Functional Limits   Single Swallow Mildly Thick (2) - (Nectar Thick)  Within Functional Limits   Serial Swallow Mildly Thick (2) - (Nectar Thick) Within Functional Limits   Single Swallow Thin (0) Moderate   Serial Swallow Thin (0) Moderate   Liquidised (3) Within Functional Limits   Pureed (4) Within Functional Limits   Minced & Moist (5) - (Dysphagia II) Not Tested   Regular (7) Not Tested   Self Feeding Needs Assistance   Dysphagia Strategies / Recommendations   Compensatory Strategies Strict 1:1 Feeding;Direct Supervision During Meals;Assistance Needed for Meal Tray Set-up;Head of Bed 90 Degrees  During Eating / Drinking;Single Sips / Bites;No Talking During Eating / Drinking   Diet / Liquid Recommendation Liquidised (3) - (Nectar Thick Full Liquid);Moderately Thick (3) - (Honey Thick)   Medication Administration  Crush all Medications in Puree   Therapy Interventions Dysphagia Therapy By Speech Language Pathologist   Short Term Goals   Short Term Goal # 1 Pt will consume PO diet of LQ3/MT2 with no clinical s/sx of aspiration/penetration.

## 2020-10-02 NOTE — PROGRESS NOTES
Chief Complaint   Patient presents with   • Seizure   • Headache       ADDENDUM:    I personally examined this patient and she has mild weakness in her extremities in the right side but nothing concerning.  The weakness has good antigravity strength however.  She does have an upgaze palsy.  Bilateral upgoing toes are also identified on examination.  Bilateral incomplete ptosis identified.  She has been successfully extubated and following commands.    Continue heparin drip for now.    On Sunday we can switch her from heparin to aspirin 325 mg daily.      Neurology Progress Note    Brief History of present illness:  45-year old female with unknown PMhx who presented to [facility] on 9/30/20 as a transfer from [facility] ED where she presented for a chief complaint of a ?2-day history of retro orbital headache, Left facial numbness and seizure-like activity. Patient unable to provide details regarding her HPI, further details obtained via review of medical record.   The patient reportedly presented to the OSH with the above noted complaints around 1800 on 9/29/20; upon arrival to OSH ED, patient had witnessed seizure-like activity. Further details including characteristics and duration of event are unknown. No leukocytosis or fever at OSH. She received Ativan; was ultimately intubated for airway protection and was transferred to [facility] for further work up/higher level of care.   On arrival here around 2000, patient received Keppra 500 mg IV. She had MRI Brain last night; this ultimately revealed brain stem/pontine/midbrain ischemia, prompting STAT CTA this morning. This revealed diffuse thrombosis involving Left and Right PCAs, Left Vert (likely dissected), and Basilar arteries. Patient to STAT thrombectomy at 1130.     Neurology has been consulted by Dr. Benigno Duffy to further evaluate the findings noted above.     Of note, I had the opportunity to speak with patient's  Bill; he reports that  patient had recently started a strenuous work out program several days before the onset of her symptoms-- likely attributing to dissection.     Repeat MRI Brain at 1800 9/30, with no change to known area of midbrain infarct; several tiny/punctate areas of new infarction to Left internal capsule, Right cerebellum, and Left khang. Per my review, no overt hemorrhage per GRE sequence; radiology notes again [facility] (vs Contrast staining).     Interval, 10/2/20:   Patient await and alert; now extubated. Moving all extremities, Left > Right. BL ptosis noted, no obvious CN palsy. Awaiting PT/OT/SLP today.     No changes to HPI as was previously documented.     Past medical history:   Unknown     Past surgical history:   Unknown    Family history:   Unknown    Social history:   Social History     Socioeconomic History   • Marital status:      Spouse name: Not on file   • Number of children: Not on file   • Years of education: Not on file   • Highest education level: Not on file   Occupational History   • Not on file   Social Needs   • Financial resource strain: Not on file   • Food insecurity     Worry: Not on file     Inability: Not on file   • Transportation needs     Medical: Not on file     Non-medical: Not on file   Tobacco Use   • Smoking status: Not on file   Substance and Sexual Activity   • Alcohol use: Not on file   • Drug use: Not on file   • Sexual activity: Not on file   Lifestyle   • Physical activity     Days per week: Not on file     Minutes per session: Not on file   • Stress: Not on file   Relationships   • Social connections     Talks on phone: Not on file     Gets together: Not on file     Attends Rastafarian service: Not on file     Active member of club or organization: Not on file     Attends meetings of clubs or organizations: Not on file     Relationship status: Not on file   • Intimate partner violence     Fear of current or ex partner: Not on file     Emotionally abused: Not on file     Physically  abused: Not on file     Forced sexual activity: Not on file   Other Topics Concern   • Not on file   Social History Narrative   • Not on file       Current medications:   Current Facility-Administered Medications   Medication Dose   • magnesium sulfate IVPB premix 2 g  2 g   • Pharmacy Consult: Enteral tube insertion - review meds/change route/product selection  1 Each   • atorvastatin (LIPITOR) tablet 40 mg  40 mg   • norepinephrine (Levophed) infusion 8 mg/250 mL (premix)  0-30 mcg/min   • labetalol (NORMODYNE/TRANDATE) injection 10 mg  10 mg   • enalaprilat (VASOTEC) injection 1.25 mg  1.25 mg   • heparin infusion 25,000 units in 500 mL 0.45% NACL  0-30 Units/kg/hr   • levETIRAcetam (Keppra) 500 mg in 100 mL NaCl IV premix  500 mg   • Respiratory Therapy Consult     • ipratropium-albuterol (DUONEB) nebulizer solution  3 mL   • senna-docusate (PERICOLACE or SENOKOT S) 8.6-50 MG per tablet 2 Tab  2 Tab    And   • polyethylene glycol/lytes (MIRALAX) PACKET 1 Packet  1 Packet    And   • magnesium hydroxide (MILK OF MAGNESIA) suspension 30 mL  30 mL    And   • bisacodyl (DULCOLAX) suppository 10 mg  10 mg   • MD Alert...ICU Electrolyte Replacement per Pharmacy         Medication Allergy:  No Known Allergies      Review of systems:   Unable as patient is intubated/sedated.       Physical examination:   Vitals:    10/02/20 0600 10/02/20 0700 10/02/20 0800 10/02/20 0900   BP: 132/87 148/89 156/82 148/96   Pulse: 96 (!) 104 91 96   Resp: (!) 21 (!) 68 (!) 25 (!) 24   Temp: 36.9 °C (98.5 °F)  36.6 °C (97.8 °F)    TempSrc: Temporal  Temporal    SpO2: 96% 95% 94% 97%   Weight:       Height:         General: Patient in no acute distress.  HEENT: Normocephalic, no signs of acute trauma.   Neck: supple, no meningeal signs or carotid bruits. There is normal range of motion. No tenderness on exam.   Chest: clear to auscultation. No cough.   CV: RRR, no murmurs.   Skin: no signs of acute rashes or trauma.   Musculoskeletal: joints  exhibit full range of motion, without any pain to palpation. There are no signs of joint or muscle swelling. There is no tenderness to deep palpation of muscles.   Psychiatric: No hallucinatory behavior.      NEUROLOGICAL EXAM:   Mental status, orientation: Awake, oriented x 4.  Speech and language: Speech is hypophonic, mild dysarthria appreciated. Follows commands bilaterally (shows thumb, two fingers, wiggles toes to command), more briskly on the Left.   Cranial nerve exam: Pupils are 3-2 mm bilaterally and equally reactive to light. Blinks to visual threat bilaterally. Eyes midline. BL Ptosis. EOMs appear to be intact, perhaps mild ?CN III palsy appreciated on the Left. Slight Right facial weakness appreciated. Corneals intact BL. Tongue is midline and without any signs of tongue biting or fasciculations.  Motor/Sensory exam: Patient moves all extremities spontaneously, Left > Right. LUE 4+/5 and semi purposeful, 4/5 to LLE. Approx. 3-/5 to RUE with some/minimal antigravity, 2+/5 to RLE with side to side movement only. Briskly responsive to nox stim/localized on the Left; Delayed response to nox stim on the Right, withdrawal only.   Deep tendon reflexes:  Plantar responses are up-going on the Right; appear to be down going on the Left. There is no clonus.   Coordination: Mild ataxia appreciated on the Left.   Gait: Not assessed at this time as patient is unable.       NIH Stroke Scale    1a Level of Consciousness   1b Orientation Questions   1c Response to Commands   2 Gaze   3 Visual Fields   4 Facial Movement 2  5 Motor Function (arm)   a Left   b Right 2  6 Motor Function (leg)   a Left   b Right 2  7 Limb Ataxia 1  8 Sensory 1  9 Language   10 Articulation 1  11 Extinction/Inattention     Score: 9      ANCILLARY DATA REVIEWED:     Lab Data Review:  Recent Results (from the past 24 hour(s))   EC-ECHOCARDIOGRAM COMPLETE W/O CONT    Collection Time: 10/01/20 11:18 AM   Result Value Ref Range    Eject.Frac. MOD  BP 61.01     Eject.Frac. MOD 4C 60.1     Eject.Frac. MOD 2C 61.3     Left Ventrical Ejection Fraction 60    Lipid Profile    Collection Time: 10/01/20 12:40 PM   Result Value Ref Range    Cholesterol,Tot 112 100 - 199 mg/dL    Triglycerides 109 0 - 149 mg/dL    HDL 58 >=40 mg/dL    LDL 32 <100 mg/dL   HEMOGLOBIN A1C    Collection Time: 10/01/20 12:40 PM   Result Value Ref Range    Glycohemoglobin 5.0 0.0 - 5.6 %    Est Avg Glucose 97 mg/dL   Heparin Xa (Unfractionated)    Collection Time: 10/01/20 12:40 PM   Result Value Ref Range    Heparin Xa (UFH) 0.48 IU/mL   CBC with Differential    Collection Time: 10/02/20  5:00 AM   Result Value Ref Range    WBC 13.6 (H) 4.8 - 10.8 K/uL    RBC 3.78 (L) 4.20 - 5.40 M/uL    Hemoglobin 11.9 (L) 12.0 - 16.0 g/dL    Hematocrit 34.4 (L) 37.0 - 47.0 %    MCV 91.0 81.4 - 97.8 fL    MCH 31.5 27.0 - 33.0 pg    MCHC 34.6 33.6 - 35.0 g/dL    RDW 42.0 35.9 - 50.0 fL    Platelet Count 294 164 - 446 K/uL    MPV 9.3 9.0 - 12.9 fL    Neutrophils-Polys 76.90 (H) 44.00 - 72.00 %    Lymphocytes 14.20 (L) 22.00 - 41.00 %    Monocytes 7.50 0.00 - 13.40 %    Eosinophils 0.50 0.00 - 6.90 %    Basophils 0.30 0.00 - 1.80 %    Immature Granulocytes 0.60 0.00 - 0.90 %    Nucleated RBC 0.00 /100 WBC    Neutrophils (Absolute) 10.48 (H) 2.00 - 7.15 K/uL    Lymphs (Absolute) 1.93 1.00 - 4.80 K/uL    Monos (Absolute) 1.02 (H) 0.00 - 0.85 K/uL    Eos (Absolute) 0.07 0.00 - 0.51 K/uL    Baso (Absolute) 0.04 0.00 - 0.12 K/uL    Immature Granulocytes (abs) 0.08 0.00 - 0.11 K/uL    NRBC (Absolute) 0.00 K/uL   Basic Metabolic Panel (BMP)    Collection Time: 10/02/20  5:00 AM   Result Value Ref Range    Sodium 140 135 - 145 mmol/L    Potassium 3.8 3.6 - 5.5 mmol/L    Chloride 105 96 - 112 mmol/L    Co2 24 20 - 33 mmol/L    Glucose 130 (H) 65 - 99 mg/dL    Bun 11 8 - 22 mg/dL    Creatinine 0.83 0.50 - 1.40 mg/dL    Calcium 9.2 8.5 - 10.5 mg/dL    Anion Gap 11.0 7.0 - 16.0   Magnesium    Collection Time: 10/02/20   5:00 AM   Result Value Ref Range    Magnesium 1.9 1.5 - 2.5 mg/dL   Phosphorus    Collection Time: 10/02/20  5:00 AM   Result Value Ref Range    Phosphorus 2.8 2.5 - 4.5 mg/dL   Heparin Xa (Unfractionated)    Collection Time: 10/02/20  5:00 AM   Result Value Ref Range    Heparin Xa (UFH) 0.69 IU/mL   CRP QUANTITIVE (NON-CARDIAC)    Collection Time: 10/02/20  5:00 AM   Result Value Ref Range    Stat C-Reactive Protein 14.70 (H) 0.00 - 0.75 mg/dL   ESTIMATED GFR    Collection Time: 10/02/20  5:00 AM   Result Value Ref Range    GFR If African American >60 >60 mL/min/1.73 m 2    GFR If Non African American >60 >60 mL/min/1.73 m 2       Labs reviewed by me.       Imaging reviewed by me:     DX-CHEST-PORTABLE (1 VIEW)   Final Result         1.  No acute cardiopulmonary disease.         EC-ECHOCARDIOGRAM COMPLETE W/O CONT   Final Result      LO-NIWQHSH-0 VIEW   Final Result         1.  Nonspecific bowel gas pattern.   2.  Dobbhoff tube is coiled within the stomach, the tip terminates overlying the expected location of the gastric antrum.      DX-CHEST-PORTABLE (1 VIEW)   Final Result         1.  No acute cardiopulmonary disease.      MR-BRAIN-W/O   Final Result      1.  Subarachnoid hemorrhage within sulci near the vertex, left greater than right      2.  Acute mid brain and punctate left pontine infarct without mass effect      3.  No other significant finding      Findings were discussed with Dr. Mart on 2015 hours 9/30/2020      CT-HEAD W/O   Final Result   Addendum 1 of 1   Addendum:   Please note that the patient has had a CTA head and neck as well as    angiogram with thrombectomy earlier today. A portion of the high    attenuation along the arterial vascular structures and dural venous    sinuses is likely related to contrast staining from    the previous contrast-enhanced procedures. However, the high attenuation    in the sulci over the vertex is suspicious for a small amount of new    subarachnoid hemorrhage.  Follow-up MRI could be performed for    confirmation.      Findings were discussed with LAXMI BROWN on 9/30/2020 at 5:21 PM.      Final      IR-THROMBO MECHANICAL ARTERY,INIT   Final Result      1.  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   2.  Basilar artery thrombosis.   3.  Successful mechanical thrombectomy of a basilar artery thrombosis.   4.  The final angiogram demonstrates patent basilar, bilateral superior cerebellar and right posterior cerebral arteries. The left P1 segment is patent. The left distal P2 segment is not visualized.      CT-CTA HEAD WITH & W/O-POST PROCESS   Final Result      1.  There is acute mid and distal basilar artery occlusion extending into the left greater than right posterior cerebral arteries.   2.  Findings have already been discussed with the ordering physician and neurologist by Dr. Albert of the IR radiology service.      CT-CTA NECK WITH & W/O-POST PROCESSING   Final Result      1.  There is abnormality involving the distal left vertebral artery at the level of C2 which may represent combination of focal dissection/thrombus with possible less likely partially thrombosed vascular malformation.   2.  There is an occlusion of the mid and distal basilar artery extending into the left greater than right posterior cerebral arteries, P1 segments.   3.  These findings have already been discussed with the clinical service by Dr. Albert of the IR radiology service.      CT-CEREBRAL PERFUSION ANALYSIS   Final Result      1.  Cerebral blood flow less than 30% likely representing completed infarct = 0 mL.      2.  T Max more than 6 seconds likely representing combination of completed infarct and ischemia = 100 mL.      3.  Mismatched volume likely representing ischemic brain/penumbra = 100 mL      4.  Please note that the cerebral perfusion was performed on the limited brain tissue around the basal ganglia region. Infarct/ischemia outside the CT perfusion sections  can be missed in this study.      MR-VENOGRAM (MRV) HEAD   Final Result      Cerebral magnetic resonance venogram within normal limits with no evidence of dural venous sinus thrombosis.      MR-BRAIN-WITH & W/O   Final Result      1.  Findings suggesting distal basilar artery and left posterior cerebral artery thrombosis.   2.  Acute ischemia within the midbrain/khang.   3.  Partially empty sella and slightly prominent fluid about the optic nerve sheaths. Correlate for any evidence of intracranial hypertension.      These findings were discussed with Dr. Best on 9/30/2020 10:11 AM.      DX-CHEST-PORTABLE (1 VIEW)   Final Result         1.  No acute cardiopulmonary disease.          Modified [place] Scale (MRS): 0 = No symptoms      ASSESSMENT AND PLAN:  45-year old female with unknown PMhx who presented to [facility] on 9/30/20 as a transfer from [facility] ED where she presented for a chief complaint of a ?2-day history of retro orbital headache, Left facial numbness and seizure-like activity on arrival; was ultimately intubated for airway protection and transferred to [facility]. Here, she received Keppra 500 mg IV; later in the evening underwent MRV that revealed no sinus venous thrombosis.   She had MRI Brain w/wo contrast as well; this revealed pontine/midbrain restricted diffusion consistent with evolving ischemia; no hemorrhage. STAT CTA head/neck on morning on 9/30 revealed thromboses involving BL PCAs, Basilar, and Left vert with probable dissection. Patient STAT to Thrombectomy for intervention; now s/p basilar artery thrombectomy with TICI 3; nearby vessels including BL PCAs however remain occluded-- posing high risk for re-occlusion/thrombosis. Patient remains on Heparin gtt following repeat CT head/MRI Brain (potential benefit of Heparin gtt outweighs risk of bleeding. NIHSS 28-->17-->9 this morning; patient now extubated and doing very well; awaiting PT/OT/SLP and physiatry  consultation.    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  Ischemic strokes involving midbrain, Left internal capsule, and Right cerebellum, secondary to the above.  New onset seizure, secondary to the above.     Recommendations/Plan:      -Continue q2h and PRN Neuro assessment, recommend continued ICU care until at least 10/3/20 **Low threshold for repeat CT head with changes/decline in neurological exam. -180 until 10/3/20.   -Telemetry; currently SR. Screen for Afib/arrhythmia. Note TTE with EF 60%, no gross structural abnormalities nor    -Continue Heparin gtt. Will likely plan to transition to DAPT vs full anticoagulation in coming 1-2 days (per CADISS trial)  -Note, EEG from 9/30-- normal study. Continue Keppra 500 mg IV q12h.   -Check lipid panel, hemoglobin A1c; hypercoag studies also sent, pending (though suspect etiology likely related to dissection)-- LDL is 30, Hemoglobin A1c is 5.0  -PT/OT/SLP eval and treat. Physiatry consult    -All other medical management per primary/ICU team.   -DVT PPX: SCDs.      The plan of care above has been discussed with Dr. Foreman.     NANO [email or web address].  [facility]

## 2020-10-02 NOTE — PROGRESS NOTES
[facility] Progress Note      Admit Date: 9/29/2020    Resident(s): Carroll Laboy M.D.   Attending:  GUS SARABIA/ Dr. Best     Patient ID:    Name:  Obdulia High     YOB: 1975  Age:  45 y.o.  female   MRN:  [id number]    Hospital Course (carried forward and updated):  Obdulia High is a 45 y.o. female with unknown past medical history who was transferred from [place] ED on 9/30/2020 where she presented with 2-day history of retro-orbital headache and left-sided facial numbness.  In the ED in [place] she has had an episode of seizure requiring intubation .  She was transferred to [facility] for higher level of care.  In the ED she was hypertensive, afebrile.  CT head was unremarkable.  Neurology was consulted and patient was admitted to ICU for further monitoring and ventilator management.  MRI was reviewed on 9/30 which brainstem ischemia.  Her last CT we were all revealed diffuse thrombosis involving left and right PCA, vertebral with dissection and basilar artery.  Patient underwent stat thrombectomy on 9/30/2020 .     consultants:  Critical Care  Neurology     Interval Events:    Neuro: off vent. Follows, weak overall, better strength on L ext > R ext.  Cardiovascular: SR/ST  Respiratory: no distress, RA  Renal: good UOP, Cr stable  Gi/nutrition: NGT  Infectious: Luekocytosis 13.6 no signs of infection  Endocrine: normoglycemic    Dispo: 1 more ronda n ICU for close monitoring porbably transfer tomorrow    yestrday  No overnight events  On ventilator, off of propofol drip.  Patient able to move all extremities, left> right side.  Follows commands.  Denies pain.  Afebrile, SR 80-90, -130/70- 80, goal 110-180.  Blood work noted for potassium 3.4-repleted, mild increase in WBC down to 12.6.  Will closely monitor with every hour neurochecks.  On heparin drip for thrombosis per neurology recommendation.  EEG negative for seizure.  Per neurosurgery continue Keppra 500 mg Q12.         Vitals Range last  24h:  Temp:  [36.6 °C (97.8 °F)-37.8 °C (100 °F)] 36.6 °C (97.8 °F)  Pulse:  [] 94  Resp:  [18-68] 56  BP: (120-156)/(66-98) 139/79  SpO2:  [93 %-100 %] 94 %      Intake/Output Summary (Last 24 hours) at 10/2/2020 1201  Last data filed at 10/2/2020 1200  Gross per 24 hour   Intake 2224 ml   Output 1725 ml   Net 499 ml        Review of Systems   Unable to perform ROS: Intubated       PHYSICAL EXAM:  Vitals:    10/02/20 0900 10/02/20 1000 10/02/20 1100 10/02/20 1200   BP: 148/96 150/98 139/79    Pulse: 96 100 94    Resp: (!) 24 (!) 25 (!) 56    Temp:    36.6 °C (97.8 °F)   TempSrc:    Temporal   SpO2: 97% 95% 94%    Weight:       Height:        Body mass index is 27.01 kg/m².    O2 therapy: Pulse Oximetry: 94 %, O2 (LPM): 2, O2 Delivery Device: None - Room Air    Date 10/02/20 0700 - 10/03/20 0659   Shift 9460-5814 6258-9595 2714-0538 24 Hour Total   INTAKE   P.O. 200   200     P.O. 200   200   I.V. 191   191     Magnesium Sulfate Volume 50   50     Heparin Volume 141   141   NG/   640     Intake (mL) (Enteral Tube 10/01/20 Cortrak - Gastric Left nare) 640   640   Shift Total 1031   1031   OUTPUT   Shift Total       NET 1031   1031        Physical Exam   Constitutional: No distress.   On ventilator    HENT:   Head: Normocephalic and atraumatic.   Eyes: Pupils are equal, round, and reactive to light.   Cardiovascular: Normal rate, regular rhythm, normal heart sounds and intact distal pulses.   No murmur heard.  Pulmonary/Chest: She has no wheezes. She has no rales.   On ventilator support    Abdominal: Soft. Bowel sounds are normal. She exhibits no distension. There is no abdominal tenderness.   Musculoskeletal:         General: No edema.   Neurological:   Intubated. Follow commands. Move all 4 extremities. Left > right.   B/l ptosis  dysarthria   Skin: Skin is warm.       Recent Labs     09/29/20  2103 09/30/20  0348 10/01/20  0247   ISTATAPH 7.367* 7.398* 7.443   ISTATAPCO2 36.1 37.2* 31.9   ISTATAPO2  131* 105* 98*   ISTATATCO2 22 24 23   [id number] 99 98 98   ISTATARTHCO3 20.7 23.0 21.8   ISTATARTBE -4 -2 -2   ISTATTEMP 37.0 C 97.6 F 97.5 F   ISTATFIO2 40 30 98   ISTATSPEC Arterial Arterial Arterial   ISTATAPHTC 7.367* 7.406 7.452   [id number] 131* 102* 95*     Recent Labs     09/30/20  0816 09/30/20  1600 10/01/20  0620 10/02/20  0500   SODIUM 141 136 136 140   POTASSIUM 3.3* 3.8 3.4* 3.8   CHLORIDE 111 103 103 105   CO2 19* 22 23 24   BUN 9 8 7* 11   CREATININE 0.60 0.80 0.80 0.83   MAGNESIUM 1.6  --  2.1 1.9   PHOSPHORUS 2.1*  --  3.4 2.8   CALCIUM 7.4* 8.3* 8.0* 9.2     Recent Labs     09/29/20  2040  09/30/20  1600 10/01/20  0620 10/02/20  0500   ALTSGPT 30  --   --   --   --    ASTSGOT 28  --   --   --   --    ALKPHOSPHAT 63  --   --   --   --    TBILIRUBIN 0.7  --   --   --   --    PREALBUMIN  --   --   --   --  14.5*   GLUCOSE 153*   < > 121* 109* 130*    < > = values in this interval not displayed.     Recent Labs     09/30/20  0816 09/30/20  1600 09/30/20  2035 10/01/20  0015 10/01/20  0620 10/02/20  0500   RBC 3.46*  --   --   --  3.74* 3.78*   HEMOGLOBIN 10.8*  --   --   --  11.9* 11.9*   HEMATOCRIT 31.9*  --   --   --  34.2* 34.4*   PLATELETCT 234  --   --   --  270 294   PROTHROMBTM  --  14.1 13.7 14.0  --   --    APTT  --   --  43.3*  --   --   --    INR  --  1.06 1.02 1.05  --   --      Recent Labs     09/29/20 2040 09/30/20  0816 10/01/20  0620 10/02/20  0500   WBC  --  9.5 12.6* 13.6*   NEUTSPOLYS  --  77.00* 68.50 76.90*   LYMPHOCYTES  --  14.30* 22.40 14.20*   MONOCYTES  --  7.90 8.10 7.50   EOSINOPHILS  --  0.30 0.40 0.50   BASOPHILS  --  0.30 0.30 0.30   ASTSGOT 28  --   --   --    ALTSGPT 30  --   --   --    ALKPHOSPHAT 63  --   --   --    TBILIRUBIN 0.7  --   --   --        Meds:  • acetaminophen  650 mg     • Pharmacy  1 Each     • atorvastatin  40 mg     • norepinephrine (Levophed) infusion  0-30 mcg/min Stopped (10/01/20 1030)   • labetalol  10 mg     • enalaprilat  1.25 mg     •  heparin  0-30 Units/kg/hr 13.4 Units/kg/hr (10/02/20 0701)   • levETIRAcetam (Keppra) IV  500 mg Stopped (10/02/20 0559)   • Respiratory Therapy Consult       • ipratropium-albuterol  3 mL     • senna-docusate  2 Tab      And   • polyethylene glycol/lytes  1 Packet      And   • magnesium hydroxide  30 mL      And   • bisacodyl  10 mg     • MD Alert...Adult ICU Electrolyte Replacement per Pharmacy            Procedures:  Basilar thrombectomy on 9/30/20     Imaging:  DX-CHEST-PORTABLE (1 VIEW)   Final Result         1.  No acute cardiopulmonary disease.         EC-ECHOCARDIOGRAM COMPLETE W/O CONT   Final Result      [id number] VIEW   Final Result         1.  Nonspecific bowel gas pattern.   2.  Dobbhoff tube is coiled within the stomach, the tip terminates overlying the expected location of the gastric antrum.      DX-CHEST-PORTABLE (1 VIEW)   Final Result         1.  No acute cardiopulmonary disease.      MR-BRAIN-W/O   Final Result      1.  Subarachnoid hemorrhage within sulci near the vertex, left greater than right      2.  Acute mid brain and punctate left pontine infarct without mass effect      3.  No other significant finding      Findings were discussed with Dr. Mart on 2015 hours 9/30/2020      CT-HEAD W/O   Final Result   Addendum 1 of 1   Addendum:   Please note that the patient has had a CTA head and neck as well as    angiogram with thrombectomy earlier today. A portion of the high    attenuation along the arterial vascular structures and dural venous    sinuses is likely related to contrast staining from    the previous contrast-enhanced procedures. However, the high attenuation    in the sulci over the vertex is suspicious for a small amount of new    subarachnoid hemorrhage. Follow-up MRI could be performed for    confirmation.      Findings were discussed with LAXMI BROWN on 9/30/2020 at 5:21 PM.      Final      IR-THROMBO MECHANICAL ARTERY,INIT   Final Result      1.  Left vertebral artery  dissection at the level of C2.   2.  Basilar artery thrombosis.   3.  Successful mechanical thrombectomy of a basilar artery thrombosis.   4.  The final angiogram demonstrates patent basilar, bilateral superior cerebellar and right posterior cerebral arteries. The left P1 segment is patent. The left distal P2 segment is not visualized.      CT-CTA HEAD WITH & W/O-POST PROCESS   Final Result      1.  There is acute mid and distal basilar artery occlusion extending into the left greater than right posterior cerebral arteries.   2.  Findings have already been discussed with the ordering physician and neurologist by Dr. Albert of the IR radiology service.      CT-CTA NECK WITH & W/O-POST PROCESSING   Final Result      1.  There is abnormality involving the distal left vertebral artery at the level of C2 which may represent combination of focal dissection/thrombus with possible less likely partially thrombosed vascular malformation.   2.  There is an occlusion of the mid and distal basilar artery extending into the left greater than right posterior cerebral arteries, P1 segments.   3.  These findings have already been discussed with the clinical service by Dr. Albert of the IR radiology service.      CT-CEREBRAL PERFUSION ANALYSIS   Final Result      1.  Cerebral blood flow less than 30% likely representing completed infarct = 0 mL.      2.  T Max more than 6 seconds likely representing combination of completed infarct and ischemia = 100 mL.      3.  Mismatched volume likely representing ischemic brain/penumbra = 100 mL      4.  Please note that the cerebral perfusion was performed on the limited brain tissue around the basal ganglia region. Infarct/ischemia outside the CT perfusion sections can be missed in this study.      MR-VENOGRAM (MRV) HEAD   Final Result      Cerebral magnetic resonance venogram within normal limits with no evidence of dural venous sinus thrombosis.      MR-BRAIN-WITH & W/O   Final Result       1.  Findings suggesting distal basilar artery and left posterior cerebral artery thrombosis.   2.  Acute ischemia within the midbrain/khang.   3.  Partially empty sella and slightly prominent fluid about the optic nerve sheaths. Correlate for any evidence of intracranial hypertension.      These findings were discussed with Dr. Best on 9/30/2020 10:11 AM.      DX-CHEST-PORTABLE (1 VIEW)   Final Result         1.  No acute cardiopulmonary disease.           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  Underwent emergent endotracheal intubation for airway protection an episode of seizure.    Further work-up revealed brainstem ischemia secondary to diffuse thrombosis in basilar, B/L PCA and vertebral artery.   Underwent emergent basilar artery thrombectomy on 9/30.  Neurology on board, due to diffuse thrombosis and increased risk of rethrombosis decision was made to start the patient on heparin drip.  EEG obtained on 9/30- for seizure.  Neurology recommends to continue Keppra for now.    Plan  Neurology following, appreciate recommendation  Will monitor on telemetry.  Close monitoring with neuro checks every 1 hour  Consider stat head CT in case of new onset neurological deficits  Continue heparin drip per neuro recs, transition to oral when indicated  PT/OT/SP when appropriate      Headache  Assessment & Plan  History of complex migraine  Diagnosed with vertebral artery dissection, basilar and PCA thrombosis  Neurology following, appreciate recommendations    Seizure (HCC)  Assessment & Plan  No history of seizures in the past  One episode of seizure in the ED, intubated for airway protection  Currently on Keppra per neurology  EEG on 9/30-negative for seizure    Acute respiratory failure with  hypoxia and hypercapnia (HCC)  Assessment & Plan  Intubated on 9/29 for airway protection in the setting of seizure  Currently off sedation.  Following commands.  Plan  Monitor pulse oximetry  Daily chest x-ray  Daily SBT trials per critical care team    Essential hypertension  Assessment & Plan  Hypertension on presentation  Unsure if patient has history of hypertension  Labetalol, enalaprilat prn.  Goal SBP less than 160        CODE STATUS: full code      Quality Measures:  Feeding: feeding tube   Sedation: Fentanyl/propofol   Thromboprophylaxis: Heparin drip   Head of bed: >30 degrees  Ulcer prophylaxis: Famotidine   Glycemic control: Correctional: Non-diabetic   Bowel care: bowel regimen PRN   Indwelling lines:  Deescalation of antibiotics: Not on antibiotics     Carroll Laboy M.D.

## 2020-10-02 NOTE — CONSULTS
Physical Medicine and Rehabilitation Consultation         Initial Consult      Date of initial consultation: 10/2/2020  Consulting provider: AYO Hastings  Reason for consultation: assess for acute inpatient rehab appropriateness  LOS: 3 Day(s)    Chief complaint: right hemiparesis    HPI: The patient is a 45 y.o. left hand dominant female with no known past medical history;  who presented on 2020  8:22 PM as a transfer from outside hospital where she initially presented with a 2-day history of retro-orbital headache, left facial numbness and seizure-like activity.  Patient received Ativan and was intubated for airway protection prior to being transferred to [facility].  Work-up with MRI brain found brainstem/pontine/midbrain ischemia, and follow-up CTA showed diffuse thrombosis in the left and right PCAs, left vertebral, and basilar arteries. Patient then received basilar artery thrombectomy. NIH SS 28 on admission, and NIH SS of 9 today 10/2/2020.     Patient is feeling better today, however does report right arm and leg numbness and tingling, constipation, Faust dependency, some ptosis, lethargy and weakness.     THERAPY:  Restrictions: none  PT: Functional mobility   Pending     OT: ADLs  Pending     SLP:   10/2 Dysphagia    Social Hx:  1 SH  1 TRE  With:  who is a family practice physician with the [employer].  Family lives in [facility]    IMAGIN/30 MRI brain without  1.  Subarachnoid hemorrhage within sulci near the vertex, left greater than right  2.  Acute mid brain and punctate left pontine infarct without mass effect  3.  No other significant finding     MR brain with/without  1.  Findings suggesting distal basilar artery and left posterior cerebral artery thrombosis.  2.  Acute ischemia within the midbrain/khang.  3.  Partially empty sella and slightly prominent fluid about the optic nerve sheaths. Correlate for any evidence of intracranial hypertension.    PROCEDURES:   [place]  "MD Roosevelt   Mechanical thrombectomy. Findings:The basilar artery is ope.There is a thrombus in the left Vertebral artery due to the dissection.There is antegrade flow.There is also thrombus in the left P2 segment.      PMH:  History reviewed. No pertinent past medical history.    PSH:  History reviewed. No pertinent surgical history.    FHX:  Non-pertinent to today's issues    Medications:  Current Facility-Administered Medications   Medication Dose   • acetaminophen (TYLENOL) tablet 650 mg  650 mg   • Pharmacy Consult: Enteral tube insertion - review meds/change route/product selection  1 Each   • atorvastatin (LIPITOR) tablet 40 mg  40 mg   • norepinephrine (Levophed) infusion 8 mg/250 mL (premix)  0-30 mcg/min   • labetalol (NORMODYNE/TRANDATE) injection 10 mg  10 mg   • enalaprilat (VASOTEC) injection 1.25 mg  1.25 mg   • heparin infusion 25,000 units in 500 mL 0.45% NACL  0-30 Units/kg/hr   • levETIRAcetam (Keppra) 500 mg in 100 mL NaCl IV premix  500 mg   • Respiratory Therapy Consult     • ipratropium-albuterol (DUONEB) nebulizer solution  3 mL   • senna-docusate (PERICOLACE or SENOKOT S) 8.6-50 MG per tablet 2 Tab  2 Tab    And   • polyethylene glycol/lytes (MIRALAX) PACKET 1 Packet  1 Packet    And   • magnesium hydroxide (MILK OF MAGNESIA) suspension 30 mL  30 mL    And   • bisacodyl (DULCOLAX) suppository 10 mg  10 mg   • MD Alert...ICU Electrolyte Replacement per Pharmacy         Allergies:  No Known Allergies    Physical Exam:  Vitals: /79   Pulse 94   Temp 36.6 °C (97.8 °F) (Temporal)   Resp (!) 56   Ht 1.803 m (5' 10.98\")   Wt 87.8 kg (193 lb 9 oz)   SpO2 94%   Gen: NAD  Head: NC/AT  Eyes/ Nose/ Mouth: Eyes do not fully open, however PERRLA, moist mucous membranes  Cardio: RRR, no mumurs  Pulm: CTAB, with normal respiratory effort  Abd: Soft NTND, active bowel sounds,   Ext: No peripheral edema. No calf tenderness. No clubbing/cyanosis. +    Mental status: answers questions " appropriately follows commands  Speech: fluent, no aphasia or dysarthria    CRANIAL NERVES:  2,3: visual acuity grossly intact, PERRL  3,4,6: EOMI bilaterally, no nystagmus or diplopia  5: sensation intact to light touch bilaterally and symmetric  7: slight right facial droop   8: hearing grossly intact  9,10: symmetric palate elevation  11: SCM/Trapezius strength 5/5 bilaterally  12: tongue protrudes midline    Motor:      Upper Extremity  Myotome R L   Shoulder flexion C5 3/5 5   Elbow flexion C5 4/5 5   Wrist extension C6 4/5 5   Elbow extension C7 4/5 5   Finger flexion C8 4/5 5   Finger abduction T1 4/5 5     Lower Extremity Myotome R L   Hip flexion L2 3/5 5   Knee extension L3 4/5 5   Ankle dorsiflexion L4 4/5 5   Toe extension L5 4/5 5   Ankle plantarflexion S1 4/5 5     Sensory:   intact to light touch through out    DTRs: 2+ in bilateral  brachioradialis, 2+ in bilateral patellar tendons  No clonus at bilateral ankles  Positive babinski left  Negative Dominguez b/l     Tone: no spasticity noted, no cogwheeling noted    Labs: Reviewed and significant for   Recent Labs     09/30/20  0816 09/30/20  1600 09/30/20  2035 10/01/20  0015 10/01/20  0620 10/02/20  0500   RBC 3.46*  --   --   --  3.74* 3.78*   HEMOGLOBIN 10.8*  --   --   --  11.9* 11.9*   HEMATOCRIT 31.9*  --   --   --  34.2* 34.4*   PLATELETCT 234  --   --   --  270 294   PROTHROMBTM  --  14.1 13.7 14.0  --   --    APTT  --   --  43.3*  --   --   --    INR  --  1.06 1.02 1.05  --   --      Recent Labs     09/30/20  1600 10/01/20  0620 10/02/20  0500   SODIUM 136 136 140   POTASSIUM 3.8 3.4* 3.8   CHLORIDE 103 103 105   CO2 22 23 24   GLUCOSE 121* 109* 130*   BUN 8 7* 11   CREATININE 0.80 0.80 0.83   CALCIUM 8.3* 8.0* 9.2     Recent Results (from the past 24 hour(s))   Lipid Profile    Collection Time: 10/01/20 12:40 PM   Result Value Ref Range    Cholesterol,Tot 112 100 - 199 mg/dL    Triglycerides 109 0 - 149 mg/dL    HDL 58 >=40 mg/dL    LDL 32 <100  mg/dL   HEMOGLOBIN A1C    Collection Time: 10/01/20 12:40 PM   Result Value Ref Range    Glycohemoglobin 5.0 0.0 - 5.6 %    Est Avg Glucose 97 mg/dL   Heparin Xa (Unfractionated)    Collection Time: 10/01/20 12:40 PM   Result Value Ref Range    Heparin Xa (UFH) 0.48 IU/mL   CBC with Differential    Collection Time: 10/02/20  5:00 AM   Result Value Ref Range    WBC 13.6 (H) 4.8 - 10.8 K/uL    RBC 3.78 (L) 4.20 - 5.40 M/uL    Hemoglobin 11.9 (L) 12.0 - 16.0 g/dL    Hematocrit 34.4 (L) 37.0 - 47.0 %    MCV 91.0 81.4 - 97.8 fL    MCH 31.5 27.0 - 33.0 pg    MCHC 34.6 33.6 - 35.0 g/dL    RDW 42.0 35.9 - 50.0 fL    Platelet Count 294 164 - 446 K/uL    MPV 9.3 9.0 - 12.9 fL    Neutrophils-Polys 76.90 (H) 44.00 - 72.00 %    Lymphocytes 14.20 (L) 22.00 - 41.00 %    Monocytes 7.50 0.00 - 13.40 %    Eosinophils 0.50 0.00 - 6.90 %    Basophils 0.30 0.00 - 1.80 %    Immature Granulocytes 0.60 0.00 - 0.90 %    Nucleated RBC 0.00 /100 WBC    Neutrophils (Absolute) 10.48 (H) 2.00 - 7.15 K/uL    Lymphs (Absolute) 1.93 1.00 - 4.80 K/uL    Monos (Absolute) 1.02 (H) 0.00 - 0.85 K/uL    Eos (Absolute) 0.07 0.00 - 0.51 K/uL    Baso (Absolute) 0.04 0.00 - 0.12 K/uL    Immature Granulocytes (abs) 0.08 0.00 - 0.11 K/uL    NRBC (Absolute) 0.00 K/uL   Basic Metabolic Panel (BMP)    Collection Time: 10/02/20  5:00 AM   Result Value Ref Range    Sodium 140 135 - 145 mmol/L    Potassium 3.8 3.6 - 5.5 mmol/L    Chloride 105 96 - 112 mmol/L    Co2 24 20 - 33 mmol/L    Glucose 130 (H) 65 - 99 mg/dL    Bun 11 8 - 22 mg/dL    Creatinine 0.83 0.50 - 1.40 mg/dL    Calcium 9.2 8.5 - 10.5 mg/dL    Anion Gap 11.0 7.0 - 16.0   Magnesium    Collection Time: 10/02/20  5:00 AM   Result Value Ref Range    Magnesium 1.9 1.5 - 2.5 mg/dL   Phosphorus    Collection Time: 10/02/20  5:00 AM   Result Value Ref Range    Phosphorus 2.8 2.5 - 4.5 mg/dL   Heparin Xa (Unfractionated)    Collection Time: 10/02/20  5:00 AM   Result Value Ref Range    Heparin Xa (UFH) 0.69  IU/mL   CRP QUANTITIVE (NON-CARDIAC)    Collection Time: 10/02/20  5:00 AM   Result Value Ref Range    Stat C-Reactive Protein 14.70 (H) 0.00 - 0.75 mg/dL   PREALBUMIN    Collection Time: 10/02/20  5:00 AM   Result Value Ref Range    Pre-Albumin 14.5 (L) 18.0 - 38.0 mg/dL   ESTIMATED GFR    Collection Time: 10/02/20  5:00 AM   Result Value Ref Range    GFR If African American >60 >60 mL/min/1.73 m 2    GFR If Non African American >60 >60 mL/min/1.73 m 2         ASSESSMENT:  Patient is a 45 y.o. female admitted with right hemiparesis, dysphagia, facial droop and dysarthria 2/2 diffuse brainstem ischemia due to thrombosis in the left and right PCAs, left vertebral, and basilar arteries; s/p basilar artery thrombectomy on 9/30.    [facility] Code / Diagnosis to Support: 0001.3 - Stroke: Bilateral Involvement    Rehabilitation: Impaired ADLs and mobility  Patient is a good candidate for inpatient rehab based on anticipated needs for PT, OT, and known speech therapy needs.  Patient will also benefit from family training.  Patient has a good discharge situation which will be home with spouse and son.     PT/OT notes are pending however she is expected to meet criteria for IPR based on my exam today. She has significant deficits affecting mobility and ADLs that require IPR. She has good support at home from her  who is a family physician and her son.     Barriers to transfer include: Insurance authorization, TCCs to verify disposition, medical clearance and bed availability     Additional Recommendations:  - Will submit to insurance today   - Continue PT/OT and SLP while in house   - Control BP <140/90 while in house   - continue keppra for seizure PPX  - Increase use of PRN bowel meds for constipation  - Fioricet ok for headaches, added today   - Gabapentin 300mg TID for neuropathic pain started today    Medical Complexity:  Leukocytosis   Anemia   Neuropathic pain       DVT PPX: SCDs      Thank you for allowing us to  participate in the care of this patient.     Patient was seen for 85 minutes on unit/floor of which > 50% of time was spent on counseling and coordination of care regarding the above, including prognosis, risk reduction, benefits of treatment, and options for next stage of care.    Delvin Conde, DO   Physical Medicine and Rehabilitation

## 2020-10-02 NOTE — DISCHARGE PLANNING
[facility] Transitional Care Coordination     Referral from:  Oren ROLLINS outpatient referral 10/01  Facesheet indicates: [facility]  Potential Rehab Diagnosis: Subarachnoid hemorrhage within sulci near the vertex, left greater than right    Chart review indicates patient has on going medical management and therapy needs to possibly meet inpatient rehab facility criteria with the goal of returning to community.    D/C support: Spouse     Physiatry consultation  per protocol.            Thank you for the referral.

## 2020-10-02 NOTE — CARE PLAN
Problem: Venous Thromboembolism (VTW)/Deep Vein Thrombosis (DVT) Prevention:  Goal: Patient will participate in Venous Thrombosis (VTE)/Deep Vein Thrombosis (DVT)Prevention Measures  Outcome: PROGRESSING AS EXPECTED     Problem: Respiratory:  Goal: Respiratory status will improve  Outcome: PROGRESSING AS EXPECTED     Problem: Respiratory:  Goal: Ability to maintain a clear airway will improve  Outcome: PROGRESSING AS EXPECTED     Problem: Communication:  Goal: The ability to communicate needs accurately and effectively will improve  Outcome: PROGRESSING SLOWER THAN EXPECTED

## 2020-10-02 NOTE — PROGRESS NOTES
"Critical Care Progress Note    Date of admission  9/29/2020    Chief Complaint  45 y.o. female admitted 9/29/2020 with seizure, intubated PTA    Hospital Course    \"45 y.o. female who presented 9/29/2020 with unknown past medical hx that presented to [place] ER for 2 days of retro-orbital headache, nausea and left side facial numbness. She had a 10 minutes seizure requiring valium and ativan and then required intubation with etomidate and succ. She presented with normal vitals afebrile other then hypertension to 180/105 had normal CBC and no cmp could be seen on paper work. CT head that was negative and post intubated CXR that showed good placement of ET tube. She was transferred here and was given rocuronium and fentanyl in route. She present on propofol with ET in place so limited history is able to be obtained and significant other is a flight medical personnel that is on his way to the hospital. With lifting sedation she was able to follow commands. Neurology was consulted and orders were placed. I was asked to admit the patient for ventilator management.      Further hx per  a family practice physician. She had left occipital headache starting on Thursday that progressed to severe today with visual scotoma and left facial numbness. She has had about 6 prior episodes of complex ocular migraines in pass. No family hx of sah, no trauma or hypercoagulable states no recent sickness or fever chills. No hx of hypertension only hx of migraines and PCOS. No prior seizures. No substance abuse.\"     10/1 thrombectomy yesterday; now awake following commands, extubated today  10/2, no distress, A/O x4 with mild dysarthria and mild right drift, ptosis following all commands on room air, therapeutic heparin drip        Interval Problem Update  Reviewed last 24 hour events:  Extubated yesterday; room air  Mild dysarthra, mild R drift, ptosis  SR/ST  -140  elder TF  SLP  I/O = 1.8/2.1  Heparin gtt 1300, therapeutic " Xa level  CXR clear  Replete k and Mg  No pain    Review of Systems  Review of Systems   Unable to perform ROS: Acuity of condition        Vital Signs for last 24 hours   Temp:  [36.6 °C (97.8 °F)-37.8 °C (100 °F)] 36.6 °C (97.8 °F)  Pulse:  [] 91  Resp:  [18-68] 25  BP: (120-156)/(66-98) 156/82  SpO2:  [93 %-100 %] 94 %    Hemodynamic parameters for last 24 hours       Respiratory Information for the last 24 hours  Vent Mode: APVCMV  Rate (breaths/min): 18  Vt Target (mL): 420  PEEP/CPAP: 8  P Support: 5  Control VTE (exp VT): 577    Physical Exam   Physical Exam  Vitals signs and nursing note reviewed.   Constitutional:       Comments: No distress, on room air   HENT:      Head: Normocephalic.      Mouth/Throat:      Mouth: Mucous membranes are moist.      Comments: ETT in place  Eyes:      Pupils: Pupils are equal, round, and reactive to light.   Neck:      Musculoskeletal: No neck rigidity or muscular tenderness.   Cardiovascular:      Rate and Rhythm: Normal rate.      Heart sounds: No murmur.   Pulmonary:      Effort: No respiratory distress.      Breath sounds: Normal breath sounds. No wheezing.   Abdominal:      General: There is no distension.      Palpations: Abdomen is soft.      Tenderness: There is no guarding.      Comments: Abdominal stria   Musculoskeletal:         General: No swelling or tenderness.   Skin:     General: Skin is warm and dry.      Capillary Refill: Capillary refill takes less than 2 seconds.   Neurological:      Comments: Awake, oriented, follows commands, mild ptosis, mild right arm drift and mild dysarthria   Psychiatric:      Comments: Unable to determine         Medications  Current Facility-Administered Medications   Medication Dose Route Frequency Provider Last Rate Last Dose   • potassium chloride SA (Kdur) tablet 40 mEq  40 mEq Oral Once Wang Best M.D.       • magnesium sulfate IVPB premix 2 g  2 g Intravenous Once Wang Best M.D.       • Pharmacy Consult:  Enteral tube insertion - review meds/change route/product selection  1 Each Other PHARMACY TO DOSE Constantine Sanon D.O.       • atorvastatin (LIPITOR) tablet 40 mg  40 mg Enteral Tube Q EVENING Miya Lott, A.P.R.N.   40 mg at 10/01/20 1812   • norepinephrine (Levophed) infusion 8 mg/250 mL (premix)  0-30 mcg/min Intravenous Continuous Wang Best M.D.   Stopped at 10/01/20 1030   • labetalol (NORMODYNE/TRANDATE) injection 10 mg  10 mg Intravenous Q4HRS PRN Wang Best M.D.       • enalaprilat (VASOTEC) injection 1.25 mg  1.25 mg Intravenous Q6HRS PRN Wang Best M.D.       • heparin infusion 25,000 units in 500 mL 0.45% NACL  0-30 Units/kg/hr Intravenous Continuous Miya Lott, A.P.R.N. 23.5 mL/hr at 10/02/20 0701 13.4 Units/kg/hr at 10/02/20 0701   • levETIRAcetam (Keppra) 500 mg in 100 mL NaCl IV premix  500 mg Intravenous Q12HRS Eusebio Fischer M.D.   Stopped at 10/02/20 0559   • Respiratory Therapy Consult   Nebulization Continuous RT Benigno Mart M.D.       • ipratropium-albuterol (DUONEB) nebulizer solution  3 mL Nebulization Q2HRS PRN (RT) Benigno Mart M.D.       • senna-docusate (PERICOLACE or SENOKOT S) 8.6-50 MG per tablet 2 Tab  2 Tab Enteral Tube BID Benigno Mart M.D.   2 Tab at 10/02/20 0544    And   • polyethylene glycol/lytes (MIRALAX) PACKET 1 Packet  1 Packet Enteral Tube QDAY PRN Benigno Mart M.D.   1 Packet at 10/02/20 0544    And   • magnesium hydroxide (MILK OF MAGNESIA) suspension 30 mL  30 mL Enteral Tube QDAY PRN Benigno Mart M.D.        And   • bisacodyl (DULCOLAX) suppository 10 mg  10 mg Rectal QDAY PRN Benigno Mart M.D.       • MD Alert...ICU Electrolyte Replacement per Pharmacy   Other PHARMACY TO DOSE Benigno Matr M.D.           Fluids    Intake/Output Summary (Last 24 hours) at 10/2/2020 0818  Last data filed at 10/2/2020 0800  Gross per 24 hour   Intake 1697 ml   Output 1930 ml   Net -233 ml       Laboratory  Recent Labs      09/29/20 2103 09/30/20  0348 10/01/20  0247   ISTATAPH 7.367* 7.398* 7.443   ISTATAPCO2 36.1 37.2* 31.9   ISTATAPO2 131* 105* 98*   ISTATATCO2 22 24 23   [id number] 99 98 98   ISTATARTHCO3 20.7 23.0 21.8   ISTATARTBE -4 -2 -2   ISTATTEMP 37.0 C 97.6 F 97.5 F   ISTATFIO2 40 30 98   ISTATSPEC Arterial Arterial Arterial   ISTATAPHTC 7.367* 7.406 7.452   [id number] 131* 102* 95*         Recent Labs     09/30/20 0816 09/30/20  1600 10/01/20  0620 10/02/20  0500   SODIUM 141 136 136 140   POTASSIUM 3.3* 3.8 3.4* 3.8   CHLORIDE 111 103 103 105   CO2 19* 22 23 24   BUN 9 8 7* 11   CREATININE 0.60 0.80 0.80 0.83   MAGNESIUM 1.6  --  2.1 1.9   PHOSPHORUS 2.1*  --  3.4 2.8   CALCIUM 7.4* 8.3* 8.0* 9.2     Recent Labs     09/29/20 2040 09/30/20  1600 10/01/20  0620 10/02/20  0500   ALTSGPT 30  --   --   --   --    ASTSGOT 28  --   --   --   --    ALKPHOSPHAT 63  --   --   --   --    TBILIRUBIN 0.7  --   --   --   --    GLUCOSE 153*   < > 121* 109* 130*    < > = values in this interval not displayed.     Recent Labs     09/29/20 2040 09/30/20  0816 10/01/20  0620 10/02/20  0500   WBC  --  9.5 12.6* 13.6*   NEUTSPOLYS  --  77.00* 68.50 76.90*   LYMPHOCYTES  --  14.30* 22.40 14.20*   MONOCYTES  --  7.90 8.10 7.50   EOSINOPHILS  --  0.30 0.40 0.50   BASOPHILS  --  0.30 0.30 0.30   ASTSGOT 28  --   --   --    ALTSGPT 30  --   --   --    ALKPHOSPHAT 63  --   --   --    TBILIRUBIN 0.7  --   --   --      Recent Labs     09/30/20  0816 09/30/20  1600 09/30/20  2035 10/01/20  0015 10/01/20  0620 10/02/20  0500   RBC 3.46*  --   --   --  3.74* 3.78*   HEMOGLOBIN 10.8*  --   --   --  11.9* 11.9*   HEMATOCRIT 31.9*  --   --   --  34.2* 34.4*   PLATELETCT 234  --   --   --  270 294   PROTHROMBTM  --  14.1 13.7 14.0  --   --    APTT  --   --  43.3*  --   --   --    INR  --  1.06 1.02 1.05  --   --        Imaging  X-Ray:  I have personally reviewed the images and compared with prior images.    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     Seizure (HCC)  Assessment & Plan  Continue keppra, f/u EEG      Acute respiratory failure with hypoxia and hypercapnia (HCC)  Assessment & Plan  Intubated date: 9/29 for airway protection for seizure   LTV/LPS, full support;     Essential hypertension  Assessment & Plan  Hypertension on presentation  Now with basilar art thrombus/disection; SBP < 180     Updated plan:  Continue heparin drip post thrombectomy  Monitor with frequent neuro checks, reviewed with neurology, keep in ICU today  PT/OT/SLP  Clinically improving    VTE:  On hold pending results of procedure  Ulcer: H2 Antagonist  Lines: None    I have performed a physical exam and reviewed and updated ROS and Plan today (10/2/2020). In review of yesterday's note (10/1/2020), there are no changes except as documented above.     Discussed patient condition and risk of morbidity and/or mortality with RN, RT, Pharmacy, QA team and neurology

## 2020-10-03 LAB
ANION GAP SERPL CALC-SCNC: 12 MMOL/L (ref 7–16)
BASOPHILS # BLD AUTO: 0.5 % (ref 0–1.8)
BASOPHILS # BLD: 0.08 K/UL (ref 0–0.12)
BUN SERPL-MCNC: 12 MG/DL (ref 8–22)
CALCIUM SERPL-MCNC: 9.1 MG/DL (ref 8.5–10.5)
CHLORIDE SERPL-SCNC: 103 MMOL/L (ref 96–112)
CO2 SERPL-SCNC: 22 MMOL/L (ref 20–33)
CREAT SERPL-MCNC: 0.59 MG/DL (ref 0.5–1.4)
EOSINOPHIL # BLD AUTO: 0.2 K/UL (ref 0–0.51)
EOSINOPHIL NFR BLD: 1.3 % (ref 0–6.9)
ERYTHROCYTE [DISTWIDTH] IN BLOOD BY AUTOMATED COUNT: 42 FL (ref 35.9–50)
GLUCOSE SERPL-MCNC: 132 MG/DL (ref 65–99)
HCT VFR BLD AUTO: 31.5 % (ref 37–47)
HGB BLD-MCNC: 10.6 G/DL (ref 12–16)
IMM GRANULOCYTES # BLD AUTO: 0.16 K/UL (ref 0–0.11)
IMM GRANULOCYTES NFR BLD AUTO: 1.1 % (ref 0–0.9)
LYMPHOCYTES # BLD AUTO: 2.3 K/UL (ref 1–4.8)
LYMPHOCYTES NFR BLD: 15.2 % (ref 22–41)
MAGNESIUM SERPL-MCNC: 2 MG/DL (ref 1.5–2.5)
MCH RBC QN AUTO: 31.1 PG (ref 27–33)
MCHC RBC AUTO-ENTMCNC: 33.7 G/DL (ref 33.6–35)
MCV RBC AUTO: 92.4 FL (ref 81.4–97.8)
MONOCYTES # BLD AUTO: 1.11 K/UL (ref 0–0.85)
MONOCYTES NFR BLD AUTO: 7.3 % (ref 0–13.4)
NEUTROPHILS # BLD AUTO: 11.29 K/UL (ref 2–7.15)
NEUTROPHILS NFR BLD: 74.6 % (ref 44–72)
NRBC # BLD AUTO: 0 K/UL
NRBC BLD-RTO: 0 /100 WBC
PLATELET # BLD AUTO: 335 K/UL (ref 164–446)
PMV BLD AUTO: 9 FL (ref 9–12.9)
POTASSIUM SERPL-SCNC: 4.3 MMOL/L (ref 3.6–5.5)
PROT C ACT/NOR PPP: 146 % (ref 83–168)
PROT S ACT/NOR PPP: 105 % (ref 57–131)
RBC # BLD AUTO: 3.41 M/UL (ref 4.2–5.4)
SODIUM SERPL-SCNC: 137 MMOL/L (ref 135–145)
UFH PPP CHRO-ACNC: 0.27 IU/ML
UFH PPP CHRO-ACNC: 0.47 IU/ML
UFH PPP CHRO-ACNC: 0.47 IU/ML
VIT B1 BLD-MCNC: 155 NMOL/L (ref 70–180)
WBC # BLD AUTO: 15.1 K/UL (ref 4.8–10.8)

## 2020-10-03 PROCEDURE — 700102 HCHG RX REV CODE 250 W/ 637 OVERRIDE(OP): Performed by: INTERNAL MEDICINE

## 2020-10-03 PROCEDURE — 99232 SBSQ HOSP IP/OBS MODERATE 35: CPT | Performed by: NURSE PRACTITIONER

## 2020-10-03 PROCEDURE — 700102 HCHG RX REV CODE 250 W/ 637 OVERRIDE(OP): Performed by: PHYSICAL MEDICINE & REHABILITATION

## 2020-10-03 PROCEDURE — 97162 PT EVAL MOD COMPLEX 30 MIN: CPT

## 2020-10-03 PROCEDURE — 83735 ASSAY OF MAGNESIUM: CPT

## 2020-10-03 PROCEDURE — 770020 HCHG ROOM/CARE - TELE (206)

## 2020-10-03 PROCEDURE — A9270 NON-COVERED ITEM OR SERVICE: HCPCS | Performed by: HOSPITALIST

## 2020-10-03 PROCEDURE — 36415 COLL VENOUS BLD VENIPUNCTURE: CPT

## 2020-10-03 PROCEDURE — 700102 HCHG RX REV CODE 250 W/ 637 OVERRIDE(OP): Performed by: HOSPITALIST

## 2020-10-03 PROCEDURE — A9270 NON-COVERED ITEM OR SERVICE: HCPCS | Performed by: PHYSICAL MEDICINE & REHABILITATION

## 2020-10-03 PROCEDURE — 99221 1ST HOSP IP/OBS SF/LOW 40: CPT | Performed by: HOSPITALIST

## 2020-10-03 PROCEDURE — 99233 SBSQ HOSP IP/OBS HIGH 50: CPT | Performed by: INTERNAL MEDICINE

## 2020-10-03 PROCEDURE — A9270 NON-COVERED ITEM OR SERVICE: HCPCS | Performed by: INTERNAL MEDICINE

## 2020-10-03 PROCEDURE — 700111 HCHG RX REV CODE 636 W/ 250 OVERRIDE (IP): Performed by: PSYCHIATRY & NEUROLOGY

## 2020-10-03 PROCEDURE — 85520 HEPARIN ASSAY: CPT | Mod: 91

## 2020-10-03 PROCEDURE — 85025 COMPLETE CBC W/AUTO DIFF WBC: CPT

## 2020-10-03 PROCEDURE — 80048 BASIC METABOLIC PNL TOTAL CA: CPT

## 2020-10-03 PROCEDURE — 97166 OT EVAL MOD COMPLEX 45 MIN: CPT

## 2020-10-03 PROCEDURE — 700111 HCHG RX REV CODE 636 W/ 250 OVERRIDE (IP): Performed by: NURSE PRACTITIONER

## 2020-10-03 RX ORDER — LEVETIRACETAM 100 MG/ML
500 SOLUTION ORAL EVERY 12 HOURS
Status: DISCONTINUED | OUTPATIENT
Start: 2020-10-03 | End: 2020-10-05

## 2020-10-03 RX ORDER — GABAPENTIN 100 MG/1
200 CAPSULE ORAL 3 TIMES DAILY
Status: DISCONTINUED | OUTPATIENT
Start: 2020-10-03 | End: 2020-10-04

## 2020-10-03 RX ADMIN — DOCUSATE SODIUM 50 MG AND SENNOSIDES 8.6 MG 2 TABLET: 8.6; 5 TABLET, FILM COATED ORAL at 17:02

## 2020-10-03 RX ADMIN — LEVETIRACETAM INJECTION 500 MG: 5 INJECTION INTRAVENOUS at 06:12

## 2020-10-03 RX ADMIN — GABAPENTIN 300 MG: 300 CAPSULE ORAL at 06:12

## 2020-10-03 RX ADMIN — GABAPENTIN 300 MG: 300 CAPSULE ORAL at 12:03

## 2020-10-03 RX ADMIN — ATORVASTATIN CALCIUM 40 MG: 40 TABLET, FILM COATED ORAL at 17:03

## 2020-10-03 RX ADMIN — LEVETIRACETAM 500 MG: 100 SOLUTION ORAL at 17:03

## 2020-10-03 RX ADMIN — HEPARIN SODIUM 15.4 UNITS/KG/HR: 5000 INJECTION, SOLUTION INTRAVENOUS at 11:24

## 2020-10-03 RX ADMIN — GABAPENTIN 200 MG: 100 CAPSULE ORAL at 17:03

## 2020-10-03 ASSESSMENT — ENCOUNTER SYMPTOMS
PALPITATIONS: 0
DEPRESSION: 0
CONSTITUTIONAL NEGATIVE: 1
BRUISES/BLEEDS EASILY: 0
ABDOMINAL PAIN: 0
NECK PAIN: 0
DIZZINESS: 0
MUSCULOSKELETAL NEGATIVE: 1
COUGH: 0
FOCAL WEAKNESS: 1
CHILLS: 0
PSYCHIATRIC NEGATIVE: 1
TREMORS: 0
GASTROINTESTINAL NEGATIVE: 1
WEAKNESS: 1
RESPIRATORY NEGATIVE: 1
SPEECH CHANGE: 1
FEVER: 0
DOUBLE VISION: 0
POLYDIPSIA: 0
BLURRED VISION: 0
NAUSEA: 0
HEMOPTYSIS: 0
VOMITING: 0
EYES NEGATIVE: 1
CARDIOVASCULAR NEGATIVE: 1
BACK PAIN: 0
HEADACHES: 0
HEARTBURN: 0

## 2020-10-03 ASSESSMENT — COGNITIVE AND FUNCTIONAL STATUS - GENERAL
MOVING FROM LYING ON BACK TO SITTING ON SIDE OF FLAT BED: UNABLE
WALKING IN HOSPITAL ROOM: TOTAL
DAILY ACTIVITIY SCORE: 11
DRESSING REGULAR UPPER BODY CLOTHING: A LOT
SUGGESTED CMS G CODE MODIFIER MOBILITY: CM
MOVING TO AND FROM BED TO CHAIR: UNABLE
CLIMB 3 TO 5 STEPS WITH RAILING: TOTAL
TOILETING: A LOT
TURNING FROM BACK TO SIDE WHILE IN FLAT BAD: UNABLE
PERSONAL GROOMING: A LOT
SUGGESTED CMS G CODE MODIFIER DAILY ACTIVITY: CL
DRESSING REGULAR LOWER BODY CLOTHING: A LOT
EATING MEALS: TOTAL
HELP NEEDED FOR BATHING: A LOT
MOBILITY SCORE: 7
STANDING UP FROM CHAIR USING ARMS: A LOT

## 2020-10-03 ASSESSMENT — ACTIVITIES OF DAILY LIVING (ADL): TOILETING: INDEPENDENT

## 2020-10-03 NOTE — CARE PLAN
Problem: Communication  Goal: The ability to communicate needs accurately and effectively will improve  Outcome: PROGRESSING AS EXPECTED     Problem: Knowledge Deficit  Goal: Knowledge of disease process/condition, treatment plan, diagnostic tests, and medications will improve  Outcome: PROGRESSING AS EXPECTED     Problem: Knowledge Deficit:  Goal: Knowledge of disease process/condition, treatment plan, diagnostic tests, and medications will improve  Outcome: PROGRESSING AS EXPECTED

## 2020-10-03 NOTE — PROGRESS NOTES
"Critical Care Progress Note    Date of admission  9/29/2020    Chief Complaint  45 y.o. female admitted 9/29/2020 with seizure, intubated PTA    Hospital Course    \"45 y.o. female who presented 9/29/2020 with unknown past medical hx that presented to [place] ER for 2 days of retro-orbital headache, nausea and left side facial numbness. She had a 10 minutes seizure requiring valium and ativan and then required intubation with etomidate and succ. She presented with normal vitals afebrile other then hypertension to 180/105 had normal CBC and no cmp could be seen on paper work. CT head that was negative and post intubated CXR that showed good placement of ET tube. She was transferred here and was given rocuronium and fentanyl in route. She present on propofol with ET in place so limited history is able to be obtained and significant other is a flight medical personnel that is on his way to the hospital. With lifting sedation she was able to follow commands. Neurology was consulted and orders were placed. I was asked to admit the patient for ventilator management.      Further hx per  a family practice physician. She had left occipital headache starting on Thursday that progressed to severe today with visual scotoma and left facial numbness. She has had about 6 prior episodes of complex ocular migraines in pass. No family hx of sah, no trauma or hypercoagulable states no recent sickness or fever chills. No hx of hypertension only hx of migraines and PCOS. No prior seizures. No substance abuse.\"     10/1 thrombectomy yesterday; now awake following commands, extubated today  10/2, no distress, A/O x4 with mild dysarthria and mild right drift, ptosis following all commands on room air, therapeutic heparin drip  10/3 awake, oriented x4, mild right upper extremity weakness and ptosis, tolerating diet, remains on heparin drip, transition to ASA tomorrow, okay to move out of ICU        Interval Problem Update  Reviewed " last 24 hour events:   A/o x 4  RUE mild weak  PT/OT  NSR,  - 140  TF out  elder PO  I/O = 3.4/2.7  Room air  [place] OK   OK to transfer    Review of Systems  Review of Systems   Unable to perform ROS: Acuity of condition        Vital Signs for last 24 hours   Temp:  [36.6 °C (97.8 °F)-36.9 °C (98.4 °F)] 36.7 °C (98 °F)  Pulse:  [] 56  Resp:  [18-56] 20  BP: (136-174)/() 143/81  SpO2:  [54 %-98 %] 92 %    Hemodynamic parameters for last 24 hours       Respiratory Information for the last 24 hours       Physical Exam   Physical Exam  Vitals signs and nursing note reviewed.   Constitutional:       Comments: No distress, on room air   HENT:      Head: Normocephalic.      Mouth/Throat:      Mouth: Mucous membranes are moist.      Comments: ETT in place  Eyes:      Pupils: Pupils are equal, round, and reactive to light.   Neck:      Musculoskeletal: No neck rigidity or muscular tenderness.   Cardiovascular:      Rate and Rhythm: Normal rate.      Heart sounds: No murmur.   Pulmonary:      Effort: No respiratory distress.      Breath sounds: Normal breath sounds. No wheezing.   Abdominal:      General: There is no distension.      Palpations: Abdomen is soft.      Tenderness: There is no guarding.      Comments: Abdominal stria   Musculoskeletal:         General: No swelling or tenderness.   Skin:     General: Skin is warm and dry.      Capillary Refill: Capillary refill takes less than 2 seconds.   Neurological:      Comments: Awake, oriented, follows commands, mild ptosis, mild right arm drift and mild dysarthria   Psychiatric:      Comments: Unable to determine         Medications  Current Facility-Administered Medications   Medication Dose Route Frequency Provider Last Rate Last Dose   • acetaminophen/caffeine/butalbital 325-40-50 mg (FIORICET) -40 MG per tablet 1 Tab  1 Tab Oral Q6HRS PRN Delvin Conde D.OGreer       • gabapentin (NEURONTIN) capsule 300 mg  300 mg Oral TID Delvin Conde D.OGreer   300  mg at 10/03/20 0612   • acetaminophen (TYLENOL) tablet 650 mg  650 mg Oral Q4HRS PRN Wang Best M.D.       • atorvastatin (LIPITOR) tablet 40 mg  40 mg Oral Q EVENING Wang Best M.D.   40 mg at 10/02/20 1746   • senna-docusate (PERICOLACE or SENOKOT S) 8.6-50 MG per tablet 2 Tab  2 Tab Oral BID Wang Best M.D.   Stopped at 10/02/20 1800    And   • polyethylene glycol/lytes (MIRALAX) PACKET 1 Packet  1 Packet Oral QDAY PRN Wang Best M.D.        And   • magnesium hydroxide (MILK OF MAGNESIA) suspension 30 mL  30 mL Oral QDAY PRN Wang Best M.D.        And   • bisacodyl (DULCOLAX) suppository 10 mg  10 mg Rectal QDAY PRN Wang Best M.D.       • norepinephrine (Levophed) infusion 8 mg/250 mL (premix)  0-30 mcg/min Intravenous Continuous Wang Best M.D.   Stopped at 10/01/20 1030   • labetalol (NORMODYNE/TRANDATE) injection 10 mg  10 mg Intravenous Q4HRS PRN Wang Best M.D.       • enalaprilat (VASOTEC) injection 1.25 mg  1.25 mg Intravenous Q6HRS PRN Wang Best M.D.       • heparin infusion 25,000 units in 500 mL 0.45% NACL  0-30 Units/kg/hr Intravenous Continuous DIAMOND TeranP.R.N. 27 mL/hr at 10/03/20 0708 15.4 Units/kg/hr at 10/03/20 0708   • levETIRAcetam (Keppra) 500 mg in 100 mL NaCl IV premix  500 mg Intravenous Q12HRS Eusebio Fischer M.D.   Stopped at 10/03/20 0627   • Respiratory Therapy Consult   Nebulization Continuous RT Benigno Mart M.D.       • ipratropium-albuterol (DUONEB) nebulizer solution  3 mL Nebulization Q2HRS PRN (RT) Benigno Mart M.D.       • MD Alert...ICU Electrolyte Replacement per Pharmacy   Other PHARMACY TO DOSE Benigno Mart M.D.           Fluids    Intake/Output Summary (Last 24 hours) at 10/3/2020 0817  Last data filed at 10/3/2020 0600  Gross per 24 hour   Intake 3294 ml   Output 2775 ml   Net 519 ml       Laboratory  Recent Labs     10/01/20  0247   ISTATAPH 7.443   ISTATAPCO2 31.9   ISTATAPO2 98*   ISTATATCO2 23    [id number] 98   ISTATARTHCO3 21.8   ISTATARTBE -2   ISTATTEMP 97.5 F   ISTATFIO2 98   ISTATSPEC Arterial   ISTATAPHTC 7.452   [id number] 95*         Recent Labs     10/01/20  0620 10/02/20  0500 10/03/20  0450   SODIUM 136 140 137   POTASSIUM 3.4* 3.8 4.3   CHLORIDE 103 105 103   CO2 23 24 22   BUN 7* 11 12   CREATININE 0.80 0.83 0.59   MAGNESIUM 2.1 1.9 2.0   PHOSPHORUS 3.4 2.8  --    CALCIUM 8.0* 9.2 9.1     Recent Labs     10/01/20  0620 10/02/20  0500 10/03/20  0450   PREALBUMIN  --  14.5*  --    GLUCOSE 109* 130* 132*     Recent Labs     10/01/20  0620 10/02/20  0500 10/03/20  0450   WBC 12.6* 13.6* 15.1*   NEUTSPOLYS 68.50 76.90* 74.60*   LYMPHOCYTES 22.40 14.20* 15.20*   MONOCYTES 8.10 7.50 7.30   EOSINOPHILS 0.40 0.50 1.30   BASOPHILS 0.30 0.30 0.50     Recent Labs     09/30/20  1600 09/30/20  2035 10/01/20  0015 10/01/20  0620 10/02/20  0500 10/03/20  0450   RBC  --   --   --  3.74* 3.78* 3.41*   HEMOGLOBIN  --   --   --  11.9* 11.9* 10.6*   HEMATOCRIT  --   --   --  34.2* 34.4* 31.5*   PLATELETCT  --   --   --  270 294 335   PROTHROMBTM 14.1 13.7 14.0  --   --   --    APTT  --  43.3*  --   --   --   --    INR 1.06 1.02 1.05  --   --   --        Imaging  X-Ray:  I have personally reviewed the images and compared with prior images.    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    I have performed a physical exam and reviewed and updated ROS and Plan today (10/3/2020). In review of yesterday's note (10/2/2020), there are no changes except as documented above.     Discussed patient condition and risk of morbidity and/or mortality with RN, RT, Pharmacy, QA team and neurology

## 2020-10-03 NOTE — THERAPY
Physical Therapy   Initial Evaluation     Patient Name: Obdulia High  Age:  45 y.o., Sex:  female  Medical Record #: [id number]  Today's Date: 10/3/2020     Precautions: Fall Risk, Swallow Precautions ( See Comments), Nasogastric Tube    Assessment  Pt presents with impaired motor control, balance and cognition associated c/c of seizure like activity found to have SAH, midbrain, left pontine infarct required thrombectomy of basilar artery. Pt mobilizing quite well considering location of CVA, strength in right LE 4/5 but motor control difficult with weight shifting and isolating joints. Able to initiate facilitated stepping, with cues to grasp with right UE; fatigued but alert throughout ~ 30 min session; recommend placement, will follow.       Plan    Recommend Physical Therapy 5 times per week until therapy goals are met for the following treatments:  Bed Mobility, Equipment, Gait Training, Manual Therapy, Neuro Re-Education / Balance, Self Care/Home Evaluation, Sensory Integration Techniques, Stair Training, Therapeutic Activities and Therapeutic Exercises    DC Equipment Recommendations:  Unable to determine at this time  Discharge Recommendations: Recommend post-acute placement for additional physical therapy services prior to discharge home       Abridged Subjective/Objective       10/03/20 0950   Prior Living Situation   Prior Services Home-Independent   Housing / Facility [address]   Steps Into Home 0   Equipment Owned None   Lives with - Patient's Self Care Capacity Spouse   Comments denies falls; spouse is a  physician in [place]; 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;    Prior Level of Functional Mobility   Bed Mobility Independent   Transfer Status Independent   Ambulation Independent   Distance Ambulation (Feet)   (to tolerance)   Assistive Devices Used None   Stairs Independent   History of Falls   History of Falls No   Cognition    Cognition / Consciousness X   Speech/ Communication Delayed  Responses  (hypophonic voice; does not speak more than a few words )   Level of Consciousness Alert   Ability To Follow Commands 1 Step   New Learning Impaired   Comments fatigued throughout session; reporting she feels off in her ability to concentrate and interpret new stimuli however does report she has maintained orientation;    Passive ROM Lower Body   Passive ROM Lower Body WDL   Strength Lower Body   Lower Body Strength  X   Comments right LE: 4/5 at all major joints, tests stronger than functionally demonstrates    Sensation Lower Body   Lower Extremity Sensation   WDL   Comments intact to light touch/deep pressure    Coordination Lower Body    Coordination Lower Body  X   Toe Tapping Right Impaired   Toe Tapping Left Impaired   Comments rapid tapping, does not coordinate with therapist;    Vision   Double Vision Education  (double vision gaze; resolves with either eye covered)   Vision Comments tracks throughout fields; left eyelid ptosis    Balance Assessment   Sitting Balance (Static) Fair -   Sitting Balance (Dynamic) Poor +   Standing Balance (Static) Poor +   Standing Balance (Dynamic) Poor   Weight Shift Sitting Fair   Weight Shift Standing Poor   Comments L UE support in sitting, right facilitated; needing physical assist for weight shifting initially but improved throughout session;    Gait Analysis   Comments able to take 3-4 lateral side steps facilitated knee extension and upright posture; neesd initial cues to maintain grasp of right hand on FWW    Bed Mobility    Supine to Sit Minimal Assist   Sit to Supine Minimal Assist   Functional Mobility   Sit to Stand Minimal Assist  (of 2 with FWW, cues for right grasp; x 3 reps)   Bed, Chair, Wheelchair Transfer Unable to Participate   Comments lethargic at end of session    Patient / Family Goals    Patient / Family Goal #1 to improve activity tolerance    Short Term Goals    Short Term Goal # 1 Pt will perform supine<>sit and supervision within 6  visits to ensure independent mobility at home.   Short Term Goal # 2 Pt will perform sit<>stand with FWW and supervision within 6 visits to ensure independent mobility at home.    Short Term Goal # 3 Pt will ambulate x 150ft with FWW and supervision within 6 visits to ensure independent mobility at home.    Education Group   Role of Physical Therapist Patient Response Patient;Acceptance;Demonstration;Explanation;Verbal Demonstration;Action Demonstration   Exercises - Supine Patient Response Patient;Acceptance;Explanation;Demonstration;Handout;Verbal Demonstration;Action Demonstration;Reinforcement Needed  (thumb opposition, shoulder flexion, scap retract; heel slide)   Additional Comments fatigue parameters    Problem List    Problems Impaired Bed Mobility;Pain;Impaired Transfers;Impaired Ambulation;Functional Strength Deficit;Impaired Balance;Impaired Coordination;Impaired Vision;Decreased Activity Tolerance;Safety Awareness Deficits / Cognition;Motor Planning / Sequencing

## 2020-10-03 NOTE — CONSULTS
Hospital Medicine Consultation    Date of Service  10/3/2020    Referring Physician  Dr. Wang Best  Consulting Physician  Jesus Alarcon M.D.    Reason for Consultation  Hospital medicine consultation the patient was treated after being transferred from an outlying facility, found to have a basilar thrombus status post thrombectomy with nearly occluded bilateral PCAs, left vertebral artery dissection, ischemic strokes involving midbrain, left internal capsule, right cerebellum secondary to the above, found with new onset seizures secondary to the above    History of Presenting Illness  Obdulia High is a 45 y.o. female with unknown past medical history who was transferred from [facility] ED on 9/30/2020 where she presented with 2-day history of retro-orbital headache and left-sided facial numbness.  In the ED she had an episode of seizure requiring intubation for airway protection  .  She was transferred to [facility] for higher level of care.  In the ED she was hypertensive, afebrile.  CT head was unremarkable.  Neurology was consulted and patient was admitted to ICU for further monitoring and ventilator management.  MRI was reviewed on 9/30 which revealed  brainstem ischemia. CTA revealed diffuse thrombosis involving left and right PCA,basilar and  vertebral artery with dissection.   Patient underwent stat thrombectomy on 9/30/2020. Currently on heparin drip for thrombosis per Neuro recommendations.    The patient today appears fatigued, weak, moving all 4 extremities with some ongoing weakness R>L, the patient tolerating a diet, continues on a heparin drip to transition to aspirin tomorrow.  Patient is on room air, continued to ptosis, left greater than right, slight dysarthria    Review of Systems  Review of Systems   Constitutional: Negative.  Negative for chills and fever.   HENT: Negative.    Eyes: Negative.    Respiratory: Negative.  Negative for cough.    Cardiovascular: Negative.  Negative for chest pain  and palpitations.   Gastrointestinal: Negative.  Negative for heartburn, nausea and vomiting.   Genitourinary: Negative.  Negative for dysuria and frequency.   Musculoskeletal: Negative.  Negative for back pain and neck pain.   Skin: Negative.  Negative for itching and rash.   Neurological: Positive for speech change, focal weakness and weakness. Negative for dizziness and headaches.   Endo/Heme/Allergies: Negative.  Negative for polydipsia. Does not bruise/bleed easily.   Psychiatric/Behavioral: Negative.  Negative for depression.       Past Medical History  History of complex ocular migraines  Surgical History  No surgical history reported  Family History  No contributory family history reported  Social History  Patient is a non-smoker nondrinker no substance abuse  Medications  None       Allergies  No Known Allergies    Physical Exam  Temp:  [36.6 °C (97.8 °F)-36.9 °C (98.4 °F)] 36.6 °C (97.8 °F)  Pulse:  [] 84  Resp:  [17-45] 22  BP: (136-174)/() 146/85  SpO2:  [54 %-98 %] 97 %    Physical Exam  Vitals signs and nursing note reviewed.   Constitutional:       Appearance: She is well-developed. She is not diaphoretic.   HENT:      Head: Normocephalic and atraumatic.      Nose: Nose normal.   Eyes:      Conjunctiva/sclera: Conjunctivae normal.      Pupils: Pupils are equal, round, and reactive to light.      Comments: Proptosis, left greater than right   Neck:      Musculoskeletal: Normal range of motion and neck supple.      Thyroid: No thyromegaly.      Vascular: No JVD.   Cardiovascular:      Rate and Rhythm: Normal rate and regular rhythm.      Heart sounds: Normal heart sounds. No friction rub. No gallop.    Pulmonary:      Effort: Pulmonary effort is normal.      Breath sounds: Normal breath sounds. No wheezing or rales.   Abdominal:      General: Bowel sounds are normal. There is no distension.      Palpations: Abdomen is soft. There is no mass.      Tenderness: There is no abdominal tenderness.  There is no guarding or rebound.   Musculoskeletal: Normal range of motion.         General: No tenderness.   Lymphadenopathy:      Cervical: No cervical adenopathy.   Skin:     General: Skin is warm and dry.   Neurological:      Mental Status: She is alert and oriented to person, place, and time.      Cranial Nerves: No cranial nerve deficit.      Motor: Weakness present.   Psychiatric:         Behavior: Behavior normal.         Fluids  Date 10/03/20 0700 - 10/04/20 0659   Shift 5911-1899 6764-0592 6086-5605 24 Hour Total   INTAKE   P.O. 240   240   Shift Total 240   240   OUTPUT   Urine 800   800   Shift Total 800   800   Weight (kg) 87.8 87.8 87.8 87.8       Laboratory  Recent Labs     10/01/20  0620 10/02/20  0500 10/03/20  0450   WBC 12.6* 13.6* 15.1*   RBC 3.74* 3.78* 3.41*   HEMOGLOBIN 11.9* 11.9* 10.6*   HEMATOCRIT 34.2* 34.4* 31.5*   MCV 91.4 91.0 92.4   MCH 31.8 31.5 31.1   MCHC 34.8 34.6 33.7   RDW 42.5 42.0 42.0   PLATELETCT 270 294 335   MPV 9.5 9.3 9.0     Recent Labs     10/01/20  0620 10/02/20  0500 10/03/20  0450   SODIUM 136 140 137   POTASSIUM 3.4* 3.8 4.3   CHLORIDE 103 105 103   CO2 23 24 22   GLUCOSE 109* 130* 132*   BUN 7* 11 12   CREATININE 0.80 0.83 0.59   CALCIUM 8.0* 9.2 9.1     Recent Labs     09/30/20  1600 09/30/20  2035 10/01/20  0015   APTT  --  43.3*  --    INR 1.06 1.02 1.05          Recent Labs     10/01/20  1240   TRIGLYCERIDE 109   HDL 58   LDL 32        Imaging  EC-ECHOCARDIOGRAM COMPLETE W/O CONT   Final Result      [id number]-4 VIEW   Final Result         1.  Nonspecific bowel gas pattern.   2.  Dobbhoff tube is coiled within the stomach, the tip terminates overlying the expected location of the gastric antrum.      MR-BRAIN-W/O   Final Result      1.  Subarachnoid hemorrhage within sulci near the vertex, left greater than right      2.  Acute mid brain and punctate left pontine infarct without mass effect      3.  No other significant finding      Findings were discussed with  Dr. Mart on 2015 hours 9/30/2020      CT-HEAD W/O   Final Result   Addendum 1 of 1   Addendum:   Please note that the patient has had a CTA head and neck as well as    angiogram with thrombectomy earlier today. A portion of the high    attenuation along the arterial vascular structures and dural venous    sinuses is likely related to contrast staining from    the previous contrast-enhanced procedures. However, the high attenuation    in the sulci over the vertex is suspicious for a small amount of new    subarachnoid hemorrhage. Follow-up MRI could be performed for    confirmation.      Findings were discussed with LAXMI BROWN on 9/30/2020 at 5:21 PM.      Final      IR-THROMBO MECHANICAL ARTERY,INIT   Final Result      1.  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   2.  Basilar artery thrombosis.   3.  Successful mechanical thrombectomy of a basilar artery thrombosis.   4.  The final angiogram demonstrates patent basilar, bilateral superior cerebellar and right posterior cerebral arteries. The left P1 segment is patent. The left distal P2 segment is not visualized.      CT-CTA HEAD WITH & W/O-POST PROCESS   Final Result      1.  There is acute mid and distal basilar artery occlusion extending into the left greater than right posterior cerebral arteries.   2.  Findings have already been discussed with the ordering physician and neurologist by Dr. Albert of the IR radiology service.      CT-CTA NECK WITH & W/O-POST PROCESSING   Final Result      1.  There is abnormality involving the distal left vertebral artery at the level of C2 which may represent combination of focal dissection/thrombus with possible less likely partially thrombosed vascular malformation.   2.  There is an occlusion of the mid and distal basilar artery extending into the left greater than right posterior cerebral arteries, P1 segments.   3.  These findings have already been discussed with the clinical service by Dr. Albert  of the IR radiology service.      CT-CEREBRAL PERFUSION ANALYSIS   Final Result      1.  Cerebral blood flow less than 30% likely representing completed infarct = 0 mL.      2.  T Max more than 6 seconds likely representing combination of completed infarct and ischemia = 100 mL.      3.  Mismatched volume likely representing ischemic brain/penumbra = 100 mL      4.  Please note that the cerebral perfusion was performed on the limited brain tissue around the basal ganglia region. Infarct/ischemia outside the CT perfusion sections can be missed in this study.      MR-VENOGRAM (MRV) HEAD   Final Result      Cerebral magnetic resonance venogram within normal limits with no evidence of dural venous sinus thrombosis.      MR-BRAIN-WITH & W/O   Final Result      1.  Findings suggesting distal basilar artery and left posterior cerebral artery thrombosis.   2.  Acute ischemia within the midbrain/khang.   3.  Partially empty sella and slightly prominent fluid about the optic nerve sheaths. Correlate for any evidence of intracranial hypertension.      These findings were discussed with Dr. Best on 9/30/2020 10:11 AM.      DX-CHEST-PORTABLE (1 VIEW)   Final Result         1.  No acute cardiopulmonary disease.          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  Underwent emergent endotracheal intubation for airway protection secondary to seizure on admission  Further work-up revealed brainstem ischemia secondary to diffuse thrombosis in basilar, B/L PCA and vertebral artery.   Underwent emergent basilar artery thrombectomy on 9/30.  Neurology on board, due to diffuse thrombosis and increased risk of re thrombosis decision was made to start the patient on heparin drip.  EEG obtained on 9/30- for seizure.Neurology  recommends to continue Keppra for now.  Currently on heparin drip, no signs of bleeding.   To transition to aspirin, hypercoagulable work-up underway    Headache- (present on admission)  Assessment & Plan  History of complex migraine  Diagnosed with vertebral artery dissection, basilar and PCA thrombosis  Neurology following, appreciate recommendations    Seizure (HCC)- (present on admission)  Assessment & Plan  No history of seizures in the past  One episode of seizure in the ED, intubated for airway protection  Currently on Keppra per neurology  EEG on 9/30-negative for seizure    Acute respiratory failure with hypoxia and hypercapnia (HCC)  Assessment & Plan  Intubated on 9/29 for airway protection in the setting of seizure  Extubated on 10/1, tolerated well, incentive spirometry, currently on room air      Essential hypertension- (present on admission)  Assessment & Plan  Hypertension on presentation  Unsure if patient has history of hypertension  Labetalol, enalaprilat prn.  Goal SBP less than 160    Plan  Continue with heparin for today, to transition to aspirin tomorrow  Appreciate neurology follow-up  Laboratory work-up tomorrow  See orders  Medically complex high risk  Thank you Dr. Best for consulting with us we will follow closely while the patient is acutely hospitalized

## 2020-10-03 NOTE — THERAPY
Occupational Therapy   Initial Evaluation     Patient Name: Obdulia High  Age:  45 y.o., Sex:  female  Medical Record #: [id number]  Today's Date: 10/3/2020     Precautions  Precautions: Fall Risk, Swallow Precautions ( See Comments), Nasogastric Tube  Comments: R sided deficits more prominent with functional then formal testing    Assessment  Patient is 45 y.o. female Basilar thrombus s/p thrombectomy, sschemic strokes involving midbrain, Lt  internal capsule, and R cerebellum . Pt currently demonstrating impaired RUE ROM, strength, sensation, motor control, mild drift of RUE w/ AROM noted, decreased cognition, impaired balance and activity tolerance limiting pt's safety and independence with ADLs; see objective data for full details on formal testing. Pt deficits are more prominent with functional tasks, tests strong w/ formal testing cognition likely exacerbating motor planning. Pt will benefit from acute OT services and post acute placement recommended at this time.     Plan    Recommend Occupational Therapy 4 times per week until therapy goals are met for the following treatments:  Adaptive Equipment, Cognitive Skill Development, Electrical Stimulation - Attended, Manual Therapy Techniques, Neuro Re-Education / Balance, Self Care/Activities of Daily Living, Therapeutic Activities and Therapeutic Exercises.    DC Equipment Recommendations: Unable to determine at this time  Discharge Recommendations: Recommend post-acute placement for additional occupational therapy services prior to discharge home        Objective       10/03/20 0952   Prior Living Situation   Prior Services Home-Independent   Housing / Facility 1 Story House   Steps Into Home 0   Bathroom Set up Bathtub / Shower Combination;Walk In Shower   Equipment Owned None   Lives with - Patient's Self Care Capacity Spouse   Comments I w/ ADLs/IADLs baseline. Will have assist from spouse and family per pt. 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)   Cognition    Cognition / Consciousness X   Speech/ Communication Delayed Responses;Nods Appropriately   Level of Consciousness Alert   Ability To Follow Commands 1 Step   Safety Awareness Impaired;Impulsive   New Learning Impaired  (delayed)   Attention Impaired   Sequencing Impaired   Comments A&O, difficulties with processing multi-step commands, decrased response time with fatigue, slightly impulsive   Active ROM Upper Body   Active ROM Upper Body  X   Dominant Hand Left   Comments R shldr flexion with repetion AAROM ~110, AROM 20-60 fluctuating, wrist full extension, digit extension delayed but functional, LUE functional   Strength Upper Body   Upper Body Strength  X   Comments R shldr 3+, R deltoid 3, elbow 4,  3+ tests better then w/ functional use of RUE, LUE functional   Sensation Upper Body   Upper Extremity Sensation  X   Comments impaired differentian of pinch and poke, light touch intact, inconsistent w/ hot/touch sensation   Upper Body Muscle Tone   Upper Body Muscle Tone  X   Comments hyptonic R hand at times   Coordination Upper Body   Coordination X   Comments R hand thumb to finger opposition impaired   Balance Assessment   Sitting Balance (Static) Fair -   Sitting Balance (Dynamic) Poor +   Standing Balance (Static) Poor +   Standing Balance (Dynamic) Poor   Weight Shift Sitting Poor   Weight Shift Standing Poor   Comments w/HHA  of 2 people   Bed Mobility    Supine to Sit Minimal Assist   Sit to Supine Minimal Assist   Scooting Moderate Assist   Rolling   (sit pivot)   Comments raised hob   ADL Assessment   Eating Total Assist  (NG in place)   Grooming Moderate Assist;Seated   Bathing Maximal Assist   Upper Body Dressing Moderate Assist   Lower Body Dressing Maximal Assist   Toileting Maximal Assist   Functional Mobility   Sit to Stand Moderate Assist  (min a of 2 people)   Bed, Chair, Wheelchair Transfer Unable to Participate   Toilet Transfers Unable to Participate  (fatigue limiting)   Comments w/HHA of 2 people   Visual Perception   Comments c/o double vision, L eye ptosis. tracking in all directions and crossing midline   Short Term Goals   Short Term Goal # 1 Pt will perform LB dressing with mod a    Short Term Goal # 2 Pt will demonstrate R shoulder AROM 120   Short Term Goal # 3 Pt will improve R bicep strength to 4/5   Anticipated Discharge Equipment and Recommendations   DC Equipment Recommendations Unable to determine at this time

## 2020-10-03 NOTE — PROGRESS NOTES
Chief Complaint   Patient presents with   • Seizure   • Headache     Neurology Progress Note    Brief History of present illness:  45-year old female with unknown PMhx who presented to [facility] on 9/30/20 as a transfer from [facility] ED where she presented for a chief complaint of a ?2-day history of retro orbital headache, Left facial numbness and seizure-like activity. Patient unable to provide details regarding her HPI, further details obtained via review of medical record.   The patient reportedly presented to the OSH with the above noted complaints around 1800 on 9/29/20; upon arrival to OSH ED, patient had witnessed seizure-like activity. Further details including characteristics and duration of event are unknown. No leukocytosis or fever at OSH. She received Ativan; was ultimately intubated for airway protection and was transferred to [facility] for further work up/higher level of care.   On arrival here around 2000, patient received Keppra 500 mg IV. She had MRI Brain last night; this ultimately revealed brain stem/pontine/midbrain ischemia, prompting STAT CTA this morning. This revealed diffuse thrombosis involving Left and Right PCAs, Left Vert (likely dissected), and Basilar arteries. Patient to STAT thrombectomy at 1130.     Neurology has been consulted by Dr. Benigno Duffy to further evaluate the findings noted above.     Of note, I had the opportunity to speak with patient's  Bill; he reports that patient had recently started a strenuous work out program several days before the onset of her symptoms-- likely attributing to dissection.     Repeat MRI Brain at 1800 9/30, with no change to known area of midbrain infarct; several tiny/punctate areas of new infarction to Left internal capsule, Right cerebellum, and Left khang. Per my review, no overt hemorrhage per GRE sequence; radiology notes again [facility] (vs Contrast staining).     Interval, 10/2/20:   Patient await and alert; Moving  all extremities, Left > Right. BL ptosis noted, slight vertical gaze plays. Cleared to eat per SLP; Awaiting PT/OT today.     No changes to HPI as was previously documented.     Past medical history:   Unknown     Past surgical history:   Unknown    Family history:   Unknown    Social history:   Social History     Socioeconomic History   • Marital status:      Spouse name: Not on file   • Number of children: Not on file   • Years of education: Not on file   • Highest education level: Not on file   Occupational History   • Not on file   Social Needs   • Financial resource strain: Not on file   • Food insecurity     Worry: Not on file     Inability: Not on file   • Transportation needs     Medical: Not on file     Non-medical: Not on file   Tobacco Use   • Smoking status: Not on file   Substance and Sexual Activity   • Alcohol use: Not on file   • Drug use: Not on file   • Sexual activity: Not on file   Lifestyle   • Physical activity     Days per week: Not on file     Minutes per session: Not on file   • Stress: Not on file   Relationships   • Social connections     Talks on phone: Not on file     Gets together: Not on file     Attends [employer] service: Not on file     Active member of club or organization: Not on file     Attends meetings of clubs or organizations: Not on file     Relationship status: Not on file   • Intimate partner violence     Fear of current or ex partner: Not on file     Emotionally abused: Not on file     Physically abused: Not on file     Forced sexual activity: Not on file   Other Topics Concern   • Not on file   Social History Narrative   • Not on file       Current medications:   Current Facility-Administered Medications   Medication Dose   • acetaminophen/caffeine/butalbital 325-40-50 mg (FIORICET) -40 MG per tablet 1 Tab  1 Tab   • gabapentin (NEURONTIN) capsule 300 mg  300 mg   • acetaminophen (TYLENOL) tablet 650 mg  650 mg   • atorvastatin (LIPITOR) tablet 40 mg  40 mg    • senna-docusate (PERICOLACE or SENOKOT S) 8.6-50 MG per tablet 2 Tab  2 Tab    And   • polyethylene glycol/lytes (MIRALAX) PACKET 1 Packet  1 Packet    And   • magnesium hydroxide (MILK OF MAGNESIA) suspension 30 mL  30 mL    And   • bisacodyl (DULCOLAX) suppository 10 mg  10 mg   • norepinephrine (Levophed) infusion 8 mg/250 mL (premix)  0-30 mcg/min   • labetalol (NORMODYNE/TRANDATE) injection 10 mg  10 mg   • enalaprilat (VASOTEC) injection 1.25 mg  1.25 mg   • heparin infusion 25,000 units in 500 mL 0.45% NACL  0-30 Units/kg/hr   • levETIRAcetam (Keppra) 500 mg in 100 mL NaCl IV premix  500 mg   • Respiratory Therapy Consult     • ipratropium-albuterol (DUONEB) nebulizer solution  3 mL   • MD Alert...ICU Electrolyte Replacement per Pharmacy         Medication Allergy:  No Known Allergies      Review of systems:   Unable as patient is intubated/sedated.       Physical examination:   Vitals:    10/03/20 0600 10/03/20 0700 10/03/20 0800 10/03/20 0900   BP: 143/81 136/86  146/84   Pulse: (!) 56 (!) 106  82   Resp: 20 (!) 23  17   Temp: 36.7 °C (98 °F)  36.6 °C (97.8 °F)    TempSrc: Temporal  Temporal    SpO2: 92% 96%  94%   Weight:       Height:         General: Patient in no acute distress.  HEENT: Normocephalic, no signs of acute trauma.   Neck: supple, no meningeal signs or carotid bruits. There is normal range of motion. No tenderness on exam.   Chest: clear to auscultation. No cough.   CV: RRR, no murmurs.   Skin: no signs of acute rashes or trauma.   Musculoskeletal: joints exhibit full range of motion, without any pain to palpation. There are no signs of joint or muscle swelling. There is no tenderness to deep palpation of muscles.   Psychiatric: No hallucinatory behavior.      NEUROLOGICAL EXAM:   Mental status, orientation: Awake, oriented x 4.  Speech and language: Speech is hypophonic, mild dysarthria appreciated. Follows commands bilaterally (shows thumb, two fingers, wiggles toes to command), more  briskly on the Left.   Cranial nerve exam: Pupils are 3-2 mm bilaterally and equally reactive to light. Blinks to visual threat bilaterally. Eyes midline. BL Ptosis. mild vertical gaze palsy appreciated. Slight Right facial weakness is appreciated. Corneals intact BL. Tongue is midline and without any signs of tongue biting or fasciculations.   Motor/Sensory exam: Patient moves all extremities spontaneously, Left > Right. 5/5 to LUE/LLE.  Approx. 4/5 to RUE/RLE. Decreased sensation to light touch/nox stim on the Right; otherwise normal sensation.   Deep tendon reflexes:  Plantar responses are up-going on the Right; appear to be down going on the Left. There is no clonus.   Coordination: Mild ataxia appreciated on the Right.  Gait: Not assessed at this time as patient is unable.       NIH Stroke Scale    1a Level of Consciousness   1b Orientation Questions   1c Response to Commands   2 Gaze   3 Visual Fields   4 Facial Movement 2  5 Motor Function (arm)   a Left   b Right 1  6 Motor Function (leg)   a Left   b Right 1  7 Limb Ataxia 1  8 Sensory 1  9 Language   10 Articulation 1  11 Extinction/Inattention     Score: 7      ANCILLARY DATA REVIEWED:     Lab Data Review:  Recent Results (from the past 24 hour(s))   CBC with Differential    Collection Time: 10/03/20  4:50 AM   Result Value Ref Range    WBC 15.1 (H) 4.8 - 10.8 K/uL    RBC 3.41 (L) 4.20 - 5.40 M/uL    Hemoglobin 10.6 (L) 12.0 - 16.0 g/dL    Hematocrit 31.5 (L) 37.0 - 47.0 %    MCV 92.4 81.4 - 97.8 fL    MCH 31.1 27.0 - 33.0 pg    MCHC 33.7 33.6 - 35.0 g/dL    RDW 42.0 35.9 - 50.0 fL    Platelet Count 335 164 - 446 K/uL    MPV 9.0 9.0 - 12.9 fL    Neutrophils-Polys 74.60 (H) 44.00 - 72.00 %    Lymphocytes 15.20 (L) 22.00 - 41.00 %    Monocytes 7.30 0.00 - 13.40 %    Eosinophils 1.30 0.00 - 6.90 %    Basophils 0.50 0.00 - 1.80 %    Immature Granulocytes 1.10 (H) 0.00 - 0.90 %    Nucleated RBC 0.00 /100 WBC    Neutrophils (Absolute) 11.29 (H) 2.00 - 7.15 K/uL     Lymphs (Absolute) 2.30 1.00 - 4.80 K/uL    Monos (Absolute) 1.11 (H) 0.00 - 0.85 K/uL    Eos (Absolute) 0.20 0.00 - 0.51 K/uL    Baso (Absolute) 0.08 0.00 - 0.12 K/uL    Immature Granulocytes (abs) 0.16 (H) 0.00 - 0.11 K/uL    NRBC (Absolute) 0.00 K/uL   Basic Metabolic Panel (BMP)    Collection Time: 10/03/20  4:50 AM   Result Value Ref Range    Sodium 137 135 - 145 mmol/L    Potassium 4.3 3.6 - 5.5 mmol/L    Chloride 103 96 - 112 mmol/L    Co2 22 20 - 33 mmol/L    Glucose 132 (H) 65 - 99 mg/dL    Bun 12 8 - 22 mg/dL    Creatinine 0.59 0.50 - 1.40 mg/dL    Calcium 9.1 8.5 - 10.5 mg/dL    Anion Gap 12.0 7.0 - 16.0   Heparin Xa (Unfractionated)    Collection Time: 10/03/20  4:50 AM   Result Value Ref Range    Heparin Xa (UFH) 0.27 IU/mL   Magnesium    Collection Time: 10/03/20  4:50 AM   Result Value Ref Range    Magnesium 2.0 1.5 - 2.5 mg/dL   ESTIMATED GFR    Collection Time: 10/03/20  4:50 AM   Result Value Ref Range    GFR If African American >60 >60 mL/min/1.73 m 2    GFR If Non African American >60 >60 mL/min/1.73 m 2       Labs reviewed by me.       Imaging reviewed by me:     EC-ECHOCARDIOGRAM COMPLETE W/O CONT   Final Result      [id number] VIEW   Final Result         1.  Nonspecific bowel gas pattern.   2.  Dobbhoff tube is coiled within the stomach, the tip terminates overlying the expected location of the gastric antrum.      MR-BRAIN-W/O   Final Result      1.  Subarachnoid hemorrhage within sulci near the vertex, left greater than right      2.  Acute mid brain and punctate left pontine infarct without mass effect      3.  No other significant finding      Findings were discussed with Dr. Mart on 2015 hours 9/30/2020      CT-HEAD W/O   Final Result   Addendum 1 of 1   Addendum:   Please note that the patient has had a CTA head and neck as well as    angiogram with thrombectomy earlier today. A portion of the high    attenuation along the arterial vascular structures and dural venous    sinuses is  likely related to contrast staining from    the previous contrast-enhanced procedures. However, the high attenuation    in the sulci over the vertex is suspicious for a small amount of new    subarachnoid hemorrhage. Follow-up MRI could be performed for    confirmation.      Findings were discussed with LAXMI BROWN on 9/30/2020 at 5:21 PM.      Final      IR-THROMBO MECHANICAL ARTERY,INIT   Final Result      1.  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   2.  Basilar artery thrombosis.   3.  Successful mechanical thrombectomy of a basilar artery thrombosis.   4.  The final angiogram demonstrates patent basilar, bilateral superior cerebellar and right posterior cerebral arteries. The left P1 segment is patent. The left distal P2 segment is not visualized.      CT-CTA HEAD WITH & W/O-POST PROCESS   Final Result      1.  There is acute mid and distal basilar artery occlusion extending into the left greater than right posterior cerebral arteries.   2.  Findings have already been discussed with the ordering physician and neurologist by Dr. Albert of the IR radiology service.      CT-CTA NECK WITH & W/O-POST PROCESSING   Final Result      1.  There is abnormality involving the distal left vertebral artery at the level of C2 which may represent combination of focal dissection/thrombus with possible less likely partially thrombosed vascular malformation.   2.  There is an occlusion of the mid and distal basilar artery extending into the left greater than right posterior cerebral arteries, P1 segments.   3.  These findings have already been discussed with the clinical service by Dr. Albert of the IR radiology service.      CT-CEREBRAL PERFUSION ANALYSIS   Final Result      1.  Cerebral blood flow less than 30% likely representing completed infarct = 0 mL.      2.  T Max more than 6 seconds likely representing combination of completed infarct and ischemia = 100 mL.      3.  Mismatched volume likely  representing ischemic brain/penumbra = 100 mL      4.  Please note that the cerebral perfusion was performed on the limited brain tissue around the basal ganglia region. Infarct/ischemia outside the CT perfusion sections can be missed in this study.      MR-VENOGRAM (MRV) HEAD   Final Result      Cerebral magnetic resonance venogram within normal limits with no evidence of dural venous sinus thrombosis.      MR-BRAIN-WITH & W/O   Final Result      1.  Findings suggesting distal basilar artery and left posterior cerebral artery thrombosis.   2.  Acute ischemia within the midbrain/khang.   3.  Partially empty sella and slightly prominent fluid about the optic nerve sheaths. Correlate for any evidence of intracranial hypertension.      These findings were discussed with Dr. Best on 9/30/2020 10:11 AM.      DX-CHEST-PORTABLE (1 VIEW)   Final Result         1.  No acute cardiopulmonary disease.          Modified [place] Scale (MRS): 0 = No symptoms      ASSESSMENT AND PLAN:  45-year old female with unknown PMhx who presented to [facility] on 9/30/20 as a transfer from [facility] ED where she presented for a chief complaint of a ?2-day history of retro orbital headache, Left facial numbness and seizure-like activity on arrival; was ultimately intubated for airway protection and transferred to [facility]. Here, she received Keppra 500 mg IV; later in the evening underwent MRV that revealed no sinus venous thrombosis.   She had MRI Brain w/wo contrast as well; this revealed pontine/midbrain restricted diffusion consistent with evolving ischemia; no hemorrhage. STAT CTA head/neck on morning on 9/30 revealed thromboses involving BL PCAs, Basilar, and Left vert with probable dissection. Patient STAT to Thrombectomy for intervention; now s/p basilar artery thrombectomy with TICI 3; nearby vessels including BL PCAs however remain occluded-- posing high risk for re-occlusion/thrombosis. Patient remains on Heparin gtt;  plan to transition to ASA tomorrow 10/4. NIHSS 28-->17-->9-->7 this morning; patient now extubated, doing well overall.     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  Ischemic strokes involving midbrain, Left internal capsule, and Right cerebellum, secondary to the above.  New onset seizure, secondary to the above.     Recommendations/Plan:      -q4 and PRN Neuro assessment, VS per nursing/unit protocol. **Low threshold for repeat CT head with changes/decline in neurological exam. -180 until 10/3/20.   -Telemetry; currently SR. Screen for Afib/arrhythmia. Note TTE with EF 60%, no gross structural abnormalities nor    -Continue Heparin gtt. Plan to transition to  mg PO q day tomorrow 10/4.   -Note, EEG from 9/30-- normal study. Continue Keppra 500 mg IV q12h.   -Check lipid panel, hemoglobin A1c; hypercoag studies also sent, pending (though suspect etiology likely related to dissection)-- LDL is 30, Hemoglobin A1c is 5.0.  -PT/OT/SLP eval and treat. Physiatry consult.    -All other medical management per primary/ICU team.   -DVT PPX: SCDs.      The plan of care above has been discussed with Dr. Foreman.     RAZA Teran.  [facility] [facility]

## 2020-10-03 NOTE — PROGRESS NOTES
Bill () would greatly appreciate a phone call from the Neuro Service regarding wife's care. Thank you   -[phone]

## 2020-10-03 NOTE — PROGRESS NOTES
[facility] Progress Note      Admit Date: 9/29/2020    Resident(s): Traci Colon M.D.   Attending:  GUS SARABIA/ Dr. Best     Patient ID:    Name:  Obdulia High     YOB: 1975  Age:  45 y.o.  female   MRN:  [id number]    Hospital Course (carried forward and updated):  Obdulia High is a 45 y.o. female with unknown past medical history who was transferred from [place] ED on 9/30/2020 where she presented with 2-day history of retro-orbital headache and left-sided facial numbness.  In the ED she had an episode of seizure requiring intubation for airway protection  .  She was transferred to [facility] for higher level of care.  In the ED she was hypertensive, afebrile.  CT head was unremarkable.  Neurology was consulted and patient was admitted to ICU for further monitoring and ventilator management.  MRI was reviewed on 9/30 which revealed  brainstem ischemia. CTA revealed diffuse thrombosis involving left and right PCA,basilar and  vertebral artery with dissection.   Patient underwent stat thrombectomy on 9/30/2020. Currently on heparin drip for diffuse thrombosis per Neuro recommendations.      consultants:  Critical Care  Neurology     Interval Events:    No Overnight events.  Stable, afebrile, SR 80-90, -155, on Room air.   No acute distress, Denies pain, Reports Generalize fatigue. Follows command. Mild right drift,dysarthria and left ptosis.   Mild hypokalemia , 3.8- replete. Otherwise unremarkable. Gradual increase in WBC count but no signs of infection.   Currently On heparin drip Per Neuro recs. Plan to switch to aspirin on Sunday.      Vitals Range last 24h:  Temp:  [36.6 °C (97.8 °F)-36.9 °C (98.4 °F)] 36.7 °C (98 °F)  Pulse:  [] 56  Resp:  [18-56] 20  BP: (136-174)/() 143/81  SpO2:  [54 %-98 %] 92 %      Intake/Output Summary (Last 24 hours) at 10/3/2020 0823  Last data filed at 10/3/2020 0600  Gross per 24 hour   Intake 3294 ml   Output 2775 ml   Net 519 ml        Review of  Systems   Unable to perform ROS: Other   Constitutional: Negative for chills and fever.   HENT: Negative for ear discharge and ear pain.    Eyes: Negative for blurred vision and double vision.   Respiratory: Negative for cough and hemoptysis.    Cardiovascular: Negative for chest pain and palpitations.   Gastrointestinal: Negative for abdominal pain, nausea and vomiting.   Genitourinary: Negative for dysuria.   Neurological: Positive for speech change and focal weakness. Negative for tremors.       PHYSICAL EXAM:  Vitals:    10/03/20 0300 10/03/20 0400 10/03/20 0500 10/03/20 0600   BP: 147/95 157/97 142/85 143/81   Pulse: 83 80 83 (!) 56   Resp: 19 18 (!) 34 20   Temp:  36.9 °C (98.4 °F)  36.7 °C (98 °F)   TempSrc:  Temporal  Temporal   SpO2: 94% 96% 95% 92%   Weight:       Height:        Body mass index is 27.01 kg/m².    O2 therapy: Pulse Oximetry: 92 %, O2 Delivery Device: None - Room Air         Physical Exam   Constitutional: No distress.   On ventilator    HENT:   Head: Normocephalic and atraumatic.   Eyes: Pupils are equal, round, and reactive to light.   Cardiovascular: Normal rate, regular rhythm, normal heart sounds and intact distal pulses.   No murmur heard.  Pulmonary/Chest: She has no wheezes. She has no rales.   On ventilator support    Abdominal: Soft. Bowel sounds are normal. She exhibits no distension. There is no abdominal tenderness.   Musculoskeletal:         General: No edema.   Neurological:   Follow commands  Move all 4 extremities  Motor strength Left upper and lower extremity 5/5   Motor strength Right upper 3/5, lower 4/5   Left  ptosis  dysarthria   Skin: Skin is warm.       Recent Labs     10/01/20  0247   ISTATAPH 7.443   ISTATAPCO2 31.9   ISTATAPO2 98*   ISTATATCO2 23   UOARXVD0AOM 98   ISTATARTHCO3 21.8   ISTATARTBE -2   ISTATTEMP 97.5 F   ISTATFIO2 98   ISTATSPEC Arterial   ISTATAPHTC 7.452   JNCHCUYG1ZQ 95*     Recent Labs     10/01/20  0620 10/02/20  0500 10/03/20  0450   SODIUM  136 140 137   POTASSIUM 3.4* 3.8 4.3   CHLORIDE 103 105 103   CO2 23 24 22   BUN 7* 11 12   CREATININE 0.80 0.83 0.59   MAGNESIUM 2.1 1.9 2.0   PHOSPHORUS 3.4 2.8  --    CALCIUM 8.0* 9.2 9.1     Recent Labs     10/01/20  0620 10/02/20  0500 10/03/20  0450   PREALBUMIN  --  14.5*  --    GLUCOSE 109* 130* 132*     Recent Labs     09/30/20  1600 09/30/20 2035 10/01/20  0015 10/01/20  0620 10/02/20  0500 10/03/20  0450   RBC  --   --   --  3.74* 3.78* 3.41*   HEMOGLOBIN  --   --   --  11.9* 11.9* 10.6*   HEMATOCRIT  --   --   --  34.2* 34.4* 31.5*   PLATELETCT  --   --   --  270 294 335   PROTHROMBTM 14.1 13.7 14.0  --   --   --    APTT  --  43.3*  --   --   --   --    INR 1.06 1.02 1.05  --   --   --      Recent Labs     10/01/20  0620 10/02/20  0500 10/03/20  0450   WBC 12.6* 13.6* 15.1*   NEUTSPOLYS 68.50 76.90* 74.60*   LYMPHOCYTES 22.40 14.20* 15.20*   MONOCYTES 8.10 7.50 7.30   EOSINOPHILS 0.40 0.50 1.30   BASOPHILS 0.30 0.30 0.50       Meds:  • acetaminophen/caffeine/butalbital 325-40-50 mg  1 Tab     • gabapentin  300 mg     • acetaminophen  650 mg     • atorvastatin  40 mg     • senna-docusate  2 Tab      And   • polyethylene glycol/lytes  1 Packet      And   • magnesium hydroxide  30 mL      And   • bisacodyl  10 mg     • norepinephrine (Levophed) infusion  0-30 mcg/min Stopped (10/01/20 1030)   • labetalol  10 mg     • enalaprilat  1.25 mg     • heparin  0-30 Units/kg/hr 15.4 Units/kg/hr (10/03/20 0708)   • levETIRAcetam (Keppra) IV  500 mg Stopped (10/03/20 0627)   • Respiratory Therapy Consult       • ipratropium-albuterol  3 mL     • MD Alert...Adult ICU Electrolyte Replacement per Pharmacy            Procedures:  Basilar thrombectomy on 9/30/20     Imaging:  EC-ECHOCARDIOGRAM COMPLETE W/O CONT   Final Result      [id number] VIEW   Final Result         1.  Nonspecific bowel gas pattern.   2.  Dobbhoff tube is coiled within the stomach, the tip terminates overlying the expected location of the gastric  antrum.      MR-BRAIN-W/O   Final Result      1.  Subarachnoid hemorrhage within sulci near the vertex, left greater than right      2.  Acute mid brain and punctate left pontine infarct without mass effect      3.  No other significant finding      Findings were discussed with Dr. Mart on 2015 hours 9/30/2020      CT-HEAD W/O   Final Result   Addendum 1 of 1   Addendum:   Please note that the patient has had a CTA head and neck as well as    angiogram with thrombectomy earlier today. A portion of the high    attenuation along the arterial vascular structures and dural venous    sinuses is likely related to contrast staining from    the previous contrast-enhanced procedures. However, the high attenuation    in the sulci over the vertex is suspicious for a small amount of new    subarachnoid hemorrhage. Follow-up MRI could be performed for    confirmation.      Findings were discussed with LAXMI BROWN on 9/30/2020 at 5:21 PM.      Final      IR-THROMBO MECHANICAL ARTERY,INIT   Final Result      1.  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   2.  Basilar artery thrombosis.   3.  Successful mechanical thrombectomy of a basilar artery thrombosis.   4.  The final angiogram demonstrates patent basilar, bilateral superior cerebellar and right posterior cerebral arteries. The left P1 segment is patent. The left distal P2 segment is not visualized.      CT-CTA HEAD WITH & W/O-POST PROCESS   Final Result      1.  There is acute mid and distal basilar artery occlusion extending into the left greater than right posterior cerebral arteries.   2.  Findings have already been discussed with the ordering physician and neurologist by Dr. Albert of the IR radiology service.      CT-CTA NECK WITH & W/O-POST PROCESSING   Final Result      1.  There is abnormality involving the distal left vertebral artery at the level of C2 which may represent combination of focal dissection/thrombus with possible less likely  partially thrombosed vascular malformation.   2.  There is an occlusion of the mid and distal basilar artery extending into the left greater than right posterior cerebral arteries, P1 segments.   3.  These findings have already been discussed with the clinical service by Dr. Albert of the IR radiology service.      CT-CEREBRAL PERFUSION ANALYSIS   Final Result      1.  Cerebral blood flow less than 30% likely representing completed infarct = 0 mL.      2.  T Max more than 6 seconds likely representing combination of completed infarct and ischemia = 100 mL.      3.  Mismatched volume likely representing ischemic brain/penumbra = 100 mL      4.  Please note that the cerebral perfusion was performed on the limited brain tissue around the basal ganglia region. Infarct/ischemia outside the CT perfusion sections can be missed in this study.      MR-VENOGRAM (MRV) HEAD   Final Result      Cerebral magnetic resonance venogram within normal limits with no evidence of dural venous sinus thrombosis.      MR-BRAIN-WITH & W/O   Final Result      1.  Findings suggesting distal basilar artery and left posterior cerebral artery thrombosis.   2.  Acute ischemia within the midbrain/khang.   3.  Partially empty sella and slightly prominent fluid about the optic nerve sheaths. Correlate for any evidence of intracranial hypertension.      These findings were discussed with Dr. Best on 9/30/2020 10:11 AM.      DX-CHEST-PORTABLE (1 VIEW)   Final Result         1.  No acute cardiopulmonary disease.           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  Underwent emergent endotracheal intubation for airway protection Due to seizure.    Further work-up revealed brainstem ischemia secondary to diffuse thrombosis in basilar,  B/L PCA and vertebral artery.   Underwent emergent basilar artery thrombectomy on 9/30.  Neurology on board, due to diffuse thrombosis and increased risk of re thrombosis decision was made to start the patient on heparin drip.  EEG obtained on 9/30- for seizure.Neurology recommends to continue Keppra for now.  Currently on heparin drip, no signs of bleeding.     Plan  Neurology following, appreciate recommendation  Monitor on telemetry  Close monitoring with Frequent neuro checks   Consider stat head CT in case of new onset neurological deficits  Continue heparin drip per neuro recs, transition to oral when indicated  PT/OT/SP when appropriate  Physiatry consultation   Coagulapathy work up pending, will follow on results     Headache  Assessment & Plan  History of complex migraine  Diagnosed with vertebral artery dissection, basilar and PCA thrombosis  Neurology following, appreciate recommendations    Seizure (HCC)  Assessment & Plan  No history of seizures in the past  One episode of seizure in the ED, intubated for airway protection  Currently on Keppra per neurology  EEG on 9/30-negative for seizure    Acute respiratory failure with hypoxia and hypercapnia (HCC)  Assessment & Plan  Intubated on 9/29 for airway protection in the setting of seizure  Extubated on 10/1, currently on room air.    Plan  Monitor pulse oximetry      Essential hypertension  Assessment & Plan  Hypertension on presentation  Unsure if patient has history of hypertension  Labetalol, enalaprilat prn.  Goal SBP less than 160        CODE STATUS: full code      Quality Measures:  Feeding: feeding tube   Sedation:None   Thromboprophylaxis: Heparin drip   Head of bed: >30 degrees  Ulcer prophylaxis: Famotidine   Glycemic control: Correctional: Non-diabetic   Bowel care: bowel regimen PRN   Indwelling lines:  Deescalation of antibiotics: Not on antibiotics     Traci Colon M.D.

## 2020-10-04 DIAGNOSIS — I65.1 BASILAR ARTERY OCCLUSION: ICD-10-CM

## 2020-10-04 LAB
ALBUMIN SERPL BCP-MCNC: 3.7 G/DL (ref 3.2–4.9)
ALBUMIN/GLOB SERPL: 1.1 G/DL
ALP SERPL-CCNC: 61 U/L (ref 30–99)
ALT SERPL-CCNC: 21 U/L (ref 2–50)
ANION GAP SERPL CALC-SCNC: 13 MMOL/L (ref 7–16)
AST SERPL-CCNC: 16 U/L (ref 12–45)
AT III ACT/NOR PPP CHRO: 91 % (ref 76–128)
AT III AG ACT/NOR PPP IA: 77 % (ref 82–136)
BILIRUB SERPL-MCNC: 0.4 MG/DL (ref 0.1–1.5)
BUN SERPL-MCNC: 14 MG/DL (ref 8–22)
CALCIUM SERPL-MCNC: 9.4 MG/DL (ref 8.5–10.5)
CHLORIDE SERPL-SCNC: 100 MMOL/L (ref 96–112)
CO2 SERPL-SCNC: 23 MMOL/L (ref 20–33)
CREAT SERPL-MCNC: 0.7 MG/DL (ref 0.5–1.4)
ERYTHROCYTE [DISTWIDTH] IN BLOOD BY AUTOMATED COUNT: 41.2 FL (ref 35.9–50)
GLOBULIN SER CALC-MCNC: 3.4 G/DL (ref 1.9–3.5)
GLUCOSE SERPL-MCNC: 116 MG/DL (ref 65–99)
HCT VFR BLD AUTO: 37.2 % (ref 37–47)
HGB BLD-MCNC: 12.4 G/DL (ref 12–16)
INR PPP: 1.02 (ref 0.87–1.13)
MAGNESIUM SERPL-MCNC: 2 MG/DL (ref 1.5–2.5)
MCH RBC QN AUTO: 30.5 PG (ref 27–33)
MCHC RBC AUTO-ENTMCNC: 33.3 G/DL (ref 33.6–35)
MCV RBC AUTO: 91.4 FL (ref 81.4–97.8)
PHOSPHATE SERPL-MCNC: 4.7 MG/DL (ref 2.5–4.5)
PLATELET # BLD AUTO: 372 K/UL (ref 164–446)
PMV BLD AUTO: 9.1 FL (ref 9–12.9)
POTASSIUM SERPL-SCNC: 4.2 MMOL/L (ref 3.6–5.5)
PROT SERPL-MCNC: 7.1 G/DL (ref 6–8.2)
PROTHROMBIN TIME: 13.7 SEC (ref 12–14.6)
RBC # BLD AUTO: 4.07 M/UL (ref 4.2–5.4)
SODIUM SERPL-SCNC: 136 MMOL/L (ref 135–145)
WBC # BLD AUTO: 11.5 K/UL (ref 4.8–10.8)

## 2020-10-04 PROCEDURE — 700111 HCHG RX REV CODE 636 W/ 250 OVERRIDE (IP): Performed by: NURSE PRACTITIONER

## 2020-10-04 PROCEDURE — 84100 ASSAY OF PHOSPHORUS: CPT

## 2020-10-04 PROCEDURE — 36415 COLL VENOUS BLD VENIPUNCTURE: CPT

## 2020-10-04 PROCEDURE — 51798 US URINE CAPACITY MEASURE: CPT

## 2020-10-04 PROCEDURE — 99232 SBSQ HOSP IP/OBS MODERATE 35: CPT | Performed by: NURSE PRACTITIONER

## 2020-10-04 PROCEDURE — 85610 PROTHROMBIN TIME: CPT

## 2020-10-04 PROCEDURE — 700102 HCHG RX REV CODE 250 W/ 637 OVERRIDE(OP): Performed by: INTERNAL MEDICINE

## 2020-10-04 PROCEDURE — A9270 NON-COVERED ITEM OR SERVICE: HCPCS | Performed by: INTERNAL MEDICINE

## 2020-10-04 PROCEDURE — 99232 SBSQ HOSP IP/OBS MODERATE 35: CPT | Performed by: INTERNAL MEDICINE

## 2020-10-04 PROCEDURE — 85027 COMPLETE CBC AUTOMATED: CPT

## 2020-10-04 PROCEDURE — 770020 HCHG ROOM/CARE - TELE (206)

## 2020-10-04 PROCEDURE — 80053 COMPREHEN METABOLIC PANEL: CPT

## 2020-10-04 PROCEDURE — 83735 ASSAY OF MAGNESIUM: CPT

## 2020-10-04 RX ORDER — WARFARIN SODIUM 5 MG/1
5 TABLET ORAL DAILY
Status: DISCONTINUED | OUTPATIENT
Start: 2020-10-04 | End: 2020-10-07 | Stop reason: HOSPADM

## 2020-10-04 RX ADMIN — ACETAMINOPHEN 650 MG: 325 TABLET, FILM COATED ORAL at 05:48

## 2020-10-04 RX ADMIN — HEPARIN SODIUM 15.4 UNITS/KG/HR: 5000 INJECTION, SOLUTION INTRAVENOUS at 05:51

## 2020-10-04 RX ADMIN — ATORVASTATIN CALCIUM 40 MG: 40 TABLET, FILM COATED ORAL at 18:00

## 2020-10-04 RX ADMIN — WARFARIN SODIUM 5 MG: 5 TABLET ORAL at 18:23

## 2020-10-04 RX ADMIN — LEVETIRACETAM 500 MG: 100 SOLUTION ORAL at 06:46

## 2020-10-04 RX ADMIN — LEVETIRACETAM 500 MG: 100 SOLUTION ORAL at 19:51

## 2020-10-04 RX ADMIN — ENOXAPARIN SODIUM 80 MG: 80 INJECTION SUBCUTANEOUS at 18:24

## 2020-10-04 RX ADMIN — DOCUSATE SODIUM 50 MG AND SENNOSIDES 8.6 MG 2 TABLET: 8.6; 5 TABLET, FILM COATED ORAL at 18:23

## 2020-10-04 ASSESSMENT — ENCOUNTER SYMPTOMS
CHILLS: 0
FEVER: 0
SHORTNESS OF BREATH: 0
VOMITING: 0
HEADACHES: 0
NAUSEA: 0
DIZZINESS: 0

## 2020-10-04 ASSESSMENT — PAIN DESCRIPTION - PAIN TYPE: TYPE: ACUTE PAIN

## 2020-10-04 NOTE — PROGRESS NOTES
[facility] Medicine Daily Progress Note    Date of Service  10/4/2020    Chief Complaint  45 y.o. female admitted 9/29/2020 with headache and facial numbness     Hospital Course    45 y.o. female with unknown past medical history who was transferred from [place] ED on 9/30/2020 where she presented with 2-day history of retro-orbital headache and left-sided facial numbness.  In the ED she had an episode of seizure requiring intubation for airway protection.  She was transferred to [facility] for higher level of care.  In the ED she was hypertensive, afebrile.  CT head was unremarkable.  Neurology was consulted and patient was admitted to ICU for further monitoring and ventilator management.  MRI was reviewed on 9/30 which revealed  brainstem ischemia. CTA revealed diffuse thrombosis involving left and right PCA,basilar and  vertebral artery with dissection.   Patient underwent stat thrombectomy on 9/30/2020. Currently on heparin drip for thrombosis per Neuro recommendations. Patient stable and downgraded from ICU.      Interval Problem Update  Change to aspirin per neurology, dc heparin. Patient has no complaints, denies pain or headaches, just sleepy.      Consultants/Specialty  Pulmonology/ Critical Care  Neurology   Physiatry       Code Status  Full Code    Disposition  Pending, likely dc rehab      Review of Systems  Review of Systems   Constitutional: Negative for chills and fever.   Respiratory: Negative for shortness of breath.    Cardiovascular: Negative for chest pain.   Gastrointestinal: Negative for nausea and vomiting.   Neurological: Negative for dizziness and headaches.   All other systems reviewed and are negative.       Physical Exam  Temp:  [36.2 °C (97.2 °F)-37.1 °C (98.7 °F)] 36.6 °C (97.9 °F)  Pulse:  [] 91  Resp:  [16-19] 16  BP: (131-147)/(84-98) 143/95  SpO2:  [92 %-96 %] 92 %    Physical Exam  Constitutional:       General: She is not in acute distress.     Appearance: Normal appearance.    HENT:      Head: Normocephalic and atraumatic.   Eyes:      Conjunctiva/sclera: Conjunctivae normal.      Pupils: Pupils are equal, round, and reactive to light.   Neck:      Musculoskeletal: Normal range of motion.   Cardiovascular:      Rate and Rhythm: Normal rate and regular rhythm.      Pulses: Normal pulses.   Pulmonary:      Effort: Pulmonary effort is normal. No respiratory distress.      Breath sounds: Normal breath sounds.   Abdominal:      General: Abdomen is flat. Bowel sounds are normal.      Palpations: Abdomen is soft.      Tenderness: There is no abdominal tenderness.   Skin:     General: Skin is warm and dry.   Neurological:      Mental Status: She is alert and oriented to person, place, and time.      Motor: Weakness (right upper and lower extremity ) present.      Comments: Mild dysarthria    Psychiatric:         Mood and Affect: Mood normal.         Behavior: Behavior normal.         Fluids    Intake/Output Summary (Last 24 hours) at 10/4/2020 1705  Last data filed at 10/4/2020 1200  Gross per 24 hour   Intake 40 ml   Output 1350 ml   Net -1310 ml       Laboratory  Recent Labs     10/02/20  0500 10/03/20  0450 10/04/20  0319   WBC 13.6* 15.1* 11.5*   RBC 3.78* 3.41* 4.07*   HEMOGLOBIN 11.9* 10.6* 12.4   HEMATOCRIT 34.4* 31.5* 37.2   MCV 91.0 92.4 91.4   MCH 31.5 31.1 30.5   MCHC 34.6 33.7 33.3*   RDW 42.0 42.0 41.2   PLATELETCT 294 335 372   MPV 9.3 9.0 9.1     Recent Labs     10/02/20  0500 10/03/20  0450 10/04/20  0319   SODIUM 140 137 136   POTASSIUM 3.8 4.3 4.2   CHLORIDE 105 103 100   CO2 24 22 23   GLUCOSE 130* 132* 116*   BUN 11 12 14   CREATININE 0.83 0.59 0.70   CALCIUM 9.2 9.1 9.4     Recent Labs     10/04/20  1547   INR 1.02               Imaging  EC-ECHOCARDIOGRAM COMPLETE W/O CONT   Final Result      [id number] VIEW   Final Result         1.  Nonspecific bowel gas pattern.   2.  Dobbhoff tube is coiled within the stomach, the tip terminates overlying the expected location of the  gastric antrum.      MR-BRAIN-W/O   Final Result      1.  Subarachnoid hemorrhage within sulci near the vertex, left greater than right      2.  Acute mid brain and punctate left pontine infarct without mass effect      3.  No other significant finding      Findings were discussed with Dr. Mart on 2015 hours 9/30/2020      CT-HEAD W/O   Final Result   Addendum 1 of 1   Addendum:   Please note that the patient has had a CTA head and neck as well as    angiogram with thrombectomy earlier today. A portion of the high    attenuation along the arterial vascular structures and dural venous    sinuses is likely related to contrast staining from    the previous contrast-enhanced procedures. However, the high attenuation    in the sulci over the vertex is suspicious for a small amount of new    subarachnoid hemorrhage. Follow-up MRI could be performed for    confirmation.      Findings were discussed with LAXMI BROWN on 9/30/2020 at 5:21 PM.      Final      IR-THROMBO MECHANICAL ARTERY,INIT   Final Result      1.  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   2.  Basilar artery thrombosis.   3.  Successful mechanical thrombectomy of a basilar artery thrombosis.   4.  The final angiogram demonstrates patent basilar, bilateral superior cerebellar and right posterior cerebral arteries. The left P1 segment is patent. The left distal P2 segment is not visualized.      CT-CTA HEAD WITH & W/O-POST PROCESS   Final Result      1.  There is acute mid and distal basilar artery occlusion extending into the left greater than right posterior cerebral arteries.   2.  Findings have already been discussed with the ordering physician and neurologist by Dr. Albert of the IR radiology service.      CT-CTA NECK WITH & W/O-POST PROCESSING   Final Result      1.  There is abnormality involving the distal left vertebral artery at the level of C2 which may represent combination of focal dissection/thrombus with possible less likely  partially thrombosed vascular malformation.   2.  There is an occlusion of the mid and distal basilar artery extending into the left greater than right posterior cerebral arteries, P1 segments.   3.  These findings have already been discussed with the clinical service by Dr. Albert of the IR radiology service.      CT-CEREBRAL PERFUSION ANALYSIS   Final Result      1.  Cerebral blood flow less than 30% likely representing completed infarct = 0 mL.      2.  T Max more than 6 seconds likely representing combination of completed infarct and ischemia = 100 mL.      3.  Mismatched volume likely representing ischemic brain/penumbra = 100 mL      4.  Please note that the cerebral perfusion was performed on the limited brain tissue around the basal ganglia region. Infarct/ischemia outside the CT perfusion sections can be missed in this study.      MR-VENOGRAM (MRV) HEAD   Final Result      Cerebral magnetic resonance venogram within normal limits with no evidence of dural venous sinus thrombosis.      MR-BRAIN-WITH & W/O   Final Result      1.  Findings suggesting distal basilar artery and left posterior cerebral artery thrombosis.   2.  Acute ischemia within the midbrain/khang.   3.  Partially empty sella and slightly prominent fluid about the optic nerve sheaths. Correlate for any evidence of intracranial hypertension.      These findings were discussed with Dr. Best on 9/30/2020 10:11 AM.      DX-CHEST-PORTABLE (1 VIEW)   Final Result         1.  No acute cardiopulmonary disease.           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  Underwent emergent endotracheal intubation for airway protection secondary to seizure on admission  Further work-up revealed brainstem ischemia secondary to diffuse thrombosis in  basilar, B/L PCA and vertebral artery.   Underwent emergent basilar artery thrombectomy on 9/30.  Neurology on board, due to diffuse thrombosis and increased risk of re thrombosis decision was made to start the patient on heparin drip.  EEG obtained on 9/30- for seizure.Neurology recommends to continue Keppra for now.  Transition heparin to aspirin per neurology   PT/OT/Physiatry following, likely dc rehab     Headache- (present on admission)  Assessment & Plan  History of complex migraine  Diagnosed with vertebral artery dissection, basilar and PCA thrombosis  Neurology following, appreciate recommendations    Seizure (HCC)- (present on admission)  Assessment & Plan  No history of seizures in the past  One episode of seizure in the ED, intubated for airway protection  Currently on Keppra per neurology  EEG on 9/30-negative for seizure    Acute respiratory failure with hypoxia and hypercapnia (HCC)  Assessment & Plan  Intubated on 9/29 for airway protection in the setting of seizure  Extubated on 10/1, tolerated well, incentive spirometry, currently on room air      Essential hypertension- (present on admission)  Assessment & Plan  Hypertension on presentation  Unsure if patient has history of hypertension  Labetalol, enalaprilat prn.  Goal SBP less than 160       VTE prophylaxis: SCDs    I have performed a physical exam and reviewed and updated ROS and Plan today (10/4/2020). In review of yesterday's note (10/3/2020), there are no changes except as documented above.

## 2020-10-04 NOTE — PROGRESS NOTES
Chief Complaint   Patient presents with   • Seizure   • Headache     Neurology Progress Note    Brief History of present illness:  45-year old female with unknown PMhx who presented to [facility] on 9/30/20 as a transfer from [facility] ED where she presented for a chief complaint of a ?2-day history of retro orbital headache, Left facial numbness and seizure-like activity. Patient unable to provide details regarding her HPI, further details obtained via review of medical record.   The patient reportedly presented to the OSH with the above noted complaints around 1800 on 9/29/20; upon arrival to OSH ED, patient had witnessed seizure-like activity. Further details including characteristics and duration of event are unknown. No leukocytosis or fever at OSH. She received Ativan; was ultimately intubated for airway protection and was transferred to [facility] for further work up/higher level of care.   On arrival here around 2000, patient received Keppra 500 mg IV. She had MRI Brain last night; this ultimately revealed brain stem/pontine/midbrain ischemia, prompting STAT CTA this morning. This revealed diffuse thrombosis involving Left and Right PCAs, Left Vert (likely dissected), and Basilar arteries. Patient to STAT thrombectomy at 1130.     Neurology has been consulted by Dr. Benigno Duffy to further evaluate the findings noted above.     Of note, I had the opportunity to speak with patient's  Bill; he reports that patient had recently started a strenuous work out program several days before the onset of her symptoms-- likely attributing to dissection.     Repeat MRI Brain at 1800 9/30, with no change to known area of midbrain infarct; several tiny/punctate areas of new infarction to Left internal capsule, Right cerebellum, and Left khang. Per my review, no overt hemorrhage per GRE sequence; radiology notes again SAH (vs Contrast staining).     Interval, 10/4/20:   Patient await and alert; eating  "breakfast. Admits to feeling \"clumsy\" with RUE. Speech is hypophonic. Transferred to [facility] last night. No events overnight.     No changes to HPI as was previously documented.     Past medical history:   Unknown     Past surgical history:   Unknown    Family history:   Unknown    Social history:   Social History     Socioeconomic History   • Marital status:      Spouse name: Not on file   • Number of children: Not on file   • Years of education: Not on file   • Highest education level: Not on file   Occupational History   • Not on file   Social Needs   • Financial resource strain: Not on file   • Food insecurity     Worry: Not on file     Inability: Not on file   • Transportation needs     Medical: Not on file     Non-medical: Not on file   Tobacco Use   • Smoking status: Not on file   Substance and Sexual Activity   • Alcohol use: Not on file   • Drug use: Not on file   • Sexual activity: Not on file   Lifestyle   • Physical activity     Days per week: Not on file     Minutes per session: Not on file   • Stress: Not on file   Relationships   • Social connections     Talks on phone: Not on file     Gets together: Not on file     Attends Anabaptist service: Not on file     Active member of club or organization: Not on file     Attends meetings of clubs or organizations: Not on file     Relationship status: Not on file   • Intimate partner violence     Fear of current or ex partner: Not on file     Emotionally abused: Not on file     Physically abused: Not on file     Forced sexual activity: Not on file   Other Topics Concern   • Not on file   Social History Narrative   • Not on file       Current medications:   Current Facility-Administered Medications   Medication Dose   • aspirin EC (ECOTRIN) tablet 325 mg  325 mg   • levETIRAcetam (KEPPRA) 100 MG/ML solution 500 mg  500 mg   • acetaminophen/caffeine/butalbital 325-40-50 mg (FIORICET) -40 MG per tablet 1 Tab  1 Tab   • acetaminophen (TYLENOL) tablet 650 " mg  650 mg   • atorvastatin (LIPITOR) tablet 40 mg  40 mg   • senna-docusate (PERICOLACE or SENOKOT S) 8.6-50 MG per tablet 2 Tab  2 Tab    And   • polyethylene glycol/lytes (MIRALAX) PACKET 1 Packet  1 Packet    And   • magnesium hydroxide (MILK OF MAGNESIA) suspension 30 mL  30 mL    And   • bisacodyl (DULCOLAX) suppository 10 mg  10 mg   • labetalol (NORMODYNE/TRANDATE) injection 10 mg  10 mg   • enalaprilat (VASOTEC) injection 1.25 mg  1.25 mg   • Respiratory Therapy Consult     • ipratropium-albuterol (DUONEB) nebulizer solution  3 mL       Medication Allergy:  No Known Allergies      Review of systems:   Unable as patient is intubated/sedated.       Physical examination:   Vitals:    10/03/20 2000 10/04/20 0000 10/04/20 0400 10/04/20 0800   BP: 145/98 142/96 147/97 131/84   Pulse: 95 (!) 107 93 82   Resp: 16 16 16 16   Temp: 36.5 °C (97.7 °F) 36.2 °C (97.2 °F) 36.6 °C (97.8 °F) 37.1 °C (98.7 °F)   TempSrc: Temporal Temporal Temporal Temporal   SpO2: 96% 95% 95% 93%   Weight:       Height:         General: Patient in no acute distress.  HEENT: Normocephalic, no signs of acute trauma.   Neck: supple, no meningeal signs or carotid bruits. There is normal range of motion. No tenderness on exam.   Chest: clear to auscultation. No cough.   CV: RRR, no murmurs.   Skin: no signs of acute rashes or trauma.   Musculoskeletal: joints exhibit full range of motion, without any pain to palpation. There are no signs of joint or muscle swelling. There is no tenderness to deep palpation of muscles.   Psychiatric: No hallucinatory behavior.      NEUROLOGICAL EXAM:   Mental status, orientation: Awake, oriented x 4.  Speech and language: Speech is hypophonic, mild dysarthria appreciated. Follows commands bilaterally (shows thumb, two fingers, wiggles toes to command), more briskly on the Left.   Cranial nerve exam: Pupils are 3-2 mm bilaterally and equally reactive to light. Blinks to visual threat bilaterally. Eyes midline. BL  Ptosis. mild vertical gaze palsy appreciated. Slight Right facial weakness is appreciated. Corneals intact BL. Tongue is midline and without any signs of tongue biting or fasciculations.   Motor/Sensory exam: Patient moves all extremities spontaneously, Left > Right. 5/5 to LUE/LLE.  Approx. 4/5 to RUE/RLE. Decreased sensation to light touch/nox stim on the Right; otherwise normal sensation.   Deep tendon reflexes:  Plantar responses are up-going on the Right; appear to be down going on the Left. There is no clonus.   Coordination: Mild ataxia appreciated on the Right.  Gait: Not assessed at this time as patient is unable.     No significant changes to exam as was documented on 10/3/20.       NIH Stroke Scale    1a Level of Consciousness   1b Orientation Questions   1c Response to Commands   2 Gaze   3 Visual Fields   4 Facial Movement 2  5 Motor Function (arm)   a Left   b Right 1  6 Motor Function (leg)   a Left   b Right 1  7 Limb Ataxia 1  8 Sensory 1  9 Language   10 Articulation 1  11 Extinction/Inattention     Score: 7      ANCILLARY DATA REVIEWED:     Lab Data Review:  Recent Results (from the past 24 hour(s))   Heparin Xa (Unfractionated)    Collection Time: 10/03/20  6:47 PM   Result Value Ref Range    Heparin Xa (UFH) 0.47 IU/mL   CBC WITHOUT DIFFERENTIAL    Collection Time: 10/04/20  3:19 AM   Result Value Ref Range    WBC 11.5 (H) 4.8 - 10.8 K/uL    RBC 4.07 (L) 4.20 - 5.40 M/uL    Hemoglobin 12.4 12.0 - 16.0 g/dL    Hematocrit 37.2 37.0 - 47.0 %    MCV 91.4 81.4 - 97.8 fL    MCH 30.5 27.0 - 33.0 pg    MCHC 33.3 (L) 33.6 - 35.0 g/dL    RDW 41.2 35.9 - 50.0 fL    Platelet Count 372 164 - 446 K/uL    MPV 9.1 9.0 - 12.9 fL   Comp Metabolic Panel    Collection Time: 10/04/20  3:19 AM   Result Value Ref Range    Sodium 136 135 - 145 mmol/L    Potassium 4.2 3.6 - 5.5 mmol/L    Chloride 100 96 - 112 mmol/L    Co2 23 20 - 33 mmol/L    Anion Gap 13.0 7.0 - 16.0    Glucose 116 (H) 65 - 99 mg/dL    Bun 14 8 - 22  mg/dL    Creatinine 0.70 0.50 - 1.40 mg/dL    Calcium 9.4 8.5 - 10.5 mg/dL    AST(SGOT) 16 12 - 45 U/L    ALT(SGPT) 21 2 - 50 U/L    Alkaline Phosphatase 61 30 - 99 U/L    Total Bilirubin 0.4 0.1 - 1.5 mg/dL    Albumin 3.7 3.2 - 4.9 g/dL    Total Protein 7.1 6.0 - 8.2 g/dL    Globulin 3.4 1.9 - 3.5 g/dL    A-G Ratio 1.1 g/dL   MAGNESIUM    Collection Time: 10/04/20  3:19 AM   Result Value Ref Range    Magnesium 2.0 1.5 - 2.5 mg/dL   PHOSPHORUS    Collection Time: 10/04/20  3:19 AM   Result Value Ref Range    Phosphorus 4.7 (H) 2.5 - 4.5 mg/dL   ESTIMATED GFR    Collection Time: 10/04/20  3:19 AM   Result Value Ref Range    GFR If African American >60 >60 mL/min/1.73 m 2    GFR If Non African American >60 >60 mL/min/1.73 m 2       Labs reviewed by me.       Imaging reviewed by me:     EC-ECHOCARDIOGRAM COMPLETE W/O CONT   Final Result      [id number] VIEW   Final Result         1.  Nonspecific bowel gas pattern.   2.  Dobbhoff tube is coiled within the stomach, the tip terminates overlying the expected location of the gastric antrum.      MR-BRAIN-W/O   Final Result      1.  Subarachnoid hemorrhage within sulci near the vertex, left greater than right      2.  Acute mid brain and punctate left pontine infarct without mass effect      3.  No other significant finding      Findings were discussed with Dr. Mart on 2015 hours 9/30/2020      CT-HEAD W/O   Final Result   Addendum 1 of 1   Addendum:   Please note that the patient has had a CTA head and neck as well as    angiogram with thrombectomy earlier today. A portion of the high    attenuation along the arterial vascular structures and dural venous    sinuses is likely related to contrast staining from    the previous contrast-enhanced procedures. However, the high attenuation    in the sulci over the vertex is suspicious for a small amount of new    subarachnoid hemorrhage. Follow-up MRI could be performed for    confirmation.      Findings were discussed with  LAXMI BROWN on 9/30/2020 at 5:21 PM.      Final      IR-THROMBO MECHANICAL ARTERY,INIT   Final Result      1.  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   2.  Basilar artery thrombosis.   3.  Successful mechanical thrombectomy of a basilar artery thrombosis.   4.  The final angiogram demonstrates patent basilar, bilateral superior cerebellar and right posterior cerebral arteries. The left P1 segment is patent. The left distal P2 segment is not visualized.      CT-CTA HEAD WITH & W/O-POST PROCESS   Final Result      1.  There is acute mid and distal basilar artery occlusion extending into the left greater than right posterior cerebral arteries.   2.  Findings have already been discussed with the ordering physician and neurologist by Dr. Albert of the IR radiology service.      CT-CTA NECK WITH & W/O-POST PROCESSING   Final Result      1.  There is abnormality involving the distal left vertebral artery at the level of C2 which may represent combination of focal dissection/thrombus with possible less likely partially thrombosed vascular malformation.   2.  There is an occlusion of the mid and distal basilar artery extending into the left greater than right posterior cerebral arteries, P1 segments.   3.  These findings have already been discussed with the clinical service by Dr. Albert of the IR radiology service.      CT-CEREBRAL PERFUSION ANALYSIS   Final Result      1.  Cerebral blood flow less than 30% likely representing completed infarct = 0 mL.      2.  T Max more than 6 seconds likely representing combination of completed infarct and ischemia = 100 mL.      3.  Mismatched volume likely representing ischemic brain/penumbra = 100 mL      4.  Please note that the cerebral perfusion was performed on the limited brain tissue around the basal ganglia region. Infarct/ischemia outside the CT perfusion sections can be missed in this study.      MR-VENOGRAM (MRV) HEAD   Final Result      Cerebral  magnetic resonance venogram within normal limits with no evidence of dural venous sinus thrombosis.      MR-BRAIN-WITH & W/O   Final Result      1.  Findings suggesting distal basilar artery and left posterior cerebral artery thrombosis.   2.  Acute ischemia within the midbrain/khang.   3.  Partially empty sella and slightly prominent fluid about the optic nerve sheaths. Correlate for any evidence of intracranial hypertension.      These findings were discussed with Dr. Best on 9/30/2020 10:11 AM.      DX-CHEST-PORTABLE (1 VIEW)   Final Result         1.  No acute cardiopulmonary disease.          Modified Houston Scale (MRS): 0 = No symptoms      ASSESSMENT AND PLAN:  45-year old female with unknown PMhx who presented to [facility] on 9/30/20 as a transfer from [facility] ED where she presented for a chief complaint of a ?2-day history of retro orbital headache, Left facial numbness and seizure-like activity on arrival; was ultimately intubated for airway protection and transferred to [facility]. Here, she received Keppra 500 mg IV; later in the evening underwent MRV that revealed no sinus venous thrombosis.   She had MRI Brain w/wo contrast as well; this revealed pontine/midbrain restricted diffusion consistent with evolving ischemia; no hemorrhage. STAT CTA head/neck on morning on 9/30 revealed thromboses involving BL PCAs, Basilar, and Left vert with probable dissection. Patient STAT to Thrombectomy for intervention; now s/p basilar artery thrombectomy with TICI 3; nearby vessels including BL PCAs however remain occluded-- posing high risk for re-occlusion/thrombosis. Patient started on Heparin gtt with plan to eventually transition to ASA.     NIHSS 28-->17-->9-->7, remains at 7 this morning; patient now transferred to [facility]; plan to stop Heparin gtt today; start full dose ASA.     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  Ischemic strokes involving midbrain, Left internal capsule, and Right cerebellum, secondary to the above.  New onset seizure, secondary to the above.     Recommendations/Plan:      -q4 and PRN Neuro assessment, VS per nursing/unit protocol. **Low threshold for repeat CT head with changes/decline in neurological exam. BP goal < 140/90.    -Telemetry; currently SR. Screen for Afib/arrhythmia. Note TTE with EF 60%, no gross structural abnormalities.    -Stop Heparin gtt now; first dose  mg PO q day this evening.    -Note, EEG from 9/30-- normal study. Continue Keppra 500 mg IV q12h.   -Check lipid panel, hemoglobin A1c; hypercoag studies also sent, pending (though suspect etiology likely related to dissection)-- LDL is 30, Hemoglobin A1c is 5.0.  -PT/OT/SLP eval and treat. Physiatry consult-- planning for discharge to acute rehab 10/5 or 10/6.    -All other medical management per primary/ICU team.   -DVT PPX: SCDs.      The plan of care above has been discussed with Dr. Foreman. Other than the above, no further recommendations from a neurological perspective. Please call with questions.     Miya Lott, [email or web address].  [facility]

## 2020-10-04 NOTE — PROGRESS NOTES
Monitor summary: SR 68-91, UT 0.16, QRS 0.08, QT 0.36, with rare PACs per strip from monitor room.

## 2020-10-04 NOTE — PROGRESS NOTES
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 Heparin gtt stopped today with initial plan to start , however per conversation with patient's  Bill, will instead proceed with Coumadin. Will likely need to be on Coumadin at least 3 months; will determine if appropriate to switch to ASA following repeat CTA head/neck (in 3 months).     Will also continue Keppra 500 mg PO BID-- will also plan to wean as outpatient/in stroke bridge clinic. Stroke bridge clinic referral has been placed.      Please refer to neurology progress note from earlier today, 10/4/20 for full assessment, recommendations and plan.     The above has been discussed with Dr. Foreman.    RAZA Teran.

## 2020-10-04 NOTE — CARE PLAN
Problem: Communication  Goal: The ability to communicate needs accurately and effectively will improve  Note: Pt with impaired communication due to stroke. Pt allowed extra time to communicate. Pt verbalized understanding of POC.      Problem: Venous Thromboembolism (VTW)/Deep Vein Thrombosis (DVT) Prevention:  Goal: Patient will participate in Venous Thrombosis (VTE)/Deep Vein Thrombosis (DVT)Prevention Measures  Note: Pt at risk for DVT due to stroke. SCDs in place. Heparin gtt. Encouraging mobilization as tolerated.

## 2020-10-04 NOTE — PROGRESS NOTES
With patient’s permission (if able), completed daily phone call to designated support person, dexter Khan  Discussed patient condition and plan of care. All questions answered.  Follow up requested: None

## 2020-10-04 NOTE — CARE PLAN
Problem: Venous Thromboembolism (VTW)/Deep Vein Thrombosis (DVT) Prevention:  Goal: Patient will participate in Venous Thrombosis (VTE)/Deep Vein Thrombosis (DVT)Prevention Measures  Outcome: PROGRESSING AS EXPECTED  Flowsheets  Taken 10/3/2020 2233  SCDs, Sequential Compression Device: On  Taken 10/3/2020 2000  Mechanical Prophylaxis: SCDs, Sequential Compression Device  Note: Patient receiving heparin drip and wearing SCDs.      Problem: Communication  Goal: The ability to communicate needs accurately and effectively will improve  Outcome: PROGRESSING SLOWER THAN EXPECTED  Note: Patient communicates effectively but has hoarse, soft voice. Continue to keep low stimuli environment.

## 2020-10-04 NOTE — PROGRESS NOTES
Inpatient Anticoagulation Service Note    Date: 10/4/2020    Reason for Anticoagulation: Stroke, Other (Comments)(Vetebral artery dissection)   Target INR: 2.0 to 3.0         Hemoglobin Value: 12.4  Hematocrit Value: 37.2  Lab Platelet Value: 372    INR from last 7 days     Date/Time INR Value    10/01/20 0015  1.05    20 2035  1.02    20 1600  1.06        Dose from last 7 days     Date/Time Dose (mg)    10/04/20 1541  5        Significant Interactions: Statin  Bridge Therapy: Yes  Date of Last VTE Event: 20  Bridge Therapy Start Date: 10/04/20  Days of Overlap Therapy: 0  INR Value Greater than 2 Prior to Discontinuation of Parenteral Anticoagulation: Not Applicable     Reversal Agent Administered: Not Applicable  Comments: New start warfarin for basilar stroke secondary to vertebral artery dissection. Bridge with lovenox to prevent prothrombotic state with early doses of warfarin, discontinue bridge after 5 days and two days of therapeutic INR. Will start with 5mg daily to determine warfarin sensitivity. Previously on heparin drip for anticoagulation. After 3 months will be re-evaluated for ongoing therapy. Goal INR 2-3. Minimal interactions and no bleeding reported. Daily INR until stable.    Plan:  5mg daily     Pharmacist suggested discharge dosinmg daily. INR within 3 days of discharge.      Martin Topete, PharmD

## 2020-10-05 ENCOUNTER — APPOINTMENT (OUTPATIENT)
Dept: RADIOLOGY | Facility: MEDICAL CENTER | Age: 45
DRG: 023 | End: 2020-10-05
Attending: NURSE PRACTITIONER
Payer: OTHER GOVERNMENT

## 2020-10-05 LAB
ALBUMIN SERPL BCP-MCNC: 3.8 G/DL (ref 3.2–4.9)
ALBUMIN/GLOB SERPL: 1.1 G/DL
ALP SERPL-CCNC: 67 U/L (ref 30–99)
ALT SERPL-CCNC: 26 U/L (ref 2–50)
ANION GAP SERPL CALC-SCNC: 13 MMOL/L (ref 7–16)
AST SERPL-CCNC: 12 U/L (ref 12–45)
BILIRUB SERPL-MCNC: 0.5 MG/DL (ref 0.1–1.5)
BUN SERPL-MCNC: 16 MG/DL (ref 8–22)
CALCIUM SERPL-MCNC: 9.5 MG/DL (ref 8.5–10.5)
CHLORIDE SERPL-SCNC: 99 MMOL/L (ref 96–112)
CO2 SERPL-SCNC: 22 MMOL/L (ref 20–33)
CREAT SERPL-MCNC: 0.76 MG/DL (ref 0.5–1.4)
CRP SERPL HS-MCNC: 6.27 MG/DL (ref 0–0.75)
GLOBULIN SER CALC-MCNC: 3.5 G/DL (ref 1.9–3.5)
GLUCOSE SERPL-MCNC: 120 MG/DL (ref 65–99)
INR PPP: 1.06 (ref 0.87–1.13)
POTASSIUM SERPL-SCNC: 4.1 MMOL/L (ref 3.6–5.5)
PREALB SERPL-MCNC: 17.6 MG/DL (ref 18–38)
PROT SERPL-MCNC: 7.3 G/DL (ref 6–8.2)
PROTHROMBIN TIME: 14.2 SEC (ref 12–14.6)
SODIUM SERPL-SCNC: 134 MMOL/L (ref 135–145)

## 2020-10-05 PROCEDURE — A9270 NON-COVERED ITEM OR SERVICE: HCPCS | Performed by: INTERNAL MEDICINE

## 2020-10-05 PROCEDURE — 97116 GAIT TRAINING THERAPY: CPT | Mod: CQ

## 2020-10-05 PROCEDURE — 97530 THERAPEUTIC ACTIVITIES: CPT | Mod: CQ

## 2020-10-05 PROCEDURE — 84134 ASSAY OF PREALBUMIN: CPT

## 2020-10-05 PROCEDURE — 770020 HCHG ROOM/CARE - TELE (206)

## 2020-10-05 PROCEDURE — 36415 COLL VENOUS BLD VENIPUNCTURE: CPT

## 2020-10-05 PROCEDURE — 70450 CT HEAD/BRAIN W/O DYE: CPT

## 2020-10-05 PROCEDURE — 92523 SPEECH SOUND LANG COMPREHEN: CPT

## 2020-10-05 PROCEDURE — 86140 C-REACTIVE PROTEIN: CPT

## 2020-10-05 PROCEDURE — 700102 HCHG RX REV CODE 250 W/ 637 OVERRIDE(OP): Performed by: INTERNAL MEDICINE

## 2020-10-05 PROCEDURE — 700111 HCHG RX REV CODE 636 W/ 250 OVERRIDE (IP): Performed by: NURSE PRACTITIONER

## 2020-10-05 PROCEDURE — 92526 ORAL FUNCTION THERAPY: CPT

## 2020-10-05 PROCEDURE — 97112 NEUROMUSCULAR REEDUCATION: CPT | Mod: CQ

## 2020-10-05 PROCEDURE — 80053 COMPREHEN METABOLIC PANEL: CPT

## 2020-10-05 PROCEDURE — 85610 PROTHROMBIN TIME: CPT

## 2020-10-05 PROCEDURE — 99232 SBSQ HOSP IP/OBS MODERATE 35: CPT | Performed by: INTERNAL MEDICINE

## 2020-10-05 PROCEDURE — 51798 US URINE CAPACITY MEASURE: CPT

## 2020-10-05 RX ORDER — LEVETIRACETAM 500 MG/1
500 TABLET ORAL 2 TIMES DAILY
Status: DISCONTINUED | OUTPATIENT
Start: 2020-10-05 | End: 2020-10-07 | Stop reason: HOSPADM

## 2020-10-05 RX ADMIN — ENOXAPARIN SODIUM 80 MG: 80 INJECTION SUBCUTANEOUS at 16:27

## 2020-10-05 RX ADMIN — DOCUSATE SODIUM 50 MG AND SENNOSIDES 8.6 MG 2 TABLET: 8.6; 5 TABLET, FILM COATED ORAL at 16:28

## 2020-10-05 RX ADMIN — DOCUSATE SODIUM 50 MG AND SENNOSIDES 8.6 MG 2 TABLET: 8.6; 5 TABLET, FILM COATED ORAL at 06:54

## 2020-10-05 RX ADMIN — ATORVASTATIN CALCIUM 40 MG: 40 TABLET, FILM COATED ORAL at 16:27

## 2020-10-05 RX ADMIN — ENOXAPARIN SODIUM 80 MG: 80 INJECTION SUBCUTANEOUS at 07:00

## 2020-10-05 RX ADMIN — WARFARIN SODIUM 5 MG: 5 TABLET ORAL at 16:28

## 2020-10-05 RX ADMIN — LEVETIRACETAM 500 MG: 500 TABLET ORAL at 16:28

## 2020-10-05 RX ADMIN — LEVETIRACETAM 500 MG: 100 SOLUTION ORAL at 06:54

## 2020-10-05 ASSESSMENT — COGNITIVE AND FUNCTIONAL STATUS - GENERAL
CLIMB 3 TO 5 STEPS WITH RAILING: A LOT
TURNING FROM BACK TO SIDE WHILE IN FLAT BAD: A LITTLE
MOBILITY SCORE: 13
SUGGESTED CMS G CODE MODIFIER MOBILITY: CL
WALKING IN HOSPITAL ROOM: A LOT
MOVING FROM LYING ON BACK TO SITTING ON SIDE OF FLAT BED: A LOT
STANDING UP FROM CHAIR USING ARMS: A LOT
MOVING TO AND FROM BED TO CHAIR: A LOT

## 2020-10-05 ASSESSMENT — ENCOUNTER SYMPTOMS
DIZZINESS: 0
SHORTNESS OF BREATH: 0
NAUSEA: 0
CHILLS: 0
FEVER: 0
HEADACHES: 0
VOMITING: 0

## 2020-10-05 ASSESSMENT — MONTREAL COGNITIVE ASSESSMENT (MOCA)
6. READ LIST OF DIGITS [FORWARD/BACKWARD]: 1
8. SERIAL SUBTRACTION OF 7S: 1
10. [FLUENCY] NAME WORDS STARTING WITH DESIGNATED LETTER: 1
4. NAME EACH OF THE THREE ANIMALS SHOWN: 3
11. FOR EACH PAIR OF WORDS, WHAT CATEGORY DO THEY BELONG TO (OUT OF 2): 1
7. [VIGILENCE] TAP WHEN HEARING DESIGNATED LETTER: 1
LEVEL OF SEVERITY: MODERATE COGNITIVE IMPAIRMENT
WHAT LEVEL OF EDUCATION WAS ATTAINED: HIGH SCHOOL EDUCATION
12. MEMORY INDEX SCORE: 0
VISUOSPATIAL/EXECUTIVE SUBSCORE: 0
9. REPEAT EACH SENTENCE: 2
WHAT IS THE TOTAL SCORE (OUT OF 30): 15
ORIENTATION SUBSCORE: 5

## 2020-10-05 ASSESSMENT — GAIT ASSESSMENTS
ASSISTIVE DEVICE: PARALLEL BARS
GAIT LEVEL OF ASSIST: MINIMAL ASSIST
DISTANCE (FEET): 6

## 2020-10-05 NOTE — PROGRESS NOTES
0600: Pt lethargic, neuro changes noted: dysconjugate eye movements, increased diplopia, and L eye not tracking past midline.     Hospitalist paged and orders received for STAT CT head.

## 2020-10-05 NOTE — PROGRESS NOTES
Received phone call from pt's  (Bill). He voiced concern regarding pt's PT/OT frequency. He would like the pt to be seen tomorrow by both therapies, and as frequently as possible moving forward. Day-team informed of this request/update.

## 2020-10-05 NOTE — DIETARY
Nutrition Services: Update   SLP evaluation on 10/2 recommended Regular, Level 3 Liquidized solids, mildly thick liquidsd with 1:1 supervision. Cortrak removed. Pt reports she is tolerating her diet with PO ~50% of most meals. Declined supplements at this time. RD to follow for adequate PO.

## 2020-10-05 NOTE — CARE PLAN
Problem: Communication  Goal: The ability to communicate needs accurately and effectively will improve  Outcome: PROGRESSING AS EXPECTED  Note: Pt admitted for stroke. Speech is slow and soft, but pt is able to make needs known. Plan of care discussed with  at bedside. All questions/concerns addressed. Hourly rounding in place.     Problem: Respiratory:  Goal: Respiratory status will improve  Outcome: PROGRESSING AS EXPECTED  Note: Turn, cough, and deep breathe exercises performed correctly. Strong cough reflex. Lungs clear throughout.

## 2020-10-05 NOTE — PROGRESS NOTES
With patient’s permission (if able), completed daily phone call to designated support person, dexter Khan  Discussed patient condition and plan of care. All questions answered.

## 2020-10-05 NOTE — THERAPY
"Speech Language Pathology  Daily Treatment     Patient Name: Obdulia High  Age:  45 y.o., Sex:  female  Medical Record #: [id number]  Today's Date: 10/5/2020     Precautions  Precautions: (P) Fall Risk, Swallow Precautions ( See Comments)  Comments: (P) R weak, L ptosis, blurred vision with eye patch    Assessment    Pt seen with meal tray, with concerns regarding recent brainstem CVA, ongoing whispered phonation and consistent throat-clearing post swallow with a modified diet.  Additional swallow diagnostic is warranted.  Family aware of status and plans for mild cogling deficits as well as ongoing dysphagia voice treatment.  Two simple laryngeal adduction exercises introduced. Pt is motivated, mildly labile regarding status.      Plan    Continue current treatment plan.  Request order for FEES diagnostic.    Discharge Recommendations: (P) Recommend post-acute placement for additional speech therapy services prior to discharge home       Objective       10/05/20 1352   Voice   Comments whispered phonation   Dysphagia    Dysphagia X   Diet / Liquid Recommendation Liquidised (3) - (Nectar Thick Full Liquid);Moderately Thick (3) - (Honey Thick)   Nutritional Liquid Intake Rating Scale Thickened beverages (mildly thick unless otherwise specified)   Nutritional Food Intake Rating Scale Total oral diet of a single consistency   Nursing Communication Swallow Precaution Sign Posted at Head of Bed   Skilled Intervention Compensatory Strategies;Verbal Cueing   Comments 25% of meal with inconsistent throat clear post double swallows.   Recommended Route of Medication Administration   Medication Administration  Crush all Medications in Puree   Patient / Family Goals   Patient / Family Goal #1 \"I just want to get better\"   Goal #1 Outcome Progressing as expected   Short Term Goals   Short Term Goal # 1 Pt will consume PO diet of LQ3/MT2 with no clinical s/sx of aspiration/penetration.   Goal Outcome # 1 Progressing slower than " expected   Short Term Goal # 2 Pt will verbalize 1-2 strategies to facilitate recall of information during SLP session with 100% accuracy and min cues   Short Term Goal # 3 Pt will complete sustained attention tasks x 15 minutes with 1 verbal/tactile cue to redirect attention   Short Term Goal # 4 With resolution of blurred vision, pt will complete fxnl rdg assessment with 75% or better accuracy with min cues   Short Term Goal # 5 Pt will verbalize at the word/sentene level with audible voice with use of la adduction techniques, with mod cues.   Education Group   Education Provided Voice / Dysphonia   Voice / Dysphonia Patient Response Patient;Acceptance;Explanation;Demonstration;Reinforcement Needed;Verbal Demonstration   Interdisciplinary Plan of Care Collaboration   Collaboration Comments Additional diagnostic warranted; requesting a FEES order   Session Information   Date / Session Number 3, 10/5/20 ((2/5-10/8 CW)  (cogling goals; attn/STM, blurred vision)   Priority 4  (5x.SAH/brainstem CVA/post extu/blurred vision.LQ/MT;FEES)

## 2020-10-05 NOTE — CARE PLAN
Problem: Venous Thromboembolism (VTW)/Deep Vein Thrombosis (DVT) Prevention:  Goal: Patient will participate in Venous Thrombosis (VTE)/Deep Vein Thrombosis (DVT)Prevention Measures  Outcome: PROGRESSING AS EXPECTED  Intervention: Assess and monitor for anticoagulation complications  Note: Pt admitted for stroke. SCD and lovenox in use for dvt prophylaxis     Problem: Communication:  Goal: The ability to communicate needs accurately and effectively will improve  Outcome: PROGRESSING AS EXPECTED     Problem: Nutritional:  Goal: Maintenance of adequate nutrition will improve  Outcome: PROGRESSING AS EXPECTED  Intervention: Provide assistance with feeding  Note: Pt admitted for stroke. Assisted pt maintaining upright position, 1-1 supervision, pt tolerated well, free from aspiration

## 2020-10-05 NOTE — THERAPY
Physical Therapy   Daily Treatment     Patient Name: Obdulia High  Age:  45 y.o., Sex:  female  Medical Record #: [id number]  Today's Date: 10/5/2020     Precautions: (P) Swallow Precautions ( See Comments)    Assessment    Pt willing to participate w/PT this afternoon, cleared by nrsg. Pt demo's Rt side weakness, UE>LE. Initiated amb in the // bars w/min assist. Pt's HR tachycardic throughout her tx session, requiring rest breaks between tasks. Pt's HR up to mid-130's and no lower than the 120's. Pt is functioning @ min<->mod assist. Low voice w/talking, improved sound w/activity.   Excellent rehab candidate.    Plan    Continue current treatment plan.    DC Equipment Recommendations: Unable to determine at this time  Discharge Recommendations: Recommend post-acute placement for additional physical therapy services prior to discharge home     Objective       10/05/20 1539   Other Treatments   Other Treatments Provided Bridging and rolling to [place]  bottoms. Sit<->stands in // bars w/min assist, marching x 10 w/ min assist.    Balance   Sitting Balance (Static) Fair -   Sitting Balance (Dynamic) Poor +   Standing Balance (Static) Poor +   Standing Balance (Dynamic) Poor   Weight Shift Sitting Poor   Weight Shift Standing Poor   Gait Analysis   Gait Level Of Assist Minimal Assist   Assistive Device Parallel Bars   Distance (Feet) 6  (fwd and backwards)   Skilled Intervention Verbal Cuing;Postural Facilitation   Comments Pt amb w/narrow TABATHA and short step length. Trunk sway w/standing initially.    Bed Mobility    Supine to Sit Minimal Assist  (HOB flat and no railing from the Lft side)   Sit to Supine   (pt left up in the w/c)   Scooting Minimal Assist  (seated)   Rolling Minimum Assist to Lt.;Supervised  (w/railing)   Functional Mobility   Sit to Stand Minimal Assist  (bed/mat surface and w/c height)   Bed, Chair, Wheelchair Transfer Moderate Assist  (w/c<->mat going to her Rt side, Min going to the Lft. )    Transfer Method Stand Step   Skilled Intervention Verbal Cuing;Postural Facilitation;Compensatory Strategies   Comments Pt tolerated 4 mat<->w/c transfers w/rest breaks in between 2* tachycardia. More assist needed going to her Rt side.

## 2020-10-05 NOTE — PROGRESS NOTES
[facility] Medicine Daily Progress Note    Date of Service  10/5/2020    Chief Complaint  45 y.o. female admitted 9/29/2020 with headache and facial numbness     Hospital Course    45 y.o. female with unknown past medical history who was transferred from [facility] ED on 9/30/2020 where she presented with 2-day history of retro-orbital headache and left-sided facial numbness.  In the ED she had an episode of seizure requiring intubation for airway protection.  She was transferred to [facility] for higher level of care.  In the ED she was hypertensive, afebrile.  CT head was unremarkable.  Neurology was consulted and patient was admitted to ICU for further monitoring and ventilator management.  MRI was reviewed on 9/30 which revealed  brainstem ischemia. CTA revealed diffuse thrombosis involving left and right PCA,basilar and  vertebral artery with dissection.   Patient underwent stat thrombectomy on 9/30/2020. Currently on heparin drip for thrombosis per Neuro recommendations. Patient stable and downgraded from ICU.      Interval Problem Update  Yesterday patient started on Coumadin per neurology. Overnight nursing noted that patient had dysconjugate eye movements and left eye was not tracking, stat CT head done that showed diminishing parenchymal and sulcal contrast staining and or hemorrhage, no evidence of new hemorrhage again redemonstrates acute left thalamus and brainstem infarct seen on previous MRI.  Pending insurance authorization for DC to rehab. Patient has no complaints this morning states she is getting stronger.     Consultants/Specialty  Pulmonology/ Critical Care  Neurology   Physiatry       Code Status  Full Code    Disposition  Pending rehab placement      Review of Systems  Review of Systems   Constitutional: Negative for chills and fever.   Respiratory: Negative for shortness of breath.    Cardiovascular: Negative for chest pain.   Gastrointestinal: Negative for nausea and vomiting.   Neurological:  Negative for dizziness and headaches.   All other systems reviewed and are negative.       Physical Exam  Temp:  [36.1 °C (97 °F)-36.6 °C (97.9 °F)] 36.6 °C (97.9 °F)  Pulse:  [89-98] 89  Resp:  [16-19] 16  BP: (124-156)/(86-95) 124/86  SpO2:  [92 %-96 %] 95 %    Physical Exam  Constitutional:       General: She is not in acute distress.     Appearance: Normal appearance.   HENT:      Head: Normocephalic and atraumatic.   Eyes:      Conjunctiva/sclera: Conjunctivae normal.   Neck:      Musculoskeletal: Normal range of motion.   Cardiovascular:      Rate and Rhythm: Normal rate and regular rhythm.      Pulses: Normal pulses.   Pulmonary:      Effort: Pulmonary effort is normal. No respiratory distress.      Breath sounds: Normal breath sounds.   Abdominal:      General: Abdomen is flat. Bowel sounds are normal.      Palpations: Abdomen is soft.      Tenderness: There is no abdominal tenderness.   Skin:     General: Skin is warm and dry.   Neurological:      Mental Status: She is alert and oriented to person, place, and time.      Motor: Weakness (right upper and lower extremity, mild, improving) present.   Psychiatric:         Mood and Affect: Mood normal.         Behavior: Behavior normal.         Fluids    Intake/Output Summary (Last 24 hours) at 10/5/2020 1442  Last data filed at 10/4/2020 2230  Gross per 24 hour   Intake 240 ml   Output 950 ml   Net -710 ml       Laboratory  Recent Labs     10/03/20  0450 10/04/20  0319   WBC 15.1* 11.5*   RBC 3.41* 4.07*   HEMOGLOBIN 10.6* 12.4   HEMATOCRIT 31.5* 37.2   MCV 92.4 91.4   MCH 31.1 30.5   MCHC 33.7 33.3*   RDW 42.0 41.2   PLATELETCT 335 372   MPV 9.0 9.1     Recent Labs     10/03/20  0450 10/04/20  0319 10/05/20  0309   SODIUM 137 136 134*   POTASSIUM 4.3 4.2 4.1   CHLORIDE 103 100 99   CO2 22 23 22   GLUCOSE 132* 116* 120*   BUN 12 14 16   CREATININE 0.59 0.70 0.76   CALCIUM 9.1 9.4 9.5     Recent Labs     10/04/20  1547 10/05/20  0309   INR 1.02 1.06                Imaging  CT-HEAD W/O   Final Result      1.  Diminishing parenchymal and sulcal contrast staining and/or hemorrhage in the parasagittal high frontal lobes. No evidence of new/progressive hemorrhage.   2.  Acute lacunar size infarct left thalamus.   3.  Acute brainstem infarct evident in the right side of the khang, best seen on MRI.      EC-ECHOCARDIOGRAM COMPLETE W/O CONT   Final Result      [id number] VIEW   Final Result         1.  Nonspecific bowel gas pattern.   2.  Dobbhoff tube is coiled within the stomach, the tip terminates overlying the expected location of the gastric antrum.      MR-BRAIN-W/O   Final Result      1.  Subarachnoid hemorrhage within sulci near the vertex, left greater than right      2.  Acute mid brain and punctate left pontine infarct without mass effect      3.  No other significant finding      Findings were discussed with Dr. Mart on 2015 hours 9/30/2020      CT-HEAD W/O   Final Result   Addendum 1 of 1   Addendum:   Please note that the patient has had a CTA head and neck as well as    angiogram with thrombectomy earlier today. A portion of the high    attenuation along the arterial vascular structures and dural venous    sinuses is likely related to contrast staining from    the previous contrast-enhanced procedures. However, the high attenuation    in the sulci over the vertex is suspicious for a small amount of new    subarachnoid hemorrhage. Follow-up MRI could be performed for    confirmation.      Findings were discussed with LAXMI BROWN on 9/30/2020 at 5:21 PM.      Final      IR-THROMBO MECHANICAL ARTERY,INIT   Final Result      1.  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   2.  Basilar artery thrombosis.   3.  Successful mechanical thrombectomy of a basilar artery thrombosis.   4.  The final angiogram demonstrates patent basilar, bilateral superior cerebellar and right posterior cerebral arteries. The left P1 segment is patent. The left distal P2 segment  is not visualized.      CT-CTA HEAD WITH & W/O-POST PROCESS   Final Result      1.  There is acute mid and distal basilar artery occlusion extending into the left greater than right posterior cerebral arteries.   2.  Findings have already been discussed with the ordering physician and neurologist by Dr. Albert of the IR radiology service.      CT-CTA NECK WITH & W/O-POST PROCESSING   Final Result      1.  There is abnormality involving the distal left vertebral artery at the level of C2 which may represent combination of focal dissection/thrombus with possible less likely partially thrombosed vascular malformation.   2.  There is an occlusion of the mid and distal basilar artery extending into the left greater than right posterior cerebral arteries, P1 segments.   3.  These findings have already been discussed with the clinical service by Dr. Albert of the IR radiology service.      CT-CEREBRAL PERFUSION ANALYSIS   Final Result      1.  Cerebral blood flow less than 30% likely representing completed infarct = 0 mL.      2.  T Max more than 6 seconds likely representing combination of completed infarct and ischemia = 100 mL.      3.  Mismatched volume likely representing ischemic brain/penumbra = 100 mL      4.  Please note that the cerebral perfusion was performed on the limited brain tissue around the basal ganglia region. Infarct/ischemia outside the CT perfusion sections can be missed in this study.      MR-VENOGRAM (MRV) HEAD   Final Result      Cerebral magnetic resonance venogram within normal limits with no evidence of dural venous sinus thrombosis.      MR-BRAIN-WITH & W/O   Final Result      1.  Findings suggesting distal basilar artery and left posterior cerebral artery thrombosis.   2.  Acute ischemia within the midbrain/khang.   3.  Partially empty sella and slightly prominent fluid about the optic nerve sheaths. Correlate for any evidence of intracranial hypertension.      These findings were  discussed with Dr. Best on 9/30/2020 10:11 AM.      DX-CHEST-PORTABLE (1 VIEW)   Final Result         1.  No acute cardiopulmonary disease.           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  Underwent emergent endotracheal intubation for airway protection secondary to seizure on admission  Further work-up revealed brainstem ischemia secondary to diffuse thrombosis in basilar, B/L PCA and vertebral artery.   Underwent emergent basilar artery thrombectomy on 9/30.  Neurology on board, due to diffuse thrombosis and increased risk of re thrombosis decision was made to start the patient on heparin drip.  EEG obtained on 9/30- for seizure.Neurology recommends to continue Keppra for now.  Started coumadin per neurology recs   PT/OT/Physiatry following, likely dc rehab     Headache- (present on admission)  Assessment & Plan  History of complex migraine  Diagnosed with vertebral artery dissection, basilar and PCA thrombosis  Neurology following  Continue to monitor     Seizure (HCC)- (present on admission)  Assessment & Plan  No history of seizures in the past  One episode of seizure in the ED, intubated for airway protection  Currently on Keppra per neurology  EEG on 9/30-negative for seizure    Acute respiratory failure with hypoxia and hypercapnia (HCC)  Assessment & Plan  Intubated on 9/29 for airway protection in the setting of seizure  Extubated on 10/1, tolerated well, incentive spirometry, currently on room air      Essential hypertension- (present on admission)  Assessment & Plan  Hypertension on presentation  Labetalol, enalaprilat prn.  Goal SBP less than 160       VTE prophylaxis: coumadin    I have performed a physical exam and reviewed and updated ROS and Plan today (10/5/2020). In review of  yesterday's note (10/4/2020), there are no changes except as documented above.

## 2020-10-05 NOTE — PROGRESS NOTES
Inpatient Anticoagulation Service Note    Date: 10/5/2020    Reason for Anticoagulation: Stroke, Other (Comments)(vertebral artery dissection)   Target INR: 2.0 to 3.0         Hemoglobin Value: 12.4  Hematocrit Value: 37.2  Lab Platelet Value: 372    INR from last 7 days     Date/Time INR Value    10/05/20 0309  1.06    10/04/20 1547  1.02    10/01/20 0015  1.05    20 2035  1.02    20 1600  1.06        Dose from last 7 days     Date/Time Dose (mg)    10/05/20 1128  5    10/04/20 1541  5        Significant Interactions: Statin  Bridge Therapy: Yes  Date of Last VTE Event: 20  Bridge Therapy Start Date: 10/04/20  Days of Overlap Therapy: 1   INR Value Greater than 2 Prior to Discontinuation of Parenteral Anticoagulation: Not Applicable     Reversal Agent Administered: Not Applicable  Comments: INR subtherapeutic following start of warfarin last night. Anticipate increase in INR over the next couple days. Targeting gentle increase with risk of ICH due to CVA. Lovenox for bridge until INR therapeutic x 2 days. CT head stable. Daily INR.    Plan:  5mg     Pharmacist suggested discharge dosinmg daily. INR within 3 days of discharge.      Martin Topete, PharmD

## 2020-10-05 NOTE — THERAPY
"Speech Language Pathology   Initial Assessment     Patient Name: Obdulia High  AGE:  45 y.o., SEX:  female  Medical Record #: [id number]  Today's Date: 10/5/2020     Precautions  Precautions: Fall Risk, Swallow Precautions ( See Comments)  Comments: R sided weakness.  R/L PCA, L vertebral artery aneurysm, basilar artery thrombectomy    Assessment    Patient is 45 y.o. female with a diagnosis of transfer from [place] with occiptal headache and diagnoses of sz, brainstem ischemia L and R PCA, basilar and vertebral arteries with dissection.  9/30 thombectomy, weakness R>L, ptosis L>R, slight dysarthria.  Additional factors influencing patient status/progress: sz, acute respiratory failure, intubation, essential HTN.  MRI 9/30: SAH L > R, acute midbrain and L pontine infarct without mass effect.  Patient seen for cognitive linguistic/language evaluation and presents AAO x 4 with c/o blurry vision ongoing, weak to nearly aphonic voice, with mostly whispered phonation.  Pt with a score of 15/30 on the MoCA, consistent with 'moderate' cognitive deficits, although only 25 points available without completion of visuospatial/executive fxn portions due to blurred vision.  Adjusted, pt presents with 'mild' deficits with poor STM, reduced abstract reasoning, and issues with reduced attention to task.  Fxnl verbal problem solving.  Voice disorder.      Plan    Recommend Speech Therapy 5 times per week until therapy goals are met for the following treatments:  Dysphagia Training and Patient / Family / Caregiver Education.    Discharge Recommendations: (P) Recommend post-acute placement for additional speech therapy services prior to discharge home    Subjective    \"I want to get better\"     Objective       10/05/20 1335   Reading Comprehension   Comments deferred due to blurry vision   Written Expression   Dominant Hand Left   Comments deferred due to blurry vision   Cognitive-Linguistic   Cognitive-Linguistic (WDL) X   Level of " "Consciousness Alert   Orientation Level Not Oriented to Day   Sustained Attention Minimal (4)   Alternating Attention Moderate (3)   Short Term Memory Severe (2)   Simple Reasoning / Problem Solving Within Functional Limits (6-7)   Abstract Reasoning Minimal (4)   Skilled Intervention Verbal Cueing;Compensatory Strategies   Comments motivated to improve, return to independent level of fxn   Outcome Measures   Outcome Measures Utilized MoCA  (with portions of addtitional tests; Cognistat, non-standardi)   MoCA (Pierre Cognitive Assessment)   Total 15   Level of Severity Moderate cognitive impairment   Patient / Family Goals   Patient / Family Goal #1 \"I just want to get better\"   Goal #1 Outcome Progressing slower than expected  (pt is labile regarding her slow progress)   Short Term Goals   Short Term Goal # 1 Pt will consume PO diet of LQ3/MT2 with no clinical s/sx of aspiration/penetration.   Short Term Goal # 2 Pt will verbalize 1-2 strategies to facilitate recall of information during SLP session with 100% accuracy and min cues   Short Term Goal # 3 Pt will complete sustained attention tasks x 15 minutes with 1 verbal/tactile cue to redirect attention   Short Term Goal # 4 With resolution of blurred vision, pt will complete fxnl rdg assessment with 75% or better accuracy with min cues   Interdisciplinary Plan of Care Collaboration   Collaboration Comments Pt labile but with motivation to improve; RN aware of blurred vision ongoing.    Session Information   Date / Session Number 2. 10/5/20 (2/5-10/8 CW)   Priority 3  (5x. SAH/brainstem CVA/post extub/blurred vision.STM/attn def)         "

## 2020-10-06 LAB
F2 C.20210G>A GENO BLD/T: NEGATIVE
F5 P.R506Q BLD/T QL: NEGATIVE
INR PPP: 1.12 (ref 0.87–1.13)
PROTHROMBIN TIME: 14.7 SEC (ref 12–14.6)

## 2020-10-06 PROCEDURE — 99232 SBSQ HOSP IP/OBS MODERATE 35: CPT | Performed by: STUDENT IN AN ORGANIZED HEALTH CARE EDUCATION/TRAINING PROGRAM

## 2020-10-06 PROCEDURE — 700102 HCHG RX REV CODE 250 W/ 637 OVERRIDE(OP): Performed by: INTERNAL MEDICINE

## 2020-10-06 PROCEDURE — A9270 NON-COVERED ITEM OR SERVICE: HCPCS | Performed by: INTERNAL MEDICINE

## 2020-10-06 PROCEDURE — 85610 PROTHROMBIN TIME: CPT

## 2020-10-06 PROCEDURE — 92612 ENDOSCOPY SWALLOW (FEES) VID: CPT

## 2020-10-06 PROCEDURE — 97535 SELF CARE MNGMENT TRAINING: CPT

## 2020-10-06 PROCEDURE — 97110 THERAPEUTIC EXERCISES: CPT | Mod: CQ

## 2020-10-06 PROCEDURE — 97112 NEUROMUSCULAR REEDUCATION: CPT | Mod: CQ

## 2020-10-06 PROCEDURE — 97112 NEUROMUSCULAR REEDUCATION: CPT

## 2020-10-06 PROCEDURE — 700111 HCHG RX REV CODE 636 W/ 250 OVERRIDE (IP): Performed by: NURSE PRACTITIONER

## 2020-10-06 PROCEDURE — 36415 COLL VENOUS BLD VENIPUNCTURE: CPT

## 2020-10-06 PROCEDURE — 97116 GAIT TRAINING THERAPY: CPT | Mod: CQ

## 2020-10-06 PROCEDURE — 770020 HCHG ROOM/CARE - TELE (206)

## 2020-10-06 RX ADMIN — LEVETIRACETAM 500 MG: 500 TABLET ORAL at 18:23

## 2020-10-06 RX ADMIN — DOCUSATE SODIUM 50 MG AND SENNOSIDES 8.6 MG 2 TABLET: 8.6; 5 TABLET, FILM COATED ORAL at 19:51

## 2020-10-06 RX ADMIN — ENOXAPARIN SODIUM 80 MG: 80 INJECTION SUBCUTANEOUS at 18:23

## 2020-10-06 RX ADMIN — WARFARIN SODIUM 5 MG: 5 TABLET ORAL at 18:23

## 2020-10-06 RX ADMIN — LEVETIRACETAM 500 MG: 500 TABLET ORAL at 04:31

## 2020-10-06 RX ADMIN — ATORVASTATIN CALCIUM 40 MG: 40 TABLET, FILM COATED ORAL at 18:23

## 2020-10-06 RX ADMIN — ENOXAPARIN SODIUM 80 MG: 80 INJECTION SUBCUTANEOUS at 04:35

## 2020-10-06 RX ADMIN — DOCUSATE SODIUM 50 MG AND SENNOSIDES 8.6 MG 2 TABLET: 8.6; 5 TABLET, FILM COATED ORAL at 04:31

## 2020-10-06 ASSESSMENT — ENCOUNTER SYMPTOMS
FLANK PAIN: 0
PHOTOPHOBIA: 0
EYE PAIN: 0
ABDOMINAL PAIN: 0
INSOMNIA: 0
SPUTUM PRODUCTION: 0
BLURRED VISION: 0
STRIDOR: 0
DOUBLE VISION: 1
DEPRESSION: 0
EYE DISCHARGE: 0
DIARRHEA: 0
POLYDIPSIA: 0
SENSORY CHANGE: 0
HALLUCINATIONS: 0
VOMITING: 0
BLOOD IN STOOL: 0
HEMOPTYSIS: 0
PALPITATIONS: 0
LOSS OF CONSCIOUSNESS: 0
NAUSEA: 0
SEIZURES: 0
FALLS: 0
SORE THROAT: 0
FOCAL WEAKNESS: 0
CLAUDICATION: 0
MYALGIAS: 0
CHILLS: 0
SHORTNESS OF BREATH: 0
TREMORS: 0
BRUISES/BLEEDS EASILY: 0
HEARTBURN: 0
NECK PAIN: 0
TINGLING: 0
DIZZINESS: 0
COUGH: 0
CONSTIPATION: 0
FEVER: 0
NERVOUS/ANXIOUS: 0
WHEEZING: 0
ORTHOPNEA: 0
BACK PAIN: 0
WEIGHT LOSS: 0
HEADACHES: 0
EYE REDNESS: 0

## 2020-10-06 ASSESSMENT — COGNITIVE AND FUNCTIONAL STATUS - GENERAL
PERSONAL GROOMING: A LITTLE
CLIMB 3 TO 5 STEPS WITH RAILING: A LOT
SUGGESTED CMS G CODE MODIFIER MOBILITY: CK
WALKING IN HOSPITAL ROOM: A LITTLE
TURNING FROM BACK TO SIDE WHILE IN FLAT BAD: A LITTLE
DRESSING REGULAR UPPER BODY CLOTHING: A LITTLE
MOVING FROM LYING ON BACK TO SITTING ON SIDE OF FLAT BED: A LITTLE
DAILY ACTIVITIY SCORE: 15
TOILETING: A LOT
STANDING UP FROM CHAIR USING ARMS: A LITTLE
HELP NEEDED FOR BATHING: A LOT
MOBILITY SCORE: 17
EATING MEALS: A LITTLE
MOVING TO AND FROM BED TO CHAIR: A LITTLE
DRESSING REGULAR LOWER BODY CLOTHING: A LOT
SUGGESTED CMS G CODE MODIFIER DAILY ACTIVITY: CK

## 2020-10-06 ASSESSMENT — GAIT ASSESSMENTS
DISTANCE (FEET): 45
GAIT LEVEL OF ASSIST: MINIMAL ASSIST
ASSISTIVE DEVICE: FRONT WHEEL WALKER

## 2020-10-06 ASSESSMENT — LIFESTYLE VARIABLES: SUBSTANCE_ABUSE: 0

## 2020-10-06 NOTE — PROGRESS NOTES
With patient’s permission (if able), completed daily phone call to designated support person, dexter Khan.  Discussed patient condition and plan of care. All questions answered.  Follow up requested: Pt would like social work to call about discharge planning.

## 2020-10-06 NOTE — PROGRESS NOTES
[facility] Medicine Daily Progress Note    Date of Service  10/6/2020    Chief Complaint  45 y.o. female admitted 9/29/2020 with headache and facial numbness     Hospital Course    45 y.o. female with unknown past medical history who was transferred from [facility] ED on 9/30/2020 where she presented with 2-day history of retro-orbital headache and left-sided facial numbness.  In the ED she had an episode of seizure requiring intubation for airway protection.  She was transferred to [facility] for higher level of care.  In the ED she was hypertensive, afebrile.  CT head was unremarkable.  Neurology was consulted and patient was admitted to ICU for further monitoring and ventilator management.  MRI was reviewed on 9/30 which revealed  brainstem ischemia. CTA revealed diffuse thrombosis involving left and right PCA,basilar and  vertebral artery with dissection. Patient underwent stat thrombectomy on 9/30/2020. Transition to coumadin. INR goal is 2-3, today is 1.12.  Interval Problem Update  Yesterday patient started on Coumadin per neurology. Overnight nursing noted that patient had dysconjugate eye movements and left eye was not tracking, stat CT head done that showed diminishing parenchymal and sulcal contrast staining and or hemorrhage, no evidence of new hemorrhage again redemonstrates acute left thalamus and brainstem infarct seen on previous MRI.  Pending insurance authorization for DC to rehab. Patient has no complaints this morning states she is getting stronger.   10/6 Coumadin 5mg per neurology, int 1.12 today.  Complaints of diplopia which bothers her, eye patchy was not on; explained her about the diplopia is stroked related, need have one eye patch up.  Mild weakness on right side of the body    Consultants/Specialty  Pulmonology/ Critical Care  Neurology   Physiatry       Code Status  Full Code    Disposition  Pending rehab placement      Review of Systems  Review of Systems   Constitutional: Negative for chills,  fever, malaise/fatigue and weight loss.   HENT: Negative for congestion, ear discharge, ear pain, hearing loss, nosebleeds, sore throat and tinnitus.    Eyes: Positive for double vision. Negative for blurred vision, photophobia, pain, discharge and redness.   Respiratory: Negative for cough, hemoptysis, sputum production, shortness of breath, wheezing and stridor.    Cardiovascular: Negative for chest pain, palpitations, orthopnea, claudication and leg swelling.   Gastrointestinal: Negative for abdominal pain, blood in stool, constipation, diarrhea, heartburn, melena, nausea and vomiting.   Genitourinary: Negative for dysuria, flank pain, frequency, hematuria and urgency.   Musculoskeletal: Negative for back pain, falls, joint pain, myalgias and neck pain.        Rt side of body mild weakness   Skin: Negative for itching and rash.   Neurological: Negative for dizziness, tingling, tremors, sensory change, focal weakness, seizures, loss of consciousness and headaches.   Endo/Heme/Allergies: Negative for environmental allergies and polydipsia. Does not bruise/bleed easily.   Psychiatric/Behavioral: Negative for depression, hallucinations, substance abuse and suicidal ideas. The patient is not nervous/anxious and does not have insomnia.    All other systems reviewed and are negative.       Physical Exam  Temp:  [36.4 °C (97.5 °F)-36.7 °C (98.1 °F)] 36.4 °C (97.5 °F)  Pulse:  [] 92  Resp:  [16] 16  BP: (116-142)/(78-90) 116/84  SpO2:  [95 %-98 %] 95 %    Physical Exam  Constitutional:       General: She is not in acute distress.     Appearance: Normal appearance.   HENT:      Head: Normocephalic and atraumatic.   Eyes:      Conjunctiva/sclera: Conjunctivae normal.   Neck:      Musculoskeletal: Normal range of motion.   Cardiovascular:      Rate and Rhythm: Normal rate and regular rhythm.      Pulses: Normal pulses.   Pulmonary:      Effort: Pulmonary effort is normal. No respiratory distress.      Breath sounds:  Normal breath sounds.   Abdominal:      General: Abdomen is flat. Bowel sounds are normal.      Palpations: Abdomen is soft.      Tenderness: There is no abdominal tenderness.   Skin:     General: Skin is warm and dry.   Neurological:      Mental Status: She is alert and oriented to person, place, and time.      Motor: Weakness (right upper and lower extremity, mild, improving) present.   Psychiatric:         Mood and Affect: Mood normal.         Behavior: Behavior normal.         Fluids    Intake/Output Summary (Last 24 hours) at 10/6/2020 1355  Last data filed at 10/6/2020 0900  Gross per 24 hour   Intake 290 ml   Output 850 ml   Net -560 ml       Laboratory  Recent Labs     10/04/20  0319   WBC 11.5*   RBC 4.07*   HEMOGLOBIN 12.4   HEMATOCRIT 37.2   MCV 91.4   MCH 30.5   MCHC 33.3*   RDW 41.2   PLATELETCT 372   MPV 9.1     Recent Labs     10/04/20  0319 10/05/20  0309   SODIUM 136 134*   POTASSIUM 4.2 4.1   CHLORIDE 100 99   CO2 23 22   GLUCOSE 116* 120*   BUN 14 16   CREATININE 0.70 0.76   CALCIUM 9.4 9.5     Recent Labs     10/04/20  1547 10/05/20  0309 10/06/20  0701   INR 1.02 1.06 1.12               Imaging  CT-HEAD W/O   Final Result      1.  Diminishing parenchymal and sulcal contrast staining and/or hemorrhage in the parasagittal high frontal lobes. No evidence of new/progressive hemorrhage.   2.  Acute lacunar size infarct left thalamus.   3.  Acute brainstem infarct evident in the right side of the khang, best seen on MRI.      EC-ECHOCARDIOGRAM COMPLETE W/O CONT   Final Result      [id number] VIEW   Final Result         1.  Nonspecific bowel gas pattern.   2.  Dobbhoff tube is coiled within the stomach, the tip terminates overlying the expected location of the gastric antrum.      MR-BRAIN-W/O   Final Result      1.  Subarachnoid hemorrhage within sulci near the vertex, left greater than right      2.  Acute mid brain and punctate left pontine infarct without mass effect      3.  No other significant  finding      Findings were discussed with Dr. Mart on 2015 hours 9/30/2020      CT-HEAD W/O   Final Result   Addendum 1 of 1   Addendum:   Please note that the patient has had a CTA head and neck as well as    angiogram with thrombectomy earlier today. A portion of the high    attenuation along the arterial vascular structures and dural venous    sinuses is likely related to contrast staining from    the previous contrast-enhanced procedures. However, the high attenuation    in the sulci over the vertex is suspicious for a small amount of new    subarachnoid hemorrhage. Follow-up MRI could be performed for    confirmation.      Findings were discussed with LAXMI BROWN on 9/30/2020 at 5:21 PM.      Final      IR-THROMBO MECHANICAL ARTERY,INIT   Final Result      1.  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   2.  Basilar artery thrombosis.   3.  Successful mechanical thrombectomy of a basilar artery thrombosis.   4.  The final angiogram demonstrates patent basilar, bilateral superior cerebellar and right posterior cerebral arteries. The left P1 segment is patent. The left distal P2 segment is not visualized.      CT-CTA HEAD WITH & W/O-POST PROCESS   Final Result      1.  There is acute mid and distal basilar artery occlusion extending into the left greater than right posterior cerebral arteries.   2.  Findings have already been discussed with the ordering physician and neurologist by Dr. Albert of the IR radiology service.      CT-CTA NECK WITH & W/O-POST PROCESSING   Final Result      1.  There is abnormality involving the distal left vertebral artery at the level of C2 which may represent combination of focal dissection/thrombus with possible less likely partially thrombosed vascular malformation.   2.  There is an occlusion of the mid and distal basilar artery extending into the left greater than right posterior cerebral arteries, P1 segments.   3.  These findings have already been discussed  with the clinical service by Dr. Albert of the IR radiology service.      CT-CEREBRAL PERFUSION ANALYSIS   Final Result      1.  Cerebral blood flow less than 30% likely representing completed infarct = 0 mL.      2.  T Max more than 6 seconds likely representing combination of completed infarct and ischemia = 100 mL.      3.  Mismatched volume likely representing ischemic brain/penumbra = 100 mL      4.  Please note that the cerebral perfusion was performed on the limited brain tissue around the basal ganglia region. Infarct/ischemia outside the CT perfusion sections can be missed in this study.      MR-VENOGRAM (MRV) HEAD   Final Result      Cerebral magnetic resonance venogram within normal limits with no evidence of dural venous sinus thrombosis.      MR-BRAIN-WITH & W/O   Final Result      1.  Findings suggesting distal basilar artery and left posterior cerebral artery thrombosis.   2.  Acute ischemia within the midbrain/khang.   3.  Partially empty sella and slightly prominent fluid about the optic nerve sheaths. Correlate for any evidence of intracranial hypertension.      These findings were discussed with Dr. Best on 9/30/2020 10:11 AM.      DX-CHEST-PORTABLE (1 VIEW)   Final Result         1.  No acute cardiopulmonary disease.           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  Underwent emergent endotracheal intubation for airway protection secondary to seizure on admission  Further work-up revealed brainstem ischemia secondary to diffuse thrombosis in basilar, B/L PCA and vertebral artery.   Underwent emergent basilar artery thrombectomy on 9/30.  Neurology on board, due to diffuse thrombosis and increased risk of re thrombosis decision was made to start the patient on heparin drip.  EEG  obtained on 9/30- for seizure.Neurology recommends to continue Keppra for now.  Started coumadin per neurology recs   PT/OT/Physiatry following, likely dc rehab   10/6 on coumadin 5mg, pending auth; PT/OT/Physiatry following    Headache- (present on admission)  Assessment & Plan  History of complex migraine  Diagnosed with vertebral artery dissection, basilar and PCA thrombosis  Neurology following  Continue to monitor     Seizure (HCC)- (present on admission)  Assessment & Plan  No history of seizures in the past  One episode of seizure in the ED, intubated for airway protection  Currently on Keppra per neurology  EEG on 9/30-negative for seizure    Acute respiratory failure with hypoxia and hypercapnia (HCC)  Assessment & Plan  Intubated on 9/29 for airway protection in the setting of seizure  Extubated on 10/1, tolerated well, incentive spirometry, currently on room air  10/6 tolerating room air      Essential hypertension- (present on admission)  Assessment & Plan  Hypertension on presentation  BP at goal  Labetalol, enalaprilat prn.  Goal SBP less than 160       VTE prophylaxis: coumadin    I have performed a physical exam and reviewed and updated ROS and Plan today (10/6/2020). In review of yesterday's note (10/5/2020), there are no changes except as documented above.

## 2020-10-06 NOTE — CARE PLAN
Problem: Communication  Goal: The ability to communicate needs accurately and effectively will improve  Outcome: PROGRESSING AS EXPECTED     Problem: Infection  Goal: Will remain free from infection  Outcome: PROGRESSING AS EXPECTED     Problem: Venous Thromboembolism (VTW)/Deep Vein Thrombosis (DVT) Prevention:  Goal: Patient will participate in Venous Thrombosis (VTE)/Deep Vein Thrombosis (DVT)Prevention Measures  Outcome: PROGRESSING AS EXPECTED     Problem: Bowel/Gastric:  Goal: Will not experience complications related to bowel motility  Outcome: PROGRESSING AS EXPECTED     Problem: Fluid Volume:  Goal: Will maintain balanced intake and output  Outcome: PROGRESSING AS EXPECTED     Problem: Pain Management  Goal: Pain level will decrease to patient's comfort goal  Outcome: PROGRESSING AS EXPECTED     Problem: Skin Integrity  Goal: Risk for impaired skin integrity will decrease  Outcome: PROGRESSING AS EXPECTED     Problem: Infection:  Goal: Will remain free from infection  Outcome: PROGRESSING AS EXPECTED     Problem: Nutritional:  Goal: Dysphagia will improve  Outcome: PROGRESSING AS EXPECTED  Goal: Maintenance of adequate nutrition will improve  Outcome: PROGRESSING AS EXPECTED     Problem: Bowel/Gastric:  Goal: Normal bowel function is maintained or improved  Outcome: PROGRESSING SLOWER THAN EXPECTED

## 2020-10-06 NOTE — THERAPY
"Occupational Therapy  Daily Treatment     Patient Name: Obdulia High  Age:  45 y.o., Sex:  female  Medical Record #: [id number]  Today's Date: 10/6/2020     Precautions: Fall Risk, Swallow Precautions  Comments: L diplopia, uses eye patch     Assessment    Pt seen for OT tx to include: bed mobility, sit to stands, LB dressing, seated grooming, neuromuscular re-education to RUE. Pt is L-hand dominant. Required cues to incorporate RUE into bimanual tasks (such as opening/closing toothpaste). Engaged pt in grasp/reach/release tasks with emphasis on completion of each motor component (i.e.: fully extending digits to release object without dragging hand). Pt's RUE in minimally limited by weakness, primary deficit is motor incoordination/apraxia. Pt able to sit statically with SBA but required min A during dynamic task of LB dressing. Pt expresses fear of falling. Pt continues to endorse diplopia, demos delayed tracking with L eye. Utilizing eye patch to compensate. Pt appears highly motivated with fair/good awareness of deficits. Voice is hypophonic, but no aphasia observed. Will continue to benefit from acute OT with recommendation for intensive post-acute therapies.     Plan    Continue current treatment plan.    DC Equipment Recommendations: Unable to determine at this time  Discharge Recommendations: Recommend post-acute placement for additional occupational therapy services prior to discharge home    Subjective    \"I'm still seeing double. It affects my balance.\"     Objective       10/06/20 1225   Cognition    Speech/ Communication   (Hypophonic, but clear articulation and no aphasia noted )   Ability To Follow Commands 2 Step   New Learning Impaired   Attention Impaired   Sequencing Impaired   Comments Pt oriented x 4, improved response time   Active ROM Upper Body   Dominant Hand Left   Rt Shoulder Flexion Degrees 150   Strength Upper Body   Comments RUE mild proximal weakness (shoulder 3+/5, elbow flex/ext 4+/5) "   Fine Motor / Dexterity    Fine Motor / Dexterity Interventions Grasp/release/reach using RUE   Bed Mobility    Supine to Sit Minimal Assist  (to L via logroll)   Sit to Supine Minimal Assist   Scooting Minimal Assist  (seated)   Rolling Minimum Assist to Lt.   Activities of Daily Living   Grooming Minimal Assist;Seated  (oral care EOB)   Lower Body Dressing Maximal Assist  (doff/don L sock in tailor sit; unable to tailor sit RLE)   Functional Mobility   Sit to Stand Minimal Assist   Mobility Supine > < EOB, side-stepping towards HOB   Visual Perception   Comments mild L eye pstois; delayed tracking L eye all directions, limited tracking to lateral L   Short Term Goals   Short Term Goal # 1 Pt will perform LB dressing with mod a    Goal Outcome # 1 Goal not met   Short Term Goal # 2 Pt will demonstrate R shoulder AROM 120   Goal Outcome # 2 Goal met, new goal added   Short Term Goal # 2 B  Pt will complete seated grooming with supv    Goal Outcome # 2 B Progressing as expected   Short Term Goal # 3 Pt will improve R bicep strength to 4/5   Goal Outcome # 3 Goal met, new goal added   Short Term Goal # 3 B Pt will complete ADL transfers with supv    Goal Outcome # 3 B Goal not met   Short Term Goal # 4 Pt will incorporate RUE into bimanual task with no cues

## 2020-10-06 NOTE — PROGRESS NOTES
Inpatient Anticoagulation Service Note    Date: 10/6/2020    Reason for Anticoagulation: Stroke, Other (Comments)(vertebral artery dissection)   Target INR: 2.0 to 3.0         Hemoglobin Value: 12.4  Hematocrit Value: 37.2  Lab Platelet Value: 372    INR from last 7 days     Date/Time INR Value    10/06/20 0701  1.12    10/05/20 0309  1.06    10/04/20 1547  1.02    10/01/20 0015  1.05    20 2035  1.02    20 1600  1.06        Dose from last 7 days     Date/Time Dose (mg)    10/06/20 1340  5    10/05/20 1128  5    10/04/20 1541  5        Significant Interactions: Statin  Bridge Therapy: Yes  Date of Last VTE Event: 20  Bridge Therapy Start Date: 10/04/20  Days of Overlap Therapy: 2   INR Value Greater than 2 Prior to Discontinuation of Parenteral Anticoagulation: Not Applicable     Reversal Agent Administered: Not Applicable  Comments: INR subtherapeutic, starting to trend up after 2 doses. Continue 5mg daily to assess sensitivity to warfarin. Minimal drug interactions. Repeat head CT without evolving bleed. Continue daily INR.    Plan:  5mg  Pharmacist suggested discharge dosinmg daily. INR within 3 days of discharge.      Martin Topete, PharmD

## 2020-10-06 NOTE — THERAPY
Physical Therapy   Daily Treatment     Patient Name: Obdulia High  Age:  45 y.o., Sex:  female  Medical Record #: [id number]  Today's Date: 10/6/2020     Precautions: Fall Risk, Swallow Precautions ( See Comments)    Assessment    Pt tired from not sleeping last night, roommate kept her up. Pt was willing to participate w/PT. Improvement w/her amb efforts, utilized FWW today w/assist level min->mod w/fatigue. Improvement in HR today w/quicker recovery in the low 1teens. Pt tolerated 45 mins of PT activity, fatigued following session. Pt is extremely motivated, could tolerate full acute rehab program @ this time.     Plan    Continue current treatment plan.    Objective       10/06/20 1034   Other Treatments   Other Treatments Provided Heel/toe raises in // bars x 10. Static standing balance w/UE support w/min assist.    Balance   Sitting Balance (Static) Fair -   Sitting Balance (Dynamic) Poor +   Standing Balance (Static) Fair -   Standing Balance (Dynamic) Poor   Weight Shift Sitting Fair   Weight Shift Standing Fair   Comments standing w/FWW   Gait Analysis   Gait Level Of Assist Minimal Assist   Assistive Device Front Wheel Walker   Distance (Feet) 45   # of Times Distance was Traveled 1   Skilled Intervention Verbal Cuing;Postural Facilitation;Compensatory Strategies   Comments Initiated amb w/FWW this session. Pt managed 45' x 1 w/standing rest breaks for breakdown of amb. Pt fatigued following her amb efforts. Pt is unsteady and remains a fall risk @ this time.    Bed Mobility    Supine to Sit Minimal Assist  (to the Rt side of the bed)   Sit to Supine Minimal Assist  (HOB flat, no railing to the Rt side of the bed)   Scooting Minimal Assist  (seated)   Rolling Minimal Assist to Rt.   Functional Mobility   Sit to Stand Minimal Assist   Bed, Chair, Wheelchair Transfer Moderate Assist  (going toward her Rt side. Min going toward the Lft)   Transfer Method Stand Step

## 2020-10-06 NOTE — THERAPY
Speech Language Pathology   Fiberoptic Endoscopic Evaluation of Swallow     Patient Name: Obdulia High  AGE:  45 y.o., SEX:  female  Medical Record #: [id number]  Today's Date: 10/6/2020     Precautions  Precautions: (P) Fall Risk, Swallow Precautions ( See Comments)  Comments: (P) left diplopia-uses eye patch.     Assessment    Patient is 45 y.o. 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  Additional factors influencing patient status/progress: pt with aphonia/diplopia with patch to left eye.  Pt agreed to the FEES and tolerated the procedure without difficulty. Pt with slight amount of thinnish and whitish pharyngeal secretions visualized prior to PO presentations. Right arytenoid and fold moving greater than left with incomplete fold adduction with phonation. Pt with pink and reddish appearing folds with possible contact ulcers posterior 1/3 of the folds with bilat white appearing tissue. Variable visualization of the vallecular space during the FEES related to positioning of the epiglottis. Slight to mild residue post swallow intermittently visualized after the first swallow with good clearance with repeat swallows. Pt with slight to mild pyriform sinus residue post swallow, greater on the left versus the right, that clears with a 2nd swallow. Deep penetration, to the epiglottis petiole area with sip of NTL from the straw with spont throat clearing. Penetration to the left aryepiglottic fold area with oatmeal probably occurring during the swallow with spontaneous coughing. 1/3-1/2 tsp amounts of oatmeal subsequently were assessed with no further penetration with 1/3-1/2 tsp amounts.  Silent penetration with slight amount of thin milk from the spoon to the lower 1/3 portion of the epiglottis. Overall, pt presenting with moderate dysphagia for assessed food items. Chopped fruit not assessed related to the penetration  with the oatmeal. Consider f/u [facility] in the next 1-2 weeks to further assess swallowing especially airway protection during the swallow. Consider request for ENT consult in the near future if voice continues to be at a whisper quality.     Plan  Cont with LQ3/MT2 with posted and recommended swallow strategies.   Recommend Speech Therapy 5 times per week until therapy goals are met for the following treatments:  Dysphagia Training.    Discharge Recommendations: (P) Recommend post-acute placement for additional speech therapy services prior to discharge home       10/06/20 2424   FEES Evaluation Prior To Procedure   Onset Date Of Dysphagia   (since admit)   Dysphagia Symptoms Warranting Video Swallow s/s of difficulty during intake of LQ3/MT2   Procedure Performed While Patient Sitting In Bed   Seated at (Degrees) 90   A Flexible Endoscope Was Introduced Transnasally In The Right Nare   FEES  Anatomy Assessment   Anatomy For A Functional Swallow: Small Vallecular Space;Other (Comments)  (decreased adduction bilat folds, post contact ulcers)   FEES Laryngeal Adduction Assessment   Breath Holding Inadequate   Clearing Throat Inadequate   Cough Inadequate   Phonation Inadequate   Onset Of Swallow Inadequate   FEES Velopharyngeal Assessment    Velopharyngeal Closure: Adequate   FEES Sensation Assessment    Presence of Scope Adequate   Presence of Residue Adequate   Presence of Penetration Immediate Response   Presence of Aspiration No Aspiration   FEES Swallow Function Assessment   Swallow Trigger Level of the Valleculae;Level of the Pyriform Sinuses   Base of Tongue Retraction Functional   Pharyngeal Constrictor Movement Functional   White Out Phase Complete White Out   Epiglottic Movement Functional   Laryngeal Elevation Functional   Cricopharyngeal Function Functional   Swallow Observations / Symptoms   Swallow Observations / Symptoms Yes   Vallecular Residue Mildly Thick (2) - (Nectar Thick);Liquidised (3);Pureed (4)  "  Mildly Thick (2) - (Nectar Thick) Teaspoon;Cup   Pyriform Sinus Residue Liquidised (3);Pureed (4);Slightly Thick (1);Thin (0)   Penetration Suspected During the Swallow Mildly Thick (2) - (Nectar Thick);Thin (0)  (penetration to left aeryepiglottic fold-cough triggered)   Mildly Thick (2) - (Nectar Thick)   (slight penetration with straw sip NTL)   Thin (0) Teaspoon  (slight penetration petiole of the epiglottis-deep pene)   Compensatory Strategies Attempted   Compensatory Strategies Attempted Yes   Patient Ability To Protect Airway   Patient Ability To Protect Airway  Adequate   Penetration Aspiration Scale   Penetration Aspiration Scale 3 - Material remains above vocal folds, visible stasis remains   Leanna Pharyngeal Residue Severity   Vallecular Residue Mild, epiglottic ligament visable   Pyriform Sinus Residue  Mild, up wall to ¼ full   Dysphagia Rating   Nutritional Liquid Intake Rating Scale Thickened beverages (mildly thick unless otherwise specified)   Nutritional Food Intake Rating Scale Total oral diet of a single consistency   Problem List   Problem List Dysphagia;Voice Quality Deficit   Evaluation Recommendations   Swallow Evaluations Recommendations (Yes / No) Yes   Diet / Liquid Recommendation Mildly Thick (2) - (Nectar Thick);Liquidised (3) - (Nectar Thick Full Liquid)   Medication Administration  Crush all Medications in Puree   Evaluation Recommended Techniques   Swallow Techniques Yes   Techniques Oral Stage Chew Each Bite Thoroughly;Other (Comment)  (HOB 90 degrees, sm amounts, slow rate, dble swallow)   Patient / Family Goals   Patient / Family Goal #1 \"I just want to get better\"   Goal #1 Outcome Progressing as expected   Short Term Goals   Short Term Goal # 1 Pt will consume PO diet of LQ3/MT2 with no clinical s/sx of aspiration/penetration.   Goal Outcome # 1 Progressing as expected   Short Term Goal # 2 Pt will verbalize 1-2 strategies to facilitate recall of information during SLP session " with 100% accuracy and min cues   Goal Outcome # 2  Progressing as expected   Session Information   Priority 3  (5x,brainstemCVA/postextub,blurredvision, LQ3/[id number])

## 2020-10-06 NOTE — DISCHARGE PLANNING
Anticipated Discharge Disposition: Acute rehab    Action: Lsw spoke with spouse Bill to update on dc planning. Lsw explained acute rehab and stated that acceptance is pending due to insurance. Lsw gave Bill contact info to rehab and rehab [facility]. Jessew explained that for rehab, 24/7 supervision is a requirement, Bill confirmed that pt will have sufficient support whether from him or from family from [place] who are willing to come help.     Barriers to Discharge: Insurance auth     Plan: wait for update from [facility]

## 2020-10-07 ENCOUNTER — HOSPITAL ENCOUNTER (INPATIENT)
Facility: REHABILITATION | Age: 45
LOS: 20 days | DRG: 057 | End: 2020-10-27
Attending: PHYSICAL MEDICINE & REHABILITATION | Admitting: PHYSICAL MEDICINE & REHABILITATION
Payer: OTHER GOVERNMENT

## 2020-10-07 VITALS
HEIGHT: 71 IN | DIASTOLIC BLOOD PRESSURE: 81 MMHG | OXYGEN SATURATION: 100 % | RESPIRATION RATE: 16 BRPM | TEMPERATURE: 97.4 F | WEIGHT: 193.56 LBS | BODY MASS INDEX: 27.1 KG/M2 | HEART RATE: 97 BPM | SYSTOLIC BLOOD PRESSURE: 115 MMHG

## 2020-10-07 PROBLEM — I77.74 VERTEBRAL ARTERY DISSECTION (HCC): Status: ACTIVE | Noted: 2020-10-07

## 2020-10-07 PROBLEM — R49.8 HYPOPHONIA: Status: ACTIVE | Noted: 2020-10-07

## 2020-10-07 PROBLEM — H53.2 DOUBLE VISION: Status: ACTIVE | Noted: 2020-10-07

## 2020-10-07 PROBLEM — R13.19 OTHER DYSPHAGIA: Status: ACTIVE | Noted: 2020-10-07

## 2020-10-07 LAB
ALBUMIN SERPL BCP-MCNC: 3.8 G/DL (ref 3.2–4.9)
ALBUMIN/GLOB SERPL: 1.2 G/DL
ALP SERPL-CCNC: 68 U/L (ref 30–99)
ALT SERPL-CCNC: 35 U/L (ref 2–50)
ANION GAP SERPL CALC-SCNC: 13 MMOL/L (ref 7–16)
AST SERPL-CCNC: 16 U/L (ref 12–45)
BASOPHILS # BLD AUTO: 0.6 % (ref 0–1.8)
BASOPHILS # BLD: 0.07 K/UL (ref 0–0.12)
BILIRUB SERPL-MCNC: 0.5 MG/DL (ref 0.1–1.5)
BUN SERPL-MCNC: 22 MG/DL (ref 8–22)
CALCIUM SERPL-MCNC: 9.3 MG/DL (ref 8.5–10.5)
CHLORIDE SERPL-SCNC: 101 MMOL/L (ref 96–112)
CO2 SERPL-SCNC: 23 MMOL/L (ref 20–33)
CREAT SERPL-MCNC: 0.7 MG/DL (ref 0.5–1.4)
EOSINOPHIL # BLD AUTO: 0.21 K/UL (ref 0–0.51)
EOSINOPHIL NFR BLD: 1.9 % (ref 0–6.9)
ERYTHROCYTE [DISTWIDTH] IN BLOOD BY AUTOMATED COUNT: 40.5 FL (ref 35.9–50)
GLOBULIN SER CALC-MCNC: 3.3 G/DL (ref 1.9–3.5)
GLUCOSE SERPL-MCNC: 118 MG/DL (ref 65–99)
HCT VFR BLD AUTO: 38.6 % (ref 37–47)
HGB BLD-MCNC: 13 G/DL (ref 12–16)
IMM GRANULOCYTES # BLD AUTO: 0.26 K/UL (ref 0–0.11)
IMM GRANULOCYTES NFR BLD AUTO: 2.3 % (ref 0–0.9)
INR PPP: 1.27 (ref 0.87–1.13)
LYMPHOCYTES # BLD AUTO: 2.32 K/UL (ref 1–4.8)
LYMPHOCYTES NFR BLD: 20.6 % (ref 22–41)
MCH RBC QN AUTO: 30.6 PG (ref 27–33)
MCHC RBC AUTO-ENTMCNC: 33.7 G/DL (ref 33.6–35)
MCV RBC AUTO: 90.8 FL (ref 81.4–97.8)
MONOCYTES # BLD AUTO: 0.95 K/UL (ref 0–0.85)
MONOCYTES NFR BLD AUTO: 8.4 % (ref 0–13.4)
NEUTROPHILS # BLD AUTO: 7.45 K/UL (ref 2–7.15)
NEUTROPHILS NFR BLD: 66.2 % (ref 44–72)
NRBC # BLD AUTO: 0 K/UL
NRBC BLD-RTO: 0 /100 WBC
PLATELET # BLD AUTO: 465 K/UL (ref 164–446)
PMV BLD AUTO: 8.8 FL (ref 9–12.9)
POTASSIUM SERPL-SCNC: 4.3 MMOL/L (ref 3.6–5.5)
PROT SERPL-MCNC: 7.1 G/DL (ref 6–8.2)
PROTHROMBIN TIME: 16.3 SEC (ref 12–14.6)
RBC # BLD AUTO: 4.25 M/UL (ref 4.2–5.4)
SODIUM SERPL-SCNC: 137 MMOL/L (ref 135–145)
WBC # BLD AUTO: 11.3 K/UL (ref 4.8–10.8)

## 2020-10-07 PROCEDURE — A9270 NON-COVERED ITEM OR SERVICE: HCPCS | Performed by: INTERNAL MEDICINE

## 2020-10-07 PROCEDURE — 700102 HCHG RX REV CODE 250 W/ 637 OVERRIDE(OP): Performed by: INTERNAL MEDICINE

## 2020-10-07 PROCEDURE — 700102 HCHG RX REV CODE 250 W/ 637 OVERRIDE(OP): Performed by: PHYSICAL MEDICINE & REHABILITATION

## 2020-10-07 PROCEDURE — A9270 NON-COVERED ITEM OR SERVICE: HCPCS | Performed by: PHYSICAL MEDICINE & REHABILITATION

## 2020-10-07 PROCEDURE — 90686 IIV4 VACC NO PRSV 0.5 ML IM: CPT | Performed by: STUDENT IN AN ORGANIZED HEALTH CARE EDUCATION/TRAINING PROGRAM

## 2020-10-07 PROCEDURE — 700111 HCHG RX REV CODE 636 W/ 250 OVERRIDE (IP): Performed by: NURSE PRACTITIONER

## 2020-10-07 PROCEDURE — 80053 COMPREHEN METABOLIC PANEL: CPT

## 2020-10-07 PROCEDURE — 94760 N-INVAS EAR/PLS OXIMETRY 1: CPT

## 2020-10-07 PROCEDURE — 36415 COLL VENOUS BLD VENIPUNCTURE: CPT

## 2020-10-07 PROCEDURE — 700111 HCHG RX REV CODE 636 W/ 250 OVERRIDE (IP): Performed by: PHYSICAL MEDICINE & REHABILITATION

## 2020-10-07 PROCEDURE — 99223 1ST HOSP IP/OBS HIGH 75: CPT | Performed by: PHYSICAL MEDICINE & REHABILITATION

## 2020-10-07 PROCEDURE — 97116 GAIT TRAINING THERAPY: CPT | Mod: CQ

## 2020-10-07 PROCEDURE — 90471 IMMUNIZATION ADMIN: CPT

## 2020-10-07 PROCEDURE — 99238 HOSP IP/OBS DSCHRG MGMT 30/<: CPT | Performed by: STUDENT IN AN ORGANIZED HEALTH CARE EDUCATION/TRAINING PROGRAM

## 2020-10-07 PROCEDURE — 85025 COMPLETE CBC W/AUTO DIFF WBC: CPT

## 2020-10-07 PROCEDURE — 85610 PROTHROMBIN TIME: CPT

## 2020-10-07 PROCEDURE — 770010 HCHG ROOM/CARE - REHAB SEMI PRIVAT*

## 2020-10-07 PROCEDURE — 700111 HCHG RX REV CODE 636 W/ 250 OVERRIDE (IP): Performed by: STUDENT IN AN ORGANIZED HEALTH CARE EDUCATION/TRAINING PROGRAM

## 2020-10-07 PROCEDURE — 97530 THERAPEUTIC ACTIVITIES: CPT | Mod: CQ

## 2020-10-07 RX ORDER — POLYVINYL ALCOHOL 14 MG/ML
1 SOLUTION/ DROPS OPHTHALMIC PRN
Status: DISCONTINUED | OUTPATIENT
Start: 2020-10-07 | End: 2020-10-27 | Stop reason: HOSPADM

## 2020-10-07 RX ORDER — BISACODYL 10 MG
10 SUPPOSITORY, RECTAL RECTAL
Status: DISCONTINUED | OUTPATIENT
Start: 2020-10-07 | End: 2020-10-27 | Stop reason: HOSPADM

## 2020-10-07 RX ORDER — AMOXICILLIN 250 MG
2 CAPSULE ORAL 2 TIMES DAILY
Status: DISCONTINUED | OUTPATIENT
Start: 2020-10-07 | End: 2020-10-27 | Stop reason: HOSPADM

## 2020-10-07 RX ORDER — ALUMINA, MAGNESIA, AND SIMETHICONE 2400; 2400; 240 MG/30ML; MG/30ML; MG/30ML
20 SUSPENSION ORAL
Status: DISCONTINUED | OUTPATIENT
Start: 2020-10-07 | End: 2020-10-27 | Stop reason: HOSPADM

## 2020-10-07 RX ORDER — AMOXICILLIN 250 MG
2 CAPSULE ORAL 2 TIMES DAILY
Status: CANCELLED | OUTPATIENT
Start: 2020-10-07

## 2020-10-07 RX ORDER — LEVETIRACETAM 500 MG/1
500 TABLET ORAL 2 TIMES DAILY
Status: CANCELLED | OUTPATIENT
Start: 2020-10-07

## 2020-10-07 RX ORDER — POLYETHYLENE GLYCOL 3350 17 G/17G
1 POWDER, FOR SOLUTION ORAL
Status: DISCONTINUED | OUTPATIENT
Start: 2020-10-07 | End: 2020-10-27 | Stop reason: HOSPADM

## 2020-10-07 RX ORDER — ONDANSETRON 4 MG/1
4 TABLET, ORALLY DISINTEGRATING ORAL 4 TIMES DAILY PRN
Status: DISCONTINUED | OUTPATIENT
Start: 2020-10-07 | End: 2020-10-27 | Stop reason: HOSPADM

## 2020-10-07 RX ORDER — BUTALBITAL, ACETAMINOPHEN AND CAFFEINE 50; 325; 40 MG/1; MG/1; MG/1
1 TABLET ORAL EVERY 6 HOURS PRN
Status: DISCONTINUED | OUTPATIENT
Start: 2020-10-07 | End: 2020-10-27 | Stop reason: HOSPADM

## 2020-10-07 RX ORDER — ATORVASTATIN CALCIUM 40 MG/1
40 TABLET, FILM COATED ORAL EVERY EVENING
Qty: 30 TAB | Refills: 0 | Status: ON HOLD
Start: 2020-10-07 | End: 2020-10-26

## 2020-10-07 RX ORDER — ECHINACEA PURPUREA EXTRACT 125 MG
2 TABLET ORAL PRN
Status: DISCONTINUED | OUTPATIENT
Start: 2020-10-07 | End: 2020-10-27 | Stop reason: HOSPADM

## 2020-10-07 RX ORDER — ENEMA 19; 7 G/133ML; G/133ML
1 ENEMA RECTAL
Status: DISCONTINUED | OUTPATIENT
Start: 2020-10-07 | End: 2020-10-27 | Stop reason: HOSPADM

## 2020-10-07 RX ORDER — LACTULOSE 20 G/30ML
30 SOLUTION ORAL
Status: DISCONTINUED | OUTPATIENT
Start: 2020-10-07 | End: 2020-10-27 | Stop reason: HOSPADM

## 2020-10-07 RX ORDER — ACETAMINOPHEN 325 MG/1
650 TABLET ORAL EVERY 4 HOURS PRN
Status: CANCELLED | OUTPATIENT
Start: 2020-10-07

## 2020-10-07 RX ORDER — WARFARIN SODIUM 5 MG/1
5 TABLET ORAL
Status: COMPLETED | OUTPATIENT
Start: 2020-10-07 | End: 2020-10-07

## 2020-10-07 RX ORDER — WARFARIN SODIUM 5 MG/1
5 TABLET ORAL DAILY
Qty: 30 TAB | Refills: 3 | Status: ON HOLD
Start: 2020-10-07 | End: 2020-10-26

## 2020-10-07 RX ORDER — ATORVASTATIN CALCIUM 40 MG/1
40 TABLET, FILM COATED ORAL EVERY EVENING
Status: DISCONTINUED | OUTPATIENT
Start: 2020-10-07 | End: 2020-10-27 | Stop reason: HOSPADM

## 2020-10-07 RX ORDER — ONDANSETRON 2 MG/ML
4 INJECTION INTRAMUSCULAR; INTRAVENOUS 4 TIMES DAILY PRN
Status: DISCONTINUED | OUTPATIENT
Start: 2020-10-07 | End: 2020-10-27 | Stop reason: HOSPADM

## 2020-10-07 RX ORDER — TRAZODONE HYDROCHLORIDE 50 MG/1
50 TABLET ORAL
Status: DISCONTINUED | OUTPATIENT
Start: 2020-10-07 | End: 2020-10-09

## 2020-10-07 RX ORDER — LEVETIRACETAM 500 MG/1
500 TABLET ORAL 2 TIMES DAILY
Status: DISCONTINUED | OUTPATIENT
Start: 2020-10-07 | End: 2020-10-08

## 2020-10-07 RX ORDER — HYDRALAZINE HYDROCHLORIDE 10 MG/1
10 TABLET, FILM COATED ORAL EVERY 8 HOURS PRN
Status: DISCONTINUED | OUTPATIENT
Start: 2020-10-07 | End: 2020-10-27 | Stop reason: HOSPADM

## 2020-10-07 RX ORDER — ACETAMINOPHEN 325 MG/1
650 TABLET ORAL EVERY 4 HOURS PRN
Status: DISCONTINUED | OUTPATIENT
Start: 2020-10-07 | End: 2020-10-27 | Stop reason: HOSPADM

## 2020-10-07 RX ORDER — ACETAMINOPHEN 325 MG/1
650 TABLET ORAL EVERY 4 HOURS PRN
Status: DISCONTINUED | OUTPATIENT
Start: 2020-10-07 | End: 2020-10-09

## 2020-10-07 RX ORDER — BISACODYL 10 MG
10 SUPPOSITORY, RECTAL RECTAL
Status: CANCELLED | OUTPATIENT
Start: 2020-10-07

## 2020-10-07 RX ORDER — LANOLIN ALCOHOL/MO/W.PET/CERES
3 CREAM (GRAM) TOPICAL NIGHTLY PRN
Status: DISCONTINUED | OUTPATIENT
Start: 2020-10-07 | End: 2020-10-09

## 2020-10-07 RX ORDER — ATORVASTATIN CALCIUM 40 MG/1
40 TABLET, FILM COATED ORAL EVERY EVENING
Status: CANCELLED | OUTPATIENT
Start: 2020-10-07

## 2020-10-07 RX ORDER — POLYETHYLENE GLYCOL 3350 17 G/17G
1 POWDER, FOR SOLUTION ORAL
Status: CANCELLED | OUTPATIENT
Start: 2020-10-07

## 2020-10-07 RX ORDER — LEVETIRACETAM 500 MG/1
500 TABLET ORAL 2 TIMES DAILY
Qty: 60 TAB | Refills: 0 | Status: ON HOLD
Start: 2020-10-07 | End: 2020-10-26

## 2020-10-07 RX ORDER — BUTALBITAL, ACETAMINOPHEN AND CAFFEINE 50; 325; 40 MG/1; MG/1; MG/1
1 TABLET ORAL EVERY 6 HOURS PRN
Status: CANCELLED | OUTPATIENT
Start: 2020-10-07

## 2020-10-07 RX ORDER — HYDROXYZINE HYDROCHLORIDE 25 MG/1
50 TABLET, FILM COATED ORAL EVERY 6 HOURS PRN
Status: DISCONTINUED | OUTPATIENT
Start: 2020-10-07 | End: 2020-10-27 | Stop reason: HOSPADM

## 2020-10-07 RX ADMIN — NYSTATIN 500000 UNITS: 100000 SUSPENSION ORAL at 18:07

## 2020-10-07 RX ADMIN — WARFARIN SODIUM 5 MG: 5 TABLET ORAL at 17:28

## 2020-10-07 RX ADMIN — LEVETIRACETAM 500 MG: 500 TABLET ORAL at 04:32

## 2020-10-07 RX ADMIN — ENOXAPARIN SODIUM 80 MG: 80 INJECTION SUBCUTANEOUS at 04:32

## 2020-10-07 RX ADMIN — DOCUSATE SODIUM 50 MG AND SENNOSIDES 8.6 MG 2 TABLET: 8.6; 5 TABLET, FILM COATED ORAL at 04:32

## 2020-10-07 RX ADMIN — LEVETIRACETAM 500 MG: 500 TABLET ORAL at 21:53

## 2020-10-07 RX ADMIN — NYSTATIN 500000 UNITS: 100000 SUSPENSION ORAL at 21:54

## 2020-10-07 RX ADMIN — ATORVASTATIN CALCIUM 40 MG: 40 TABLET, FILM COATED ORAL at 21:53

## 2020-10-07 RX ADMIN — DOCUSATE SODIUM 50 MG AND SENNOSIDES 8.6 MG 2 TABLET: 8.6; 5 TABLET, FILM COATED ORAL at 21:53

## 2020-10-07 RX ADMIN — INFLUENZA A VIRUS A/GUANGDONG-MAONAN/SWL1536/2019 CNIC-1909 (H1N1) ANTIGEN (FORMALDEHYDE INACTIVATED), INFLUENZA A VIRUS A/HONG KONG/2671/2019 (H3N2) ANTIGEN (FORMALDEHYDE INACTIVATED), INFLUENZA B VIRUS B/PHUKET/3073/2013 ANTIGEN (FORMALDEHYDE INACTIVATED), AND INFLUENZA B VIRUS B/WASHINGTON/02/2019 ANTIGEN (FORMALDEHYDE INACTIVATED) 0.5 ML: 15; 15; 15; 15 INJECTION, SUSPENSION INTRAMUSCULAR at 13:47

## 2020-10-07 RX ADMIN — ENOXAPARIN SODIUM 80 MG: 100 INJECTION SUBCUTANEOUS at 21:57

## 2020-10-07 SDOH — ECONOMIC STABILITY: FOOD INSECURITY: WITHIN THE PAST 12 MONTHS, YOU WORRIED THAT YOUR FOOD WOULD RUN OUT BEFORE YOU GOT MONEY TO BUY MORE.: NEVER TRUE

## 2020-10-07 SDOH — ECONOMIC STABILITY: TRANSPORTATION INSECURITY
IN THE PAST 12 MONTHS, HAS LACK OF TRANSPORTATION KEPT YOU FROM MEETINGS, WORK, OR FROM GETTING THINGS NEEDED FOR DAILY LIVING?: NO

## 2020-10-07 SDOH — ECONOMIC STABILITY: FOOD INSECURITY: WITHIN THE PAST 12 MONTHS, THE FOOD YOU BOUGHT JUST DIDN'T LAST AND YOU DIDN'T HAVE MONEY TO GET MORE.: NEVER TRUE

## 2020-10-07 SDOH — HEALTH STABILITY: MENTAL HEALTH: HOW OFTEN DO YOU HAVE 6 OR MORE DRINKS ON ONE OCCASION?: NEVER

## 2020-10-07 SDOH — ECONOMIC STABILITY: TRANSPORTATION INSECURITY
IN THE PAST 12 MONTHS, HAS THE LACK OF TRANSPORTATION KEPT YOU FROM MEDICAL APPOINTMENTS OR FROM GETTING MEDICATIONS?: NO

## 2020-10-07 SDOH — HEALTH STABILITY: MENTAL HEALTH: HOW OFTEN DO YOU HAVE A DRINK CONTAINING ALCOHOL?: NOT ASKED

## 2020-10-07 ASSESSMENT — ENCOUNTER SYMPTOMS
CLAUDICATION: 0
PHOTOPHOBIA: 0
FALLS: 0
SENSORY CHANGE: 0
STRIDOR: 0
EYE PAIN: 0
WHEEZING: 0
FEVER: 0
HEADACHES: 0
PALPITATIONS: 0
BRUISES/BLEEDS EASILY: 0
ORTHOPNEA: 0
NAUSEA: 0
WEIGHT LOSS: 0
INSOMNIA: 0
EYE REDNESS: 0
FLANK PAIN: 0
EYE DISCHARGE: 0
BLOOD IN STOOL: 0
DIARRHEA: 0
COUGH: 0
BACK PAIN: 0
DEPRESSION: 0
SPUTUM PRODUCTION: 0
SHORTNESS OF BREATH: 0
ABDOMINAL PAIN: 0
SEIZURES: 0
TREMORS: 0
NERVOUS/ANXIOUS: 0
DOUBLE VISION: 1
MYALGIAS: 0
LOSS OF CONSCIOUSNESS: 0
HALLUCINATIONS: 0
HEARTBURN: 0
VOMITING: 0
POLYDIPSIA: 0
CHILLS: 0
SORE THROAT: 0
HEMOPTYSIS: 0
CONSTIPATION: 0
TINGLING: 0
DIZZINESS: 0
FOCAL WEAKNESS: 0
BLURRED VISION: 0
NECK PAIN: 0

## 2020-10-07 ASSESSMENT — GAIT ASSESSMENTS
DISTANCE (FEET): 12
GAIT LEVEL OF ASSIST: MINIMAL ASSIST
ASSISTIVE DEVICE: FRONT WHEEL WALKER

## 2020-10-07 ASSESSMENT — LIFESTYLE VARIABLES
ON A TYPICAL DAY WHEN YOU DRINK ALCOHOL HOW MANY DRINKS DO YOU HAVE: 0
AVERAGE NUMBER OF DAYS PER WEEK YOU HAVE A DRINK CONTAINING ALCOHOL: 0
EVER HAD A DRINK FIRST THING IN THE MORNING TO STEADY YOUR NERVES TO GET RID OF A HANGOVER: NO
EVER_SMOKED: NEVER
ALCOHOL_USE: NO
EVER FELT BAD OR GUILTY ABOUT YOUR DRINKING: NO
HOW MANY TIMES IN THE PAST YEAR HAVE YOU HAD 5 OR MORE DRINKS IN A DAY: 0
TOTAL SCORE: 0
CONSUMPTION TOTAL: NEGATIVE
HAVE PEOPLE ANNOYED YOU BY CRITICIZING YOUR DRINKING: NO
HAVE YOU EVER FELT YOU SHOULD CUT DOWN ON YOUR DRINKING: NO
SUBSTANCE_ABUSE: 0
TOTAL SCORE: 0
TOTAL SCORE: 0

## 2020-10-07 ASSESSMENT — PATIENT HEALTH QUESTIONNAIRE - PHQ9
1. LITTLE INTEREST OR PLEASURE IN DOING THINGS: NOT AT ALL
2. FEELING DOWN, DEPRESSED, IRRITABLE, OR HOPELESS: NOT AT ALL
SUM OF ALL RESPONSES TO PHQ9 QUESTIONS 1 AND 2: 0

## 2020-10-07 ASSESSMENT — COGNITIVE AND FUNCTIONAL STATUS - GENERAL
STANDING UP FROM CHAIR USING ARMS: A LITTLE
WALKING IN HOSPITAL ROOM: A LITTLE
MOVING TO AND FROM BED TO CHAIR: A LITTLE
TURNING FROM BACK TO SIDE WHILE IN FLAT BAD: A LITTLE
SUGGESTED CMS G CODE MODIFIER MOBILITY: CK
MOVING FROM LYING ON BACK TO SITTING ON SIDE OF FLAT BED: A LITTLE
CLIMB 3 TO 5 STEPS WITH RAILING: A LOT
MOBILITY SCORE: 17

## 2020-10-07 ASSESSMENT — FIBROSIS 4 INDEX
FIB4 SCORE: 0.26
FIB4 SCORE: 0.26

## 2020-10-07 NOTE — FLOWSHEET NOTE
10/07/20 1505   Patient History   Pulmonary Diagnosis no   Procedures Relevant to Respiratory Status S/P resp failure, vent   Home O2 No   Nocturnal CPAP No   Home Treatments/Frequency No   Sleep Apnea Screening   Have you had a sleep study? No   Have you been diagnosed with sleep apnea? No   S - Have you been told that you SNORE? 0   T - Are you often TIRED during the day? 0   O - Do you know if you stop breathing or has anyone witnessed you stop breathing while you were asleep? (OBSTRUCTION) 0   P - Do you have high blood PRESSURE or on medication to control high blood pressure? 0   B - Is your Body Mass Index greater than 35? (BMI) 0   A - Are you 50 years old or older? (AGE) 0   N - Are you a male with a NECK circumference greater than 17 inches, or a female with a neck circumference greater than 16 inches? 0   G - Are you male? (GENDER) 0   Stop Bang Total Score 0

## 2020-10-07 NOTE — H&P
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 Conde's consult \"The patient is a 45 y.o. left hand dominant female with no known past medical history;  who presented on 9/29/2020  8:22 PM as a transfer from outside hospital where she initially presented with a 2-day history of retro-orbital headache, left facial numbness and seizure-like activity.  Patient received Ativan and was intubated for airway protection prior to being transferred to [facility].  Work-up with MRI brain found brainstem/pontine/midbrain ischemia, and follow-up CTA showed diffuse thrombosis in the left and right PCAs, left vertebral, and basilar arteries. Patient then received basilar artery thrombectomy with Dr. Albert. NIH SS 28 on admission, and NIH SS of 9 today 10/2/2020.   Patient is feeling better today, however does report right arm and leg numbness and tingling, constipation, Faust dependency, some ptosis, lethargy and weakness.\"    In addition to the above history, patient had 10 minutes of seizure at the outside hospital - [facility], requiring Valium, Ativan, prior to intubation. EEG on acute was negative for seizures, though patient is on anti-seizure medication. COVID negative.     Post thrombectomy TICI 3 basilar, and per neurology, for high risk of re-thrombosing due to other nearby occluded arteries - left vertebral and bilateral PCAs. Repeat imagining also with small amount of subarachnoid hemorrhage versus contrast staining. She was extubated on 10/1. Repeat MRI with known stroke in midbrain as well as small new punctate infarctions in the left internal capsule, right " cerebellum, and left khang. Per review of notes, patient apparently had started a strenuous workout program several days prior to her stroke, which is thought to be the etiology of her dissection. She was initially on heparin drip, now on a Lovenox bridge to coumadin. Plan for repeat imaging in 3 months (CTA) to determine whether patient can discontinue warfarin and switch to aspirin. Plan to also wean Keppra in the outpatient stroke bridge clinic. Patient had repeat Head CT on 10/5 without any new findings of hemorrhage. Patient initially had a Cortrak for nutrition, now removed.     Patient current reports right sided weakness, right face and right hand paresthesias, hoarse voice, and double vision. She denies any pain or headache. She moved her bowels yesterday. Her dueñas was removed this morning and she has been able to urinate without retention. She reports her mood is stable, though she is tearful, and is eager to get started with therapy and her recovery. She also reports her thinking is off. Her  Keith will be in tomorrow.     Patient was evaluated by Rehab Medicine physician and Physical Therapy, Occupational Therapy and Speech Therapy and determined to be appropriate for acute inpatient rehab and was transferred to [facility] on 10/7/2020  2:32 PM.    With this acute therapeutic intervention, this patient hopes to improve her functional status, and return to independent living with the supportive care of family.    REVIEW OF SYSTEMS:     A complete review of systems was performed and was negative in detail with the exception of items mentioned elsewhere in this document.    PMH:  Past Medical History:   Diagnosis Date   • Migraine    • Polycystic ovarian syndrome        PSH:  No past surgical history on file.   No surgeries.    Family History   Problem Relation Age of Onset   • Diabetes Mother    • Hypertension Mother    • Diabetes Father    • Hypertension Father          MEDICATIONS:  Current Facility-Administered Medications   Medication Dose   • hydrOXYzine HCl (ATARAX) tablet 50 mg  50 mg   • melatonin tablet 3 mg  3 mg   • Respiratory Therapy Consult     • Pharmacy Consult Request ...Pain Management Review 1 Each  1 Each   • hydrALAZINE (APRESOLINE) tablet 10 mg  10 mg   • acetaminophen (TYLENOL) tablet 650 mg  650 mg   • artificial tears ophthalmic solution 1 Drop  1 Drop   • benzocaine-menthol (CEPACOL) lozenge 1 Lozenge  1 Lozenge   • mag hydrox-al hydrox-simeth (MAALOX PLUS ES or MYLANTA DS) suspension 20 mL  20 mL   • ondansetron (ZOFRAN ODT) dispertab 4 mg  4 mg    Or   • ondansetron (ZOFRAN) syringe/vial injection 4 mg  4 mg   • traZODone (DESYREL) tablet 50 mg  50 mg   • sodium chloride (OCEAN) 0.65 % nasal spray 2 Spray  2 Spray   • lactulose 20 GM/30ML solution 30 mL  30 mL   • docusate sodium (ENEMEEZ) enema 283 mg  283 mg   • fleet enema 133 mL  1 Each   • atorvastatin (LIPITOR) tablet 40 mg  40 mg   • senna-docusate (PERICOLACE or SENOKOT S) 8.6-50 MG per tablet 2 Tab  2 Tab    And   • polyethylene glycol/lytes (MIRALAX) PACKET 1 Packet  1 Packet    And   • magnesium hydroxide (MILK OF MAGNESIA) suspension 30 mL  30 mL    And   • bisacodyl (DULCOLAX) suppository 10 mg  10 mg   • acetaminophen (TYLENOL) tablet 650 mg  650 mg   • acetaminophen/caffeine/butalbital 325-40-50 mg (FIORICET) -40 MG per tablet 1 Tab  1 Tab   • enoxaparin (LOVENOX) inj 80 mg  80 mg   • levETIRAcetam (KEPPRA) tablet 500 mg  500 mg   • MD Alert...Warfarin per Pharmacy     • warfarin (COUMADIN) tablet 5 mg  5 mg       ALLERGIES:  Patient has no known allergies.    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 Family lives in [facility] [place] [facility]    She has been  for 20 years, has a 22 year old daughter who is just graduating from nursing school, and a 15 year old son who lives at home.    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    No drugs or tobacco, has alcohol about once a year.    LEVEL OF FUNCTION PRIOR TO DISABILTY:  Independent, working    LEVEL OF FUNCTION PRIOR TO ADMISSION to [facility]:  Min assist for short distance ambulation  Mod assist for transfers  Min to mod assist for ADLs  Dysphagia  Hypophonia    CURRENT LEVEL OF FUNCTION:   Same as level of function prior to admission to [facility]    PHYSICAL EXAM:     VITAL SIGNS:   height is 1.702 m (5' 7.01\") and weight is 87.5 kg (192 lb 14.4 oz). Her temporal temperature is 35.9 °C (96.7 °F). Her blood pressure is 117/87 and her pulse is 85. Her respiration is 18 and oxygen saturation is 95%.     GENERAL: No apparent distress  HEENT: Normocephalic/atraumatic, white/yellow plaque on tongue  CARDIAC: Regular rate and rhythm, normal S1, S2, no murmurs, no peripheral edema   LUNGS: Clear to auscultation, normal respiratory effort, on room air   ABDOMINAL: bowel sounds present, soft, nontender and nondistended    EXTREMITIES: no edema  MSK: No joint swelling    NEURO:    Mental status: alert  Speech: fluent, no aphasia or dysarthria, hypophonia    CRANIAL NERVES:  2,3: visual acuity grossly intact, left pupil less reactive than right  3,4,6: unable to adduct left eye, mild left ptosis, + diplopia  5: decreased sensation on right side of face  7: no gross facial asymmetry  8: hearing grossly intact  9,10: symmetric palate elevation  11: SCM/Trapezius strength 5/5 bilaterally  12: tongue protrudes midline    Motor:  RUE 4/5  RLE 4+/5  LUE and LLE 5/5  Positive right pronator drift    Sensory:   decreased to light touch on the right arm    DTRs: 3+ RUE and RLE, 2+ LUE and LLE  Several beats of clonus at right ankle  Negative babinski b/l  Positive left Dominguez      RADIOLOGY:          Results for orders placed during the hospital encounter of 09/29/20   MR-BRAIN-WITH & W/O    " Impression 1.  Findings suggesting distal basilar artery and left posterior cerebral artery thrombosis.  2.  Acute ischemia within the midbrain/khang.  3.  Partially empty sella and slightly prominent fluid about the optic nerve sheaths. Correlate for any evidence of intracranial hypertension.    These findings were discussed with Dr. Best on 9/30/2020 10:11 AM.        Results for orders placed during the hospital encounter of 09/29/20   MR-BRAIN-W/O    Impression 1.  Subarachnoid hemorrhage within sulci near the vertex, left greater than right    2.  Acute mid brain and punctate left pontine infarct without mass effect    3.  No other significant finding    Findings were discussed with Dr. Mart on 2015 hours 9/30/2020                                                                                               Results for orders placed during the hospital encounter of 09/29/20   CT-CTA NECK WITH & W/O-POST PROCESSING    Impression 1.  There is abnormality involving the distal left vertebral artery at the level of C2 which may represent combination of focal dissection/thrombus with possible less likely partially thrombosed vascular malformation.  2.  There is an occlusion of the mid and distal basilar artery extending into the left greater than right posterior cerebral arteries, P1 segments.  3.  These findings have already been discussed with the clinical service by Dr. Albert of the IR radiology service.                               LABS:  Recent Labs     10/05/20  0309 10/07/20  0629   SODIUM 134* 137   POTASSIUM 4.1 4.3   CHLORIDE 99 101   CO2 22 23   GLUCOSE 120* 118*   BUN 16 22   CREATININE 0.76 0.70   CALCIUM 9.5 9.3     Recent Labs     10/07/20  0629   WBC 11.3*   RBC 4.25   HEMOGLOBIN 13.0   HEMATOCRIT 38.6   MCV 90.8   MCH 30.6   MCHC 33.7   RDW 40.5   PLATELETCT 465*   MPV 8.8*     Recent Labs     10/05/20  0309 10/06/20  0701 10/07/20  0629   INR 1.06 1.12 1.27*       PRIMARY REHAB DIAGNOSIS:     This patient is a 45 y.o. 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    IMPAIRMENTS:   Cognitive  ADLs/IADLs  Mobility  Speech  Swallow    SECONDARY DIAGNOSIS/MEDICAL CO-MORBIDITIES AFFECTING FUNCTION:    Hypertension  History of migraines  Oral thrush  Seizure prophylaxis    RELEVANT CHANGES SINCE PREADMISSION EVALUATION:    Status unchanged    The patient's rehabilitation potential is Excellent  The patient's medical prognosis is excellent    PLAN:   Discussion and Recommendations, discussed with the patient and/or family:   1. The patient requires an acute inpatient rehabilitation program with a coordinated program of care at an intensity and frequency not available at a lower level of care. This recommendation is substantiated by the patient's medical physicians who recommend that the patient's intervention and assessment of medical issues needs to be done at an acute level of care for patient's safety and maximum outcome.     2. A coordinated program of care will be supplied by an interdisciplinary team of physical therapy, occupational therapy, rehab physician, rehab nursing, and, if needed, speech therapy and rehab psychology. Rehab team presents a patient-specific rehabilitation and education program concentrating on prevention of future problems related to accessibility, mobility, skin, bowel, bladder, sexuality, and psychosocial and medical/surgical problems.     3. Need for Rehabilitation Physician: The rehab physician will be evaluating the patient on a multi-weekly basis to help coordinate the program of care. The rehab physician communicates between medical physicians, therapists, and nurses to maximize the patient's potential outcome. Specific areas in which the rehab physician will be providing daily assessment include the following:   A. Assessing the patient's heart rate and blood pressure  response (vitals monitoring) to activity and making adjustments in medications or conservative measures as needed.   B. The rehab physician will be assessing the frequency at which the program can be increased to allow the patient to reach optimal functional outcome.   C. The rehab physician will also provide assessments in daily skin care, especially in light of patient's impairments in mobility.   D. The rehab physician will provide special expertise in understanding how to work with functional impairment and recommend appropriate interventions, compensatory techniques, and education that will facilitate the patient's outcome.     4. Rehab R.N.   The rehab RN will be working with patient to carry over in room mobility and activities of daily living when the patient is not in 3 hours of skilled therapy. Rehab nursing will be working in conjunction with rehab physician to address all the medical issues above and continue to assess laboratory work and discuss abnormalities with the treating physicians, assess vitals, and response to activity, and discuss and report abnormalities with the rehab physician. Rehab RN will also continue daily skin care, supervise bladder/bowel program, instruct in medication administration, and ensure patient safety.     5. Therapies to treat at intensity and frequency of (may change after completion of evaluation by all therapeutic disciplines):       PT:  Physical therapy to address mobility, transfer, gait training and evaluation for adaptive equipment needs 1hour/day at least 5 days/week for the duration of the ELOS (see below)       OT:  Occupational therapy to address ADLs, self-care, home management training, functional mobility/transfers and assistive device evaluation, and community re-integration 1hour/day at least 5 days/week for the duration of the ELOS (see below).        ST/Dysphagia:  Speech therapy to address speech, language, and cognitive deficits as well as swallowing  difficulties with retraining/dysphagia management and community re-integration with comprehension, expression, cognitive training 1hour/day at least 5 days/week for the duration of the ELOS (see below).     6. Medical management / Rehabilitation Issues/Adverse Potential affecting function as part of rehabilitation plan.    Hypertension  On admission to acute  Monitor need to start medications    History of migraines  PRN tylenol or Fioricet    Oral thrush     Start Nystatin    Seizure prophylaxis  Continue Shoshana ARREDONDO performed a complete drug regimen review and did not identify any potential clinically significant medication issues.    The patient's CODE STATUS was confirmed as FULL CODE on admission, with the patient and/or family at bedside.    REHABILITATION ISSUES/ADVERSE POTENTIAL:  1.  CVA (Cerebrovascular Accident): Continue warfarin for secondary prophylaxis as well as lipid and blood pressure management. Patient demonstrates functional deficits in strength, balance, coordination, and ADL's. Patient is admitted to [facility] for comprehensive rehabilitation therapy as described below.   Rehabilitation nursing monitors bowel and bladder control, educates on medication administration, co-morbidities and monitors patient safety.    2.  DVT prophylaxis:  Patient is on Lovenox bridge to warfarin for anticoagulation upon transfer. Encourage OOB. Monitor daily for signs and symptoms of DVT including but not limited to swelling and pain to prevent the development of DVT that may interfere with therapies.    3.  Pain: No issues with pain currently / Controlled with as needed oral analgesics.    4.  Nutrition/Dysphagia: Dietician monitors nutrient intake, recommend supplements prn and provide nutrition education to pt/family to promote optimal nutrition for wound healing/recovery.     5.  Bladder/bowel:  Start bowel and bladder program, to prevent constipation, urinary retention (which may lead to  UTI), and urinary incontinence (which will impact upon pt's functional independence).   - TV Q3h while awake with post void bladder scans, I&O cath for PVRs >400  - up to commode after meal     6.  Skin/dermal ulcer prophylaxis: Monitor for new skin conditions with q.2 h. turns as required to prevent the development of skin breakdown.     7.  Cognition/Behavior:  Psychologist Dr. Hutchins provides adjustment counseling to illness and psychosocial barriers that may be potential barriers to rehabilitation.     8. Respiratory therapy: RT performs O2 management prn, breathing retraining, pulmonary hygiene and bronchospasm management prn to optimize participation in therapies.    Pt was seen today for 73 min, and entire time spent in face-to-face contact was >50% in counseling and coordination of care as detailed in A/P above.        GOALS/EXPECTED LEVEL OF FUNCTION BASED ON CURRENT MEDICAL AND FUNCTIONAL STATUS (may change based on patient's medical status and rate of impairment recovery):  Transfers:   Supervision  Mobility/Gait:   Supervision  ADL's:   Supervision  Cognition:  Modified Independent to supervision  Swallowing:  Regular with thins    DISPOSITION: Discharge to pre-morbid independent living setting with the supportive care of patient's spouse.      ELOS: 3-4 weeks    dEen Mendoza M.D.  Physical Medicine and Rehabilitation

## 2020-10-07 NOTE — CARE PLAN
Problem: Communication  Goal: The ability to communicate needs accurately and effectively will improve  Outcome: PROGRESSING AS EXPECTED  Note: Pt at risk for decreased ability to communicate due to stroke. Pt voice is soft and raspy, but pt is able to communicate needs effectively. Call light and belongings within reach. Will continue hourly rounding.      Problem: Skin Integrity  Goal: Risk for impaired skin integrity will decrease  Outcome: PROGRESSING AS EXPECTED  Note: Pt at risk for impaired skin integrity due to stroke. Q2 turns implemented. Pt educated on turning self. Will continue to monitor.

## 2020-10-07 NOTE — DISCHARGE PLANNING
Insurance has authorized [facility].  Dr. Mendoza has accepted Obdulia.  Transport has been arranged for 1400.  Dr. Crowder, Bill Spouse, Anabelle BSN & Heather KELSEY are aware.

## 2020-10-07 NOTE — THERAPY
Physical Therapy   Daily Treatment     Patient Name: Obdulia High  Age:  45 y.o., Sex:  female  Medical Record #: [id number]  Today's Date: 10/7/2020     Precautions: (P) Fall Risk, Swallow Precautions ( See Comments)    Assessment    Pt fatigued again today, roommate keeping her up. Pt functioning @ min<->mod assist w/her tasks. Pt fatigued following showering/toileting, only managing 12' w/FWW today. Pt conts to demo Rt UE weakness > Rt LE. Pt following commands throughout her tx efforts, still demo hypophonia.   Excellent rehab candidate.     Plan    Continue current treatment plan.    DC Equipment Recommendations: Unable to determine at this time         Objective       10/07/20 0996   Other Treatments   Other Treatments Provided Assisted pt w/showering today. Pt cued to use her Lft UE to assist. Sit-> shower for pericare.    Balance   Sitting Balance (Static) Fair -   Sitting Balance (Dynamic) Poor +   Standing Balance (Static) Poor +   Standing Balance (Dynamic) Poor   Weight Shift Sitting Fair   Weight Shift Standing Poor   Comments standing w/FWW   Gait Analysis   Gait Level Of Assist Minimal Assist   Assistive Device Front Wheel Walker   Distance (Feet) 12   # of Times Distance was Traveled 1   Skilled Intervention Verbal Cuing;Postural Facilitation   Comments Pt amb out of the BR following showering/toileting. Pt fatigued, needing cueing for walker management.    Bed Mobility    Supine to Sit Minimal Assist  (out the Rt side w/railing assistance)   Sit to Supine Supervised  (HOB flat and no railing)   Scooting Minimal Assist  (seated)   Rolling Minimum Assist to Lt.   Comments Pt managing LE's on/off the bed. Pt needing trunk assist to get seated.    Functional Mobility   Sit to Stand Minimal Assist  (from shower chair/toilet)   Bed, Chair, Wheelchair Transfer Moderate Assist  (bed->shower chair->toilet)   Toilet Transfers Moderate Assist   Skilled Intervention Verbal Cuing;Postural  Facilitation;Compensatory Strategies   Comments Multiple transfers today to different height surfaces.

## 2020-10-07 NOTE — FLOWSHEET NOTE
10/07/20 1505   Events/Summary/Plan   Events/Summary/Plan RT assessment   Vital Signs   Pulse 85   Respiration 18   Pulse Oximetry 95 %   $ Pulse Oximetry (Spot Check) Yes   Respiratory Assessment   Level of Consciousness Alert   Breath Sounds   RLL Breath Sounds Diminished   LLL Breath Sounds Diminished   Oxygen   O2 Delivery Device None - Room Air   Smoking History   Have you ever smoked Never

## 2020-10-07 NOTE — PROGRESS NOTES
Patient admitted to facility at 1445 via wheelchair; accompanied by hospital transport.  Patient assisted to room and positioned in bed for comfort and safety; call light within reach.  Patient assisted with stowing belongings and oriented to room and facility.  Admission assessment performed and documented in computer.  Received and reviewed education binder. Admission paperwork completed; signed copies placed in chart.  Will continue to monitor.         2 RN skin check done with admitting RN and  RN. Face photo and skin photos documented in media.   Pt with Remy score of 18, RN wound protocol not needed at this time.

## 2020-10-07 NOTE — PROGRESS NOTES
[facility] Medicine Daily Progress Note    Date of Service  10/7/2020    Chief Complaint  45 y.o. female admitted 9/29/2020 with headache and facial numbness     Hospital Course    45 y.o. female with unknown past medical history who was transferred from [facility] ED on 9/30/2020 where she presented with 2-day history of retro-orbital headache and left-sided facial numbness.  In the ED she had an episode of seizure requiring intubation for airway protection.  She was transferred to [facility] for higher level of care.  In the ED she was hypertensive, afebrile.  CT head was unremarkable.  Neurology was consulted and patient was admitted to ICU for further monitoring and ventilator management.  MRI was reviewed on 9/30 which revealed  brainstem ischemia. CTA revealed diffuse thrombosis involving left and right PCA,basilar and  vertebral artery with dissection. Patient underwent stat thrombectomy on 9/30/2020. Transition to coumadin. INR goal is 2-3, today is 1.27.  Slowly trending up.  Interval Problem Update  10/5 patient started on Coumadin per neurology. Overnight nursing noted that patient had dysconjugate eye movements and left eye was not tracking, stat CT head done that showed diminishing parenchymal and sulcal contrast staining and or hemorrhage, no evidence of new hemorrhage again redemonstrates acute left thalamus and brainstem infarct seen on previous MRI.  Pending insurance authorization for DC to rehab. Patient has no complaints this morning states she is getting stronger.   10/6 Coumadin 5mg per neurology, int 1.12 today.  Complaints of diplopia which bothers her, eye patchy was not on; explained her about the diplopia is stroked related, need have one eye patch up.Mild weakness on right side of the body  10/7 no overnight event, weakness is improving, diplopia is still there.    Consultants/Specialty  Pulmonology/ Critical Care  Neurology   Physiatry       Code Status  Full Code    Disposition  Pending rehab  placement      Review of Systems  Review of Systems   Constitutional: Negative for chills, fever, malaise/fatigue and weight loss.   HENT: Negative for congestion, ear discharge, ear pain, hearing loss, nosebleeds, sore throat and tinnitus.    Eyes: Positive for double vision. Negative for blurred vision, photophobia, pain, discharge and redness.   Respiratory: Negative for cough, hemoptysis, sputum production, shortness of breath, wheezing and stridor.    Cardiovascular: Negative for chest pain, palpitations, orthopnea, claudication and leg swelling.   Gastrointestinal: Negative for abdominal pain, blood in stool, constipation, diarrhea, heartburn, melena, nausea and vomiting.   Genitourinary: Negative for dysuria, flank pain, frequency, hematuria and urgency.   Musculoskeletal: Negative for back pain, falls, joint pain, myalgias and neck pain.        Rt side of body mild weakness   Skin: Negative for itching and rash.   Neurological: Negative for dizziness, tingling, tremors, sensory change, focal weakness, seizures, loss of consciousness and headaches.   Endo/Heme/Allergies: Negative for environmental allergies and polydipsia. Does not bruise/bleed easily.   Psychiatric/Behavioral: Negative for depression, hallucinations, substance abuse and suicidal ideas. The patient is not nervous/anxious and does not have insomnia.    All other systems reviewed and are negative.       Physical Exam  Temp:  [36.1 °C (97 °F)-36.9 °C (98.5 °F)] 36.3 °C (97.4 °F)  Pulse:  [] 97  Resp:  [16-17] 16  BP: (115-147)/(67-90) 115/81  SpO2:  [93 %-100 %] 100 %    Physical Exam  Constitutional:       General: She is not in acute distress.     Appearance: Normal appearance.   HENT:      Head: Normocephalic and atraumatic.   Eyes:      Conjunctiva/sclera: Conjunctivae normal.   Neck:      Musculoskeletal: Normal range of motion.   Cardiovascular:      Rate and Rhythm: Normal rate and regular rhythm.      Pulses: Normal pulses.    Pulmonary:      Effort: Pulmonary effort is normal. No respiratory distress.      Breath sounds: Normal breath sounds.   Abdominal:      General: Abdomen is flat. Bowel sounds are normal.      Palpations: Abdomen is soft.      Tenderness: There is no abdominal tenderness.   Skin:     General: Skin is warm and dry.   Neurological:      Mental Status: She is alert and oriented to person, place, and time.      Motor: Weakness (right upper and lower extremity, mild, improving) present.   Psychiatric:         Mood and Affect: Mood normal.         Behavior: Behavior normal.         Fluids    Intake/Output Summary (Last 24 hours) at 10/7/2020 1235  Last data filed at 10/7/2020 0538  Gross per 24 hour   Intake 240 ml   Output 700 ml   Net -460 ml       Laboratory  Recent Labs     10/07/20  0629   WBC 11.3*   RBC 4.25   HEMOGLOBIN 13.0   HEMATOCRIT 38.6   MCV 90.8   MCH 30.6   MCHC 33.7   RDW 40.5   PLATELETCT 465*   MPV 8.8*     Recent Labs     10/05/20  0309 10/07/20  0629   SODIUM 134* 137   POTASSIUM 4.1 4.3   CHLORIDE 99 101   CO2 22 23   GLUCOSE 120* 118*   BUN 16 22   CREATININE 0.76 0.70   CALCIUM 9.5 9.3     Recent Labs     10/05/20  0309 10/06/20  0701 10/07/20  0629   INR 1.06 1.12 1.27*               Imaging  CT-HEAD W/O   Final Result      1.  Diminishing parenchymal and sulcal contrast staining and/or hemorrhage in the parasagittal high frontal lobes. No evidence of new/progressive hemorrhage.   2.  Acute lacunar size infarct left thalamus.   3.  Acute brainstem infarct evident in the right side of the khang, best seen on MRI.      EC-ECHOCARDIOGRAM COMPLETE W/O CONT   Final Result      [id number] VIEW   Final Result         1.  Nonspecific bowel gas pattern.   2.  Dobbhoff tube is coiled within the stomach, the tip terminates overlying the expected location of the gastric antrum.      MR-BRAIN-W/O   Final Result      1.  Subarachnoid hemorrhage within sulci near the vertex, left greater than right      2.   Acute mid brain and punctate left pontine infarct without mass effect      3.  No other significant finding      Findings were discussed with Dr. Mart on 2015 hours 9/30/2020      CT-HEAD W/O   Final Result   Addendum 1 of 1   Addendum:   Please note that the patient has had a CTA head and neck as well as    angiogram with thrombectomy earlier today. A portion of the high    attenuation along the arterial vascular structures and dural venous    sinuses is likely related to contrast staining from    the previous contrast-enhanced procedures. However, the high attenuation    in the sulci over the vertex is suspicious for a small amount of new    subarachnoid hemorrhage. Follow-up MRI could be performed for    confirmation.      Findings were discussed with LAXMI BROWN on 9/30/2020 at 5:21 PM.      Final      IR-THROMBO MECHANICAL ARTERY,INIT   Final Result      1.  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   2.  Basilar artery thrombosis.   3.  Successful mechanical thrombectomy of a basilar artery thrombosis.   4.  The final angiogram demonstrates patent basilar, bilateral superior cerebellar and right posterior cerebral arteries. The left P1 segment is patent. The left distal P2 segment is not visualized.      CT-CTA HEAD WITH & W/O-POST PROCESS   Final Result      1.  There is acute mid and distal basilar artery occlusion extending into the left greater than right posterior cerebral arteries.   2.  Findings have already been discussed with the ordering physician and neurologist by Dr. Albert of the IR radiology service.      CT-CTA NECK WITH & W/O-POST PROCESSING   Final Result      1.  There is abnormality involving the distal left vertebral artery at the level of C2 which may represent combination of focal dissection/thrombus with possible less likely partially thrombosed vascular malformation.   2.  There is an occlusion of the mid and distal basilar artery extending into the left greater  than right posterior cerebral arteries, P1 segments.   3.  These findings have already been discussed with the clinical service by Dr. Albert of the IR radiology service.      CT-CEREBRAL PERFUSION ANALYSIS   Final Result      1.  Cerebral blood flow less than 30% likely representing completed infarct = 0 mL.      2.  T Max more than 6 seconds likely representing combination of completed infarct and ischemia = 100 mL.      3.  Mismatched volume likely representing ischemic brain/penumbra = 100 mL      4.  Please note that the cerebral perfusion was performed on the limited brain tissue around the basal ganglia region. Infarct/ischemia outside the CT perfusion sections can be missed in this study.      MR-VENOGRAM (MRV) HEAD   Final Result      Cerebral magnetic resonance venogram within normal limits with no evidence of dural venous sinus thrombosis.      MR-BRAIN-WITH & W/O   Final Result      1.  Findings suggesting distal basilar artery and left posterior cerebral artery thrombosis.   2.  Acute ischemia within the midbrain/khang.   3.  Partially empty sella and slightly prominent fluid about the optic nerve sheaths. Correlate for any evidence of intracranial hypertension.      These findings were discussed with Dr. Best on 9/30/2020 10:11 AM.      DX-CHEST-PORTABLE (1 VIEW)   Final Result         1.  No acute cardiopulmonary disease.           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  Underwent emergent endotracheal intubation for airway protection secondary to seizure on admission  Further work-up revealed brainstem ischemia secondary to diffuse thrombosis in basilar, B/L PCA and vertebral artery.   Underwent emergent basilar artery thrombectomy on 9/30.  Neurology on board, due to diffuse  thrombosis and increased risk of re thrombosis decision was made to start the patient on heparin drip.  EEG obtained on 9/30- for seizure.Neurology recommends to continue Keppra for now.  Started coumadin per neurology recs   PT/OT/Physiatry following, likely dc rehab   10/6 on coumadin 5mg, pending auth; PT/OT/Physiatry following    Headache- (present on admission)  Assessment & Plan  History of complex migraine  Diagnosed with vertebral artery dissection, basilar and PCA thrombosis  Neurology following  Continue to monitor     Seizure (HCC)- (present on admission)  Assessment & Plan  No history of seizures in the past  One episode of seizure in the ED, intubated for airway protection  Currently on Keppra per neurology  EEG on 9/30-negative for seizure    Acute respiratory failure with hypoxia and hypercapnia (HCC)  Assessment & Plan  Intubated on 9/29 for airway protection in the setting of seizure  Extubated on 10/1, tolerated well, incentive spirometry, currently on room air  10/6 tolerating room air      Essential hypertension- (present on admission)  Assessment & Plan  Hypertension on presentation  BP at goal  Labetalol, enalaprilat prn.  Goal SBP less than 160       VTE prophylaxis: coumadin    I have performed a physical exam and reviewed and updated ROS and Plan today (10/7/2020). In review of yesterday's note (10/6/2020), there are no changes except as documented above.

## 2020-10-07 NOTE — PROGRESS NOTES
Pt transferred to [facility] in a wheelchair via transport. Pt  aware of transfer. Discharge education and COBRA given to transport. Pt bladder scanned prior to transfer for 179mls. Order to straight cath if bladder scan is >200mls. This RN called over to [facility] and left a message for RN to call back to be made aware.

## 2020-10-07 NOTE — PROGRESS NOTES
Inpatient Anticoagulation Service Note    Date: 10/7/2020    Reason for Anticoagulation: Stroke, Other (Comments)(vertebral artery dissection)   Target INR: 2.0 to 3.0         Hemoglobin Value: 13  Hematocrit Value: 38.6  Lab Platelet Value: (!) 465    INR from last 7 days     Date/Time INR Value    10/07/20 0629  (!) 1.27    10/06/20 0701  1.12    10/05/20 0309  1.06    10/04/20 1547  1.02    10/01/20 0015  1.05    09/30/20 2035  1.02    09/30/20 1600  1.06        Dose from last 7 days     Date/Time Dose (mg)    10/07/20 1234  5    10/06/20 1340  5    10/05/20 1128  5    10/04/20 1541  5        Significant Interactions: Statin  Bridge Therapy: Yes  Date of Last VTE Event: 09/29/20  Bridge Therapy Start Date: 10/04/20  Days of Overlap Therapy: 3 (If less than 5 days and overlap therapy discontinued -- document reason (i.e. Bleed Risk))  INR Value Greater than 2 Prior to Discontinuation of Parenteral Anticoagulation: Not Applicable (If still on overlap therapy, if No -- document reason (i.e. Bleed Risk))    Reversal Agent Administered: Not Applicable  Comments: INR subtherapeutic at 1.27, goal 2-3 but trending up. INR subtherapeutic as expected as patient has only received 3 doses of warfarin. As INR continues to trend up, will continue with warfarin 5 mg po qday. DDI with statin noted. H/h stable. No overt s/sx of bleeding documented. Of note, patient with minimal PO intake which may cause fluctuations in INR. Pharmacy will continue to monitor.    Plan:  Warfarin 5 mg qday, repeat INR in AM  Education Material Provided?: Yes(Provided by PharmD student)  Pharmacist suggested discharge dosing: Warfarin 5 mg po qday, with repeat INR w/i 48 hours of discharge.     Pat Olsen, PharmD, BCPS

## 2020-10-07 NOTE — DISCHARGE SUMMARY
Discharge Summary    CHIEF COMPLAINT ON ADMISSION  Chief Complaint   Patient presents with   • Seizure   • Headache       Reason for Admission  EMS - Transfer      CODE STATUS  Full Code    HPI & HOSPITAL COURSE     45 y.o. female with unknown past medical history who was transferred from [place] ED on 9/30/2020 where she presented with 2-day history of retro-orbital headache and left-sided facial numbness.  In the ED she had an episode of seizure requiring intubation for airway protection.  She was transferred to [facility] for higher level of care.  In the ED she was hypertensive, afebrile.  CT head was unremarkable.  Neurology was consulted and patient was admitted to ICU for further monitoring and ventilator management.  MRI was reviewed on 9/30 which revealed  brainstem ischemia. CTA revealed diffuse thrombosis involving left and right PCA,basilar and  vertebral artery with dissection. Patient underwent stat thrombectomy on 9/30/2020. Transition to coumadin. INR goal is 2-3, today is 1.27.  Slowly trending up.  Interval Problem Update  10/5 patient started on Coumadin per neurology. Overnight nursing noted that patient had dysconjugate eye movements and left eye was not tracking, stat CT head done that showed diminishing parenchymal and sulcal contrast staining and or hemorrhage, no evidence of new hemorrhage again redemonstrates acute left thalamus and brainstem infarct seen on previous MRI.  Pending insurance authorization for DC to rehab. Patient has no complaints this morning states she is getting stronger.   10/6 Coumadin 5mg per neurology, int 1.12 today.  Complaints of diplopia which bothers her, eye patchy was not on; explained her about the diplopia is stroked related, need have one eye patch up.Mild weakness on right side of the body  10/7 no overnight event, weakness is improving, diplopia is still there.    Consultants/Specialty  Pulmonology/ Critical Care  Neurology   Physiatry       Code Status  Full  Code    Disposition  Pending rehab placement      Review of Systems  Review of Systems   Constitutional: Negative for chills, fever, malaise/fatigue and weight loss.   HENT: Negative for congestion, ear discharge, ear pain, hearing loss, nosebleeds, sore throat and tinnitus.    Eyes: Positive for double vision. Negative for blurred vision, photophobia, pain, discharge and redness.   Respiratory: Negative for cough, hemoptysis, sputum production, shortness of breath, wheezing and stridor.    Cardiovascular: Negative for chest pain, palpitations, orthopnea, claudication and leg swelling.   Gastrointestinal: Negative for abdominal pain, blood in stool, constipation, diarrhea, heartburn, melena, nausea and vomiting.   Genitourinary: Negative for dysuria, flank pain, frequency, hematuria and urgency.   Musculoskeletal: Negative for back pain, falls, joint pain, myalgias and neck pain.        Rt side of body mild weakness   Skin: Negative for itching and rash.   Neurological: Negative for dizziness, tingling, tremors, sensory change, focal weakness, seizures, loss of consciousness and headaches.   Endo/Heme/Allergies: Negative for environmental allergies and polydipsia. Does not bruise/bleed easily.   Psychiatric/Behavioral: Negative for depression, hallucinations, substance abuse and suicidal ideas. The patient is not nervous/anxious and does not have insomnia.    All other systems reviewed and are negative.       Physical Exam  Temp:  [36.1 °C (97 °F)-36.9 °C (98.5 °F)] 36.3 °C (97.4 °F)  Pulse:  [] 97  Resp:  [16-17] 16  BP: (115-147)/(67-90) 115/81  SpO2:  [93 %-100 %] 100 %    Physical Exam  Constitutional:       General: She is not in acute distress.     Appearance: Normal appearance.   HENT:      Head: Normocephalic and atraumatic.   Eyes:      Conjunctiva/sclera: Conjunctivae normal.   Neck:      Musculoskeletal: Normal range of motion.   Cardiovascular:      Rate and Rhythm: Normal rate and regular rhythm.       Pulses: Normal pulses.   Pulmonary:      Effort: Pulmonary effort is normal. No respiratory distress.      Breath sounds: Normal breath sounds.   Abdominal:      General: Abdomen is flat. Bowel sounds are normal.      Palpations: Abdomen is soft.      Tenderness: There is no abdominal tenderness.   Skin:     General: Skin is warm and dry.   Neurological:      Mental Status: She is alert and oriented to person, place, and time.      Motor: Weakness (right upper and lower extremity, mild, improving) present.   Psychiatric:         Mood and Affect: Mood normal.         Behavior: Behavior normal.         Fluids    Intake/Output Summary (Last 24 hours) at 10/7/2020 1235  Last data filed at 10/7/2020 0538  Gross per 24 hour   Intake 240 ml   Output 700 ml   Net -460 ml       Laboratory  Recent Labs     10/07/20  0629   WBC 11.3*   RBC 4.25   HEMOGLOBIN 13.0   HEMATOCRIT 38.6   MCV 90.8   MCH 30.6   MCHC 33.7   RDW 40.5   PLATELETCT 465*   MPV 8.8*     Recent Labs     10/05/20  0309 10/07/20  0629   SODIUM 134* 137   POTASSIUM 4.1 4.3   CHLORIDE 99 101   CO2 22 23   GLUCOSE 120* 118*   BUN 16 22   CREATININE 0.76 0.70   CALCIUM 9.5 9.3     Recent Labs     10/05/20  0309 10/06/20  0701 10/07/20  0629   INR 1.06 1.12 1.27*               Imaging  CT-HEAD W/O   Final Result      1.  Diminishing parenchymal and sulcal contrast staining and/or hemorrhage in the parasagittal high frontal lobes. No evidence of new/progressive hemorrhage.   2.  Acute lacunar size infarct left thalamus.   3.  Acute brainstem infarct evident in the right side of the khang, best seen on MRI.      EC-ECHOCARDIOGRAM COMPLETE W/O CONT   Final Result      [id number] VIEW   Final Result         1.  Nonspecific bowel gas pattern.   2.  Dobbhoff tube is coiled within the stomach, the tip terminates overlying the expected location of the gastric antrum.      MR-BRAIN-W/O   Final Result      1.  Subarachnoid hemorrhage within sulci near the vertex, left  greater than right      2.  Acute mid brain and punctate left pontine infarct without mass effect      3.  No other significant finding      Findings were discussed with Dr. Mart on 2015 hours 9/30/2020      CT-HEAD W/O   Final Result   Addendum 1 of 1   Addendum:   Please note that the patient has had a CTA head and neck as well as    angiogram with thrombectomy earlier today. A portion of the high    attenuation along the arterial vascular structures and dural venous    sinuses is likely related to contrast staining from    the previous contrast-enhanced procedures. However, the high attenuation    in the sulci over the vertex is suspicious for a small amount of new    subarachnoid hemorrhage. Follow-up MRI could be performed for    confirmation.      Findings were discussed with LAXMI BROWN on 9/30/2020 at 5:21 PM.      Final      IR-THROMBO MECHANICAL ARTERY,INIT   Final Result      1.  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   2.  Basilar artery thrombosis.   3.  Successful mechanical thrombectomy of a basilar artery thrombosis.   4.  The final angiogram demonstrates patent basilar, bilateral superior cerebellar and right posterior cerebral arteries. The left P1 segment is patent. The left distal P2 segment is not visualized.      CT-CTA HEAD WITH & W/O-POST PROCESS   Final Result      1.  There is acute mid and distal basilar artery occlusion extending into the left greater than right posterior cerebral arteries.   2.  Findings have already been discussed with the ordering physician and neurologist by Dr. Albert of the IR radiology service.      CT-CTA NECK WITH & W/O-POST PROCESSING   Final Result      1.  There is abnormality involving the distal left vertebral artery at the level of C2 which may represent combination of focal dissection/thrombus with possible less likely partially thrombosed vascular malformation.   2.  There is an occlusion of the mid and distal basilar artery  extending into the left greater than right posterior cerebral arteries, P1 segments.   3.  These findings have already been discussed with the clinical service by Dr. Albert of the IR radiology service.      CT-CEREBRAL PERFUSION ANALYSIS   Final Result      1.  Cerebral blood flow less than 30% likely representing completed infarct = 0 mL.      2.  T Max more than 6 seconds likely representing combination of completed infarct and ischemia = 100 mL.      3.  Mismatched volume likely representing ischemic brain/penumbra = 100 mL      4.  Please note that the cerebral perfusion was performed on the limited brain tissue around the basal ganglia region. Infarct/ischemia outside the CT perfusion sections can be missed in this study.      MR-VENOGRAM (MRV) HEAD   Final Result      Cerebral magnetic resonance venogram within normal limits with no evidence of dural venous sinus thrombosis.      MR-BRAIN-WITH & W/O   Final Result      1.  Findings suggesting distal basilar artery and left posterior cerebral artery thrombosis.   2.  Acute ischemia within the midbrain/khang.   3.  Partially empty sella and slightly prominent fluid about the optic nerve sheaths. Correlate for any evidence of intracranial hypertension.      These findings were discussed with Dr. Best on 9/30/2020 10:11 AM.      DX-CHEST-PORTABLE (1 VIEW)   Final Result         1.  No acute cardiopulmonary disease.           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  Underwent emergent endotracheal intubation for airway protection secondary to seizure on admission  Further work-up revealed brainstem ischemia secondary to diffuse thrombosis in basilar, B/L PCA and vertebral artery.   Underwent emergent basilar artery thrombectomy on 9/30.  Neurology on  board, due to diffuse thrombosis and increased risk of re thrombosis decision was made to start the patient on heparin drip.  EEG obtained on 9/30- for seizure.Neurology recommends to continue Keppra for now.  Started coumadin per neurology recs   PT/OT/Physiatry following, likely dc rehab   10/6 on coumadin 5mg, pending auth; PT/OT/Physiatry following    Headache- (present on admission)  Assessment & Plan  History of complex migraine  Diagnosed with vertebral artery dissection, basilar and PCA thrombosis  Neurology following  Continue to monitor     Seizure (HCC)- (present on admission)  Assessment & Plan  No history of seizures in the past  One episode of seizure in the ED, intubated for airway protection  Currently on Keppra per neurology  EEG on 9/30-negative for seizure    Acute respiratory failure with hypoxia and hypercapnia (HCC)  Assessment & Plan  Intubated on 9/29 for airway protection in the setting of seizure  Extubated on 10/1, tolerated well, incentive spirometry, currently on room air  10/6 tolerating room air      Essential hypertension- (present on admission)  Assessment & Plan  Hypertension on presentation  BP at goal  Labetalol, enalaprilat prn.  Goal SBP less than 160       VTE prophylaxis: coumadin    I have performed a physical exam and reviewed and updated ROS and Plan today (10/7/2020). In review of yesterday's note (10/6/2020), there are no changes except as documented above.             Therefore, she is discharged in good and stable condition to an inpatient rehabilitation hospital.    The patient recovered much more quickly than anticipated on admission.      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 *      FOLLOW UP  No future appointments.  No follow-up provider specified.    MEDICATIONS ON DISCHARGE     Medication List      START taking these medications      Instructions   atorvastatin 40 MG Tabs  Commonly known as: LIPITOR   Take 1 Tab by mouth every evening.  Dose: 40 mg     levETIRAcetam 500 MG Tabs  Commonly known as: KEPPRA   Take 1 Tab by mouth 2 Times a Day.  Dose: 500 mg     warfarin 5 MG Tabs  Commonly known as: COUMADIN   Take 1 Tab by mouth every day at 6 PM.  Dose: 5 mg            Allergies  No Known Allergies    DIET  Orders Placed This Encounter   Procedures   • Diet Order Regular (Direct supervision. Meds crushed)     Standing Status:   Standing     Number of Occurrences:   1     Order Specific Question:   Diet:     Answer:   Regular [1]     Comments:   Direct supervision. Meds crushed     Order Specific Question:   Texture Modifier     Answer:   Level 3 - Liquidised (Nectar Thick Full Liquid)     Order Specific Question:   Liquid Level     Answer:   Level 2 - Mildly Thick     Order Specific Question:   Miscellaneous modifications:     Answer:   SLP - 1:1 Supervision by Nursing [21]     Order Specific Question:   Miscellaneous modifications:     Answer:   SLP - Deliver to Nursing Station [22]       ACTIVITY  As tolerated.  Weight bearing as tolerated    LINES, DRAINS, AND WOUNDS  This is an automated list. Peripheral IVs will be removed prior to discharge.  Peripheral IV 09/29/20 20 G Right Hand (Active)   Site Assessment Clean;Dry;Intact 10/07/20 0800   Dressing Type Occlusive;Transparent 10/07/20 0800   Line Status Scrubbed the hub prior to access;Flushed;Saline locked 10/07/20 0800   Dressing Status Dry;Intact;Old drainage 10/07/20 0800   Dressing Intervention N/A 10/07/20 0800   Dressing Change Due 10/07/20 10/01/20 0800   Date Primary Tubing Changed 09/29/20 10/01/20 2000   NEXT  Primary Tubing Change  10/03/20 10/01/20 2000   Infiltration Grading ([facility], [facility]) 0 10/07/20 0800   Phlebitis Scale ([facility] Only) 0 10/07/20 0800       Peripheral IV 10/01/20 20 G Anterior;Left Forearm (Active)   Site Assessment Clean;Dry;Intact 10/07/20 0800   Dressing Type Occlusive;Transparent 10/07/20 0800   Line Status Scrubbed the hub prior to access;Flushed;Saline locked 10/07/20 0800   Dressing Status Clean;Dry;Intact 10/07/20 0800   Dressing Intervention N/A 10/07/20 0800   Dressing Change Due 10/08/20 10/01/20 0800   Infiltration Grading ([facility]) 0 10/07/20 0800   Phlebitis Scale ([facility] Only) 0 10/07/20 0800          Peripheral IV 09/29/20 20 G Right Hand (Active)   Site Assessment Clean;Dry;Intact 10/07/20 0800   Dressing Type Occlusive;Transparent 10/07/20 0800   Line Status Scrubbed the hub prior to access;Flushed;Saline locked 10/07/20 0800   Dressing Status Dry;Intact;Old drainage 10/07/20 0800   Dressing Intervention N/A 10/07/20 0800   Dressing Change Due 10/07/20 10/01/20 0800   Date Primary Tubing Changed 09/29/20 10/01/20 2000   NEXT Primary Tubing Change  10/03/20 10/01/20 2000   Infiltration Grading ([facility]) 0 10/07/20 0800   Phlebitis Scale ([facility] Only) 0 10/07/20 0800       Peripheral IV 10/01/20 20 G Anterior;Left Forearm (Active)   Site Assessment Clean;Dry;Intact 10/07/20 0800   Dressing Type Occlusive;Transparent 10/07/20 0800   Line Status Scrubbed the hub prior to access;Flushed;Saline locked 10/07/20 0800   Dressing Status Clean;Dry;Intact 10/07/20 0800   Dressing Intervention N/A 10/07/20 0800   Dressing Change Due 10/08/20 10/01/20 0800   Infiltration Grading ([facility]) 0 10/07/20 0800   Phlebitis Scale ([facility] Only) 0 10/07/20 0800               MENTAL STATUS ON TRANSFER  Level of Consciousness: Alert  Orientation : Unable to Assess  Speech: Speech Clear(soft; weak voice)    CONSULTATIONS  Neurology  ophthal neurology    PROCEDURES  no    LABORATORY  Lab Results    Component Value Date    SODIUM 137 10/07/2020    POTASSIUM 4.3 10/07/2020    CHLORIDE 101 10/07/2020    CO2 23 10/07/2020    GLUCOSE 118 (H) 10/07/2020    BUN 22 10/07/2020    CREATININE 0.70 10/07/2020        Lab Results   Component Value Date    WBC 11.3 (H) 10/07/2020    HEMOGLOBIN 13.0 10/07/2020    HEMATOCRIT 38.6 10/07/2020    PLATELETCT 465 (H) 10/07/2020        Total time of the discharge process exceeds 30 minutes.

## 2020-10-07 NOTE — CARE PLAN
Problem: Nutritional:  Goal: Achieve adequate nutritional intake  Description: Patient will consume 50% of meals  Outcome: PROGRESSING SLOWER THAN EXPECTED   PO noted 25-50% x3 meals yesterday. Pt agreeable to Boost supplements now - will add TID pending MD approval.     RD following.

## 2020-10-07 NOTE — PROGRESS NOTES
With patient’s permission (if able), completed daily phone call to designated support person, dexter Khan.  Discussed patient condition and plan of care. All questions answered.  Follow up requested: N/A

## 2020-10-07 NOTE — PROGRESS NOTES
Pharmacy Warfarin Consult   10/7/2020     45 y.o.   female     Indication for anticoagulation: Stroke    Goal INR = 2 - 3    Recent Labs     10/05/20  0309 10/06/20  0701 10/07/20  0629   INR 1.06 1.12 1.27*   HEMOGLOBIN  --   --  13.0   HEMATOCRIT  --   --  38.6       Pertinent Drug/Drug Interactions:  Statin, prn trazodone  Outpatient Warfarin Regimen:  New start  Recent Warfarin Dosing:    INR from last 7 days      Date/Time INR Value        Dose     10/07/20 0629  (!) 1.27               5 mg     10/06/20 0701  1.12                      5     10/05/20 0309  1.06                      5     10/04/20 1547  1.02                      5     10/01/20 0015  1.05     09/30/20 2035  1.02     09/30/20 1600  1.06              Bridge Therapy: Lovenox 80 mg q12h        1.  Warfarin 5 mg tonight for INR = 1.27        Shane Roberts [facility]

## 2020-10-07 NOTE — DISCHARGE INSTRUCTIONS
Discharge Instructions    Discharged to other by medical transportation with self. Discharged via wheelchair, hospital escort: Yes.  Special equipment needed: Not Applicable    Be sure to schedule a follow-up appointment with your primary care doctor or any specialists as instructed.     Discharge Plan:   Diet Plan: Discussed  Activity Level: Discussed  Confirmed Follow up Appointment: No (Comments)(Pt transferring to [facility])  Confirmed Symptoms Management: Discussed  Medication Reconciliation Updated: Yes  Influenza Vaccine Indication: Patient Refuses    I understand that a diet low in cholesterol, fat, and sodium is recommended for good health. Unless I have been given specific instructions below for another diet, I accept this instruction as my diet prescription.   Other diet: Thickened liquids; puree    Special Instructions: None    · Is patient discharged on Warfarin / Coumadin?   Yes    You are receiving the drug warfarin. Please understand the importance of monitoring warfarin with scheduled PT/INR blood draws.  Follow-up with the [facility] in one week for INR lab..    IMPORTANT: HOW TO USE THIS INFORMATION:  This is a summary and does NOT have all possible information about this product. This information does not assure that this product is safe, effective, or appropriate for you. This information is not individual medical advice and does not substitute for the advice of your health care professional. Always ask your health care professional for complete information about this product and your specific health needs.      WARFARIN - ORAL (WARF-uh-rin)      COMMON BRAND NAME(S): Coumadin      WARNING:  Warfarin can cause very serious (possibly fatal) bleeding. This is more likely to occur when you first start taking this medication or if you take too much warfarin. To decrease your risk for bleeding, your doctor or other health care provider will monitor you closely and check your lab results (INR  "test) to make sure you are not taking too much warfarin. Keep all medical and laboratory appointments. Tell your doctor right away if you notice any signs of serious bleeding. See also Side Effects section.      USES:  This medication is used to treat blood clots (such as in deep vein thrombosis-DVT or pulmonary embolus-PE) and/or to prevent new clots from forming in your body. Preventing harmful blood clots helps to reduce the risk of a stroke or heart attack. Conditions that increase your risk of developing blood clots include a certain type of irregular heart rhythm (atrial fibrillation), heart valve replacement, recent heart attack, and certain surgeries (such as hip/knee replacement). Warfarin is commonly called a \"blood thinner,\" but the more correct term is \"anticoagulant.\" It helps to keep blood flowing smoothly in your body by decreasing the amount of certain substances (clotting proteins) in your blood.      HOW TO USE:  Read the Medication Guide provided by your pharmacist before you start taking warfarin and each time you get a refill. If you have any questions, ask your doctor or pharmacist. Take this medication by mouth with or without food as directed by your doctor or other health care professional, usually once a day. It is very important to take it exactly as directed. Do not increase the dose, take it more frequently, or stop using it unless directed by your doctor. Dosage is based on your medical condition, laboratory tests (such as INR), and response to treatment. Your doctor or other health care provider will monitor you closely while you are taking this medication to determine the right dose for you. Use this medication regularly to get the most benefit from it. To help you remember, take it at the same time each day. It is important to eat a balanced, consistent diet while taking warfarin. Some foods can affect how warfarin works in your body and may affect your treatment and dose. Avoid " sudden large increases or decreases in your intake of foods high in vitamin K (such as broccoli, cauliflower, cabbage, brussels sprouts, kale, spinach, and other green leafy vegetables, liver, green tea, certain vitamin supplements). If you are trying to lose weight, check with your doctor before you try to go on a diet. Cranberry products may also affect how your warfarin works. Limit the amount of cranberry juice (16 ounces/480 milliliters a day) or other cranberry products you may drink or eat.      SIDE EFFECTS:  Nausea, loss of appetite, or stomach/abdominal pain may occur. If any of these effects persist or worsen, tell your doctor or pharmacist promptly. Remember that your doctor has prescribed this medication because he or she has judged that the benefit to you is greater than the risk of side effects. Many people using this medication do not have serious side effects. This medication can cause serious bleeding if it affects your blood clotting proteins too much (shown by unusually high INR lab results). Even if your doctor stops your medication, this risk of bleeding can continue for up to a week. Tell your doctor right away if you have any signs of serious bleeding, including: unusual pain/swelling/discomfort, unusual/easy bruising, prolonged bleeding from cuts or gums, persistent/frequent nosebleeds, unusually heavy/prolonged menstrual flow, pink/dark urine, coughing up blood, vomit that is bloody or looks like coffee grounds, severe headache, dizziness/fainting, unusual or persistent tiredness/weakness, bloody/black/tarry stools, chest pain, shortness of breath, difficulty swallowing. Tell your doctor right away if any of these unlikely but serious side effects occur: persistent nausea/vomiting, severe stomach/abdominal pain, yellowing eyes/skin. This drug rarely has caused very serious (possibly fatal) problems if its effects lead to small blood clots (usually at the beginning of treatment). This can  lead to severe skin/tissue damage that may require surgery or amputation if left untreated. Patients with certain blood conditions (protein C or S deficiency) may be at greater risk. Get medical help right away if any of these rare but serious side effects occur: painful/red/purplish patches on the skin (such as on the toe, breast, abdomen), change in the amount of urine, vision changes, confusion, slurred speech, weakness on one side of the body. A very serious allergic reaction to this drug is rare. However, get medical help right away if you notice any symptoms of a serious allergic reaction, including: rash, itching/swelling (especially of the face/tongue/throat), severe dizziness, trouble breathing. This is not a complete list of possible side effects. If you notice other effects not listed above, contact your doctor or pharmacist. In the [place] - Call your doctor for medical advice about side effects. You may report side effects to FDA at 3-001-PZM-0954. In [place] - Call your doctor for medical advice about side effects. You may report side effects to [facility] [place] at 1-675.515.4179.      PRECAUTIONS:  Before taking warfarin, tell your doctor or pharmacist if you are allergic to it; or if you have any other allergies. This product may contain inactive ingredients, which can cause allergic reactions or other problems. Talk to your pharmacist for more details. Before using this medication, tell your doctor or pharmacist your medical history, especially of: blood disorders (such as anemia, hemophilia), bleeding problems (such as bleeding of the stomach/intestines, bleeding in the brain), blood vessel disorders (such as aneurysms), recent major injury/surgery, liver disease, alcohol use, mental/mood disorders (including memory problems), frequent falls/injuries. It is important that all your doctors and dentists know that you take warfarin. Before having surgery or any medical/dental procedures, tell your doctor or  dentist that you are taking this medication and about all the products you use (including prescription drugs, nonprescription drugs, and herbal products). Avoid getting injections into the muscles. If you must have an injection into a muscle (for example, a flu shot), it should be given in the arm. This way, it will be easier to check for bleeding and/or apply pressure bandages. This medication may cause stomach bleeding. Daily use of alcohol while using this medicine will increase your risk for stomach bleeding and may also affect how this medication works. Limit or avoid alcoholic beverages. If you have not been eating well, if you have an illness or infection that causes fever, vomiting, or diarrhea for more than 2 days, or if you start using any antibiotic medications, contact your doctor or pharmacist immediately because these conditions can affect how warfarin works. This medication can cause heavy bleeding. To lower the chance of getting cut, bruised, or injured, use great caution with sharp objects like safety razors and nail cutters. Use an electric razor when shaving and a soft toothbrush when brushing your teeth. Avoid activities such as contact sports. If you fall or injure yourself, especially if you hit your head, call your doctor immediately. Your doctor may need to check you. The Food & Drug Administration has stated that generic warfarin products are interchangeable. However, consult your doctor or pharmacist before switching warfarin products. Be careful not to take more than one medication that contains warfarin unless specifically directed by the doctor or health care provider who is monitoring your warfarin treatment. Older adults may be at greater risk for bleeding while using this drug. This medication is not recommended for use during pregnancy because of serious (possibly fatal) harm to an unborn baby. Discuss the use of reliable forms of birth control with your doctor. If you become  "pregnant or think you may be pregnant, tell your doctor immediately. If you are planning pregnancy, discuss a plan for managing your condition with your doctor before you become pregnant. Your doctor may switch the type of medication you use during pregnancy. Very small amounts of this medication may pass into breast milk but is unlikely to harm a nursing infant. Consult your doctor before breast-feeding.      DRUG INTERACTIONS:  Drug interactions may change how your medications work or increase your risk for serious side effects. This document does not contain all possible drug interactions. Keep a list of all the products you use (including prescription/nonprescription drugs and herbal products) and share it with your doctor and pharmacist. Do not start, stop, or change the dosage of any medicines without your doctor's approval. Warfarin interacts with many prescription, nonprescription, vitamin, and herbal products. This includes medications that are applied to the skin or inside the vagina or rectum. The interactions with warfarin usually result in an increase or decrease in the \"blood-thinning\" (anticoagulant) effect. Your doctor or other health care professional should closely monitor you to prevent serious bleeding or clotting problems. While taking warfarin, it is very important to tell your doctor or pharmacist of any changes in medications, vitamins, or herbal products that you are taking. Some products that may interact with this drug include: capecitabine, imatinib, mifepristone. Aspirin, aspirin-like drugs (salicylates), and nonsteroidal anti-inflammatory drugs (NSAIDs such as ibuprofen, naproxen, celecoxib) may have effects similar to warfarin. These drugs may increase the risk of bleeding problems if taken during treatment with warfarin. Carefully check all prescription/nonprescription product labels (including drugs applied to the skin such as pain-relieving creams) since the products may contain " NSAIDs or salicylates. Talk to your doctor about using a different medication (such as acetaminophen) to treat pain/fever. Low-dose aspirin and related drugs (such as clopidogrel, ticlopidine) should be continued if prescribed by your doctor for specific medical reasons such as heart attack or stroke prevention. Consult your doctor or pharmacist for more details. Many herbal products interact with warfarin. Tell your doctor before taking any herbal products, especially bromelains, coenzyme Q10, cranberry, danshen, dong quai, fenugreek, garlic, ginkgo biloba, ginseng, and [place]'s wort, among others. This medication may interfere with a certain laboratory test to measure theophylline levels, possibly causing false test results. Make sure laboratory personnel and all your doctors know you use this drug.      OVERDOSE:  If overdose is suspected, contact a poison control center or emergency room immediately. [place] residents can call the [employer] [place] Poison Hotline at 1-721.224.9107. [place] residents can call a provincial poison control center. Symptoms of overdose may include: bloody/black/tarry stools, pink/dark urine, unusual/prolonged bleeding.      NOTES:  Do not share this medication with others. Laboratory and/or medical tests (such as INR, complete blood count) must be performed periodically to monitor your progress or check for side effects. Consult your doctor for more details.      MISSED DOSE:  For the best possible benefit, do not miss any doses. If you do miss a dose and remember on the same day, take it as soon as you remember. If you remember on the next day, skip the missed dose and resume your usual dosing schedule. Do not double the dose to catch up because this could increase your risk for bleeding. Keep a record of missed doses to give to your doctor or pharmacist. Contact your doctor or pharmacist if you miss 2 or more doses in a row.      STORAGE:  Store at room temperature away from light and  moisture. Do not store in the bathroom. Keep all medications away from children and pets. Do not flush medications down the toilet or pour them into a drain unless instructed to do so. Properly discard this product when it is  or no longer needed. Consult your pharmacist or local waste disposal company for more details about how to safely discard your product.      MEDICAL ALERT:  Your condition and medication can cause complications in a medical emergency. For information about enrolling in MedicAlert, call 1-947.584.5380 ([place]) or 1-624.808.1003 ([place]).      Information last revised 2010 Copyright(c)  [employer]             Depression / Suicide Risk    As you are discharged from this [facility] facility, it is important to learn how to keep safe from harming yourself.    Recognize the warning signs:  · Abrupt changes in personality, positive or negative- including increase in energy   · Giving away possessions  · Change in eating patterns- significant weight changes-  positive or negative  · Change in sleeping patterns- unable to sleep or sleeping all the time   · Unwillingness or inability to communicate  · Depression  · Unusual sadness, discouragement and loneliness  · Talk of wanting to die  · Neglect of personal appearance   · Rebelliousness- reckless behavior  · Withdrawal from people/activities they love  · Confusion- inability to concentrate     If you or a loved one observes any of these behaviors or has concerns about self-harm, here's what you can do:  · Talk about it- your feelings and reasons for harming yourself  · Remove any means that you might use to hurt yourself (examples: pills, rope, extension cords, firearm)  · Get professional help from the community (Mental Health, Substance Abuse, psychological counseling)  · Do not be alone:Call your Safe Contact- someone whom you trust who will be there for you.  · Call your local CRISIS HOTLINE 596-3417 or  048-614-4306  · Call your local Children's Mobile Crisis Response Team [place] (097) 591-5248 or www.[employer]  · Call the toll free National Suicide Prevention Hotlines   · National Suicide Prevention Lifeline 668-904-FBVM (2460)  · National [employer] Line Network 800-SUICIDE (212-3052)

## 2020-10-07 NOTE — DISCHARGE PLANNING
Anticipated Discharge Disposition: Rehab    Action: Lsw completed assessment with spouse Bill over the phone. Bill confirmed information on facesheet. Pt and family just recently moved to [place] from [place] and have limited support and knowledge of community resources. Pt was independent with ADL/iADLs prior to admission. Pt drives. No hx of sa or mh.    Barriers to Discharge: insurance authorization    Plan: Lsw to assist as needed      Care Transition Team Assessment    Information Source  Orientation : Unable to Assess  Information Given By: Spouse  Informant's Name: Bill  Who is responsible for making decisions for patient? : Patient    Readmission Evaluation  Is this a readmission?: No    Elopement Risk  Legal Hold: No  Ambulatory or Self Mobile in Wheelchair: No-Not an Elopement Risk  Elopement Risk: Not at Risk for Elopement    Interdisciplinary Discharge Planning  Lives with - Patient's Self Care Capacity: Child Less than 18 Years of Age, Spouse  Housing / Facility: [address] [place] [address]  Prior Services: Home-Independent    Discharge Preparedness  What is your plan after discharge?: Other (comment)(Rehab)  What are your discharge supports?: Child, Parent, Spouse, Other (comment)  Prior Functional Level: Ambulatory, Drives Self, Independent with Activities of Daily Living, Independent with Medication Management  Difficulity with ADLs: None  Difficulity with IADLs: None    Functional Assesment  Prior Functional Level: Ambulatory, Drives Self, Independent with Activities of Daily Living, Independent with Medication Management    Finances  Financial Barriers to Discharge: No  Prescription Coverage: Yes    Vision / Hearing Impairment  Vision Impairment : No  Right Eye Vision: Other (Comments)(diplopia)  Left Eye Vision: Other (Comments)(diplopia)  Hearing Impairment : No              Domestic Abuse  Have you ever been the victim of abuse or violence?: No  Physical Abuse or Sexual Abuse: No  Verbal Abuse or Emotional  Abuse: No  Possible Abuse/Neglect Reported to:: Not Applicable    Psychological Assessment  History of Substance Abuse: None  History of Psychiatric Problems: No  Non-compliant with Treatment: No    Discharge Risks or Barriers  Discharge risks or barriers?: No PCP    Anticipated Discharge Information  Discharge Disposition: D/T to SNF with Medicare cert in anticipation of skilled care (03)  Discharge Address: [address] [place] [id number]  Discharge Contact Phone Number: [phone]

## 2020-10-07 NOTE — CARE PLAN
Problem: Communication  Goal: The ability to communicate needs accurately and effectively will improve  Outcome: PROGRESSING AS EXPECTED   Pt A+Ox4; able to make needs known. Voice weak and hoarse; SLP working with pt.    Problem: Venous Thromboembolism (VTW)/Deep Vein Thrombosis (DVT) Prevention:  Goal: Patient will participate in Venous Thrombosis (VTE)/Deep Vein Thrombosis (DVT)Prevention Measures  Outcome: PROGRESSING AS EXPECTED   Pt cont on coumadin therapy. No issues noted.    Problem: Psychosocial Needs:  Goal: Ability to identify and develop effective coping behavior will improve  Outcome: PROGRESSING AS EXPECTED   Pt makes needs known. Safety precautions in place and maintained. Pt  involved in care; at bedside.

## 2020-10-07 NOTE — DIETARY
Nutrition Services: Brief Update  Pt noted with PO 25-50% x3 meals yesterday. Pt now agreeable to Boost and thinks it will help her increase nutritional intake.     RD encouraged pt to still attempt to consume food on meal trays in addition to drinking Boost.     RD to add thickened supplements TID with meals, pending MD approval.     RD continues to follow.

## 2020-10-08 ENCOUNTER — APPOINTMENT (OUTPATIENT)
Dept: RADIOLOGY | Facility: REHABILITATION | Age: 45
DRG: 057 | End: 2020-10-08
Attending: PHYSICAL MEDICINE & REHABILITATION
Payer: OTHER GOVERNMENT

## 2020-10-08 PROBLEM — D72.829 LEUKOCYTOSIS: Status: ACTIVE | Noted: 2020-10-08

## 2020-10-08 LAB
25(OH)D3 SERPL-MCNC: 37 NG/ML (ref 30–100)
ALBUMIN SERPL BCP-MCNC: 4.1 G/DL (ref 3.2–4.9)
ALBUMIN/GLOB SERPL: 1.1 G/DL
ALP SERPL-CCNC: 77 U/L (ref 30–99)
ALT SERPL-CCNC: 42 U/L (ref 2–50)
ANION GAP SERPL CALC-SCNC: 12 MMOL/L (ref 7–16)
AST SERPL-CCNC: 18 U/L (ref 12–45)
BASOPHILS # BLD AUTO: 0.6 % (ref 0–1.8)
BASOPHILS # BLD: 0.07 K/UL (ref 0–0.12)
BILIRUB SERPL-MCNC: 0.5 MG/DL (ref 0.1–1.5)
BUN SERPL-MCNC: 24 MG/DL (ref 8–22)
CALCIUM SERPL-MCNC: 9.7 MG/DL (ref 8.5–10.5)
CHLORIDE SERPL-SCNC: 99 MMOL/L (ref 96–112)
CO2 SERPL-SCNC: 25 MMOL/L (ref 20–33)
CREAT SERPL-MCNC: 0.8 MG/DL (ref 0.5–1.4)
EOSINOPHIL # BLD AUTO: 0.17 K/UL (ref 0–0.51)
EOSINOPHIL NFR BLD: 1.5 % (ref 0–6.9)
ERYTHROCYTE [DISTWIDTH] IN BLOOD BY AUTOMATED COUNT: 41.8 FL (ref 35.9–50)
GLOBULIN SER CALC-MCNC: 3.7 G/DL (ref 1.9–3.5)
GLUCOSE SERPL-MCNC: 120 MG/DL (ref 65–99)
HCT VFR BLD AUTO: 42.2 % (ref 37–47)
HGB BLD-MCNC: 13.8 G/DL (ref 12–16)
IMM GRANULOCYTES # BLD AUTO: 0.18 K/UL (ref 0–0.11)
IMM GRANULOCYTES NFR BLD AUTO: 1.6 % (ref 0–0.9)
INR PPP: 1.4 (ref 0.87–1.13)
LYMPHOCYTES # BLD AUTO: 2.47 K/UL (ref 1–4.8)
LYMPHOCYTES NFR BLD: 21.3 % (ref 22–41)
MAGNESIUM SERPL-MCNC: 2.3 MG/DL (ref 1.5–2.5)
MCH RBC QN AUTO: 30.3 PG (ref 27–33)
MCHC RBC AUTO-ENTMCNC: 32.7 G/DL (ref 33.6–35)
MCV RBC AUTO: 92.5 FL (ref 81.4–97.8)
MONOCYTES # BLD AUTO: 0.85 K/UL (ref 0–0.85)
MONOCYTES NFR BLD AUTO: 7.3 % (ref 0–13.4)
NEUTROPHILS # BLD AUTO: 7.85 K/UL (ref 2–7.15)
NEUTROPHILS NFR BLD: 67.7 % (ref 44–72)
NRBC # BLD AUTO: 0 K/UL
NRBC BLD-RTO: 0 /100 WBC
PLATELET # BLD AUTO: 568 K/UL (ref 164–446)
PMV BLD AUTO: 8.8 FL (ref 9–12.9)
POTASSIUM SERPL-SCNC: 4.3 MMOL/L (ref 3.6–5.5)
PROT SERPL-MCNC: 7.8 G/DL (ref 6–8.2)
PROTHROMBIN TIME: 17.6 SEC (ref 12–14.6)
RBC # BLD AUTO: 4.56 M/UL (ref 4.2–5.4)
SODIUM SERPL-SCNC: 136 MMOL/L (ref 135–145)
WBC # BLD AUTO: 11.6 K/UL (ref 4.8–10.8)

## 2020-10-08 PROCEDURE — 80053 COMPREHEN METABOLIC PANEL: CPT

## 2020-10-08 PROCEDURE — 97166 OT EVAL MOD COMPLEX 45 MIN: CPT

## 2020-10-08 PROCEDURE — 92610 EVALUATE SWALLOWING FUNCTION: CPT

## 2020-10-08 PROCEDURE — 82306 VITAMIN D 25 HYDROXY: CPT

## 2020-10-08 PROCEDURE — 97162 PT EVAL MOD COMPLEX 30 MIN: CPT

## 2020-10-08 PROCEDURE — 85025 COMPLETE CBC W/AUTO DIFF WBC: CPT

## 2020-10-08 PROCEDURE — 92523 SPEECH SOUND LANG COMPREHEN: CPT

## 2020-10-08 PROCEDURE — 97535 SELF CARE MNGMENT TRAINING: CPT

## 2020-10-08 PROCEDURE — 770010 HCHG ROOM/CARE - REHAB SEMI PRIVAT*

## 2020-10-08 PROCEDURE — 700102 HCHG RX REV CODE 250 W/ 637 OVERRIDE(OP): Performed by: PHYSICAL MEDICINE & REHABILITATION

## 2020-10-08 PROCEDURE — 36415 COLL VENOUS BLD VENIPUNCTURE: CPT

## 2020-10-08 PROCEDURE — 83735 ASSAY OF MAGNESIUM: CPT

## 2020-10-08 PROCEDURE — 700111 HCHG RX REV CODE 636 W/ 250 OVERRIDE (IP): Performed by: PHYSICAL MEDICINE & REHABILITATION

## 2020-10-08 PROCEDURE — 71045 X-RAY EXAM CHEST 1 VIEW: CPT

## 2020-10-08 PROCEDURE — 97530 THERAPEUTIC ACTIVITIES: CPT

## 2020-10-08 PROCEDURE — 85610 PROTHROMBIN TIME: CPT

## 2020-10-08 PROCEDURE — 99233 SBSQ HOSP IP/OBS HIGH 50: CPT | Performed by: PHYSICAL MEDICINE & REHABILITATION

## 2020-10-08 PROCEDURE — A9270 NON-COVERED ITEM OR SERVICE: HCPCS | Performed by: PHYSICAL MEDICINE & REHABILITATION

## 2020-10-08 RX ORDER — LEVETIRACETAM 100 MG/ML
500 SOLUTION ORAL 2 TIMES DAILY
Status: DISCONTINUED | OUTPATIENT
Start: 2020-10-08 | End: 2020-10-12

## 2020-10-08 RX ORDER — WARFARIN SODIUM 5 MG/1
5 TABLET ORAL
Status: COMPLETED | OUTPATIENT
Start: 2020-10-08 | End: 2020-10-08

## 2020-10-08 RX ADMIN — ENOXAPARIN SODIUM 80 MG: 100 INJECTION SUBCUTANEOUS at 09:45

## 2020-10-08 RX ADMIN — NYSTATIN 500000 UNITS: 100000 SUSPENSION ORAL at 09:45

## 2020-10-08 RX ADMIN — NYSTATIN 500000 UNITS: 100000 SUSPENSION ORAL at 21:30

## 2020-10-08 RX ADMIN — NYSTATIN 500000 UNITS: 100000 SUSPENSION ORAL at 13:23

## 2020-10-08 RX ADMIN — NYSTATIN 500000 UNITS: 100000 SUSPENSION ORAL at 17:03

## 2020-10-08 RX ADMIN — ENOXAPARIN SODIUM 80 MG: 100 INJECTION SUBCUTANEOUS at 21:30

## 2020-10-08 RX ADMIN — DOCUSATE SODIUM 50 MG AND SENNOSIDES 8.6 MG 2 TABLET: 8.6; 5 TABLET, FILM COATED ORAL at 09:45

## 2020-10-08 RX ADMIN — LEVETIRACETAM 500 MG: 500 TABLET ORAL at 09:45

## 2020-10-08 RX ADMIN — WARFARIN SODIUM 5 MG: 5 TABLET ORAL at 17:03

## 2020-10-08 RX ADMIN — LEVETIRACETAM 500 MG: 500 SOLUTION ORAL at 21:30

## 2020-10-08 RX ADMIN — DOCUSATE SODIUM 50 MG AND SENNOSIDES 8.6 MG 2 TABLET: 8.6; 5 TABLET, FILM COATED ORAL at 21:31

## 2020-10-08 RX ADMIN — ATORVASTATIN CALCIUM 40 MG: 40 TABLET, FILM COATED ORAL at 21:30

## 2020-10-08 ASSESSMENT — BRIEF INTERVIEW FOR MENTAL STATUS (BIMS)
WHAT DAY OF THE WEEK IS IT: CORRECT
BIMS SUMMARY SCORE: 14
WHAT MONTH IS IT: ACCURATE WITHIN 5 DAYS
WHAT YEAR IS IT: CORRECT
ASKED TO RECALL SOCK: YES, NO CUE REQUIRED
ASKED TO RECALL BED: YES, AFTER CUEING (A PIECE OF FURNITURE")"
INITIAL REPETITION OF BED BLUE SOCK - FIRST ATTEMPT: 3
ASKED TO RECALL BLUE: YES, NO CUE REQUIRED

## 2020-10-08 ASSESSMENT — GAIT ASSESSMENTS
DEVIATION: DECREASED BASE OF SUPPORT;BRADYKINETIC;DECREASED HEEL STRIKE;DECREASED TOE OFF
GAIT LEVEL OF ASSIST: CONTACT GUARD ASSIST
DISTANCE (FEET): 10
ASSISTIVE DEVICE: PARALLEL BARS

## 2020-10-08 ASSESSMENT — ACTIVITIES OF DAILY LIVING (ADL)
BED_CHAIR_WHEELCHAIR_TRANSFER_DESCRIPTION: ADAPTIVE EQUIPMENT;SET-UP OF EQUIPMENT;SQUAT PIVOT TRANSFER TO WHEELCHAIR;SUPERVISION FOR SAFETY;VERBAL CUEING
TOILETING: INDEPENDENT
TOILET_TRANSFER_DESCRIPTION: REQUIRES LIFT;SET-UP OF EQUIPMENT;SUPERVISION FOR SAFETY;VERBAL CUEING

## 2020-10-08 NOTE — THERAPY
"Speech Language Pathology   Initial Assessment     Patient Name: Obdulia High  AGE:  45 y.o., SEX:  female  Medical Record #: [id number]  Today's Date: 10/8/2020     Subjective    Per Dr. Mendoza HPI: \"Per Dr. Conde's consult \"The patient is a 45 y.o. left hand dominant female with no known past medical history;  who presented on 9/29/2020  8:22 PM as a transfer from outside hospital where she initially presented with a 2-day history of retro-orbital headache, left facial numbness and seizure-like activity.  Patient received Ativan and was intubated for airway protection prior to being transferred to [facility].  Work-up with MRI brain found brainstem/pontine/midbrain ischemia, and follow-up CTA showed diffuse thrombosis in the left and right PCAs, left vertebral, and basilar arteries. Patient then received basilar artery thrombectomy with Dr. Albert. NIH SS 28 on admission, and NIH SS of 9 today 10/2/2020.   Patient is feeling better today, however does report right arm and leg numbness and tingling, constipation, Faust dependency, some ptosis, lethargy and weakness.\"     In addition to the above history, patient had 10 minutes of seizure at the outside hospital - [facility], requiring Valium, Ativan, prior to intubation. EEG on acute was negative for seizures, though patient is on anti-seizure medication. COVID negative.      Post thrombectomy TICI 3 basilar, and per neurology, for high risk of re-thrombosing due to other nearby occluded arteries - left vertebral and bilateral PCAs. Repeat imagining also with small amount of subarachnoid hemorrhage versus contrast staining. She was extubated on 10/1. Repeat MRI with known stroke in midbrain as well as small new punctate infarctions in the left internal capsule, right cerebellum, and left khang. Per review of notes, patient apparently had started a strenuous workout program several days prior to her stroke, which is thought to be the etiology of her " "dissection. She was initially on heparin drip, now on a Lovenox bridge to coumadin. Plan for repeat imaging in 3 months (CTA) to determine whether patient can discontinue warfarin and switch to aspirin. Plan to also wean Keppra in the outpatient stroke bridge clinic. Patient had repeat Head CT on 10/5 without any new findings of hemorrhage. Patient initially had a Cortrak for nutrition, now removed.      Patient current reports right sided weakness, right face and right hand paresthesias, hoarse voice, and double vision. She denies any pain or headache. She also reports her thinking is off.\"    Pt completed FEES on 10/6/2020 with the following results:   \"slight amount of thinnish and whitish pharyngeal secretions visualized prior to PO presentations. Right arytenoid and fold moving greater than left with incomplete fold adduction with phonation. Pt with pink and reddish appearing folds with possible contact ulcers posterior 1/3 of the folds with bilat white appearing tissue. Variable visualization of the vallecular space during the FEES related to positioning of the epiglottis. Slight to mild residue post swallow intermittently visualized after the first swallow with good clearance with repeat swallows. Pt with slight to mild pyriform sinus residue post swallow, greater on the left versus the right, that clears with a 2nd swallow. Deep penetration, to the epiglottis petiole area with sip of NTL from the straw with spont throat clearing. Penetration to the left aryepiglottic fold area with oatmeal probably occurring during the swallow with spontaneous coughing. 1/3-1/2 tsp amounts of oatmeal subsequently were assessed with no further penetration with 1/3-1/2 tsp amounts.  Silent penetration with slight amount of thin milk from the spoon to the lower 1/3 portion of the epiglottis.\"   Pt admitted on mildly thick full liquidized diet. 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      Objective     10/08/20 0804   Precautions   Precautions Fall Risk;Swallow Precautions ( See Comments)   Comments aspiration precautions, full mildy thick liquid diet, diplopia   Prior Living Situation   Prior Services Home-Independent;None   Housing / Facility 1 Story House   Lives with - Patient's Self Care Capacity Spouse;Child Less than 18 Years of Age  (15 y/o son and spouse, )   Prior Level Of Function   Communication Within Functional Limits   Swallow Within Functional Limits   Dentition Intact   Dentures None   Hearing Within Functional Limits for Evaluation   Hearing Aid None   Vision Within Functional Limits for Evaluation   Patient's Primary Language English   Occupation (Pre-Hospital Vocational) Employed Full Time  (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 Mendoza HPI: \"45 y.o. left hand dominant female with no known past medical history;  who presented on 9/29/2020  8:22 PM as a transfer from outside hospital where she initially presented with a 2-day history of retro-orbital headache, left facial numbness and seizure-like activity.  Patient received Ativan and was intubated for airway protection prior to being transferred to [facility].  Work-up with MRI brain found brainstem/pontine/midbrain ischemia, and follow-up CTA showed diffuse thrombosis in the left and right PCAs, left vertebral, and basilar arteries\"   Receptive Language / Auditory Comprehension   Receptive Language / Auditory Comprehension WDL   Expressive Language   Expressive Language (WDL) WDL   Reading Comprehension    Reading Comprehension (WDL) X   Reading Words Within Functional Limits (6-7)   Reading Sentences Supervision (5)   Reading Long / Multi Paragraph Material Moderate (3)   Following Written Direction Moderate (3)   Functional Reading Materials Moderate (3)   Map Reading Moderate (3) " "  Written Language Expression   Written Language Expression (WDL) X   Dominant Hand Left   To Dictation Within Functional Limits (6-7)   Spontaneous Sentence Level Within Functional Limits (6-7)   Functional Writing (Name, Address) Within Functional Limits (6-7)   Spelling Accuracy Minimal (4)  (perseverations of letters )   Cognition   Cognitive-Linguistic (WDL) X   Attention to Task Within Functional Limits (6-7)   Simple Attention Within Functional Limits (6-7)   Moderate Attention Moderate (3)   Complex Attention Moderate (3)   Orientation  Within Functional Limits (6-7)   Visual Scanning / Cancellation Skills Within Functional Limits (6-7)   Verbal Short Term Memory 30 Minutes   Visual Short Term Memory Supervision (5)   Prospective Memory Minimal (4)   Functional Memory Activities Minimal (4)   Safety Awareness Supervision (5)   Insight into Deficits Minimal (4)   Executive Functioning / Organization Moderate (3)   Written Sequencing Minimal (4)   Functional Math / Financial Management Supervision (5)   Clock Drawing Within Functional Limits   ABS (Agitated Behavior Scale)   Agitated Behavior Scale Performed No   Cognitive Pattern Assessment   Cognitive Pattern Assessment Used BIMS   Brief Interview for Mental Status (BIMS)   Repetition of Three Words (First Attempt) 3   Temporal Orientation: Year Correct   Temporal Orientation: Month Accurate within 5 days   Temporal Orientation: Day Correct   Recall: \"Sock\" Yes, no cue required   Recall: \"Blue\" Yes, no cue required   Recall: \"Bed\" Yes, after cueing (\"a piece of furniture\")   BIMS Summary Score 14   Social / Pragmatic Communication   Social / Pragmatic Communication WDL   Tracheostomy   Tracheostomy No   Speech Mechanisms / Voice Production   Speech Mechanisms / Voice Production (WDL) X   Voice Quality Reduced Intensity;Breathy;Aphonic   Articulatory Precision Supervision (5)   Conversational Intelligibility Minimal (4)   Uses Adequate Breath Support Variable "   Loudness: Soft   Labial Function   Labial Function (WDL) WDL   Lingual Function   Lingual Function (WDL) WDL   Jaw Function   Jaw Function (WDL) WDL   Velar Function   Velar Function (WDL) X   Velar Structure At Rest Within Functional Limits   / A / Prolonged Minimal  (deviation to right)   / A /  5X's Minimal  (deviation to right)   Laryngeal Function   Laryngeal Function (WDL) X   Voice Quality Severe   Volutional Cough Moderate   Excursion Upon Swallow Complete   Max Phonation Time (Seconds) 5 seconds, breathy quality   Swallowing   Swallowing (WDL) X   Ice Chips Minimal   Thick Nectar / Honey / Liquid Within Functional Limits;Thick Nectar   Pureed (4) Within Functional Limits   Thin Liquid Minimal   Single Swallow Thin / Nectar (Cup) Minimal;Thin Liquid   Dysphagia Strategies / Recommendations   Strategies / Interventions Recommended (Yes / No) Yes   Compensatory Strategies Direct Supervision During Meals;Assistance Needed for Meal Tray Set-up;No Straws;Multiple Swallows;Single Sips / Bites;No Talking During Eating / Drinking   Diet / Liquid Recommendation Mildly Thick (2) - (Nectar Thick);Liquidised (3) - (Nectar Thick Full Liquid)   Medication Administration  Crush all Medications in Puree   Therapy Interventions Dysphagia Therapy By Speech Language Pathologist;Therapeutic Dining For Meals;MBSS Evaluation   Barriers To Oral Feeding   Barriers To Oral Feeding None   Cervical Ausculatation Not Tested   Pre-Feeding Oral Stimulation Trial Not Tested   Swallowing/Nutritional Status   Swallowing/Nutritional Status Modified food consistency;Supervision during eating   Eating   Assistance Needed Verbal cues;Supervision;Set-up / clean-up   CARE Score 4   Eating Discharge Goal   Discharge Goal Independent   Functional Level of Assist   Eating Supervision   Eating Description Tube feed bolus;Set-up of equipment or meal/tube feeding;Supervision for safety;Increased time;Modified diet   Comprehension Supervision    Comprehension Description Verbal cues   Expression Minimal Assist   Expression Description Verbal cueing   Social Interaction Supervision   Social Interaction Description Increased time;Verbal cues   Problem Solving Minimal Assist   Problem Solving Description Verbal cueing;Therapy schedule;Seat belt;Increased time;Bed/chair alarm   Memory Minimal Assist   Memory Description Verbal cueing;Therapy schedule;Seat belt;Increased time   Outcome Measures   Outcome Measures Utilized MASA;SCCAN   MASA (Reynolds Assessment of Swallowing Ability)   Alertness 10   Cooperation 10   Auditory Comprehension 10   Respiration 10   Respiratory Rate for Swallow 5   Dysphasia 4   Dyspraxia 5   Dysarthria 5   Saliva 5   Lip Seal 4   Tongue Movement 10   Tongue Strength 10   Tongue Coordination 10   Oral Preparation 8   Gag 5   Palate 8   Bolus Clearance 8   Oral Transit 10   Cough Reflex 5   Voluntary Cough 8   Voice 6  (breathy, unable to produce past whisper)   Trache 10   Pharygneal Phase 10   Pharyngeal Response 5   Diet Recommendations Nothing by mouth, risk too great   Fluid Recommendations Nectar thick liquids   Swallow Integrity Possible dysphagia/aspiration   Total Score 181   Dysphagia Severity No abnormality detected   Aspiration Severity No abnormality detected   SCCAN (Scales of Cognitive and Communicative Ability for Neurorehabilitation)   Oral Expression - Raw Score 17   Oral Expression - Scale Performance Score 89   Orientation - Raw Score 12   Orientation - Scale Performance Score 100   Memory - Raw Score 16   Memory - Scale Performance Score 84   Speech Comprehension - Raw Score 13   Speech Comprehension - Scale Performance Score 100   Reading Comprehension - Raw Score 7   Reading Comprehension - Scale Performance Score 58   Writing - Raw Score 7   Writing - Scale Performance Score 100   Attention - Raw Score 10   Attention - Scale Performance Score 62   Problem Solving - Raw Score 19   Problem Solving - Scale  Performance Score 83   SCCAN Total Raw Score 81   SCCAN Degree of Severity Mild Impairment   Problem List   Problem List Cognitive-Linguistic Deficits;Dysphagia   Current Discharge Plan   Current Discharge Plan Return to Prior Living Situation   Benefit   Therapy Benefit Patient would benefit from Inpatient Rehab Speech-Language Pathology to address above identified deficits.   Interdisciplinary Plan of Care Collaboration   IDT Collaboration with  Certified Nursing Assistant;Nursing   Patient Position at End of Therapy Seated  (in bathroom)   Collaboration Comments transfer of care to CNA, CLOF and POC with nurse   Strengths & Barriers   Strengths Able to follow instructions;Alert and oriented;Effective communication skills;Independent prior level of function;Motivated for self care and independence;Pleasant and cooperative;Supportive family;Willingly participates in therapeutic activities   Barriers Aspiration risk;Hemiplegia  (diplopia, voice)   Speech Language Pathologist Assigned   Assigned SLP / Extension ES/60 cog/swallow TDINE NO SPLIT   SLP Total Time Spent   SLP Individual Total Time Spent (Mins) 90   Evaluation Charges   SLP Speech Language Evaluation Speech Sound Language Comprehension   SLP Oral Pharyngeal Evaluation Oral Pharyngeal Evaluation       Assessment    Patient is 45 y.o. 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  Additional factors influencing patient status/progress (ie: cognitive factors, co-morbidities, social support, etc): independent PLOF, supportive spouse, dysphagia, diplopia, right sided weakness.    Pt seen for cognitive linguistic and swallow assessments this date.  SWALLOW: Isolated oral motor exam revealed adequate strength, ROM, coordination for all oral musculature. Mild deviation to right when assessing velar elevation. Pt's vocal quality is breathy, speaking in whispered volume and at times aphonic. Voluntary  "cough is weak and non productive, however, pt was able to demonstrate spontaneous throat clear and cough with adequate v.f. adduction.   Trials of ice chips pt exhibited spontaneous cough response on 1/3 trials, trials of thin liquids via tsp and cup sip tolerated with no overt s/sx of pen/asp. Pt tolerated mildly thick juice, Boost and cream of wheat with no overt s/sx of pen/asp. Pt refusing puree or minced/moist trials due to \"feeling full.\" It should be known pt demonstrated deep penetration to epiglottic petiole on FEES, however, no aspiration during the study. Given location of injury pt is at higher risk for aspiration and it is recommended pt remain on Level 3 mildly thick liquidized (full mildly thick liquid) with MBSS to be scheduled to further assess swallow function and readiness for advancement in diet. Pt to remain on therapeutic dining for supervision at this time.     COGNITIVE:   The Scales of Cognitive and Communicative Ability for Neurorehabilitation (SCCAN) assesses cognitive-communicative deficits and functional ability in patients in rehabilitation hospitals, clinics, and skilled nursing facilities. The SCCAN is appropriate for a broad range of neurological patients, provides a measure of both impairment and functional ability.     SCCAN (Scales of Cognitive and Communicative Ability for Neurorehabilitation)  Oral Expression - Raw Score: 17  Oral Expression - Scale Performance Score: 89  Orientation - Raw Score: 12  Orientation - Scale Performance Score: 100  Memory - Raw Score: 16  Memory - Scale Performance Score: 84  Speech Comprehension - Raw Score: 13  Speech Comprehension - Scale Performance Score: 100  Reading Comprehension - Raw Score: 7  Reading Comprehension - Scale Performance Score: 58  Writing - Raw Score: 7  Writing - Scale Performance Score: 100  Attention - Raw Score: 10  Attention - Scale Performance Score: 62  Problem Solving - Raw Score: 19  Problem Solving - Scale " Performance Score: 83  SCCAN Total Raw Score: 81  SCCAN Degree of Severity: Mild Impairment    Pt's performance in reading comprehension limited by double vision and fatigue (pt wearing eye patch during eval). Pt reports changes in processing speed and speech/word retrieval since hospitalization. Recommend ST services to address attention, IADLs.     Plan  Recommend Speech Therapy 30-60 minutes per day 5-7 days per week for 3 weeks for the following treatments:  SLP Swallowing Dysfunction Treatment, SLP Oral Pharyngeal Evaluation, SLP Video Swallow Evaluation, SLP Cognitive Skill Development and SLP Group Treatment.    Goals:  Long term and short term goals have been discussed with patient and they are in agreement.    Speech Therapy Problems     Problem: Problem Solving STGs     Dates: Start: 10/08/20       Goal: STG-Within one week, patient will     Dates: Start: 10/08/20       Description: 1) Individualized goal:  complete functional organization tasks related to medication and financial mngt. With 100% accuracy provided SPV  2) Interventions:  SLP Cognitive Skill Development and SLP Group Treatment              Goal: STG-Within one week, patient will     Dates: Start: 10/08/20       Description: 1) Individualized goal:  complete alternating attention tasks with 80% accuracy provided MIN cues.   2) Interventions:  SLP Cognitive Skill Development and SLP Group Treatment                    Problem: Speech/Swallowing LTGs     Dates: Start: 10/08/20       Goal: LTG-By discharge, patient will safely swallow     Dates: Start: 10/08/20       Description: 1) Individualized goal:  regular textures/thin liquids for safe return to PLOF.   2) Interventions:  SLP Swallowing Dysfunction Treatment, SLP Oral Pharyngeal Evaluation, SLP Video Swallow Evaluation, SLP Cognitive Skill Development, and SLP Group Treatment              Goal: LTG-By discharge, patient will solve complex problem     Dates: Start: 10/08/20        Description: 1) Individualized goal: By utilizing compensatory intervention for memory and problem-solving to allow for safe completion of daily activities with MOD I in order to prepare for safe d/c home  2) Interventions:  SLP Cognitive Skill Development and SLP Group Treatment                    Problem: Swallowing STGs     Dates: Start: 10/08/20       Goal: STG-Within one week, patient will     Dates: Start: 10/08/20       Description: 1) Individualized goal: complete a MBSS for further evaluation of swallow function; with additional goals pending results  2) Interventions:  SLP Video Swallow Evaluation

## 2020-10-08 NOTE — DISCHARGE PLANNING
CASE MANAGEMENT INITIAL ASSESSMENT    Admit Date:  10/7/2020     I met with patients  to discuss role of case management / discharge planning / team conference.     Patient is a  45 y.o. female transferred from [facility] S/P Stroke and basilar artery thrombectomy with Dr. Albert, 9/30/2020, per H&P.    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    PCP: In progress per .  He will get name of PCP for CM.    NSGY: Dr. Albert    ADM MD: Eden Mendoza    Diagnosis: 0001.3-Stroke: Bilateral Involvement  Stroke (cerebrum) (HCC)    Co-morbidities:   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) 09/30/2020     Priority: High   • Acute respiratory failure with hypoxia and hypercapnia (HCC) 09/29/2020     Priority: High   • Seizure (HCC) 09/29/2020     Priority: High   • Headache 09/29/2020     Priority: High   • Essential hypertension 09/29/2020     Priority: Medium   • Leukocytosis 10/08/2020   • Double vision 10/07/2020   • Hypophonia 10/07/2020   • Vertebral artery dissection (HCC) 10/07/2020   • Other dysphagia 10/07/2020     Prior Living Situation:  Housing / Facility: 1 Story House  Lives with - Patient's Self Care Capacity: Child Less than 18 Years of Age, Spouse(15 y/o son)    Prior Level of Function:  Medication Management: Independent  Finances: Independent  Home Management: Independent  Shopping: Independent  Prior Level Of Mobility: Independent Without Device in Community  Driving / Transportation: Driving Independent    Support Systems:  Primary : Bill High Spouse  Advance Directives: No  Power of  (Name & Phone): none    Previous Services Utilized:   Equipment Owned: None  Prior Services: Home-Independent, None    Other Information:  Occupation (Pre-Hospital Vocational): Employed Full  Time(Works from home; maikel business)     Primary Payor Source: Kirstie    Additional Case Management Questions:  Have you ever received case management services for yourself or a family member? No    Do you feel you have and an understanding of what services  provide? Yes    Do you have any additional questions regarding case management? No        CASE MANAGEMENT PLAN OF CARE   Individualized Goals: Per -  1. Return Home  2. Be able to get back to work  3. Be able to see 3 mo old grand daughter soon    Barriers:   1. Functional limitation      Plan:  1. Continue to follow patient through hospitalization and provide discharge planning in collaboration with patient, family, physicians and ancillary services.     2. Utilize community resources to ensure a safe discharge.

## 2020-10-08 NOTE — THERAPY
"Occupational Therapy   Initial Evaluation     Patient Name: Obdulia High  Age:  45 y.o., Sex:  female  Medical Record #: [id number]  Today's Date: 10/8/2020     Subjective    Patient lying in bed awake and agreeable to shower.  Complains of double vision and some dizziness with supine to sit and bending forward.  No complaints of pain.  States that she needs to work on \"everything.\"     Objective       10/08/20 0701   Prior Living Situation   Prior Services Home-Independent   Housing / Facility 1 Story House   Steps Into Home 1   Steps In Home 0   Rail None   Elevator No   Bathroom Set up Walk In Shower   Equipment Owned None   Lives with - Patient's Self Care Capacity Spouse;Child Less than 18 Years of Age  (15 year old son)   Comments has a 22 year old daughter who is finishing up nursing school; spouse is a family practice physician with the [employer]   Prior Level of ADL Function   Self Feeding Independent   Grooming / Hygiene Independent   Bathing Independent   Dressing Independent   Toileting Independent   Prior Level of IADL Function   Medication Management Independent   Laundry Independent   Kitchen Mobility Independent   Finances Independent   Home Management Independent   Shopping Independent   Prior Level Of Mobility Independent Without Device in Community   Driving / Transportation Driving Independent   Occupation (Pre-Hospital Vocational) Employed Full Time  (works from home, owns own maikel business)   Leisure Interests Gardening   Prior Functioning: Everyday Activities   Self Care Independent   Indoor Mobility (Ambulation) Independent   Stairs Independent   Functional Cognition Independent   Prior Device Use None of the given options   Vitals   O2 Delivery Device None - Room Air   Pain 0 - 10 Group   Therapist Pain Assessment 0   Cognition    Speech/ Communication   (hypophonia)   Orientation Level Oriented x 4   Level of Consciousness Alert   Comments hypophonia   Vision Screen   Vision Not tested  (pt " reports double vision)   Passive ROM Upper Body   Passive ROM Upper Body WDL   Active ROM Upper Body   Active ROM Upper Body  X   Dominant Hand Right   Rt Shoulder Flexion Degrees 90   Strength Upper Body   Upper Body Strength  X   Rt Shoulder Flexion Strength 3- (F-)   Rt Shoulder Extension Strength 3+ (F+)   Rt Shoulder Abduction Strength 3- (F-)   Rt Shoulder Adduction Strength 3+ (F+)   Rt Elbow Flexion Strength 4- (G-)   Rt Elbow Extension Strength 3+ (F+)   Rt Forearm Supination Strength 4- (G-)   Rt Forearm Pronation Strength 4- (G-)   Rt Wrist Flexion Strength 3+ (F+)   Rt Wrist Extension Strength 4- (G-)   Right  Impaired  (mildly impaired)   Sensation Upper Body   Upper Extremity Sensation  Not Tested   Comments pt states she has some numbness/tingling in right hand   Upper Body Muscle Tone   Upper Body Muscle Tone  Not Tested   Balance Assessment   Sitting Balance (Static) Good   Sitting Balance (Dynamic) Good   Standing Balance (Static) Trace +   Standing Balance (Dynamic) Trace   Weight Shift Sitting Fair   Weight Shift Standing Fair   Bed Mobility    Supine to Sit Stand by Assist   Sit to Stand Moderate Assist   Scooting Stand by Assist   Coordination Upper Body   Coordination X   Fine Motor Coordination mild/moderately impaired right hand   Gross Motor Coordination mildly impaired R UE   Oral Hygiene   Assistance Needed Set-up / clean-up;Supervision   CARE Score 4   Oral Hygiene Discharge Goal   Discharge Goal Independent   Shower/Bathe Self   Assistance Needed Set-up / clean-up;Supervision;Verbal cues;Physical assistance   Physical Assistance Level 25%-49%   CARE Score 3   Shower/Bathe Self Discharge Goal   Discharge Goal Independent   Upper Body Dressing   Assistance Needed Set-up / clean-up;Supervision   CARE Score 4   Upper Body Dressing Discharge Goal   Discharge Goal Independent   Lower Body Dressing   Assistance Needed Set-up / clean-up;Supervision;Verbal cues;Physical assistance   Physical  Assistance Level Less than 25%   CARE Score 3   Lower Body Dressing Discharge Goal   Discharge Goal Independent   Putting On/Taking Off Footwear   Assistance Needed Set-up / clean-up;Supervision;Verbal cues   CARE Score 4   Putting On/Taking Off Footwear Discharge Goal   Discharge Goal Independent   Toileting Hygiene   Assistance Needed Physical assistance;Supervision;Set-up / clean-up;Verbal cues   Physical Assistance Level Less than 25%   CARE Score 3   Toileting Hygiene Discharge Goal   Discharge Goal Independent   Toilet Transfer   Assistance Needed Set-up / clean-up;Supervision;Verbal cues;Physical assistance   Physical Assistance Level 50%-74%   CARE Score 2   Toilet Transfer Discharge Goal   Discharge Goal Independent   Hearing, Speech, and Vision   Expression of Ideas and Wants Without difficulty   Understanding Verbal and Non-Verbal Content Understands   Functional Level of Assist   Grooming Stand by Assist   Grooming Description Seated in wheelchair at sink;Set-up of equipment;Supervision for safety   Bathing Minimal Assist   Bathing Description Hand held shower;Tub bench;Initial preparation for task;Set-up of equipment;Supervision for safety;Verbal cueing  (requires assist for standing balance)   Upper Body Dressing Supervision   Upper Body Dressing Description Set-up of equipment;Supervision for safety  (to don shirt)   Lower Body Dressing Minimal Assist   Lower Body Dressing Description Grab bar;Set-up of equipment;Supervision for safety;Verbal cueing  (standing balance when pulling pants up/down)   Toileting Minimal Assist   Toileting Description Assist for standing balance;Initial preparation for task;Set-up of equipment;Supervision for safety;Verbal cueing   Toilet Transfers Maximal Assist   Toilet Transfer Description Requires lift;Set-up of equipment;Supervision for safety;Verbal cueing  (lift and lower assist)   Tub / Shower Transfers Maximal Assist   Tub Shower Transfer Description Shower  bench;Requires lift;Set-up of equipment;Supervision for safety;Verbal cueing  (lift and lower assist )   Problem List   Problem List Decreased Active Daily Living Skills;Decreased Homemaking Skills;Decreased Upper Extremity Strength Right;Decreased Upper Extremity AROM Right;Decreased Functional Mobility;Decreased Activity Tolerance;Impaired Coordination Right Upper Extremity;Impaired Sensation Right Upper Extremity;Impaired Vision;Impaired Postural Control / Balance   Precautions   Precautions Fall Risk;Other (See Comments)   Comments Double vision   Current Discharge Plan   Current Discharge Plan Return to Prior Living Situation   Benefit    Therapy Benefit Patient Would Benefit from Inpatient Rehab Occupational Therapy to Maximize [place] with ADLs, IADLs and Functional Mobility.   Interdisciplinary Plan of Care Collaboration   IDT Collaboration with  Physical Therapist   Patient Position at End of Therapy Seated;Chair Alarm On;Self Releasing Lap Belt Applied;Call Light within Reach;Tray Table within Reach   Collaboration Comments CLOF   Equipment Needs   Assistive Device / DME Parallel Bars;Wheelchair;Shower Chair;Grab Bars In Shower / Tub;Grab Bars By Toilet   Adaptive Equipment None   Strengths & Barriers   Strengths Alert and oriented;Able to follow instructions;Effective communication skills;Good insight into deficits/needs;Independent prior level of function;Manages pain appropriately;Motivated for self care and independence;Pleasant and cooperative;Supportive family;Willingly participates in therapeutic activities   Barriers Decreased endurance;Fatigue;Generalized weakness;Hemiparesis;Impaired activity tolerance;Impaired balance;Limited mobility;Visual impairment  (double vision)   OT Total Time Spent   OT Individual Total Time Spent (Mins) 60   OT Charge Group   Charges Yes   OT Self Care / ADL 1   OT Evaluation OT Evaluation Mod       Assessment  Patient is 45 y.o. 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  Additional factors influencing patient status / progress (ie: cognitive factors, co-morbidities, social support, etc): lives with spouse and 15 year old son.      Plan  Recommend Occupational Therapy  minutes per day 5-7 days per week for 2-3 weeks for the following treatments:  OT Self Care/ADL, OT Community Reintegration, OT Manual Ther Technique, OT Neuro Re-Ed/Balance, OT Sensory Int Techniques, OT Therapeutic Activity, OT Evaluation and OT Therapeutic Exercise.    Goals:  Long term and short term goals have been discussed with patient and they are in agreement.    Occupational Therapy Goals     Problem: Bathing     Dates: Start: 10/08/20       Goal: STG-Within one week, patient will bathe     Dates: Start: 10/08/20       Description: 1) Individualized Goal:  body with CGA (for standing portion) using AE/AD  2) Interventions:  OT Self Care/ADL, OT Manual Ther Technique, OT Neuro Re-Ed/Balance, OT Sensory Int Techniques, OT Therapeutic Activity, OT Evaluation, and OT Therapeutic Exercise                  Problem: Dressing     Dates: Start: 10/08/20       Goal: STG-Within one week, patient will dress LB     Dates: Start: 10/08/20       Description: 1) Individualized Goal:  with CGA for standing portion using grab bar  2) Interventions:  OT Self Care/ADL, OT Manual Ther Technique, OT Neuro Re-Ed/Balance, OT Sensory Int Techniques, OT Therapeutic Activity, OT Evaluation, and OT Therapeutic Exercise                Problem: Functional Transfers     Dates: Start: 10/08/20       Goal: STG-Within one week, patient will transfer to toilet     Dates: Start: 10/08/20       Description: 1) Individualized Goal:  with min A using grab bar for balance/support  2) Interventions:  OT Self Care/ADL, OT Manual Ther Technique, OT Neuro Re-Ed/Balance, OT Sensory Int Techniques, OT Therapeutic Activity, OT Evaluation, and OT Therapeutic Exercise                 Problem: OT Long Term Goals     Dates: Start: 10/08/20       Goal: LTG-By discharge, patient will complete basic self care tasks     Dates: Start: 10/08/20       Description: 1) Individualized Goal:  with mod I using AE/AD/techniques as needed  2) Interventions:  OT Self Care/ADL, OT Manual Ther Technique, OT Neuro Re-Ed/Balance, OT Sensory Int Techniques, OT Therapeutic Activity, OT Evaluation, and OT Therapeutic Exercise          Goal: LTG-By discharge, patient will perform bathroom transfers     Dates: Start: 10/08/20       Description: 1) Individualized Goal:  with mod I using AE/AD/techniques as needed  2) Interventions:  OT Self Care/ADL, OT Manual Ther Technique, OT Neuro Re-Ed/Balance, OT Sensory Int Techniques, OT Therapeutic Activity, OT Evaluation, and OT Therapeutic Exercise          Goal: LTG-By discharge, patient will complete basic home management     Dates: Start: 10/08/20       Description: 1) Individualized Goal:  with supervision using AE/AD/techniques as needed  2) Interventions:  OT Self Care/ADL, OT Manual Ther Technique, OT Neuro Re-Ed/Balance, OT Sensory Int Techniques, OT Therapeutic Activity, OT Evaluation, and OT Therapeutic Exercise

## 2020-10-08 NOTE — PROGRESS NOTES
"Rehab Progress Note     Date of Service: 10/8/2020  Chief Complaint: follow up stroke    Interval Events (Subjective)    Patient seen and examined today in her room. She is resting. Her  Keith is present. Stroke prognosis for recovery discussed. Patient woke up, complains of of fatigue, and double vision. She slept well last night as it is quieter over here at the rehab hospital. Patient is understandably tearful, and reports she will let me know if she starts to feel depressed. She has no other complaints. All questions were answered.    Objective:  VITAL SIGNS: /75   Pulse 85   Temp 36.7 °C (98 °F) (Oral)   Resp 18   Ht 1.702 m (5' 7.01\")   Wt 87.5 kg (192 lb 14.4 oz)   SpO2 95%   BMI 30.21 kg/m²   Gen: alert, no apparent distress  CV: regular rate and rhythm, no murmurs, no peripheral edema  Resp: clear to ascultation bilaterally, normal respiratory effort  GI: soft, non-tender abdomen, bowel sounds present  Neuro: notable for left ptosis, inability to adduct left eye, double vision, mild right facial droop, right hemiparesis, decreased sensation on the right face and arm  Psych: tearful    Recent Results (from the past 72 hour(s))   Prothrombin Time    Collection Time: 10/06/20  7:01 AM   Result Value Ref Range    PT 14.7 (H) 12.0 - 14.6 sec    INR 1.12 0.87 - 1.13   Prothrombin Time    Collection Time: 10/07/20  6:29 AM   Result Value Ref Range    PT 16.3 (H) 12.0 - 14.6 sec    INR 1.27 (H) 0.87 - 1.13   CBC WITH DIFFERENTIAL    Collection Time: 10/07/20  6:29 AM   Result Value Ref Range    WBC 11.3 (H) 4.8 - 10.8 K/uL    RBC 4.25 4.20 - 5.40 M/uL    Hemoglobin 13.0 12.0 - 16.0 g/dL    Hematocrit 38.6 37.0 - 47.0 %    MCV 90.8 81.4 - 97.8 fL    MCH 30.6 27.0 - 33.0 pg    MCHC 33.7 33.6 - 35.0 g/dL    RDW 40.5 35.9 - 50.0 fL    Platelet Count 465 (H) 164 - 446 K/uL    MPV 8.8 (L) 9.0 - 12.9 fL    Neutrophils-Polys 66.20 44.00 - 72.00 %    Lymphocytes 20.60 (L) 22.00 - 41.00 %    Monocytes " 8.40 0.00 - 13.40 %    Eosinophils 1.90 0.00 - 6.90 %    Basophils 0.60 0.00 - 1.80 %    Immature Granulocytes 2.30 (H) 0.00 - 0.90 %    Nucleated RBC 0.00 /100 WBC    Neutrophils (Absolute) 7.45 (H) 2.00 - 7.15 K/uL    Lymphs (Absolute) 2.32 1.00 - 4.80 K/uL    Monos (Absolute) 0.95 (H) 0.00 - 0.85 K/uL    Eos (Absolute) 0.21 0.00 - 0.51 K/uL    Baso (Absolute) 0.07 0.00 - 0.12 K/uL    Immature Granulocytes (abs) 0.26 (H) 0.00 - 0.11 K/uL    NRBC (Absolute) 0.00 K/uL   Comp Metabolic Panel    Collection Time: 10/07/20  6:29 AM   Result Value Ref Range    Sodium 137 135 - 145 mmol/L    Potassium 4.3 3.6 - 5.5 mmol/L    Chloride 101 96 - 112 mmol/L    Co2 23 20 - 33 mmol/L    Anion Gap 13.0 7.0 - 16.0    Glucose 118 (H) 65 - 99 mg/dL    Bun 22 8 - 22 mg/dL    Creatinine 0.70 0.50 - 1.40 mg/dL    Calcium 9.3 8.5 - 10.5 mg/dL    AST(SGOT) 16 12 - 45 U/L    ALT(SGPT) 35 2 - 50 U/L    Alkaline Phosphatase 68 30 - 99 U/L    Total Bilirubin 0.5 0.1 - 1.5 mg/dL    Albumin 3.8 3.2 - 4.9 g/dL    Total Protein 7.1 6.0 - 8.2 g/dL    Globulin 3.3 1.9 - 3.5 g/dL    A-G Ratio 1.2 g/dL   ESTIMATED GFR    Collection Time: 10/07/20  6:29 AM   Result Value Ref Range    GFR If African American >60 >60 mL/min/1.73 m 2    GFR If Non African American >60 >60 mL/min/1.73 m 2   CBC with Differential    Collection Time: 10/08/20  7:07 AM   Result Value Ref Range    WBC 11.6 (H) 4.8 - 10.8 K/uL    RBC 4.56 4.20 - 5.40 M/uL    Hemoglobin 13.8 12.0 - 16.0 g/dL    Hematocrit 42.2 37.0 - 47.0 %    MCV 92.5 81.4 - 97.8 fL    MCH 30.3 27.0 - 33.0 pg    MCHC 32.7 (L) 33.6 - 35.0 g/dL    RDW 41.8 35.9 - 50.0 fL    Platelet Count 568 (H) 164 - 446 K/uL    MPV 8.8 (L) 9.0 - 12.9 fL    Neutrophils-Polys 67.70 44.00 - 72.00 %    Lymphocytes 21.30 (L) 22.00 - 41.00 %    Monocytes 7.30 0.00 - 13.40 %    Eosinophils 1.50 0.00 - 6.90 %    Basophils 0.60 0.00 - 1.80 %    Immature Granulocytes 1.60 (H) 0.00 - 0.90 %    Nucleated RBC 0.00 /100 WBC     Neutrophils (Absolute) 7.85 (H) 2.00 - 7.15 K/uL    Lymphs (Absolute) 2.47 1.00 - 4.80 K/uL    Monos (Absolute) 0.85 0.00 - 0.85 K/uL    Eos (Absolute) 0.17 0.00 - 0.51 K/uL    Baso (Absolute) 0.07 0.00 - 0.12 K/uL    Immature Granulocytes (abs) 0.18 (H) 0.00 - 0.11 K/uL    NRBC (Absolute) 0.00 K/uL   Comp Metabolic Panel (CMP)    Collection Time: 10/08/20  7:07 AM   Result Value Ref Range    Sodium 136 135 - 145 mmol/L    Potassium 4.3 3.6 - 5.5 mmol/L    Chloride 99 96 - 112 mmol/L    Co2 25 20 - 33 mmol/L    Anion Gap 12.0 7.0 - 16.0    Glucose 120 (H) 65 - 99 mg/dL    Bun 24 (H) 8 - 22 mg/dL    Creatinine 0.80 0.50 - 1.40 mg/dL    Calcium 9.7 8.5 - 10.5 mg/dL    AST(SGOT) 18 12 - 45 U/L    ALT(SGPT) 42 2 - 50 U/L    Alkaline Phosphatase 77 30 - 99 U/L    Total Bilirubin 0.5 0.1 - 1.5 mg/dL    Albumin 4.1 3.2 - 4.9 g/dL    Total Protein 7.8 6.0 - 8.2 g/dL    Globulin 3.7 (H) 1.9 - 3.5 g/dL    A-G Ratio 1.1 g/dL   Magnesium    Collection Time: 10/08/20  7:07 AM   Result Value Ref Range    Magnesium 2.3 1.5 - 2.5 mg/dL   Vitamin D, 25-hydroxy (blood)    Collection Time: 10/08/20  7:07 AM   Result Value Ref Range    25-Hydroxy   Vitamin D 25 37 30 - 100 ng/mL   Prothrombin Time    Collection Time: 10/08/20  7:07 AM   Result Value Ref Range    PT 17.6 (H) 12.0 - 14.6 sec    INR 1.40 (H) 0.87 - 1.13   ESTIMATED GFR    Collection Time: 10/08/20  7:07 AM   Result Value Ref Range    GFR If African American >60 >60 mL/min/1.73 m 2    GFR If Non African American >60 >60 mL/min/1.73 m 2       Current Facility-Administered Medications   Medication Frequency   • warfarin (COUMADIN) tablet 5 mg ONCE AT 1800   • levETIRAcetam (KEPPRA) 100 MG/ML solution 500 mg BID   • hydrOXYzine HCl (ATARAX) tablet 50 mg Q6HRS PRN   • melatonin tablet 3 mg HS PRN   • Respiratory Therapy Consult Continuous RT   • Pharmacy Consult Request ...Pain Management Review 1 Each PHARMACY TO DOSE   • hydrALAZINE (APRESOLINE) tablet 10 mg Q8HRS PRN   •  acetaminophen (TYLENOL) tablet 650 mg Q4HRS PRN   • artificial tears ophthalmic solution 1 Drop PRN   • benzocaine-menthol (CEPACOL) lozenge 1 Lozenge Q2HRS PRN   • mag hydrox-al hydrox-simeth (MAALOX PLUS ES or MYLANTA DS) suspension 20 mL Q2HRS PRN   • ondansetron (ZOFRAN ODT) dispertab 4 mg 4X/DAY PRN    Or   • ondansetron (ZOFRAN) syringe/vial injection 4 mg 4X/DAY PRN   • traZODone (DESYREL) tablet 50 mg QHS PRN   • sodium chloride (OCEAN) 0.65 % nasal spray 2 Spray PRN   • lactulose 20 GM/30ML solution 30 mL QDAY PRN   • docusate sodium (ENEMEEZ) enema 283 mg QDAY PRN   • fleet enema 133 mL QDAY PRN   • atorvastatin (LIPITOR) tablet 40 mg Q EVENING   • senna-docusate (PERICOLACE or SENOKOT S) 8.6-50 MG per tablet 2 Tab BID    And   • polyethylene glycol/lytes (MIRALAX) PACKET 1 Packet QDAY PRN    And   • magnesium hydroxide (MILK OF MAGNESIA) suspension 30 mL QDAY PRN    And   • bisacodyl (DULCOLAX) suppository 10 mg QDAY PRN   • acetaminophen (TYLENOL) tablet 650 mg Q4HRS PRN   • acetaminophen/caffeine/butalbital 325-40-50 mg (FIORICET) -40 MG per tablet 1 Tab Q6HRS PRN   • enoxaparin (LOVENOX) inj 80 mg Q12HRS   • MD Alert...Warfarin per Pharmacy PHARMACY TO DOSE   • nystatin (MYCOSTATIN) 740835 UNIT/ML suspension 500,000 Units 4X/DAY       Orders Placed This Encounter   Procedures   • Diet Order Regular (Direct supervision. Meds crushed)     Standing Status:   Standing     Number of Occurrences:   1     Order Specific Question:   Diet:     Answer:   Regular [1]     Comments:   Direct supervision. Meds crushed     Order Specific Question:   Texture Modifier     Answer:   Level 3 - Liquidised (Nectar Thick Full Liquid)     Order Specific Question:   Liquid Level     Answer:   Level 2 - Mildly Thick     Order Specific Question:   Miscellaneous modifications:     Answer:   SLP - 1:1 Supervision by Nursing [21]     Order Specific Question:   Miscellaneous modifications:     Answer:   SLP - Deliver to  Nursing Station [22]       Assessment:  Active Hospital Problems    Diagnosis   • *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    Medical Decision Making and Plan:    Midbrain and khang left ischemic stroke  Left vertebral artery dissection  S/p thrombectomy Dr. 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 Lubin  Outpatient follow up with stroke bridge clinic  Outpatient follow up with Dr. Becker, rehab clinic  Outpatient follow up with Dr. Rosas, neurophthalmology    Hypophonia  Suspect from intubation, not from stroke location  Speech therapy  May need outpatient referral to ENT    Hypertension  On admission to acute  Monitor need to start medications     History of migraines  PRN tylenol or Fioricet     Oral thrush     Start Nystatin     Seizure prophylaxis  Continue Keppra    Leukocytosis  Check UA and CXR    Bowel  Meds as needed  Last BM 10/8    Bladder  Check PVRs - 52, 59  ICP for over 400 cc  Scheduled toileting    DVT prophylaxis  Lovenox bridge to coumadin    Total time:  35 minutes.  I spent greater than 50% of the time for patient care, counseling, and coordination on this date, including patient face-to face time, unit/floor  time with review of records/pertinent lab data and studies, as well as discussing diagnostic evaluation/work up, planned therapeutic interventions, and future disposition of care, as per the interval events/subjective and the assessment and plan as noted above.      Eden Mendoza M.D.   Physical Medicine and Rehabilitation

## 2020-10-08 NOTE — PROGRESS NOTES
Pharmacy Warfarin Consult   10/8/2020     45 y.o.   female     Indication for anticoagulation: Stroke     Goal INR = 2 - 3    Recent Labs     10/06/20  0701 10/07/20  0629 10/08/20  0707   INR 1.12 1.27* 1.40*   HEMOGLOBIN  --  13.0  --    HEMATOCRIT  --  38.6  --        Pertinent Drug/Drug Interactions:  Statin, prn trazodone  Outpatient Warfarin Regimen:  New start  Recent Warfarin Dosing:    Dose from last 7 days     Date/Time Dose (mg)    10/08/20 0707  5    10/07/20 1517  5        INR from last 7 days from [facility]     Date/Time INR Value        Dose     10/07/20 0629  (!) 1.27               dose given at Rehab     10/06/20 0701  1.12                      5     10/05/20 0309  1.06                      5     10/04/20 1547  1.02                      5     10/01/20 0015  1.05     09/30/20 2035  1.02     09/30/20 1600  1.06       Bridge Therapy: Lovenox 80 mg q12h           1.  Warfarin 5 mg tonight for INR = 1.4           Sha Weiss [facility]

## 2020-10-08 NOTE — THERAPY
Physical Therapy   Initial Evaluation     Patient Name: Obdulia High  Age:  45 y.o., Sex:  female  Medical Record #: [id number]  Today's Date: 10/8/2020     Subjective    Pt was supine in bed upon arrival and agreeable to treatment.  Pt's spouse present during session.  Pt reported fatigue.      Objective       10/08/20 1031   Prior Living Situation   Prior Services Home-Independent;None   Housing / Facility 1 Story House   Steps Into Home 2  (platform steps)   Steps In Home 0   Rail None   Elevator No   Equipment Owned None   Lives with - Patient's Self Care Capacity Child Less than 18 Years of Age;Spouse  (15 y/o son)   Comments Pt lives in [place], [place]   Prior Level of Functional Mobility   Bed Mobility Independent   Transfer Status Independent   Ambulation Independent   Distance Ambulation (Feet)   (Community)   Assistive Devices Used None   Stairs Independent   Prior Functioning: Everyday Activities   Self Care Independent   Indoor Mobility (Ambulation) Independent   Stairs Independent   Functional Cognition Independent   Prior Device Use None of the given options   Pain 0 - 10 Group   Therapist Pain Assessment 0;Post Activity Pain Same as Prior to Activity;Nurse Notified   Cognition    Orientation Level Oriented x 4   Comments hypophonia   Passive ROM Lower Body   Passive ROM Lower Body WDL   Active ROM Lower Body    Active ROM Lower Body  WDL   Strength Lower Body   Rt Hip Flexion Strength 4 (G)   Rt Knee Flexion Strength   (4+/5)   Rt Knee Extension Strength 5 (N)   Rt Ankle Dorsiflexion Strength   (4+/5)   Lt Hip Flexion Strength 5 (N)   Lt Knee Flexion Strength 5 (N)   Lt Knee Extension Strength 5 (N)   Lt Ankle Dorsiflexion Strength 5 (N)   Sensation Lower Body   Rt Lower Extremity Light Touch   (Intact)   Rt Lower Extremity Proprioception Impaired   Lt Lower Extremity Light Touch   (Intact)   Lt Lower Extremity Proprioception   (Intact)   Lower Body Muscle Tone   Lower Body Muscle Tone  WDL   Comments No  tone or clonus noted   Bed Mobility    Supine to Sit Stand by Assist   Sit to Supine Stand by Assist   Sit to Stand Minimal Assist   Scooting Stand by Assist   Rolling Supervised   Comments HOBE, no bedrail   Coordination Lower Body    Rapid Alternating Movements Right   (Intact)   Rapid Alternating Movements Left   (Intact)   Heel To Shin Right   (Intact)   Heel To Jaffe Left   (Intact)   Roll Left and Right   Assistance Needed Supervision   CARE Score 4   Roll Left and Right Discharge Goal   Discharge Goal Independent   Sit to Lying   Assistance Needed Supervision   CARE Score 4   Sit to Lying Discharge Goal   Discharge Goal Independent   Lying to Sitting on Side of Bed   Assistance Needed Supervision   CARE Score 4   Lying to Sitting on Side of Bed Discharge Goal   Discharge Goal Independent   Sit to Stand   Assistance Needed Physical assistance   Physical Assistance Level 25%-49%   CARE Score 3   Sit to Stand Discharge Goal   Discharge Goal Supervision or touching assistance   Chair/Bed-to-Chair Transfer   Assistance Needed Physical assistance   Physical Assistance Level 25%-49%   CARE Score 3   Chair/Bed-to-Chair Transfer Discharge Goal   Discharge Goal Supervision or touching assistance   Car Transfer   Reason if not Attempted Safety concerns   CARE Score 88   Car Transfer Discharge Goal   Discharge Goal Supervision or touching assistance   Walk 10 Feet   Assistance Needed Incidental touching;Supervision;Adaptive equipment   CARE Score 4   Walk 10 Feet Discharge Goal   Discharge Goal Supervision or touching assistance   Walk 50 Feet with Two Turns   Reason if not Attempted Safety concerns   CARE Score 88   Walk 50 Feet with Two Turns Discharge Goal   Discharge Goal Supervision or touching assistance   Walk 150 Feet   Reason if not Attempted Safety concerns   CARE Score 88   Walk 150 Feet Discharge Goal   Discharge Goal Supervision or touching assistance   Walking 10 Feet on Uneven Surfaces   Reason if not  Attempted Safety concerns   CARE Score 88   Walking 10 Feet on Uneven Surfaces Discharge Goal   Discharge Goal Supervision or touching assistance   1 Step (Curb)   Reason if not Attempted Safety concerns   CARE Score 88   1 Step (Curb) Discharge Goal   Discharge Goal Supervision or touching assistance   4 Steps   Reason if not Attempted Safety concerns   CARE Score 88   4 Steps Discharge Goal   Discharge Goal Supervision or touching assistance   12 Steps   Reason if not Attempted Safety concerns   CARE Score 88   12 Steps Discharge Goal   Discharge Goal Supervision or touching assistance   Picking Up Object   Reason if not Attempted Safety concerns   CARE Score 88   Picking Up Object Discharge Goal   Discharge Goal Supervision or touching assistance   Wheel 50 Feet with Two Turns   Assistance Needed Physical assistance   Physical Assistance Level 50%-74%   CARE Score 2   Type of Wheelchair/Scooter Manual   Wheel 50 Feet with Two Turns Discharge Goal   Discharge Goal Supervision or touching assistance   Wheel 150 Feet   Reason if not Attempted Safety concerns   CARE Score 88   Wheel 150 Feet Discharge Goal   Discharge Goal Supervision or touching assistance   Gait Functional Level of Assist    Gait Level Of Assist Contact Guard Assist   Assistive Device Parallel Bars   Distance (Feet) 10   # of Times Distance was Traveled 1   Deviation Decreased Base Of Support;Bradykinetic;Decreased Heel Strike;Decreased Toe Off   Wheelchair Functional Level of Assist   Wheelchair Assist Moderate Assist  (for steering)   Distance Wheelchair (Feet or Distance) 100   Wheelchair Description Extra time;Limited by fatigue;Safety concerns;Supervision for safety;Verbal cueing   Stairs Functional Level of Assist   Level of Assist with Stairs Unable to Participate   Transfer Functional Level of Assist   Bed, Chair, Wheelchair Transfer Minimal Assist   Bed Chair Wheelchair Transfer Description Adaptive equipment;Set-up of equipment;Squat  pivot transfer to wheelchair;Supervision for safety;Verbal cueing   Problem List    Problems Pain;Impaired Bed Mobility;Impaired Transfers;Impaired Ambulation;Functional Strength Deficit;Impaired Balance;Impaired Vision;Decreased Activity Tolerance;Safety Awareness Deficits / Cognition;Other (Comments)  (Dizziness)   Precautions   Precautions Fall Risk;Other (See Comments)   Comments Double vision, dizziness   Current Discharge Plan   Current Discharge Plan Return to Prior Living Situation   Interdisciplinary Plan of Care Collaboration   IDT Collaboration with  Occupational Therapist;Nursing   Patient Position at End of Therapy In Bed;Call Light within Reach;Tray Table within Reach;Phone within Reach   Collaboration Comments CLOF   Benefit   Therapy Benefit Patient Would Benefit from Inpatient Rehabilitation Physical Therapy to Maximize Functional [place] with ADLs, IADLs and Mobility.   Strengths & Barriers   Strengths Good insight into deficits/needs;Independent prior level of function;Manages pain appropriately;Motivated for self care and independence;Pleasant and cooperative;Supportive family;Willingly participates in therapeutic activities   Barriers Decreased endurance;Fatigue;Generalized weakness;Impaired activity tolerance;Impaired balance;Limited mobility;Visual impairment  (Dizziness)   PT Total Time Spent   PT Individual Total Time Spent (Mins) 60   PT Charge Group   PT Therapeutic Activities 1   PT Evaluation PT Evaluation Mod     Pt and pt's spouse education on PT role, PT POC, rehab orientation.     Assessment  Patient is 45 y.o. 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  Additional factors influencing patient status / progress (ie: cognitive factors, co-morbidities, social support, etc): independent PLOF, good social support, no PMH.      Plan  Recommend Physical Therapy  minutes per day 5-7 days per week for 3-4 weeks  for the following treatments:  PT Group Therapy, PT E Stim Attended, PT Gait Training, PT Therapeutic Exercises, PT Neuro Re-Ed/Balance, PT Aquatic Therapy, PT Therapeutic Activity, PT Manual Therapy and PT Evaluation.    Goals:  Long term and short term goals have been discussed with patient and spouse and they are in agreement.    Physical Therapy Problems     Problem: Mobility     Dates: Start: 10/08/20       Goal: STG-Within one week, patient will propel wheelchair community     Dates: Start: 10/08/20       Description: 1) Individualized goal:  W/C mobility x 150 feet with SBA  2) Interventions:  PT Group Therapy, PT E Stim Attended, PT Gait Training, PT Therapeutic Exercises, PT Neuro Re-Ed/Balance, PT Aquatic Therapy, PT Therapeutic Activity, PT Manual Therapy, and PT Evaluation            Goal: STG-Within one week, patient will ambulate household distance     Dates: Start: 10/08/20       Description: 1) Individualized goal:  AMB x 25 feet with FWW and CGA  2) Interventions:  PT Group Therapy, PT E Stim Attended, PT Gait Training, PT Therapeutic Exercises, PT Neuro Re-Ed/Balance, PT Aquatic Therapy, PT Therapeutic Activity, PT Manual Therapy, and PT Evaluation                  Problem: Mobility Transfers     Dates: Start: 10/08/20       Goal: STG-Within one week, patient will transfer bed to chair     Dates: Start: 10/08/20       Description: 1) Individualized goal: SPT with CGA  2) Interventions:  PT Group Therapy, PT E Stim Attended, PT Gait Training, PT Therapeutic Exercises, PT Neuro Re-Ed/Balance, PT Aquatic Therapy, PT Therapeutic Activity, PT Manual Therapy, and PT Evaluation                  Problem: PT-Long Term Goals     Dates: Start: 10/08/20       Goal: LTG-By discharge, patient will ambulate     Dates: Start: 10/08/20       Description: 1) Individualized goal:  AMB x 150 feet with LRD and SPV  2) Interventions:  PT Group Therapy, PT E Stim Attended, PT Gait Training, PT Therapeutic Exercises, PT  Neuro Re-Ed/Balance, PT Aquatic Therapy, PT Therapeutic Activity, PT Manual Therapy, and PT Evaluation            Goal: LTG-By discharge, patient will transfer one surface to another     Dates: Start: 10/08/20       Description: 1) Individualized goal:  SPT with LRD and SPV  2) Interventions:  PT Group Therapy, PT E Stim Attended, PT Gait Training, PT Therapeutic Exercises, PT Neuro Re-Ed/Balance, PT Aquatic Therapy, PT Therapeutic Activity, PT Manual Therapy, and PT Evaluation            Goal: LTG-By discharge, patient will ambulate up/down 4-6 stairs     Dates: Start: 10/08/20       Description: 1) Individualized goal:  Up/down 4 steps with BHR and CGA  2) Interventions:  PT Group Therapy, PT E Stim Attended, PT Gait Training, PT Therapeutic Exercises, PT Neuro Re-Ed/Balance, PT Aquatic Therapy, PT Therapeutic Activity, PT Manual Therapy, and PT Evaluation            Goal: LTG-By discharge, patient will transfer in/out of a car     Dates: Start: 10/08/20       Description: 1) Individualized goal:  Car transfer with LRD and SPV  2) Interventions:  PT Group Therapy, PT E Stim Attended, PT Gait Training, PT Therapeutic Exercises, PT Neuro Re-Ed/Balance, PT Aquatic Therapy, PT Therapeutic Activity, PT Manual Therapy, and PT Evaluation

## 2020-10-08 NOTE — CARE PLAN
Problem: Communication  Goal: The ability to communicate needs accurately and effectively will improve  Outcome: PROGRESSING AS EXPECTED  Note: Patient is alert and oriented x 4.       Problem: Safety  Goal: Will remain free from injury  Outcome: PROGRESSING AS EXPECTED  Note: Patient is in a room close to the nurses station, call light within  reach, and personal items at bedside.   No impulsiveness noted this shift.

## 2020-10-08 NOTE — DISCHARGE PLANNING
[facility] Transitional Care Coordination     10/08/20 - Late entry - Gaetano mora for inpatient rehab per LAURE Bowden - auth #[id number].  Dennise's fax #[phone].

## 2020-10-08 NOTE — CARE PLAN
Problem: Safety  Goal: Will remain free from injury  Note: Patient uses call light consistently and appropriately this shift.  Waits for assistance when needed and does not attempt self transfer.  Able to verbalize needs.  Will continue to monitor.      Problem: Pain Management  Goal: Pain level will decrease to patient's comfort goal  Note: Patient able to verbalize needs.  Denies pain or discomfort this shift and no s/s same noted.  Will continue to monitor.

## 2020-10-09 ENCOUNTER — APPOINTMENT (OUTPATIENT)
Dept: RADIOLOGY | Facility: REHABILITATION | Age: 45
DRG: 057 | End: 2020-10-09
Attending: PHYSICAL MEDICINE & REHABILITATION
Payer: OTHER GOVERNMENT

## 2020-10-09 ENCOUNTER — APPOINTMENT (OUTPATIENT)
Dept: PAIN MANAGEMENT | Facility: REHABILITATION | Age: 45
DRG: 057 | End: 2020-10-09
Attending: PHYSICAL MEDICINE & REHABILITATION
Payer: OTHER GOVERNMENT

## 2020-10-09 PROBLEM — N39.0 UTI (URINARY TRACT INFECTION): Status: ACTIVE | Noted: 2020-10-09

## 2020-10-09 LAB
APPEARANCE UR: ABNORMAL
BACTERIA #/AREA URNS HPF: ABNORMAL /HPF
BILIRUB UR QL STRIP.AUTO: ABNORMAL
COLOR UR: ABNORMAL
EPI CELLS #/AREA URNS HPF: ABNORMAL /HPF
GLUCOSE UR STRIP.AUTO-MCNC: NEGATIVE MG/DL
INR PPP: 1.45 (ref 0.87–1.13)
KETONES UR STRIP.AUTO-MCNC: NEGATIVE MG/DL
LEUKOCYTE ESTERASE UR QL STRIP.AUTO: ABNORMAL
MICRO URNS: ABNORMAL
NITRITE UR QL STRIP.AUTO: POSITIVE
PH UR STRIP.AUTO: 5 [PH] (ref 5–8)
PROT UR QL STRIP: 100 MG/DL
PROTHROMBIN TIME: 18.1 SEC (ref 12–14.6)
RBC # URNS HPF: ABNORMAL /HPF
RBC UR QL AUTO: ABNORMAL
SP GR UR STRIP.AUTO: 1.03
UROBILINOGEN UR STRIP.AUTO-MCNC: 1 MG/DL
WBC #/AREA URNS HPF: ABNORMAL /HPF

## 2020-10-09 PROCEDURE — 74018 RADEX ABDOMEN 1 VIEW: CPT

## 2020-10-09 PROCEDURE — 85610 PROTHROMBIN TIME: CPT

## 2020-10-09 PROCEDURE — A9270 NON-COVERED ITEM OR SERVICE: HCPCS | Performed by: PHYSICAL MEDICINE & REHABILITATION

## 2020-10-09 PROCEDURE — 770010 HCHG ROOM/CARE - REHAB SEMI PRIVAT*

## 2020-10-09 PROCEDURE — 74230 X-RAY XM SWLNG FUNCJ C+: CPT

## 2020-10-09 PROCEDURE — 87077 CULTURE AEROBIC IDENTIFY: CPT

## 2020-10-09 PROCEDURE — 92526 ORAL FUNCTION THERAPY: CPT

## 2020-10-09 PROCEDURE — 700102 HCHG RX REV CODE 250 W/ 637 OVERRIDE(OP): Performed by: PHYSICAL MEDICINE & REHABILITATION

## 2020-10-09 PROCEDURE — 700111 HCHG RX REV CODE 636 W/ 250 OVERRIDE (IP): Performed by: PHYSICAL MEDICINE & REHABILITATION

## 2020-10-09 PROCEDURE — 97530 THERAPEUTIC ACTIVITIES: CPT

## 2020-10-09 PROCEDURE — 97130 THER IVNTJ EA ADDL 15 MIN: CPT

## 2020-10-09 PROCEDURE — 87086 URINE CULTURE/COLONY COUNT: CPT

## 2020-10-09 PROCEDURE — 97129 THER IVNTJ 1ST 15 MIN: CPT

## 2020-10-09 PROCEDURE — 36415 COLL VENOUS BLD VENIPUNCTURE: CPT

## 2020-10-09 PROCEDURE — 99232 SBSQ HOSP IP/OBS MODERATE 35: CPT | Performed by: PHYSICAL MEDICINE & REHABILITATION

## 2020-10-09 PROCEDURE — 97116 GAIT TRAINING THERAPY: CPT

## 2020-10-09 PROCEDURE — 81001 URINALYSIS AUTO W/SCOPE: CPT

## 2020-10-09 PROCEDURE — 87186 SC STD MICRODIL/AGAR DIL: CPT

## 2020-10-09 PROCEDURE — 92611 MOTION FLUOROSCOPY/SWALLOW: CPT

## 2020-10-09 RX ORDER — CEFUROXIME AXETIL 500 MG/1
500 TABLET ORAL EVERY 12 HOURS
Status: COMPLETED | OUTPATIENT
Start: 2020-10-09 | End: 2020-10-13

## 2020-10-09 RX ORDER — WARFARIN SODIUM 5 MG/1
5 TABLET ORAL
Status: COMPLETED | OUTPATIENT
Start: 2020-10-09 | End: 2020-10-09

## 2020-10-09 RX ORDER — TRAZODONE HYDROCHLORIDE 50 MG/1
50 TABLET ORAL
Status: DISCONTINUED | OUTPATIENT
Start: 2020-10-09 | End: 2020-10-27 | Stop reason: HOSPADM

## 2020-10-09 RX ORDER — LANOLIN ALCOHOL/MO/W.PET/CERES
3 CREAM (GRAM) TOPICAL
Status: DISCONTINUED | OUTPATIENT
Start: 2020-10-09 | End: 2020-10-27 | Stop reason: HOSPADM

## 2020-10-09 RX ADMIN — TRAZODONE HYDROCHLORIDE 50 MG: 50 TABLET ORAL at 20:59

## 2020-10-09 RX ADMIN — CEFUROXIME AXETIL 500 MG: 500 TABLET ORAL at 14:51

## 2020-10-09 RX ADMIN — CEFUROXIME AXETIL 500 MG: 500 TABLET ORAL at 21:07

## 2020-10-09 RX ADMIN — WARFARIN SODIUM 5 MG: 5 TABLET ORAL at 18:06

## 2020-10-09 RX ADMIN — ENOXAPARIN SODIUM 80 MG: 100 INJECTION SUBCUTANEOUS at 10:00

## 2020-10-09 RX ADMIN — NYSTATIN 500000 UNITS: 100000 SUSPENSION ORAL at 18:06

## 2020-10-09 RX ADMIN — NYSTATIN 500000 UNITS: 100000 SUSPENSION ORAL at 10:00

## 2020-10-09 RX ADMIN — DOCUSATE SODIUM 50 MG AND SENNOSIDES 8.6 MG 2 TABLET: 8.6; 5 TABLET, FILM COATED ORAL at 21:00

## 2020-10-09 RX ADMIN — ATORVASTATIN CALCIUM 40 MG: 40 TABLET, FILM COATED ORAL at 20:59

## 2020-10-09 RX ADMIN — LEVETIRACETAM 500 MG: 500 SOLUTION ORAL at 10:00

## 2020-10-09 RX ADMIN — NYSTATIN 500000 UNITS: 100000 SUSPENSION ORAL at 21:08

## 2020-10-09 RX ADMIN — NYSTATIN 500000 UNITS: 100000 SUSPENSION ORAL at 14:58

## 2020-10-09 RX ADMIN — DOCUSATE SODIUM 50 MG AND SENNOSIDES 8.6 MG 2 TABLET: 8.6; 5 TABLET, FILM COATED ORAL at 10:00

## 2020-10-09 RX ADMIN — LEVETIRACETAM 500 MG: 500 SOLUTION ORAL at 20:58

## 2020-10-09 RX ADMIN — MELATONIN TAB 3 MG 3 MG: 3 TAB at 21:00

## 2020-10-09 RX ADMIN — ENOXAPARIN SODIUM 80 MG: 100 INJECTION SUBCUTANEOUS at 21:09

## 2020-10-09 ASSESSMENT — ACTIVITIES OF DAILY LIVING (ADL)
BED_CHAIR_WHEELCHAIR_TRANSFER_DESCRIPTION: ADAPTIVE EQUIPMENT;SET-UP OF EQUIPMENT;SUPERVISION FOR SAFETY;VERBAL CUEING
BED_CHAIR_WHEELCHAIR_TRANSFER_DESCRIPTION: ADAPTIVE EQUIPMENT;SUPERVISION FOR SAFETY;VERBAL CUEING;SET-UP OF EQUIPMENT

## 2020-10-09 ASSESSMENT — GAIT ASSESSMENTS
ASSISTIVE DEVICE: PARALLEL BARS
DEVIATION: BRADYKINETIC;DECREASED HEEL STRIKE;DECREASED TOE OFF
GAIT LEVEL OF ASSIST: CONTACT GUARD ASSIST
DISTANCE (FEET): 10

## 2020-10-09 NOTE — PROGRESS NOTES
Pharmacy Warfarin Consult   10/9/2020     45 y.o.   female     Indication for anticoagulation: Stroke     Goal INR = 2 - 3    Recent Labs     10/07/20  0629 10/08/20  0707 10/09/20  0721   INR 1.27* 1.40* 1.45*   HEMOGLOBIN 13.0 13.8  --    HEMATOCRIT 38.6 42.2  --        Pertinent Drug/Drug Interactions:  ABX, Statin, prn trazodone  Outpatient Warfarin Regimen:  New start  Recent Warfarin Dosing:    Dose from last 7 days     Date/Time Dose (mg)    10/09/20 0721  5    10/08/20 0707  5    10/07/20 1517  5    10/07/20 1443  --        INR from last 7 days from [facility]     Date/Time INR Value        Dose     10/07/20 0629  (!) 1.27               dose given at Rehab     10/06/20 0701  1.12                      5     10/05/20 0309  1.06                      5     10/04/20 1547  1.02                      5     10/01/20 0015  1.05     09/30/20 2035  1.02     09/30/20 1600  1.06         Bridge Therapy: Lovenox 80 mg q12h           1.  Warfarin 5 mg tonight for INR = 1.45           Sha Weiss [facility]

## 2020-10-09 NOTE — CARE PLAN
Problem: Safety  Goal: Will remain free from injury  Outcome: PROGRESSING AS EXPECTED  Note: Patient verbalized she will not get up without assist. Call button within reach     Problem: Pain Management  Goal: Pain level will decrease to patient's comfort goal  Outcome: PROGRESSING AS EXPECTED  Note: Patient verbalized no pain for now. Patient verbalized will inform RN for any pain

## 2020-10-09 NOTE — PROGRESS NOTES
Received patient on bed. Patient alert and oriented. Patient verbalized no pain and no SOB. Patient was able to swallow pills crushed with thickened liquid. Patient verbalized she will not get up without assist. Safety and fall precaution reinforced.

## 2020-10-09 NOTE — THERAPY
Speech Language Pathology  Video Swallow     Patient Name:  Obdulia High  AGE:  45 y.o., SEX:  female  Medical Record #:  [id number]  Today's Date: 10/9/2020     Objective       10/09/20 1031   History / Background Information   Prior Level of Function for Eating / Swallowing WFL   Diagnosis stroke   Onset Date Of Dysphagia 9/29/20   Dysphagia Symptoms Warranting Video Swallow Coughing with ice chips on eval   Dentition Intact   Procedure   Patient Seated in  wheelchair    Seated at (Degrees) 90   Views Completed Lateral;Anterior / Posterior   Fluoroscopy Time 120.7 sec    Consistencies / Presentation Method   Consistencies / Presentation Method Tested   Mildly Thick (2) - (Nectar Thick) Teaspoon;Cup   Thin (0) Teaspoon;Cup   Pureed (4) Teaspoon   Soft & Bite-Sized (6) - (Dysphagia III) Teaspoon   Regular (7) Teaspoon   Barium Tablet Teaspoon   Oral Phase   Oral Phase X   Mildly Thick (2) - (Nectar Thick) Oral Residue After the Swallow   Thin (0) Oral Residue After the Swallow;Premature Spillage Into Valleculae;Premature Spillage Into Pyriform Sinus   Pureed (4) Oral Residue After the Swallow;Premature Spillage Into Valleculae   Soft & Bite-Sized (6) - (Dysphagia III) Oral Residue After the Swallow;Premature Spillage Into Valleculae   Regular (7) Oral Residue After the Swallow   Pharyngeal Phase   Pharyngeal Phase X   Mildly Thick (2) - (Nectar Thick) Residue In Valleculae   Thin (0) Penetration During Swallow;Aspiration During Swallow;Delayed Cough Response to Penetration / Aspiration    Esophageal Phase   Esophageal Phase WDL   Compensatory Strategies Attempted   Compensatory Strategies Attempted Yes   Multiple Swallows Effective in clearing small amount of oral residue    Impression   Dysphagia Present Yes   Pharyngeal Mild Impairment   Prognosis   Prognosis for Improvement Excellent   Barriers to Improvement Aspiration with serial swallows iof thin liquids, delayed cough to aspirated material    Positive  Indicators for Improvement able to tolerate all other textures without difficulty    Recommendations   Diet / Liquid Recommendation Thin (0);Soft & Bite-Sized (6) - (Dysphagia III)   Medication Administration  Float Whole with Puree;Whole with Liquid Wash   Strategies / Precautions Small Bites;Small Sips;Sitting Upright at 90 Degrees while Eating;Stay Upright at Least 30 Minutes After Meals;Oral Care After Meals;Monitor Temperature and Lung Sounds   Interventions Compensatory Safe Swallow Strategy Training;Dysphagia Therapy by SLP;Therapeutic Dining Program for Meals;Patient / Caregiver Education / Training   SLP Contact Information (Name / Extension) Ash Dillard MS, [facility]-SLP/ ex [id number]   Video Tape Number [id number]   SLP Total Time Spent   SLP Individual Total Time Spent (Mins) 30   Charge Group   SLP Video Swallow / FEES Videofluoroscopic Evaluation       Assessment    MBSS completed.  Pt presented with slight-mild pharyngeal dysphagia characterized by the following: Mild oral residue with all textures that pt was able to clear with an independent second swallow; premature spillage to the valleculae (puree, MTL, Thin, mech soft); premature spillage to the pyriforms (cup sip thin liquids); penetration during the swallow with cup sips of thin liquids; aspiration during the swallow noted on one occasion with serial cup sips of thin liquids (pt did respond with a cough; however it was slightly delayed and not strong enough to clear the aspirated material).  Pt was also able to tolerate a barium capsule floated in puree without difficulty.      Plan    Recommend upgrading pt's diet to 6- Soft & Bite Sized textures and thin liquids, pt to remain in t-dine to target compensatory swallowing strategies with emphasis on small sips and to complete trials of regular textures.  Meds can be given whole, one at a time with a thin liquid wash.

## 2020-10-09 NOTE — THERAPY
Physical Therapy   Daily Treatment     Patient Name: Obdulia High  Age:  45 y.o., Sex:  female  Medical Record #: [id number]  Today's Date: 10/9/2020     Precautions  Precautions: Fall Risk, Other (See Comments)  Comments: double vision, dizziness, NTL    Subjective    Pt was seated in w/c upon arrival and agreeable to treatment.  Pt's spouse was present during session.      Objective       10/09/20 1431   Precautions   Precautions Fall Risk;Other (See Comments)   Gait Functional Level of Assist    Gait Level Of Assist Contact Guard Assist   Assistive Device Parallel Bars   Distance (Feet) 10   # of Times Distance was Traveled 3   Deviation Bradykinetic;Decreased Heel Strike;Decreased Toe Off   Transfer Functional Level of Assist   Bed, Chair, Wheelchair Transfer Contact Guard Assist   Bed Chair Wheelchair Transfer Description Adaptive equipment;Set-up of equipment;Supervision for safety;Verbal cueing   Bed Mobility    Sit to Supine Stand by Assist   Sit to Stand Contact Guard Assist   Scooting Stand by Assist   Interdisciplinary Plan of Care Collaboration   Patient Position at End of Therapy In Bed;Call Light within Reach;Tray Table within Reach;Phone within Reach;Family / Friend in Room   PT Total Time Spent   PT Individual Total Time Spent (Mins) 60   PT Charge Group   PT Gait Training 1   PT Therapeutic Activities 3     VESTIBULAR EVALUATION    Subjective:     Onset of dizziness: Since CVA  Description of dizziness: Room spinning dizziness, occasional lightheadedness, a sense of visual lag, and occasional side to side movement  Duration: Constant, rated 2/10 at rest increasing to 10/10 with position change or head turns  What makes dizziness worse: Position change, head turns  What makes dizziness better: Supine position with no head turns  Fall history: N/A  Double vision: Constant, perceived at a diagonal side by side image  Visual changes: L eye decreased visual acuity  Ear fullness: N/A  Tinnitus:  N/A    Oculomotor Exam:     Spontaneous nystagmus: Negative  Gaze holding nystagmus: L: Negative, R: Positive for upbeating nystagmus and subjective dizziness  Smooth pursuit: Upward gaze: decreased B (L>R) with R upbeat nystagmus, no ADD with L eye  Saccades: Negative L, R, and Upward  Convergence: Positive   VOR: Negative, but report of subjective dizziness  VOR Cancellation: Positive with report of subjective dizziness  Head Impulse Test: Positive bilaterally with report of significant subjective dizziness     Cerebellar Screen:   Disdiadokokinesia: Negative    Positional Testing: deferred    Assessment    Pt demonstrates signs and symptoms consistent with central vestibular dysfunction.  Pt would benefit from habituation interventions and OP vestibular referral to continue to address vestibular deficits  Pt with increased tolerance to upright standing and ambulation this session.     Strengths: Good insight into deficits/needs, Independent prior level of function, Manages pain appropriately, Motivated for self care and independence, Pleasant and cooperative, Supportive family, Willingly participates in therapeutic activities  Barriers: Decreased endurance, Fatigue, Generalized weakness, Impaired activity tolerance, Impaired balance, Limited mobility, Visual impairment(Dizziness)    Plan    Continue to progress gait training in // bars progressing to FWW as tolerated, vestibular habituation exercises, transfer training, complete family training with pt's spouse on bed and toilet transfers as appropriate    Physical Therapy Problems     Problem: Mobility     Dates: Start: 10/08/20       Goal: STG-Within one week, patient will propel wheelchair community     Dates: Start: 10/08/20       Description: 1) Individualized goal:  W/C mobility x 150 feet with SBA  2) Interventions:  PT Group Therapy, PT E Stim Attended, PT Gait Training, PT Therapeutic Exercises, PT Neuro Re-Ed/Balance, PT Aquatic Therapy, PT Therapeutic  Activity, PT Manual Therapy, and PT Evaluation            Goal: STG-Within one week, patient will ambulate household distance     Dates: Start: 10/08/20       Description: 1) Individualized goal:  AMB x 25 feet with FWW and CGA  2) Interventions:  PT Group Therapy, PT E Stim Attended, PT Gait Training, PT Therapeutic Exercises, PT Neuro Re-Ed/Balance, PT Aquatic Therapy, PT Therapeutic Activity, PT Manual Therapy, and PT Evaluation                  Problem: Mobility Transfers     Dates: Start: 10/08/20       Goal: STG-Within one week, patient will transfer bed to chair     Dates: Start: 10/08/20       Description: 1) Individualized goal: SPT with CGA  2) Interventions:  PT Group Therapy, PT E Stim Attended, PT Gait Training, PT Therapeutic Exercises, PT Neuro Re-Ed/Balance, PT Aquatic Therapy, PT Therapeutic Activity, PT Manual Therapy, and PT Evaluation                  Problem: PT-Long Term Goals     Dates: Start: 10/08/20       Goal: LTG-By discharge, patient will ambulate     Dates: Start: 10/08/20       Description: 1) Individualized goal:  AMB x 150 feet with LRD and SPV  2) Interventions:  PT Group Therapy, PT E Stim Attended, PT Gait Training, PT Therapeutic Exercises, PT Neuro Re-Ed/Balance, PT Aquatic Therapy, PT Therapeutic Activity, PT Manual Therapy, and PT Evaluation            Goal: LTG-By discharge, patient will transfer one surface to another     Dates: Start: 10/08/20       Description: 1) Individualized goal:  SPT with LRD and SPV  2) Interventions:  PT Group Therapy, PT E Stim Attended, PT Gait Training, PT Therapeutic Exercises, PT Neuro Re-Ed/Balance, PT Aquatic Therapy, PT Therapeutic Activity, PT Manual Therapy, and PT Evaluation            Goal: LTG-By discharge, patient will ambulate up/down 4-6 stairs     Dates: Start: 10/08/20       Description: 1) Individualized goal:  Up/down 4 steps with BHR and CGA  2) Interventions:  PT Group Therapy, PT E Stim Attended, PT Gait Training, PT  Therapeutic Exercises, PT Neuro Re-Ed/Balance, PT Aquatic Therapy, PT Therapeutic Activity, PT Manual Therapy, and PT Evaluation            Goal: LTG-By discharge, patient will transfer in/out of a car     Dates: Start: 10/08/20       Description: 1) Individualized goal:  Car transfer with LRD and SPV  2) Interventions:  PT Group Therapy, PT E Stim Attended, PT Gait Training, PT Therapeutic Exercises, PT Neuro Re-Ed/Balance, PT Aquatic Therapy, PT Therapeutic Activity, PT Manual Therapy, and PT Evaluation

## 2020-10-09 NOTE — THERAPY
Occupational Therapy  Daily Treatment     Patient Name: Obdulia High  Age:  45 y.o., Sex:  female  Medical Record #: [id number]  Today's Date: 10/9/2020     Precautions  Precautions: (P) Fall Risk, Other (See Comments), Swallow Precautions ( See Comments)  Comments: (P) double vision, dizziness, NTL         Subjective    Patient lying in bed awake.  Agreeable to OT.  Stated she slept so-so last night due to stomach discomfort.     Objective       10/09/20 0901   Precautions   Precautions Fall Risk;Other (See Comments);Swallow Precautions ( See Comments)   Comments double vision, dizziness, NTL   Functional Level of Assist   Grooming Supervision   Grooming Description Set-up of equipment;Supervision for safety  (to brush teeth)   Bed, Chair, Wheelchair Transfer Minimal Assist   Bed Chair Wheelchair Transfer Description Adaptive equipment;Supervision for safety;Verbal cueing;Set-up of equipment  (CGA/min SPT w/ bedrail)   Sitting Upper Body Exercises   Sitting Upper Body Exercises Yes   Bicep Curls 1 set of 10;Right ;Weight (See Comments for lbs);Left   Pronation / Supination 1 set of 10;Right ;Weight (See Comments for lbs);Left   Wrist Flexion / Extension 1 set of 10;Right ;Weight (See Comments for lbs);Left   Comments 3 lbs R, 7 lbs L   Fine Motor / Dexterity    Fine Motor / Dexterity Yes   Fine Motor / Dexterity Interventions R UE used to place large pegs in large, flat pegboard and large, slanted pegboard with cues for in hand manipulation rather than placing on table to get correct    Outcome Measures   Outcome Measures Utilized [place] Manual Dexterity Test   [place] Manual Dexterity Test   Placing Test Right 288 seconds and 224 seconds   Interdisciplinary Plan of Care Collaboration   Patient Position at End of Therapy In Bed;Call Light within Reach;Tray Table within Reach;Bed Alarm On   OT Total Time Spent   OT Individual Total Time Spent (Mins) 60   OT Charge Group   OT Therapy Activity 4      Dynamometer used for R and L  strength with the following results, in pounds of pressure: R = 37, 41 and 46; L = 55, 56, 55   Pinch meter used for R and L pinch strength with the following results, in pounds of pressure: R = 1.6, 6, 6.5; L = 12, 10, 9    Assessment    Patient complained of R shoulder fatigue with two rounds of Manual Dexterity Test.  Continues to be motivated to improve.    Strengths: Alert and oriented, Able to follow instructions, Effective communication skills, Good insight into deficits/needs, Independent prior level of function, Manages pain appropriately, Motivated for self care and independence, Pleasant and cooperative, Supportive family, Willingly participates in therapeutic activities  Barriers: Decreased endurance, Fatigue, Generalized weakness, Hemiparesis, Impaired activity tolerance, Impaired balance, Limited mobility, Visual impairment(double vision)    Plan    R UE strengthening/GMC/FMC, Standing balance (static and dynamic), ADLs, IADLs, typing    Occupational Therapy Goals     Problem: Bathing     Dates: Start: 10/08/20       Goal: STG-Within one week, patient will bathe     Dates: Start: 10/08/20       Description: 1) Individualized Goal:  body with CGA (for standing portion) using AE/AD  2) Interventions:  OT Self Care/ADL, OT Manual Ther Technique, OT Neuro Re-Ed/Balance, OT Sensory Int Techniques, OT Therapeutic Activity, OT Evaluation, and OT Therapeutic Exercise                  Problem: Dressing     Dates: Start: 10/08/20       Goal: STG-Within one week, patient will dress LB     Dates: Start: 10/08/20       Description: 1) Individualized Goal:  with CGA for standing portion using grab bar  2) Interventions:  OT Self Care/ADL, OT Manual Ther Technique, OT Neuro Re-Ed/Balance, OT Sensory Int Techniques, OT Therapeutic Activity, OT Evaluation, and OT Therapeutic Exercise                Problem: Functional Transfers     Dates: Start: 10/08/20       Goal: STG-Within one  week, patient will transfer to toilet     Dates: Start: 10/08/20       Description: 1) Individualized Goal:  with min A using grab bar for balance/support  2) Interventions:  OT Self Care/ADL, OT Manual Ther Technique, OT Neuro Re-Ed/Balance, OT Sensory Int Techniques, OT Therapeutic Activity, OT Evaluation, and OT Therapeutic Exercise                Problem: OT Long Term Goals     Dates: Start: 10/08/20       Goal: LTG-By discharge, patient will complete basic self care tasks     Dates: Start: 10/08/20       Description: 1) Individualized Goal:  with mod I using AE/AD/techniques as needed  2) Interventions:  OT Self Care/ADL, OT Manual Ther Technique, OT Neuro Re-Ed/Balance, OT Sensory Int Techniques, OT Therapeutic Activity, OT Evaluation, and OT Therapeutic Exercise          Goal: LTG-By discharge, patient will perform bathroom transfers     Dates: Start: 10/08/20       Description: 1) Individualized Goal:  with mod I using AE/AD/techniques as needed  2) Interventions:  OT Self Care/ADL, OT Manual Ther Technique, OT Neuro Re-Ed/Balance, OT Sensory Int Techniques, OT Therapeutic Activity, OT Evaluation, and OT Therapeutic Exercise          Goal: LTG-By discharge, patient will complete basic home management     Dates: Start: 10/08/20       Description: 1) Individualized Goal:  with supervision using AE/AD/techniques as needed  2) Interventions:  OT Self Care/ADL, OT Manual Ther Technique, OT Neuro Re-Ed/Balance, OT Sensory Int Techniques, OT Therapeutic Activity, OT Evaluation, and OT Therapeutic Exercise

## 2020-10-09 NOTE — THERAPY
"Speech Language Pathology  Daily Treatment     Patient Name: Obdulia High  Age:  45 y.o., Sex:  female  Medical Record #: [id number]  Today's Date: 10/9/2020     Precautions  Precautions: Fall Risk, Other (See Comments)  Comments: double vision, dizziness, NTL    Subjective    Pt sleeping at time of ST. Pt's spouse present and very supportive, \"OT makes her tired.\"      Objective     10/09/20 1334   Cognition   Moderate Attention Moderate (3)   Dysphagia    Other Treatments review of MBSS with pt and spouse   Diet / Liquid Recommendation Thin (0);Soft & Bite-Sized (6) - (Dysphagia III)  (trials of regular textures)   Nutritional Liquid Intake Rating Scale Non thickened beverages   Nutritional Food Intake Rating Scale Total oral diet with multiple consistencies without special preparation but with specific food limitations   Interdisciplinary Plan of Care Collaboration   IDT Collaboration with  Other (See Comments);Family / Caregiver;Physical Therapist  (PT student observing)   Patient Position at End of Therapy Seated   Collaboration Comments spouse present for session, transfer of care to PT at end of ST   SLP Total Time Spent   SLP Individual Total Time Spent (Mins) 60   Treatment Charges   SLP Swallowing Dysfunction Treatment Swallowing Dysfunction Treatment   SLP Cognitive Skill Development First 15 Minutes 1   SLP Cognitive Skill Development Additional 15 Minutes 1       Assessment    Review of MBSS with patient and spouse, education provided on results, instance of aspiration with serial cup sip thin liquids which pt was aware of during study stating to spouse \"this is where I choked.\" Review of diet recommendations for 6- Soft & Bite Sized/thin liquids, and strategies, serial swallows, single sips, no straws. Pt and spouse confirmed understanding, all questions answered to their satisfaction. Review of [employer] results with pt and spouse, spouse confirmed his observations in line with pt's scores on standardized " "assessment. Selective visual attention task (find \"the\") - pt located 3/14 targets IND, with strategies (I.e. scanning backwards, narrowing visual field) pt was able to locate an additional 2 targets. With identification of specific line and number of targets pt located 14/14. Alternating attention task (who has more) using simple math calculations: 50% IND, pt demonstrated difficulty with visual attention, often writing responses on line above or below target, receptive to cues, breaking info into smaller units helpful this date.     Strengths: Able to follow instructions, Alert and oriented, Effective communication skills, Independent prior level of function, Motivated for self care and independence, Pleasant and cooperative, Supportive family, Willingly participates in therapeutic activities  Barriers: Aspiration risk, Hemiplegia(diplopia, voice)    Plan    Trial regular textures for lunch and through weekend with advancement as appropriate. Ongoing training in use of strategies (single sips, no straws, 2 swallows). Continue to target attention, $ mngt and med mngt.     Speech Therapy Problems     Problem: Problem Solving STGs     Dates: Start: 10/08/20       Goal: STG-Within one week, patient will     Dates: Start: 10/08/20       Description: 1) Individualized goal:  complete functional organization tasks related to medication and financial mngt. With 100% accuracy provided SPV  2) Interventions:  SLP Cognitive Skill Development and SLP Group Treatment              Goal: STG-Within one week, patient will     Dates: Start: 10/08/20       Description: 1) Individualized goal:  complete alternating attention tasks with 80% accuracy provided MIN cues.   2) Interventions:  SLP Cognitive Skill Development and SLP Group Treatment                    Problem: Speech/Swallowing LTGs     Dates: Start: 10/08/20       Goal: LTG-By discharge, patient will safely swallow     Dates: Start: 10/08/20       Description: 1) " Individualized goal:  regular textures/thin liquids for safe return to PLOF.   2) Interventions:  SLP Swallowing Dysfunction Treatment, SLP Oral Pharyngeal Evaluation, SLP Video Swallow Evaluation, SLP Cognitive Skill Development, and SLP Group Treatment              Goal: LTG-By discharge, patient will solve complex problem     Dates: Start: 10/08/20       Description: 1) Individualized goal: By utilizing compensatory intervention for memory and problem-solving to allow for safe completion of daily activities with MOD I in order to prepare for safe d/c home  2) Interventions:  SLP Cognitive Skill Development and SLP Group Treatment                    Problem: Swallowing STGs     Dates: Start: 10/08/20       Goal: STG-Within one week, patient will     Dates: Start: 10/08/20       Description: 1) Individualized goal: complete a MBSS for further evaluation of swallow function; with additional goals pending results  2) Interventions:  SLP Video Swallow Evaluation

## 2020-10-10 LAB
INR PPP: 1.62 (ref 0.87–1.13)
PROTHROMBIN TIME: 19.7 SEC (ref 12–14.6)

## 2020-10-10 PROCEDURE — 36415 COLL VENOUS BLD VENIPUNCTURE: CPT

## 2020-10-10 PROCEDURE — 85610 PROTHROMBIN TIME: CPT

## 2020-10-10 PROCEDURE — 700102 HCHG RX REV CODE 250 W/ 637 OVERRIDE(OP): Performed by: PHYSICAL MEDICINE & REHABILITATION

## 2020-10-10 PROCEDURE — 99232 SBSQ HOSP IP/OBS MODERATE 35: CPT | Performed by: PHYSICAL MEDICINE & REHABILITATION

## 2020-10-10 PROCEDURE — A9270 NON-COVERED ITEM OR SERVICE: HCPCS | Performed by: PHYSICAL MEDICINE & REHABILITATION

## 2020-10-10 PROCEDURE — 700111 HCHG RX REV CODE 636 W/ 250 OVERRIDE (IP): Performed by: PHYSICAL MEDICINE & REHABILITATION

## 2020-10-10 PROCEDURE — 92526 ORAL FUNCTION THERAPY: CPT

## 2020-10-10 PROCEDURE — 770010 HCHG ROOM/CARE - REHAB SEMI PRIVAT*

## 2020-10-10 RX ORDER — WARFARIN SODIUM 5 MG/1
5 TABLET ORAL
Status: COMPLETED | OUTPATIENT
Start: 2020-10-10 | End: 2020-10-10

## 2020-10-10 RX ADMIN — DOCUSATE SODIUM 50 MG AND SENNOSIDES 8.6 MG 2 TABLET: 8.6; 5 TABLET, FILM COATED ORAL at 20:23

## 2020-10-10 RX ADMIN — TRAZODONE HYDROCHLORIDE 50 MG: 50 TABLET ORAL at 20:25

## 2020-10-10 RX ADMIN — CEFUROXIME AXETIL 500 MG: 500 TABLET ORAL at 09:11

## 2020-10-10 RX ADMIN — MELATONIN TAB 3 MG 3 MG: 3 TAB at 20:25

## 2020-10-10 RX ADMIN — WARFARIN SODIUM 5 MG: 5 TABLET ORAL at 17:13

## 2020-10-10 RX ADMIN — ENOXAPARIN SODIUM 80 MG: 100 INJECTION SUBCUTANEOUS at 20:26

## 2020-10-10 RX ADMIN — CEFUROXIME AXETIL 500 MG: 500 TABLET ORAL at 20:25

## 2020-10-10 RX ADMIN — LEVETIRACETAM 500 MG: 500 SOLUTION ORAL at 20:22

## 2020-10-10 RX ADMIN — ATORVASTATIN CALCIUM 40 MG: 40 TABLET, FILM COATED ORAL at 20:24

## 2020-10-10 RX ADMIN — LEVETIRACETAM 500 MG: 500 SOLUTION ORAL at 09:11

## 2020-10-10 RX ADMIN — ENOXAPARIN SODIUM 80 MG: 100 INJECTION SUBCUTANEOUS at 09:11

## 2020-10-10 NOTE — CARE PLAN
Problem: Communication  Goal: The ability to communicate needs accurately and effectively will improve  Outcome: PROGRESSING AS EXPECTED     Problem: Safety  Goal: Will remain free from injury  Outcome: PROGRESSING AS EXPECTED     Problem: Infection  Goal: Will remain free from infection  Outcome: PROGRESSING AS EXPECTED  Intervention: Assess signs and symptoms of infection  Note: Afebrile. ABT for UTI.     Problem: Pain Management  Goal: Pain level will decrease to patient's comfort goal  Outcome: PROGRESSING AS EXPECTED

## 2020-10-10 NOTE — PROGRESS NOTES
"Rehab Progress Note     Date of Service: 10/9/2020  Chief Complaint: follow up stroke    Interval Events (Subjective)    Patient seen and examined today in the afternoon after all of her therapies were completed.  Her  Vega is at bedside.  She reports she is feeling better today compared to yesterday.  She gives me a thumbs up sign.  She continues to have double vision but is wearing her eye patch.  She reports she slept very poorly last night and would like to try some sleeping medications.  She also is reporting constipation but denies any abdominal pain.  She reports she really has not been eating very much has a poor appetite.  Noted to have a positive urinalysis for which she has been started on antibiotics.  Patient was having some menstrual cramping but this has now resolved.  Patient has no new questions concerns or complaints.  with no concerns today.    Objective:  VITAL SIGNS: /78   Pulse 81   Temp 36.5 °C (97.7 °F) (Oral)   Resp 16   Ht 1.702 m (5' 7.01\")   Wt 87.5 kg (192 lb 14.4 oz)   SpO2 98%   BMI 30.21 kg/m²   Gen: alert, no apparent distress  CV: regular rate and rhythm, no murmurs, no peripheral edema  Resp: clear to ascultation bilaterally, normal respiratory effort  GI: soft, non-tender abdomen, bowel sounds present  Neuro: notable for double vision, left ptosis, inability to abduct the left eye, mild right-sided facial droop, mild right-sided hemiparesis, decreased sensation on the right face and arm, hypophonia    Recent Results (from the past 72 hour(s))   Prothrombin Time    Collection Time: 10/07/20  6:29 AM   Result Value Ref Range    PT 16.3 (H) 12.0 - 14.6 sec    INR 1.27 (H) 0.87 - 1.13   CBC WITH DIFFERENTIAL    Collection Time: 10/07/20  6:29 AM   Result Value Ref Range    WBC 11.3 (H) 4.8 - 10.8 K/uL    RBC 4.25 4.20 - 5.40 M/uL    Hemoglobin 13.0 12.0 - 16.0 g/dL    Hematocrit 38.6 37.0 - 47.0 %    MCV 90.8 81.4 - 97.8 fL    MCH 30.6 27.0 - 33.0 pg    " MCHC 33.7 33.6 - 35.0 g/dL    RDW 40.5 35.9 - 50.0 fL    Platelet Count 465 (H) 164 - 446 K/uL    MPV 8.8 (L) 9.0 - 12.9 fL    Neutrophils-Polys 66.20 44.00 - 72.00 %    Lymphocytes 20.60 (L) 22.00 - 41.00 %    Monocytes 8.40 0.00 - 13.40 %    Eosinophils 1.90 0.00 - 6.90 %    Basophils 0.60 0.00 - 1.80 %    Immature Granulocytes 2.30 (H) 0.00 - 0.90 %    Nucleated RBC 0.00 /100 WBC    Neutrophils (Absolute) 7.45 (H) 2.00 - 7.15 K/uL    Lymphs (Absolute) 2.32 1.00 - 4.80 K/uL    Monos (Absolute) 0.95 (H) 0.00 - 0.85 K/uL    Eos (Absolute) 0.21 0.00 - 0.51 K/uL    Baso (Absolute) 0.07 0.00 - 0.12 K/uL    Immature Granulocytes (abs) 0.26 (H) 0.00 - 0.11 K/uL    NRBC (Absolute) 0.00 K/uL   Comp Metabolic Panel    Collection Time: 10/07/20  6:29 AM   Result Value Ref Range    Sodium 137 135 - 145 mmol/L    Potassium 4.3 3.6 - 5.5 mmol/L    Chloride 101 96 - 112 mmol/L    Co2 23 20 - 33 mmol/L    Anion Gap 13.0 7.0 - 16.0    Glucose 118 (H) 65 - 99 mg/dL    Bun 22 8 - 22 mg/dL    Creatinine 0.70 0.50 - 1.40 mg/dL    Calcium 9.3 8.5 - 10.5 mg/dL    AST(SGOT) 16 12 - 45 U/L    ALT(SGPT) 35 2 - 50 U/L    Alkaline Phosphatase 68 30 - 99 U/L    Total Bilirubin 0.5 0.1 - 1.5 mg/dL    Albumin 3.8 3.2 - 4.9 g/dL    Total Protein 7.1 6.0 - 8.2 g/dL    Globulin 3.3 1.9 - 3.5 g/dL    A-G Ratio 1.2 g/dL   ESTIMATED GFR    Collection Time: 10/07/20  6:29 AM   Result Value Ref Range    GFR If African American >60 >60 mL/min/1.73 m 2    GFR If Non African American >60 >60 mL/min/1.73 m 2   CBC with Differential    Collection Time: 10/08/20  7:07 AM   Result Value Ref Range    WBC 11.6 (H) 4.8 - 10.8 K/uL    RBC 4.56 4.20 - 5.40 M/uL    Hemoglobin 13.8 12.0 - 16.0 g/dL    Hematocrit 42.2 37.0 - 47.0 %    MCV 92.5 81.4 - 97.8 fL    MCH 30.3 27.0 - 33.0 pg    MCHC 32.7 (L) 33.6 - 35.0 g/dL    RDW 41.8 35.9 - 50.0 fL    Platelet Count 568 (H) 164 - 446 K/uL    MPV 8.8 (L) 9.0 - 12.9 fL    Neutrophils-Polys 67.70 44.00 - 72.00 %     Lymphocytes 21.30 (L) 22.00 - 41.00 %    Monocytes 7.30 0.00 - 13.40 %    Eosinophils 1.50 0.00 - 6.90 %    Basophils 0.60 0.00 - 1.80 %    Immature Granulocytes 1.60 (H) 0.00 - 0.90 %    Nucleated RBC 0.00 /100 WBC    Neutrophils (Absolute) 7.85 (H) 2.00 - 7.15 K/uL    Lymphs (Absolute) 2.47 1.00 - 4.80 K/uL    Monos (Absolute) 0.85 0.00 - 0.85 K/uL    Eos (Absolute) 0.17 0.00 - 0.51 K/uL    Baso (Absolute) 0.07 0.00 - 0.12 K/uL    Immature Granulocytes (abs) 0.18 (H) 0.00 - 0.11 K/uL    NRBC (Absolute) 0.00 K/uL   Comp Metabolic Panel (CMP)    Collection Time: 10/08/20  7:07 AM   Result Value Ref Range    Sodium 136 135 - 145 mmol/L    Potassium 4.3 3.6 - 5.5 mmol/L    Chloride 99 96 - 112 mmol/L    Co2 25 20 - 33 mmol/L    Anion Gap 12.0 7.0 - 16.0    Glucose 120 (H) 65 - 99 mg/dL    Bun 24 (H) 8 - 22 mg/dL    Creatinine 0.80 0.50 - 1.40 mg/dL    Calcium 9.7 8.5 - 10.5 mg/dL    AST(SGOT) 18 12 - 45 U/L    ALT(SGPT) 42 2 - 50 U/L    Alkaline Phosphatase 77 30 - 99 U/L    Total Bilirubin 0.5 0.1 - 1.5 mg/dL    Albumin 4.1 3.2 - 4.9 g/dL    Total Protein 7.8 6.0 - 8.2 g/dL    Globulin 3.7 (H) 1.9 - 3.5 g/dL    A-G Ratio 1.1 g/dL   Magnesium    Collection Time: 10/08/20  7:07 AM   Result Value Ref Range    Magnesium 2.3 1.5 - 2.5 mg/dL   Vitamin D, 25-hydroxy (blood)    Collection Time: 10/08/20  7:07 AM   Result Value Ref Range    25-Hydroxy   Vitamin D 25 37 30 - 100 ng/mL   Prothrombin Time    Collection Time: 10/08/20  7:07 AM   Result Value Ref Range    PT 17.6 (H) 12.0 - 14.6 sec    INR 1.40 (H) 0.87 - 1.13   ESTIMATED GFR    Collection Time: 10/08/20  7:07 AM   Result Value Ref Range    GFR If African American >60 >60 mL/min/1.73 m 2    GFR If Non African American >60 >60 mL/min/1.73 m 2   URINALYSIS    Collection Time: 10/09/20  2:15 AM    Specimen: Urine, Clean Catch   Result Value Ref Range    Color Red (A)     Character Turbid (A)     Specific Gravity 1.029 <1.035    Ph 5.0 5.0 - 8.0    Glucose Negative  Negative mg/dL    Ketones Negative Negative mg/dL    Protein 100 (A) Negative mg/dL    Bilirubin Moderate (A) Negative    Urobilinogen, Urine 1.0 Negative    Nitrite Positive (A) Negative    Leukocyte Esterase Moderate (A) Negative    Occult Blood Moderate (A) Negative    Micro Urine Req Microscopic    URINE MICROSCOPIC (W/UA)    Collection Time: 10/09/20  2:15 AM   Result Value Ref Range    WBC Packed (A) /hpf    -150 (A) /hpf    Bacteria Few (A) None /hpf    Epithelial Cells Few /hpf   Prothrombin Time    Collection Time: 10/09/20  7:21 AM   Result Value Ref Range    PT 18.1 (H) 12.0 - 14.6 sec    INR 1.45 (H) 0.87 - 1.13       Current Facility-Administered Medications   Medication Frequency   • cefUROXime (CEFTIN) tablet 500 mg Q12HRS   • warfarin (COUMADIN) tablet 5 mg ONCE AT 1800   • traZODone (DESYREL) tablet 50 mg QHS   • melatonin tablet 3 mg QHS   • levETIRAcetam (KEPPRA) 100 MG/ML solution 500 mg BID   • hydrOXYzine HCl (ATARAX) tablet 50 mg Q6HRS PRN   • Respiratory Therapy Consult Continuous RT   • Pharmacy Consult Request ...Pain Management Review 1 Each PHARMACY TO DOSE   • hydrALAZINE (APRESOLINE) tablet 10 mg Q8HRS PRN   • artificial tears ophthalmic solution 1 Drop PRN   • benzocaine-menthol (CEPACOL) lozenge 1 Lozenge Q2HRS PRN   • mag hydrox-al hydrox-simeth (MAALOX PLUS ES or MYLANTA DS) suspension 20 mL Q2HRS PRN   • ondansetron (ZOFRAN ODT) dispertab 4 mg 4X/DAY PRN    Or   • ondansetron (ZOFRAN) syringe/vial injection 4 mg 4X/DAY PRN   • sodium chloride (OCEAN) 0.65 % nasal spray 2 Spray PRN   • lactulose 20 GM/30ML solution 30 mL QDAY PRN   • docusate sodium (ENEMEEZ) enema 283 mg QDAY PRN   • fleet enema 133 mL QDAY PRN   • atorvastatin (LIPITOR) tablet 40 mg Q EVENING   • senna-docusate (PERICOLACE or SENOKOT S) 8.6-50 MG per tablet 2 Tab BID    And   • polyethylene glycol/lytes (MIRALAX) PACKET 1 Packet QDAY PRN    And   • magnesium hydroxide (MILK OF MAGNESIA) suspension 30 mL  QDAY PRN    And   • bisacodyl (DULCOLAX) suppository 10 mg QDAY PRN   • acetaminophen (TYLENOL) tablet 650 mg Q4HRS PRN   • acetaminophen/caffeine/butalbital 325-40-50 mg (FIORICET) -40 MG per tablet 1 Tab Q6HRS PRN   • enoxaparin (LOVENOX) inj 80 mg Q12HRS   • MD Alert...Warfarin per Pharmacy PHARMACY TO DOSE   • nystatin (MYCOSTATIN) 900003 UNIT/ML suspension 500,000 Units 4X/DAY       Orders Placed This Encounter   Procedures   • Diet Order Regular (meds whole 1:1 with thins)     Standing Status:   Standing     Number of Occurrences:   1     Order Specific Question:   Diet:     Answer:   Regular [1]     Comments:   meds whole 1:1 with thins     Order Specific Question:   Texture Modifier     Answer:   Level 6 - Soft & Bite Sized (Dysphagia 3)     Order Specific Question:   Liquid level     Answer:   Level 0 - Thin       Assessment:  Active Hospital Problems    Diagnosis   • *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    Medical Decision Making and Plan:    Midbrain and khang left ischemic stroke  Left vertebral artery dissection  S/p thrombectomy Dr. 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 Lubin  Outpatient follow up with stroke  bridge clinic  Outpatient follow up with Dr. Becker, rehab clinic  Outpatient follow up with Dr. Rosas, neurophthalmology    Hypophonia  Suspect from intubation, not from stroke location  Speech therapy  May need outpatient referral to ENT    Hypertension  On admission to acute  Monitor need to start medications     History of migraines  PRN tylenol or Fioricet     Insomnia  Schedule melatonin and trazodone    Oral thrush     Continue nystatin     Seizure prophylaxis  Continue Keppra    Leukocytosis  Positive UA, culture ordered, started on antibiotics  Negative CXR    UTI  Started on cefuroxime, culture pending    Bowel  Meds as needed  Last BM 10/8    Bladder  Check PVRs - 52, 59  ICP for over 400 cc  Scheduled toileting    DVT prophylaxis  Lovenox bridge to coumadin    Total time:  35 minutes.  I spent greater than 50% of the time for patient care, counseling, and coordination on this date, including patient face-to face time, unit/floor time with review of records/pertinent lab data and studies, as well as discussing diagnostic evaluation/work up, planned therapeutic interventions, and future disposition of care, as per the interval events/subjective and the assessment and plan as noted above.      Eden Mendoza M.D.   Physical Medicine and Rehabilitation

## 2020-10-10 NOTE — CARE PLAN
Problem: Infection  Goal: Will remain free from infection  Outcome: PROGRESSING AS EXPECTED    Pt on PO abx for uti, no adverse reaction noted.      Problem: Venous Thromboembolism (VTW)/Deep Vein Thrombosis (DVT) Prevention:  Goal: Patient will participate in Venous Thrombosis (VTE)/Deep Vein Thrombosis (DVT)Prevention Measures  Outcome: PROGRESSING AS EXPECTED    Pt on DVT prevention measures. No s/s of DVTs     Problem: Bowel/Gastric:  Goal: Normal bowel function is maintained or improved  Outcome: PROGRESSING AS EXPECTED     LBM noted on 10/8. Bowel sounds present and non-tender. Pt denies any abdominal pain. Abdominal xray shows no bowel obstruction. CNA reported pt has very poor appetite ate <20% of breakfast this am. Encourage PO intake and hydration.

## 2020-10-10 NOTE — PROGRESS NOTES
Inpatient Anticoagulation Service Note    Date: 10/10/2020    Reason for Anticoagulation: Stroke   Target INR: 2.0 to 3.0         Hemoglobin Value: 13.8  Hematocrit Value: 42.2  Lab Platelet Value: (!) 568    INR from last 7 days     Date/Time INR Value    10/10/20 0558  (!) 1.62    10/09/20 0721  (!) 1.45    10/08/20 0707  (!) 1.4        Dose from last 7 days     Date/Time Dose (mg)    10/10/20 1300  5    10/09/20 0721  5    10/08/20 0707  5    10/07/20 1517  5    10/07/20 1443  --        Significant Interactions: Statin  Bridge Therapy: Yes (If less than 5 days and overlap therapy discontinued -- document reason (i.e. Bleed Risk))    (If still on overlap therapy, if No -- document reason (i.e. Bleed Risk))         Plan:  Warfarin 5 mg PO today       Cisco Finney, PharmD

## 2020-10-10 NOTE — THERAPY
Speech Language Pathology  Daily Treatment     Patient Name: Obdulia High  Age:  45 y.o., Sex:  female  Medical Record #: [id number]  Today's Date: 10/10/2020     Precautions  Precautions: Fall Risk, Other (See Comments)  Comments: double vision, dizziness, NTL    Subjective    Pt pleasant and cooperative during tx.       Objective       10/10/20 1131   Dysphagia    Other Treatments regular texture trials.    SLP Total Time Spent   SLP Individual Total Time Spent (Mins) 30   Treatment Charges   SLP Swallowing Dysfunction Treatment Swallowing Dysfunction Treatment       Assessment    Pt recalled, and utilized, swallow strategies independently.  Pt tolerated regular textures, and thin liquids without overt s/s of aspiration, or oral residue.  Pt instructed regarding bio feedback for voicing.  Pt placed hand on throat to feel difference between voiceless sounds (ex. /s/) and voiced (ex. /ah/).  Pt verbalized understanding.     Strengths: Able to follow instructions, Alert and oriented, Effective communication skills, Independent prior level of function, Motivated for self care and independence, Pleasant and cooperative, Supportive family, Willingly participates in therapeutic activities  Barriers: Aspiration risk, Hemiplegia(diplopia, voice)    Plan    Upgrade to regular (level 7) thin (level 0).  Continue tdine to monitor diet tolerance and use of strategies.      Speech Therapy Problems     Problem: Problem Solving STGs     Dates: Start: 10/08/20       Goal: STG-Within one week, patient will     Dates: Start: 10/08/20       Description: 1) Individualized goal:  complete functional organization tasks related to medication and financial mngt. With 100% accuracy provided SPV  2) Interventions:  SLP Cognitive Skill Development and SLP Group Treatment              Goal: STG-Within one week, patient will     Dates: Start: 10/08/20       Description: 1) Individualized goal:  complete alternating attention tasks with 80%  accuracy provided MIN cues.   2) Interventions:  SLP Cognitive Skill Development and SLP Group Treatment                    Problem: Speech/Swallowing LTGs     Dates: Start: 10/08/20       Goal: LTG-By discharge, patient will safely swallow     Dates: Start: 10/08/20       Description: 1) Individualized goal:  regular textures/thin liquids for safe return to PLOF.   2) Interventions:  SLP Swallowing Dysfunction Treatment, SLP Oral Pharyngeal Evaluation, SLP Video Swallow Evaluation, SLP Cognitive Skill Development, and SLP Group Treatment              Goal: LTG-By discharge, patient will solve complex problem     Dates: Start: 10/08/20       Description: 1) Individualized goal: By utilizing compensatory intervention for memory and problem-solving to allow for safe completion of daily activities with MOD I in order to prepare for safe d/c home  2) Interventions:  SLP Cognitive Skill Development and SLP Group Treatment                    Problem: Swallowing STGs     Dates: Start: 10/08/20       Goal: STG-Within one week, patient will     Dates: Start: 10/08/20       Description: 1) Individualized goal: complete a MBSS for further evaluation of swallow function; with additional goals pending results  2) Interventions:  SLP Video Swallow Evaluation

## 2020-10-10 NOTE — PROGRESS NOTES
"Rehab Progress Note     Encounter Date: 10/10/2020    CC: Vertigo, dizziness.    Interval Events (Subjective)  Vital signs stable.  Voiding volitionally, PVRs 52, 59 -noted that it is red in color due to menstruation.  Last BM 10/8.  Positive UA, culture not returned yet, but on Ceftin.   at bedside, patient in bed.  Patient seen and examined in her room.  She states that she was able to swallow food today which was really exciting for her.  Still did not sleep very well last night but does not want to increase any sleep medications.  Wants to see how it is going.  Vertigo limiting her therapy to some degree.  Declines any pharmacologic intervention at this time.  All questions answered.  Had a small bowel movement today, declining PRN intervention made aware that there are parents if she wants to have bowel movement.    14 point ROS reviewed and negative except as stated above.     Objective:  VITAL SIGNS: /74   Pulse 93   Temp 36.8 °C (98.2 °F) (Oral)   Resp 16   Ht 1.702 m (5' 7.01\")   Wt 87.5 kg (192 lb 14.4 oz)   SpO2 98%   BMI 30.21 kg/m²     GEN: No apparent distress, lying comfortably in bed.  HEENT: Head normocephalic, significant left-sided lid lag and dyssynchronous eye movements.  CV: Extremities warm and well-perfused, no peripheral edema appreciated bilaterally.  PULMONARY: Breathing nonlabored on room air, no respiratory accessory muscle use.  Not requiring supplemental oxygen.  SKIN: No appreciable skin breakdown on exposed areas of skin.  NEURO: Awake, alert, logical thought content, answers questions appropriately.  Hypophonic.  PSYCH: Mood and affect within normal limits.    Recent Results (from the past 72 hour(s))   CBC with Differential    Collection Time: 10/08/20  7:07 AM   Result Value Ref Range    WBC 11.6 (H) 4.8 - 10.8 K/uL    RBC 4.56 4.20 - 5.40 M/uL    Hemoglobin 13.8 12.0 - 16.0 g/dL    Hematocrit 42.2 37.0 - 47.0 %    MCV 92.5 81.4 - 97.8 fL    MCH 30.3 27.0 - " 33.0 pg    MCHC 32.7 (L) 33.6 - 35.0 g/dL    RDW 41.8 35.9 - 50.0 fL    Platelet Count 568 (H) 164 - 446 K/uL    MPV 8.8 (L) 9.0 - 12.9 fL    Neutrophils-Polys 67.70 44.00 - 72.00 %    Lymphocytes 21.30 (L) 22.00 - 41.00 %    Monocytes 7.30 0.00 - 13.40 %    Eosinophils 1.50 0.00 - 6.90 %    Basophils 0.60 0.00 - 1.80 %    Immature Granulocytes 1.60 (H) 0.00 - 0.90 %    Nucleated RBC 0.00 /100 WBC    Neutrophils (Absolute) 7.85 (H) 2.00 - 7.15 K/uL    Lymphs (Absolute) 2.47 1.00 - 4.80 K/uL    Monos (Absolute) 0.85 0.00 - 0.85 K/uL    Eos (Absolute) 0.17 0.00 - 0.51 K/uL    Baso (Absolute) 0.07 0.00 - 0.12 K/uL    Immature Granulocytes (abs) 0.18 (H) 0.00 - 0.11 K/uL    NRBC (Absolute) 0.00 K/uL   Comp Metabolic Panel (CMP)    Collection Time: 10/08/20  7:07 AM   Result Value Ref Range    Sodium 136 135 - 145 mmol/L    Potassium 4.3 3.6 - 5.5 mmol/L    Chloride 99 96 - 112 mmol/L    Co2 25 20 - 33 mmol/L    Anion Gap 12.0 7.0 - 16.0    Glucose 120 (H) 65 - 99 mg/dL    Bun 24 (H) 8 - 22 mg/dL    Creatinine 0.80 0.50 - 1.40 mg/dL    Calcium 9.7 8.5 - 10.5 mg/dL    AST(SGOT) 18 12 - 45 U/L    ALT(SGPT) 42 2 - 50 U/L    Alkaline Phosphatase 77 30 - 99 U/L    Total Bilirubin 0.5 0.1 - 1.5 mg/dL    Albumin 4.1 3.2 - 4.9 g/dL    Total Protein 7.8 6.0 - 8.2 g/dL    Globulin 3.7 (H) 1.9 - 3.5 g/dL    A-G Ratio 1.1 g/dL   Magnesium    Collection Time: 10/08/20  7:07 AM   Result Value Ref Range    Magnesium 2.3 1.5 - 2.5 mg/dL   Vitamin D, 25-hydroxy (blood)    Collection Time: 10/08/20  7:07 AM   Result Value Ref Range    25-Hydroxy   Vitamin D 25 37 30 - 100 ng/mL   Prothrombin Time    Collection Time: 10/08/20  7:07 AM   Result Value Ref Range    PT 17.6 (H) 12.0 - 14.6 sec    INR 1.40 (H) 0.87 - 1.13   ESTIMATED GFR    Collection Time: 10/08/20  7:07 AM   Result Value Ref Range    GFR If African American >60 >60 mL/min/1.73 m 2    GFR If Non African American >60 >60 mL/min/1.73 m 2   URINALYSIS    Collection Time: 10/09/20   2:15 AM    Specimen: Urine, Clean Catch   Result Value Ref Range    Color Red (A)     Character Turbid (A)     Specific Gravity 1.029 <1.035    Ph 5.0 5.0 - 8.0    Glucose Negative Negative mg/dL    Ketones Negative Negative mg/dL    Protein 100 (A) Negative mg/dL    Bilirubin Moderate (A) Negative    Urobilinogen, Urine 1.0 Negative    Nitrite Positive (A) Negative    Leukocyte Esterase Moderate (A) Negative    Occult Blood Moderate (A) Negative    Micro Urine Req Microscopic    URINE MICROSCOPIC (W/UA)    Collection Time: 10/09/20  2:15 AM   Result Value Ref Range    WBC Packed (A) /hpf    -150 (A) /hpf    Bacteria Few (A) None /hpf    Epithelial Cells Few /hpf   Prothrombin Time    Collection Time: 10/09/20  7:21 AM   Result Value Ref Range    PT 18.1 (H) 12.0 - 14.6 sec    INR 1.45 (H) 0.87 - 1.13   Prothrombin Time    Collection Time: 10/10/20  5:58 AM   Result Value Ref Range    PT 19.7 (H) 12.0 - 14.6 sec    INR 1.62 (H) 0.87 - 1.13       Current Facility-Administered Medications   Medication Frequency   • cefUROXime (CEFTIN) tablet 500 mg Q12HRS   • traZODone (DESYREL) tablet 50 mg QHS   • melatonin tablet 3 mg QHS   • levETIRAcetam (KEPPRA) 100 MG/ML solution 500 mg BID   • hydrOXYzine HCl (ATARAX) tablet 50 mg Q6HRS PRN   • Respiratory Therapy Consult Continuous RT   • Pharmacy Consult Request ...Pain Management Review 1 Each PHARMACY TO DOSE   • hydrALAZINE (APRESOLINE) tablet 10 mg Q8HRS PRN   • artificial tears ophthalmic solution 1 Drop PRN   • benzocaine-menthol (CEPACOL) lozenge 1 Lozenge Q2HRS PRN   • mag hydrox-al hydrox-simeth (MAALOX PLUS ES or MYLANTA DS) suspension 20 mL Q2HRS PRN   • ondansetron (ZOFRAN ODT) dispertab 4 mg 4X/DAY PRN    Or   • ondansetron (ZOFRAN) syringe/vial injection 4 mg 4X/DAY PRN   • sodium chloride (OCEAN) 0.65 % nasal spray 2 Spray PRN   • lactulose 20 GM/30ML solution 30 mL QDAY PRN   • docusate sodium (ENEMEEZ) enema 283 mg QDAY PRN   • fleet enema 133 mL  QDAY PRN   • atorvastatin (LIPITOR) tablet 40 mg Q EVENING   • senna-docusate (PERICOLACE or SENOKOT S) 8.6-50 MG per tablet 2 Tab BID    And   • polyethylene glycol/lytes (MIRALAX) PACKET 1 Packet QDAY PRN    And   • magnesium hydroxide (MILK OF MAGNESIA) suspension 30 mL QDAY PRN    And   • bisacodyl (DULCOLAX) suppository 10 mg QDAY PRN   • acetaminophen (TYLENOL) tablet 650 mg Q4HRS PRN   • acetaminophen/caffeine/butalbital 325-40-50 mg (FIORICET) -40 MG per tablet 1 Tab Q6HRS PRN   • enoxaparin (LOVENOX) inj 80 mg Q12HRS   • MD Alert...Warfarin per Pharmacy PHARMACY TO DOSE       Orders Placed This Encounter   Procedures   • Diet Order Regular (meds whole 1:1 with thins)     Standing Status:   Standing     Number of Occurrences:   1     Order Specific Question:   Diet:     Answer:   Regular [1]     Comments:   meds whole 1:1 with thins     Order Specific Question:   Texture Modifier     Answer:   Level 6 - Soft & Bite Sized (Dysphagia 3)     Order Specific Question:   Liquid level     Answer:   Level 0 - Thin       Assessment:  Active Hospital Problems    Diagnosis   • *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  Bowel: States mild constipation, does not want to have additional bowel meds, had small bowel movement today.  KUB without significant stool burden.  Bladder: PVRs below 100 cc.  On Ceftin, culture still processing.  Will follow.  Hematuria: Secondary to menstruation + positive UTI    Total time: 25 minutes.  I spent greater than 50%  of the time for patient care and coordination on this date, including unit/floor time, and face-to-face time with the patient as per assessment and plan above.    Yovana Becker D.O.

## 2020-10-10 NOTE — PROGRESS NOTES
Received shift report regarding patient and assumed care. Patient is awake, calm and stable, currently positioned in bed for comfort and safety; call light within reach. Denies any pain or discomfort at this time. Will continue to monitor.

## 2020-10-11 LAB
INR PPP: 1.94 (ref 0.87–1.13)
PROTHROMBIN TIME: 22.8 SEC (ref 12–14.6)

## 2020-10-11 PROCEDURE — 770010 HCHG ROOM/CARE - REHAB SEMI PRIVAT*

## 2020-10-11 PROCEDURE — 85610 PROTHROMBIN TIME: CPT

## 2020-10-11 PROCEDURE — 97129 THER IVNTJ 1ST 15 MIN: CPT

## 2020-10-11 PROCEDURE — 700111 HCHG RX REV CODE 636 W/ 250 OVERRIDE (IP): Performed by: PHYSICAL MEDICINE & REHABILITATION

## 2020-10-11 PROCEDURE — 700102 HCHG RX REV CODE 250 W/ 637 OVERRIDE(OP): Performed by: PHYSICAL MEDICINE & REHABILITATION

## 2020-10-11 PROCEDURE — 97530 THERAPEUTIC ACTIVITIES: CPT

## 2020-10-11 PROCEDURE — 36415 COLL VENOUS BLD VENIPUNCTURE: CPT

## 2020-10-11 PROCEDURE — 92507 TX SP LANG VOICE COMM INDIV: CPT

## 2020-10-11 PROCEDURE — A9270 NON-COVERED ITEM OR SERVICE: HCPCS | Performed by: PHYSICAL MEDICINE & REHABILITATION

## 2020-10-11 PROCEDURE — 97116 GAIT TRAINING THERAPY: CPT

## 2020-10-11 PROCEDURE — 97130 THER IVNTJ EA ADDL 15 MIN: CPT

## 2020-10-11 RX ORDER — WARFARIN SODIUM 5 MG/1
5 TABLET ORAL DAILY
Status: DISCONTINUED | OUTPATIENT
Start: 2020-10-11 | End: 2020-10-12

## 2020-10-11 RX ADMIN — CEFUROXIME AXETIL 500 MG: 500 TABLET ORAL at 08:26

## 2020-10-11 RX ADMIN — ENOXAPARIN SODIUM 80 MG: 100 INJECTION SUBCUTANEOUS at 08:25

## 2020-10-11 RX ADMIN — ENOXAPARIN SODIUM 80 MG: 100 INJECTION SUBCUTANEOUS at 20:31

## 2020-10-11 RX ADMIN — DOCUSATE SODIUM 50 MG AND SENNOSIDES 8.6 MG 2 TABLET: 8.6; 5 TABLET, FILM COATED ORAL at 08:26

## 2020-10-11 RX ADMIN — ATORVASTATIN CALCIUM 40 MG: 40 TABLET, FILM COATED ORAL at 20:32

## 2020-10-11 RX ADMIN — MELATONIN TAB 3 MG 3 MG: 3 TAB at 20:32

## 2020-10-11 RX ADMIN — LEVETIRACETAM 500 MG: 500 SOLUTION ORAL at 08:25

## 2020-10-11 RX ADMIN — CEFUROXIME AXETIL 500 MG: 500 TABLET ORAL at 20:32

## 2020-10-11 RX ADMIN — DOCUSATE SODIUM 50 MG AND SENNOSIDES 8.6 MG 2 TABLET: 8.6; 5 TABLET, FILM COATED ORAL at 20:31

## 2020-10-11 RX ADMIN — LEVETIRACETAM 500 MG: 500 SOLUTION ORAL at 20:31

## 2020-10-11 RX ADMIN — TRAZODONE HYDROCHLORIDE 50 MG: 50 TABLET ORAL at 20:32

## 2020-10-11 RX ADMIN — WARFARIN SODIUM 5 MG: 5 TABLET ORAL at 17:19

## 2020-10-11 ASSESSMENT — ACTIVITIES OF DAILY LIVING (ADL): BED_CHAIR_WHEELCHAIR_TRANSFER_DESCRIPTION: SUPERVISION FOR SAFETY;VERBAL CUEING

## 2020-10-11 ASSESSMENT — GAIT ASSESSMENTS
ASSISTIVE DEVICE: PARALLEL BARS
GAIT LEVEL OF ASSIST: CONTACT GUARD ASSIST
DISTANCE (FEET): 20

## 2020-10-11 ASSESSMENT — FIBROSIS 4 INDEX: FIB4 SCORE: 0.22

## 2020-10-11 NOTE — THERAPY
Physical Therapy   Daily Treatment     Patient Name: Obdulia High  Age:  45 y.o., Sex:  female  Medical Record #: [id number]  Today's Date: 10/11/2020     Precautions  Precautions: Fall Risk  Comments: (dizzy)    Subjective    Pt states she did not sleep well last PM. She verbalized very little today during TX.     Objective       10/11/20 0931   Precautions   Precautions Fall Risk   Comments   (dizzy)   Pain 0 - 10 Group   Pain Rating Scale (NPRS) 0   Cognition    Level of Consciousness Alert   Comments   (impaired safety awareness)   Gait Functional Level of Assist    Gait Level Of Assist Contact Guard Assist   Assistive Device Parallel Bars   Distance (Feet) 20   # of Times Distance was Traveled 3   Deviation   (decreased step lengths; mildly flexed posture)   Wheelchair Functional Level of Assist   Wheelchair Assist Minimal Assist  (min A 25%/CGA 75% + max cues for pattern and RUE neglect)   Distance Wheelchair (Feet or Distance) 150   Wheelchair Description Extra time;Impaired coordination;Limited by fatigue;Verbal cueing;Supervision for safety;Safety concerns   Transfer Functional Level of Assist   Bed, Chair, Wheelchair Transfer Contact Guard Assist   Bed Chair Wheelchair Transfer Description Supervision for safety;Verbal cueing   Bed Mobility    Supine to Sit Stand by Assist   Sit to Supine Stand by Assist   Sit to Stand Contact Guard Assist   Neuro-Muscular Treatments   Neuro-Muscular Treatments   (instructed in gaze stabilization with transfer/ambulation)   Interdisciplinary Plan of Care Collaboration   Patient Position at End of Therapy In Bed;Bed Alarm On;Call Light within Reach;Tray Table within Reach;Phone within Reach   Strengths & Barriers   Strengths Independent prior level of function;Making steady progress towards goals;Motivated for self care and independence;Pleasant and cooperative   Barriers Decreased endurance;Fatigue;Impaired activity tolerance;Impaired balance   PT Total Time Spent   PT  Individual Total Time Spent (Mins) 30   PT Charge Group   PT Gait Training 1   PT Therapeutic Activities 1       Assessment    Pt was fatigued but cooperative with TX. She had some impulsivity and needed cues to slow down and safety. Therapist instructed in W/C propulsion using 3 pushes with RUE and 1 BUE to get a more straight pathway. She displays R neglect UE more than LE with W/C propulsion, gait and transfers.  Strengths: Independent prior level of function, Making steady progress towards goals, Motivated for self care and independence, Pleasant and cooperative  Barriers: Decreased endurance, Fatigue, Impaired activity tolerance, Impaired balance    Plan    Gait training with FWW; balance re education; transfer training and strengthening    Physical Therapy Problems     Problem: Mobility     Dates: Start: 10/08/20       Goal: STG-Within one week, patient will propel wheelchair community     Dates: Start: 10/08/20       Description: 1) Individualized goal:  W/C mobility x 150 feet with SBA  2) Interventions:  PT Group Therapy, PT E Stim Attended, PT Gait Training, PT Therapeutic Exercises, PT Neuro Re-Ed/Balance, PT Aquatic Therapy, PT Therapeutic Activity, PT Manual Therapy, and PT Evaluation            Goal: STG-Within one week, patient will ambulate household distance     Dates: Start: 10/08/20       Description: 1) Individualized goal:  AMB x 25 feet with FWW and CGA  2) Interventions:  PT Group Therapy, PT E Stim Attended, PT Gait Training, PT Therapeutic Exercises, PT Neuro Re-Ed/Balance, PT Aquatic Therapy, PT Therapeutic Activity, PT Manual Therapy, and PT Evaluation                  Problem: Mobility Transfers     Dates: Start: 10/08/20       Goal: STG-Within one week, patient will transfer bed to chair     Dates: Start: 10/08/20       Description: 1) Individualized goal: SPT with CGA  2) Interventions:  PT Group Therapy, PT E Stim Attended, PT Gait Training, PT Therapeutic Exercises, PT Neuro  Re-Ed/Balance, PT Aquatic Therapy, PT Therapeutic Activity, PT Manual Therapy, and PT Evaluation                  Problem: PT-Long Term Goals     Dates: Start: 10/08/20       Goal: LTG-By discharge, patient will ambulate     Dates: Start: 10/08/20       Description: 1) Individualized goal:  AMB x 150 feet with LRD and SPV  2) Interventions:  PT Group Therapy, PT E Stim Attended, PT Gait Training, PT Therapeutic Exercises, PT Neuro Re-Ed/Balance, PT Aquatic Therapy, PT Therapeutic Activity, PT Manual Therapy, and PT Evaluation            Goal: LTG-By discharge, patient will transfer one surface to another     Dates: Start: 10/08/20       Description: 1) Individualized goal:  SPT with LRD and SPV  2) Interventions:  PT Group Therapy, PT E Stim Attended, PT Gait Training, PT Therapeutic Exercises, PT Neuro Re-Ed/Balance, PT Aquatic Therapy, PT Therapeutic Activity, PT Manual Therapy, and PT Evaluation            Goal: LTG-By discharge, patient will ambulate up/down 4-6 stairs     Dates: Start: 10/08/20       Description: 1) Individualized goal:  Up/down 4 steps with BHR and CGA  2) Interventions:  PT Group Therapy, PT E Stim Attended, PT Gait Training, PT Therapeutic Exercises, PT Neuro Re-Ed/Balance, PT Aquatic Therapy, PT Therapeutic Activity, PT Manual Therapy, and PT Evaluation            Goal: LTG-By discharge, patient will transfer in/out of a car     Dates: Start: 10/08/20       Description: 1) Individualized goal:  Car transfer with LRD and SPV  2) Interventions:  PT Group Therapy, PT E Stim Attended, PT Gait Training, PT Therapeutic Exercises, PT Neuro Re-Ed/Balance, PT Aquatic Therapy, PT Therapeutic Activity, PT Manual Therapy, and PT Evaluation

## 2020-10-11 NOTE — PROGRESS NOTES
Inpatient Anticoagulation Service Note    Date: 10/11/2020    Reason for Anticoagulation: Stroke   Target INR: 2.0 to 3.0    Hemoglobin Value: 13.8  Hematocrit Value: 42.2  Lab Platelet Value: (!) 568    INR from last 7 days     Date/Time INR Value    10/11/20 0607  (!) 1.94    10/10/20 0558  (!) 1.62    10/09/20 0721  (!) 1.45    10/08/20 0707  (!) 1.4        Dose from last 7 days     Date/Time Dose (mg)    10/11/20 1444  5    10/10/20 1300  5    10/09/20 0721  5    10/08/20 0707  5    10/07/20 1517  5    10/07/20 1443  --        Significant Interactions: Antibiotics, Statin  Bridge Therapy: Yes; Lovenox 80 mg SQ Q12H  Days of Overlap Therapy: 5       Plan:  Warfarin 5 mg PO daily.  Plan to discontinue Lovenox once INR > 2 for two consecutive days.  Education Material Provided?: No  Pharmacist suggested discharge dosin mg daily     Leelee Ferrell, PetraD

## 2020-10-11 NOTE — THERAPY
Speech Language Pathology  Daily Treatment     Patient Name: Obdulia High  Age:  45 y.o., Sex:  female  Medical Record #: [id number]  Today's Date: 10/11/2020     Precautions  Precautions: Fall Risk  Comments: (dizzy)    Subjective    Pt pleasant and cooperative during tx.      Objective       10/11/20 1031   Reading Comprehension    Reading Sentences Supervision (5)   Cognition   Functional Math / Financial Management Minimal (4)   Voice   Laryngeal Relaxation Minimal (4)   SLP Total Time Spent   SLP Individual Total Time Spent (Mins) 60   Treatment Charges   SLP Treatment - Individual Speech Language Treatment - Individual   SLP Cognitive Skill Development First 15 Minutes 1   SLP Cognitive Skill Development Additional 15 Minutes 1       Assessment    Single step word problems related to finance: 30% independent, 100% given mod cues to attend to written detail and to accurately enter numbers into the calculator.  Yawn sigh, laryngeal massage, and glottal price laryngeal relaxation techniques introduced this day.  Pt demonstrated ability to achieve voicing during 50% of attempts.  Attn (pt required to state names of places for each letter of the alphabet): 92% independent; 100% [place].      Strengths: Able to follow instructions, Alert and oriented, Effective communication skills, Independent prior level of function, Motivated for self care and independence, Pleasant and cooperative, Supportive family, Willingly participates in therapeutic activities  Barriers: Aspiration risk, Hemiplegia(diplopia, voice)    Plan    Target finance, meds, attn, voice.     Speech Therapy Problems     Problem: Problem Solving STGs     Dates: Start: 10/08/20       Goal: STG-Within one week, patient will     Dates: Start: 10/08/20       Description: 1) Individualized goal:  complete functional organization tasks related to medication and financial mngt. With 100% accuracy provided SPV  2) Interventions:  SLP Cognitive Skill Development and  SLP Group Treatment              Goal: STG-Within one week, patient will     Dates: Start: 10/08/20       Description: 1) Individualized goal:  complete alternating attention tasks with 80% accuracy provided MIN cues.   2) Interventions:  SLP Cognitive Skill Development and SLP Group Treatment                    Problem: Speech/Swallowing LTGs     Dates: Start: 10/08/20       Goal: LTG-By discharge, patient will safely swallow     Dates: Start: 10/08/20       Description: 1) Individualized goal:  regular textures/thin liquids for safe return to PLOF.   2) Interventions:  SLP Swallowing Dysfunction Treatment, SLP Oral Pharyngeal Evaluation, SLP Video Swallow Evaluation, SLP Cognitive Skill Development, and SLP Group Treatment              Goal: LTG-By discharge, patient will solve complex problem     Dates: Start: 10/08/20       Description: 1) Individualized goal: By utilizing compensatory intervention for memory and problem-solving to allow for safe completion of daily activities with MOD I in order to prepare for safe d/c home  2) Interventions:  SLP Cognitive Skill Development and SLP Group Treatment                    Problem: Swallowing STGs     Dates: Start: 10/08/20       Goal: STG-Within one week, patient will     Dates: Start: 10/08/20       Description: 1) Individualized goal: complete a MBSS for further evaluation of swallow function; with additional goals pending results  2) Interventions:  SLP Video Swallow Evaluation

## 2020-10-12 DIAGNOSIS — I63.212 ACUTE ISCHEMIC VERTEBROBASILAR ARTERY BRAINSTEM STROKE INVOLVING LEFT-SIDED VESSEL (HCC): ICD-10-CM

## 2020-10-12 DIAGNOSIS — I63.22 ACUTE ISCHEMIC VERTEBROBASILAR ARTERY BRAINSTEM STROKE INVOLVING LEFT-SIDED VESSEL (HCC): ICD-10-CM

## 2020-10-12 DIAGNOSIS — R49.8 HYPOPHONIA: ICD-10-CM

## 2020-10-12 DIAGNOSIS — H53.2 DOUBLE VISION: ICD-10-CM

## 2020-10-12 LAB
BASOPHILS # BLD AUTO: 0.5 % (ref 0–1.8)
BASOPHILS # BLD: 0.04 K/UL (ref 0–0.12)
EOSINOPHIL # BLD AUTO: 0.16 K/UL (ref 0–0.51)
EOSINOPHIL NFR BLD: 1.8 % (ref 0–6.9)
ERYTHROCYTE [DISTWIDTH] IN BLOOD BY AUTOMATED COUNT: 42.6 FL (ref 35.9–50)
HCT VFR BLD AUTO: 36.8 % (ref 37–47)
HGB BLD-MCNC: 12.2 G/DL (ref 12–16)
IMM GRANULOCYTES # BLD AUTO: 0.1 K/UL (ref 0–0.11)
IMM GRANULOCYTES NFR BLD AUTO: 1.1 % (ref 0–0.9)
INR PPP: 1.91 (ref 0.87–1.13)
LYMPHOCYTES # BLD AUTO: 1.72 K/UL (ref 1–4.8)
LYMPHOCYTES NFR BLD: 19.7 % (ref 22–41)
MCH RBC QN AUTO: 31 PG (ref 27–33)
MCHC RBC AUTO-ENTMCNC: 33.2 G/DL (ref 33.6–35)
MCV RBC AUTO: 93.4 FL (ref 81.4–97.8)
MONOCYTES # BLD AUTO: 0.45 K/UL (ref 0–0.85)
MONOCYTES NFR BLD AUTO: 5.2 % (ref 0–13.4)
NEUTROPHILS # BLD AUTO: 6.25 K/UL (ref 2–7.15)
NEUTROPHILS NFR BLD: 71.7 % (ref 44–72)
NRBC # BLD AUTO: 0 K/UL
NRBC BLD-RTO: 0 /100 WBC
PLATELET # BLD AUTO: 453 K/UL (ref 164–446)
PMV BLD AUTO: 8.5 FL (ref 9–12.9)
PROTHROMBIN TIME: 22.5 SEC (ref 12–14.6)
RBC # BLD AUTO: 3.94 M/UL (ref 4.2–5.4)
WBC # BLD AUTO: 8.7 K/UL (ref 4.8–10.8)

## 2020-10-12 PROCEDURE — A9270 NON-COVERED ITEM OR SERVICE: HCPCS

## 2020-10-12 PROCEDURE — 97530 THERAPEUTIC ACTIVITIES: CPT

## 2020-10-12 PROCEDURE — 700102 HCHG RX REV CODE 250 W/ 637 OVERRIDE(OP)

## 2020-10-12 PROCEDURE — 770010 HCHG ROOM/CARE - REHAB SEMI PRIVAT*

## 2020-10-12 PROCEDURE — 97112 NEUROMUSCULAR REEDUCATION: CPT

## 2020-10-12 PROCEDURE — 99233 SBSQ HOSP IP/OBS HIGH 50: CPT | Performed by: PHYSICAL MEDICINE & REHABILITATION

## 2020-10-12 PROCEDURE — 36415 COLL VENOUS BLD VENIPUNCTURE: CPT

## 2020-10-12 PROCEDURE — 97110 THERAPEUTIC EXERCISES: CPT

## 2020-10-12 PROCEDURE — 97130 THER IVNTJ EA ADDL 15 MIN: CPT

## 2020-10-12 PROCEDURE — A9270 NON-COVERED ITEM OR SERVICE: HCPCS | Performed by: PHYSICAL MEDICINE & REHABILITATION

## 2020-10-12 PROCEDURE — 92508 TX SP LANG VOICE COMM GROUP: CPT

## 2020-10-12 PROCEDURE — 97535 SELF CARE MNGMENT TRAINING: CPT

## 2020-10-12 PROCEDURE — 700111 HCHG RX REV CODE 636 W/ 250 OVERRIDE (IP): Performed by: PHYSICAL MEDICINE & REHABILITATION

## 2020-10-12 PROCEDURE — 700102 HCHG RX REV CODE 250 W/ 637 OVERRIDE(OP): Performed by: PHYSICAL MEDICINE & REHABILITATION

## 2020-10-12 PROCEDURE — 97116 GAIT TRAINING THERAPY: CPT

## 2020-10-12 PROCEDURE — 85610 PROTHROMBIN TIME: CPT

## 2020-10-12 PROCEDURE — 85025 COMPLETE CBC W/AUTO DIFF WBC: CPT

## 2020-10-12 PROCEDURE — 97129 THER IVNTJ 1ST 15 MIN: CPT

## 2020-10-12 RX ORDER — LEVETIRACETAM 500 MG/1
500 TABLET ORAL EVERY 12 HOURS
Status: DISCONTINUED | OUTPATIENT
Start: 2020-10-12 | End: 2020-10-27 | Stop reason: HOSPADM

## 2020-10-12 RX ORDER — WARFARIN SODIUM 3 MG/1
6 TABLET ORAL
Status: COMPLETED | OUTPATIENT
Start: 2020-10-12 | End: 2020-10-12

## 2020-10-12 RX ORDER — LEVETIRACETAM 500 MG/1
TABLET ORAL
Status: COMPLETED
Start: 2020-10-12 | End: 2020-10-12

## 2020-10-12 RX ADMIN — TRAZODONE HYDROCHLORIDE 50 MG: 50 TABLET ORAL at 20:17

## 2020-10-12 RX ADMIN — CEFUROXIME AXETIL 500 MG: 500 TABLET ORAL at 09:00

## 2020-10-12 RX ADMIN — ATORVASTATIN CALCIUM 40 MG: 40 TABLET, FILM COATED ORAL at 20:17

## 2020-10-12 RX ADMIN — LEVETIRACETAM 500 MG: 500 TABLET ORAL at 20:18

## 2020-10-12 RX ADMIN — CEFUROXIME AXETIL 500 MG: 500 TABLET ORAL at 20:18

## 2020-10-12 RX ADMIN — LEVETIRACETAM 500 MG: 500 SOLUTION ORAL at 09:02

## 2020-10-12 RX ADMIN — ENOXAPARIN SODIUM 80 MG: 100 INJECTION SUBCUTANEOUS at 20:18

## 2020-10-12 RX ADMIN — LEVETIRACETAM 500 MG: 500 TABLET, FILM COATED ORAL at 20:18

## 2020-10-12 RX ADMIN — ENOXAPARIN SODIUM 80 MG: 100 INJECTION SUBCUTANEOUS at 09:03

## 2020-10-12 RX ADMIN — WARFARIN SODIUM 6 MG: 3 TABLET ORAL at 17:15

## 2020-10-12 RX ADMIN — MELATONIN TAB 3 MG 3 MG: 3 TAB at 20:18

## 2020-10-12 ASSESSMENT — GAIT ASSESSMENTS
DISTANCE (FEET): 45
DEVIATION: BRADYKINETIC;DECREASED BASE OF SUPPORT;ATAXIC
GAIT LEVEL OF ASSIST: MINIMAL ASSIST
ASSISTIVE DEVICE: FRONT WHEEL WALKER

## 2020-10-12 ASSESSMENT — ACTIVITIES OF DAILY LIVING (ADL)
BED_CHAIR_WHEELCHAIR_TRANSFER_DESCRIPTION: SQUAT PIVOT TRANSFER TO WHEELCHAIR;SUPERVISION FOR SAFETY;VERBAL CUEING;INCREASED TIME;SET-UP OF EQUIPMENT
TUB_SHOWER_TRANSFER_DESCRIPTION: GRAB BAR;SHOWER BENCH;SET-UP OF EQUIPMENT;SUPERVISION FOR SAFETY
TOILET_TRANSFER_DESCRIPTION: GRAB BAR;SUPERVISION FOR SAFETY;SET-UP OF EQUIPMENT;VERBAL CUEING

## 2020-10-12 NOTE — PROGRESS NOTES
"Rehab Progress Note     Date of Service: 10/12/2020  Chief Complaint: follow up stroke    Interval Events (Subjective)    Patient seen and examined today in her room. She states the weekend went well. She continues to have double vision and is wearing a patch. She reports she's sleeping OK. She denies any pain. She moved her bowels yesterday and denies any issues with her bladder. She has no new complaints.     Objective:  VITAL SIGNS: /66   Pulse 81   Temp 36.6 °C (97.9 °F) (Oral)   Resp 18   Ht 1.702 m (5' 7.01\")   Wt 88 kg (194 lb)   SpO2 95%   BMI 30.38 kg/m²   Gen: alert, no apparent distress  CV: regular rate and rhythm, no murmurs, no peripheral edema  Resp: clear to ascultation bilaterally, normal respiratory effort  GI: soft, non-tender abdomen, bowel sounds present  Neuro: notable for left ptosis, unable to adduct left eye, decreased vertical eye movement, impaired sensation on right side of face and right hand, hypophonia    Recent Results (from the past 72 hour(s))   Prothrombin Time    Collection Time: 10/10/20  5:58 AM   Result Value Ref Range    PT 19.7 (H) 12.0 - 14.6 sec    INR 1.62 (H) 0.87 - 1.13   Prothrombin Time    Collection Time: 10/11/20  6:07 AM   Result Value Ref Range    PT 22.8 (H) 12.0 - 14.6 sec    INR 1.94 (H) 0.87 - 1.13   Prothrombin Time    Collection Time: 10/12/20  6:46 AM   Result Value Ref Range    PT 22.5 (H) 12.0 - 14.6 sec    INR 1.91 (H) 0.87 - 1.13   CBC WITH DIFFERENTIAL    Collection Time: 10/12/20  6:46 AM   Result Value Ref Range    WBC 8.7 4.8 - 10.8 K/uL    RBC 3.94 (L) 4.20 - 5.40 M/uL    Hemoglobin 12.2 12.0 - 16.0 g/dL    Hematocrit 36.8 (L) 37.0 - 47.0 %    MCV 93.4 81.4 - 97.8 fL    MCH 31.0 27.0 - 33.0 pg    MCHC 33.2 (L) 33.6 - 35.0 g/dL    RDW 42.6 35.9 - 50.0 fL    Platelet Count 453 (H) 164 - 446 K/uL    MPV 8.5 (L) 9.0 - 12.9 fL    Neutrophils-Polys 71.70 44.00 - 72.00 %    Lymphocytes 19.70 (L) 22.00 - 41.00 %    Monocytes 5.20 0.00 - 13.40 % "    Eosinophils 1.80 0.00 - 6.90 %    Basophils 0.50 0.00 - 1.80 %    Immature Granulocytes 1.10 (H) 0.00 - 0.90 %    Nucleated RBC 0.00 /100 WBC    Neutrophils (Absolute) 6.25 2.00 - 7.15 K/uL    Lymphs (Absolute) 1.72 1.00 - 4.80 K/uL    Monos (Absolute) 0.45 0.00 - 0.85 K/uL    Eos (Absolute) 0.16 0.00 - 0.51 K/uL    Baso (Absolute) 0.04 0.00 - 0.12 K/uL    Immature Granulocytes (abs) 0.10 0.00 - 0.11 K/uL    NRBC (Absolute) 0.00 K/uL       Current Facility-Administered Medications   Medication Frequency   • warfarin (COUMADIN) tablet 6 mg ONCE AT 1800   • levETIRAcetam (KEPPRA) tablet 500 mg Q12HRS   • cefUROXime (CEFTIN) tablet 500 mg Q12HRS   • traZODone (DESYREL) tablet 50 mg QHS   • melatonin tablet 3 mg QHS   • hydrOXYzine HCl (ATARAX) tablet 50 mg Q6HRS PRN   • Respiratory Therapy Consult Continuous RT   • Pharmacy Consult Request ...Pain Management Review 1 Each PHARMACY TO DOSE   • hydrALAZINE (APRESOLINE) tablet 10 mg Q8HRS PRN   • artificial tears ophthalmic solution 1 Drop PRN   • benzocaine-menthol (CEPACOL) lozenge 1 Lozenge Q2HRS PRN   • mag hydrox-al hydrox-simeth (MAALOX PLUS ES or MYLANTA DS) suspension 20 mL Q2HRS PRN   • ondansetron (ZOFRAN ODT) dispertab 4 mg 4X/DAY PRN    Or   • ondansetron (ZOFRAN) syringe/vial injection 4 mg 4X/DAY PRN   • sodium chloride (OCEAN) 0.65 % nasal spray 2 Spray PRN   • lactulose 20 GM/30ML solution 30 mL QDAY PRN   • docusate sodium (ENEMEEZ) enema 283 mg QDAY PRN   • fleet enema 133 mL QDAY PRN   • atorvastatin (LIPITOR) tablet 40 mg Q EVENING   • senna-docusate (PERICOLACE or SENOKOT S) 8.6-50 MG per tablet 2 Tab BID    And   • polyethylene glycol/lytes (MIRALAX) PACKET 1 Packet QDAY PRN    And   • magnesium hydroxide (MILK OF MAGNESIA) suspension 30 mL QDAY PRN    And   • bisacodyl (DULCOLAX) suppository 10 mg QDAY PRN   • acetaminophen (TYLENOL) tablet 650 mg Q4HRS PRN   • acetaminophen/caffeine/butalbital 325-40-50 mg (FIORICET) -40 MG per tablet 1  Tab Q6HRS PRN   • enoxaparin (LOVENOX) inj 80 mg Q12HRS   • MD Alert...Warfarin per Pharmacy PHARMACY TO DOSE       Orders Placed This Encounter   Procedures   • Diet Order Regular (meds whole 1:1 with thins)     Standing Status:   Standing     Number of Occurrences:   1     Order Specific Question:   Diet:     Answer:   Regular [1]     Comments:   meds whole 1:1 with thins       Assessment:  Active Hospital Problems    Diagnosis   • *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    I led and attended the weekly conference today, and agree with the IDT conference documentation and plan of care as noted below.    Date of conference: 10/12/2020    Goals and barriers: See IDT note.    Biggest barriers: double vision, impaired motor planning, impaired standing balance, fatigue, right hemiparesis, impaired coordination    CM/social support:  very supportive    Anticipated DC date: 10/27    Home health: TBD    Equip: shower chair, grab bars    Follow up: PCP, stroke bridge clinic, Dr. Becker - rehab clinic, neuro-ophthalmology, ENT      Medical Decision Making and Plan:    Midbrain and khang left ischemic stroke  Left vertebral artery dissection  S/p thrombectomy Dr. 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 Lubin  Outpatient follow up with stroke bridge clinic, referral made  Outpatient follow up with Dr. Becker, rehab clinic referral made  Outpatient follow up with Dr. Rosas, neurophthalmology referral made    Hypophonia  Suspect from intubation, not from stroke location  Speech therapy  FEES with impaired mobility of cords, and possible pathology on cords  Follow up with outpatient ENT, referral made    Hypertension  On admission to acute  Monitor need to start medications     History of migraines  PRN tylenol or Fioricet     Insomnia, improved  Schedule melatonin and trazodone    Oral thrush     Continue nystatin     Seizure prophylaxis  Continue Keppra  Outpatient follow up with stroke bridge clinic    Leukocytosis  Positive UA, culture with GNRs, started on antibiotics  Negative CXR    UTI  Started on cefuroxime, culture pending    Bowel  Meds as needed  Last BM 10/11    Bladder  Check PVRs - 52, 59  Patient not retaining   ICP for over 400 cc  Scheduled toileting    DVT prophylaxis  Lovenox bridge to coumadin    Total time:  40 minutes.  I spent greater than 50% of the time for patient care, counseling, and coordination on this date, including patient face-to face time, unit/floor time with review of records/pertinent lab data and studies, as well as discussing diagnostic evaluation/work up, planned therapeutic interventions, and future disposition of care, as per the interval events/subjective and the assessment and plan as noted above.      Eden Mendoza M.D.   Physical Medicine and Rehabilitation

## 2020-10-12 NOTE — THERAPY
Speech Language Pathology  Daily Treatment     Patient Name: Obdulia High  Age:  45 y.o., Sex:  female  Medical Record #: [id number]  Today's Date: 10/12/2020     Precautions  Precautions: Fall Risk  Comments: (dizzy)    Subjective    Pt participated in dysphagia therapy group.       Objective       10/12/20 0801   Group Therapy    Group Topic Dysphagia   Reason for Group Therapy To Increase Active Participation through Peer Pressure;To Enhance Motivation;To Validate Increased [place] with Skills;To Increase Patient Interaction during Physical Recovery;To Facilitate Goal Discussion ;To Reinforce Strengthening and Movement Patterns through Group Format Demonstration   Group Treatment Provided Assess diet tolerance and use of strategies.    SLP Total Time Spent   SLP Group Total Time Spent (Mins) 30   Treatment Charges   SLP Treatment - Group Speech Language Treatment - Group       Assessment    Pt assessed with regular textures/ thin liquids.  Pt tolerated regular textures and thin liquids without oral residue or s/s of aspiration.      Strengths: Able to follow instructions, Alert and oriented, Independent prior level of function, Making steady progress towards goals, Motivated for self care and independence, Pleasant and cooperative, Supportive family, Willingly participates in therapeutic activities  Barriers: Fatigue, Visual impairment(diplopia)    Plan    Continue regular textures and thin liquids.  D/C tdine.     Speech Therapy Problems     Problem: Problem Solving STGs     Dates: Start: 10/08/20       Goal: STG-Within one week, patient will     Dates: Start: 10/08/20       Description: 1) Individualized goal:  complete functional organization tasks related to medication and financial mngt. With 100% accuracy provided SPV  2) Interventions:  SLP Cognitive Skill Development and SLP Group Treatment        Note:     Goal Note filed on 10/12/20 0801 by Patricia Long MS,CCC-SLP    Simple math problems 32% IND,  MIN to MOD cues to achieve 100%.                   Goal: STG-Within one week, patient will     Dates: Start: 10/08/20       Description: 1) Individualized goal:  complete alternating attention tasks with 80% accuracy provided MIN cues.   2) Interventions:  SLP Cognitive Skill Development and SLP Group Treatment        Note:     Goal Note filed on 10/12/20 0801 by Patricia Long MS,CCC-SLP    Will continue to target.                         Problem: Speech/Swallowing LTGs     Dates: Start: 10/08/20       Goal: LTG-By discharge, patient will safely swallow     Dates: Start: 10/08/20       Description: 1) Individualized goal:  regular textures/thin liquids for safe return to PLOF.   2) Interventions:  SLP Swallowing Dysfunction Treatment, SLP Oral Pharyngeal Evaluation, SLP Video Swallow Evaluation, SLP Cognitive Skill Development, and SLP Group Treatment              Goal: LTG-By discharge, patient will solve complex problem     Dates: Start: 10/08/20       Description: 1) Individualized goal: By utilizing compensatory intervention for memory and problem-solving to allow for safe completion of daily activities with MOD I in order to prepare for safe d/c home  2) Interventions:  SLP Cognitive Skill Development and SLP Group Treatment

## 2020-10-12 NOTE — CARE PLAN
Problem: Safety  Goal: Will remain free from injury  Outcome: PROGRESSING AS EXPECTED  Note: Pt uses call light appropriately. Waits for assistance and does not attempt self transfer this shift. Able to verbalize needs.      Problem: Pain Management  Goal: Pain level will decrease to patient's comfort goal  Outcome: PROGRESSING AS EXPECTED  Note: Pt denies pain or discomfort tonight. Sleeps good with scheduled melatonin and trazodone. Will continue to monitor.

## 2020-10-12 NOTE — CARE PLAN
Problem: Mobility  Goal: STG-Within one week, patient will propel wheelchair community  Description: 1) Individualized goal:  W/C mobility x 150 feet with SBA  2) Interventions:  PT Group Therapy, PT E Stim Attended, PT Gait Training, PT Therapeutic Exercises, PT Neuro Re-Ed/Balance, PT Aquatic Therapy, PT Therapeutic Activity, PT Manual Therapy, and PT Evaluation    Outcome: NOT MET  Note: Min A, impaired coordination  Goal: STG-Within one week, patient will ambulate household distance  Description: 1) Individualized goal:  AMB x 25 feet with FWW and CGA  2) Interventions:  PT Group Therapy, PT E Stim Attended, PT Gait Training, PT Therapeutic Exercises, PT Neuro Re-Ed/Balance, PT Aquatic Therapy, PT Therapeutic Activity, PT Manual Therapy, and PT Evaluation    Outcome: NOT MET  Note: // bars 20ft x 3 CGA,   R neglect, safety concerns     Problem: Mobility Transfers  Goal: STG-Within one week, patient will transfer bed to chair  Description: 1) Individualized goal: SPT with CGA  2) Interventions:  PT Group Therapy, PT E Stim Attended, PT Gait Training, PT Therapeutic Exercises, PT Neuro Re-Ed/Balance, PT Aquatic Therapy, PT Therapeutic Activity, PT Manual Therapy, and PT Evaluation    Outcome: MET

## 2020-10-12 NOTE — DISCHARGE PLANNING
Spoke with patient and  post team conference. Gave them the tentative discharge date of the 10/27. We discussed the difference between home health and a care giver.  may ask for some resources on care givers. Discussed follow up appointments and also team suggesting they need grab bars and shower chair.  Gave time to ask questions.

## 2020-10-12 NOTE — THERAPY
"Speech Language Pathology  Daily Treatment     Patient Name: Obdulia High  Age:  45 y.o., Sex:  female  Medical Record #: [id number]  Today's Date: 10/12/2020     Precautions  Precautions: Fall Risk  Comments: (dizzy)    Subjective    Pt in bed at time of ST, agreeable to therapy.      Objective       10/12/20 0834   Cognition   Medication Management  Minimal (4)   Interdisciplinary Plan of Care Collaboration   Patient Position at End of Therapy Seated;Call Light within Reach;Self Releasing Lap Belt Applied   Strengths & Barriers   Strengths Able to follow instructions;Alert and oriented;Effective communication skills;Independent prior level of function;Making steady progress towards goals;Motivated for self care and independence;Pleasant and cooperative;Supportive family;Willingly participates in therapeutic activities   Barriers Fatigue  (diplopia )   SLP Total Time Spent   SLP Individual Total Time Spent (Mins) 60   Treatment Charges   SLP Cognitive Skill Development First 15 Minutes 1   SLP Cognitive Skill Development Additional 15 Minutes 3       Assessment    Medication  mngt initiated today - pt created med list, able to recall purpose of 2 medications taking currently. Fxl med sort completed with pt independently initiating sorting with right hand (despite being left hand dominant) \"to practice more.\" Pt correctly sorted 4/7 medications IND, set up A using towel, cues for managing pill containers. Would benefit from ongoing trials as pt fatigued quickly during this task \"I'm exhausted mentally and physically.\"     Strengths: Able to follow instructions, Alert and oriented, Effective communication skills, Independent prior level of function, Making steady progress towards goals, Motivated for self care and independence, Pleasant and cooperative, Supportive family, Willingly participates in therapeutic activities  Barriers: Fatigue(diplopia )    Plan    Continue fxl med sort, $ mngt and complex " organization    Speech Therapy Problems     Problem: Problem Solving STGs     Dates: Start: 10/08/20       Goal: STG-Within one week, patient will     Dates: Start: 10/08/20       Description: 1) Individualized goal:  complete functional organization tasks related to medication and financial mngt. With 100% accuracy provided SPV  2) Interventions:  SLP Cognitive Skill Development and SLP Group Treatment        Note:     Goal Note filed on 10/12/20 0801 by Patricia Long MS,CCC-SLP    Simple math problems 32% IND, MIN to MOD cues to achieve 100%.                   Goal: STG-Within one week, patient will     Dates: Start: 10/08/20       Description: 1) Individualized goal:  complete alternating attention tasks with 80% accuracy provided MIN cues.   2) Interventions:  SLP Cognitive Skill Development and SLP Group Treatment        Note:     Goal Note filed on 10/12/20 0801 by Patricia Long MS,CCC-SLP    Will continue to target.                         Problem: Speech/Swallowing LTGs     Dates: Start: 10/08/20       Goal: LTG-By discharge, patient will safely swallow     Dates: Start: 10/08/20       Description: 1) Individualized goal:  regular textures/thin liquids for safe return to PLOF.   2) Interventions:  SLP Swallowing Dysfunction Treatment, SLP Oral Pharyngeal Evaluation, SLP Video Swallow Evaluation, SLP Cognitive Skill Development, and SLP Group Treatment              Goal: LTG-By discharge, patient will solve complex problem     Dates: Start: 10/08/20       Description: 1) Individualized goal: By utilizing compensatory intervention for memory and problem-solving to allow for safe completion of daily activities with MOD I in order to prepare for safe d/c home  2) Interventions:  SLP Cognitive Skill Development and SLP Group Treatment

## 2020-10-12 NOTE — THERAPY
"Occupational Therapy  Daily Treatment     Patient Name: Obdulia High  Age:  45 y.o., Sex:  female  Medical Record #: [id number]  Today's Date: 10/12/2020     Precautions  Precautions: Fall Risk  Comments: (dizzy)         Subjective    Pt was supine in bed with  present and agreeable to therapy. Pt's spouse present for session and requested to be cleared to transfer the pt. Trial transfers completed with good safety awareness and spouse was      Objective       10/12/20 1301   Precautions   Precautions Fall Risk   Cognition    Speech/ Communication   (hypophonia)   Functional Level of Assist   Bed, Chair, Wheelchair Transfer Contact Guard Assist   Bed Chair Wheelchair Transfer Description Squat pivot transfer to wheelchair;Supervision for safety;Verbal cueing;Increased time;Set-up of equipment   Neuro-Muscular Treatments   Neuro-Muscular Treatments Facilitation  (gaze stabilization as outline in note)   Comments PNF D1 and D2 patterns with R UE X 10 each after 2 demonstration reps   Bed Mobility    Supine to Sit Stand by Assist   Sit to Supine Stand by Assist   Scooting Stand by Assist   Rolling Supervised   Skilled Intervention Verbal Cuing   Interdisciplinary Plan of Care Collaboration   IDT Collaboration with  Occupational Therapist   Patient Position at End of Therapy In Bed;Call Light within Reach;Tray Table within Reach;Phone within Reach   Collaboration Comments POC   OT Total Time Spent   OT Individual Total Time Spent (Mins) 60   OT Charge Group   OT Neuromuscular Re-education / Balance 2   OT Therapy Activity 2   Vision rehab with occular ROM and gaze stability exercises as follows:   Monocular pursuit to end diagonal range of each quadrant with a 10 second hold with each eye X 2 trials     FMC:   Pt participated in two trials of timed typing practice focused on R UE with \"J\" and \"K\" key, improving from 22% accuracy on first trial to 24.5% accuracy on the second trial    Assessment    Pt tolerated " session well. Pt demonstrated improvement on timed typing activity focused on two keys on the right side of home row. Pt continues to demonstrated impaired abduction in the right eye, adduction in the left eye, and superior elevation in both eyes with most impairment in the upper right quadrant. Pt continues to report diplopia without her eye patch. Pt's  demonstrated a safe transfer W/C<>bed with CGA and it was recommended to consult with primary team for spouse clearance to complete transfers.     Strengths: Alert and oriented, Able to follow instructions, Effective communication skills, Good insight into deficits/needs, Independent prior level of function, Manages pain appropriately, Motivated for self care and independence, Pleasant and cooperative, Supportive family, Willingly participates in therapeutic activities  Barriers: Decreased endurance, Fatigue, Generalized weakness, Hemiparesis, Impaired activity tolerance, Impaired balance, Limited mobility, Visual impairment(double vision)    Plan    R UE strengthening/GMC/FMC, Standing balance (static and dynamic), ADLs, IADLs, typing, vision rehab for diplopia    Occupational Therapy Goals     Problem: Bathing     Dates: Start: 10/08/20       Goal: STG-Within one week, patient will bathe     Dates: Start: 10/08/20       Description: 1) Individualized Goal:  body with CGA (for standing portion) using AE/AD  2) Interventions:  OT Self Care/ADL, OT Manual Ther Technique, OT Neuro Re-Ed/Balance, OT Sensory Int Techniques, OT Therapeutic Activity, OT Evaluation, and OT Therapeutic Exercise      Note:     Goal Note filed on 10/12/20 4985 by Dario Cooley OT/LUANNE MCGILL                        Problem: Dressing     Dates: Start: 10/08/20       Goal: STG-Within one week, patient will dress LB     Dates: Start: 10/08/20       Description: 1) Individualized Goal:  with CGA for standing portion using grab bar  2) Interventions:  OT Self Care/ADL, OT Manual Ther  Technique, OT Neuro Re-Ed/Balance, OT Sensory Int Techniques, OT Therapeutic Activity, OT Evaluation, and OT Therapeutic Exercise    Note:     Goal Note filed on 10/12/20 1305 by Dario Cooley OT/L    Min A for balance                        Problem: Functional Transfers     Dates: Start: 10/12/20       Goal: STG-Within one week, patient will transfer to toilet     Dates: Start: 10/12/20       Description: 1) Individualized Goal:  with SBA using grab bar  2) Interventions:  OT Self Care/ADL, OT Manual Ther Technique, OT Neuro Re-Ed/Balance, OT Sensory Int Techniques, OT Therapeutic Activity, OT Evaluation, and OT Therapeutic Exercise                Problem: OT Long Term Goals     Dates: Start: 10/08/20       Goal: LTG-By discharge, patient will complete basic self care tasks     Dates: Start: 10/08/20       Description: 1) Individualized Goal:  with mod I using AE/AD/techniques as needed  2) Interventions:  OT Self Care/ADL, OT Manual Ther Technique, OT Neuro Re-Ed/Balance, OT Sensory Int Techniques, OT Therapeutic Activity, OT Evaluation, and OT Therapeutic Exercise          Goal: LTG-By discharge, patient will perform bathroom transfers     Dates: Start: 10/08/20       Description: 1) Individualized Goal:  with mod I using AE/AD/techniques as needed  2) Interventions:  OT Self Care/ADL, OT Manual Ther Technique, OT Neuro Re-Ed/Balance, OT Sensory Int Techniques, OT Therapeutic Activity, OT Evaluation, and OT Therapeutic Exercise          Goal: LTG-By discharge, patient will complete basic home management     Dates: Start: 10/08/20       Description: 1) Individualized Goal:  with supervision using AE/AD/techniques as needed  2) Interventions:  OT Self Care/ADL, OT Manual Ther Technique, OT Neuro Re-Ed/Balance, OT Sensory Int Techniques, OT Therapeutic Activity, OT Evaluation, and OT Therapeutic Exercise

## 2020-10-12 NOTE — THERAPY
Physical Therapy   Daily Treatment     Patient Name: Obdulia High  Age:  45 y.o., Sex:  female  Medical Record #: [id number]  Today's Date: 10/12/2020     Precautions  Precautions: (P) Fall Risk, Other (See Comments)  Comments: (P) dizzy, double vision    Subjective    Patient and spouse were requesting family transfer training. Spouse was receptive to recommendations and HEP.      Objective       10/12/20 1501   Precautions   Precautions Fall Risk;Other (See Comments)   Comments dizzy, double vision   Cognition    Comments Patch on left eye   Gait Functional Level of Assist    Gait Level Of Assist Minimal Assist  (wc follow)   Assistive Device Front Wheel Walker   Distance (Feet) 45   # of Times Distance was Traveled 1   Deviation Bradykinetic;Decreased Base Of Support;Ataxic  (Warm up in // bars CGA x 40ft)   Wheelchair Functional Level of Assist   Wheelchair Assist Supervised   Distance Wheelchair (Feet or Distance) 250   Wheelchair Description Extra time;Impaired coordination;Adaptive equipment;Supervision for safety;Verbal cueing  (cues to steer away from objects to her right)   Transfer Functional Level of Assist   Bed, Chair, Wheelchair Transfer Contact Guard Assist  (family training completed)   Bed Chair Wheelchair Transfer Description Set-up of equipment;Verbal cueing;Adaptive equipment;Increased time   Toilet Transfers Contact Guard Assist  (CGA/SBA, family training completed)   Toilet Transfer Description Set-up of equipment;Increased time;Grab bar;Adaptive equipment;Verbal cueing   Sitting Lower Body Exercises   Sitting Lower Body Exercises   (2lb ankle weights applied BLE)   Ankle Pumps 2 sets of 15;Bilateral   Hip Abduction 2 sets of 15;Bilateral   Hip Adduction Bilateral;1 set of 10  (pillow squeezes)   Long Arc Quad 2 sets of 15;Bilateral   Marching 2 sets of 15;Reciprocal   Hamstring Curl 2 sets of 15;Bilateral   Bed Mobility    Supine to Sit Supervised   Sit to Supine Supervised   Sit to Stand  Contact Guard Assist   Scooting Supervised   Rolling Supervised   Neuro-Muscular Treatments   Neuro-Muscular Treatments Verbal Cuing;Sequencing;Tactile Cuing;Weight Shift Right;Weight Shift Left;Postural Changes;Postural Facilitation;Joint Approximation   Comments Education with spouse of set up, guarding, hand placements, and use of gait belt for toilet and bed transfers.    Interdisciplinary Plan of Care Collaboration   IDT Collaboration with  Occupational Therapist;Family / Caregiver   Patient Position at End of Therapy Seated;Family / Friend in Room;Self Releasing Lap Belt Applied   Collaboration Comments Family training with spouse, , for bed and toilet transfers,  mobility safet, and seated HEP recommendations.  Roomboard updated.    Strengths & Barriers   Strengths Independent prior level of function;Making steady progress towards goals;Motivated for self care and independence;Pleasant and cooperative;Supportive family;Willingly participates in therapeutic activities   Barriers Decreased endurance;Fatigue;Impaired activity tolerance;Impaired balance;Visual impairment;Limited mobility;Impulsive   PT Total Time Spent   PT Individual Total Time Spent (Mins) 60   PT Charge Group   PT Gait Training 1   PT Therapeutic Exercise 1   PT Neuromuscular Re-Education / Balance 1   PT Therapeutic Activities 1       Assessment    Patient's spouse cleared for toilet and bed transfers, as well as supervision with  mobility and set up for seated HEP. Patient was limited by dec activity tolerance, and impaired motor planning. Patient progressed to amb with FWW, with inconsistent patterning.   Strengths: (P) Independent prior level of function, Making steady progress towards goals, Motivated for self care and independence, Pleasant and cooperative, Supportive family, Willingly participates in therapeutic activities  Barriers: (P) Decreased endurance, Fatigue, Impaired activity tolerance, Impaired balance, Visual  impairment, Limited mobility, Impulsive    Plan    Ongoing balance, gait training, trial LiteGait vs Vector, balance assessment, neuro re-ed, vestibular habituation exercises.      Physical Therapy Problems     Problem: Mobility     Dates: Start: 10/08/20       Goal: STG-Within one week, patient will propel wheelchair community     Dates: Start: 10/08/20       Description: 1) Individualized goal:  W/C mobility x 150 feet with SBA  2) Interventions:  PT Group Therapy, PT E Stim Attended, PT Gait Training, PT Therapeutic Exercises, PT Neuro Re-Ed/Balance, PT Aquatic Therapy, PT Therapeutic Activity, PT Manual Therapy, and PT Evaluation      Note:     Goal Note filed on 10/12/20 1315 by Rakel Mcneal, ZARIT    Min A, impaired coordination                  Goal: STG-Within one week, patient will ambulate household distance     Dates: Start: 10/08/20       Description: 1) Individualized goal:  AMB x 25 feet with FWW and CGA  2) Interventions:  PT Group Therapy, PT E Stim Attended, PT Gait Training, PT Therapeutic Exercises, PT Neuro Re-Ed/Balance, PT Aquatic Therapy, PT Therapeutic Activity, PT Manual Therapy, and PT Evaluation      Note:     Goal Note filed on 10/12/20 1315 by Rakel Mcneal, DPT    // bars 20ft x 3 CGA,   R neglect, safety concerns                        Problem: Mobility Transfers     Dates: Start: 10/12/20       Goal: STG-Within one week, patient will transfer bed to chair     Dates: Start: 10/12/20       Description: 1) Individualized goal:  SBA reach pivot + set up wc <> bed right/ left  2) Interventions:  PT Orthotics Training, PT Gait Training, PT Self Care/Home Eval, PT Therapeutic Exercises, PT Neuro Re-Ed/Balance, PT Aquatic Therapy, PT Therapeutic Activity, and PT Manual Therapy                  Problem: PT-Long Term Goals     Dates: Start: 10/08/20       Goal: LTG-By discharge, patient will ambulate     Dates: Start: 10/08/20       Description: 1) Individualized goal:  AMB x 150 feet with LRD  and SPV  2) Interventions:  PT Group Therapy, PT E Stim Attended, PT Gait Training, PT Therapeutic Exercises, PT Neuro Re-Ed/Balance, PT Aquatic Therapy, PT Therapeutic Activity, PT Manual Therapy, and PT Evaluation            Goal: LTG-By discharge, patient will transfer one surface to another     Dates: Start: 10/08/20       Description: 1) Individualized goal:  SPT with LRD and SPV  2) Interventions:  PT Group Therapy, PT E Stim Attended, PT Gait Training, PT Therapeutic Exercises, PT Neuro Re-Ed/Balance, PT Aquatic Therapy, PT Therapeutic Activity, PT Manual Therapy, and PT Evaluation            Goal: LTG-By discharge, patient will ambulate up/down 4-6 stairs     Dates: Start: 10/08/20       Description: 1) Individualized goal:  Up/down 4 steps with BHR and CGA  2) Interventions:  PT Group Therapy, PT E Stim Attended, PT Gait Training, PT Therapeutic Exercises, PT Neuro Re-Ed/Balance, PT Aquatic Therapy, PT Therapeutic Activity, PT Manual Therapy, and PT Evaluation            Goal: LTG-By discharge, patient will transfer in/out of a car     Dates: Start: 10/08/20       Description: 1) Individualized goal:  Car transfer with LRD and SPV  2) Interventions:  PT Group Therapy, PT E Stim Attended, PT Gait Training, PT Therapeutic Exercises, PT Neuro Re-Ed/Balance, PT Aquatic Therapy, PT Therapeutic Activity, PT Manual Therapy, and PT Evaluation

## 2020-10-12 NOTE — THERAPY
"Occupational Therapy  Daily Treatment     Patient Name: Obdulia High  Age:  45 y.o., Sex:  female  Medical Record #: [id number]  Today's Date: 10/12/2020     Precautions  Precautions: (P) Fall Risk  Comments: (dizzy)         Subjective    Patient agreeable to shower.  After shower, agreeable to work on typing skills, as this is a major part of her job at home.     Objective       10/12/20 0931   Precautions   Precautions Fall Risk   Cognition    Comments hypophonia   Functional Level of Assist   Eating Supervision   Grooming Supervision   Grooming Description Seated in wheelchair at sink;Supervision for safety   Bathing Minimal Assist   Bathing Description Hand held shower;Grab bar;Tub bench;Set-up of equipment;Initial preparation for task;Supervision for safety;Verbal cueing  (min A standing balance)   Upper Body Dressing Supervision   Upper Body Dressing Description Increased time;Set-up of equipment;Supervision for safety   Lower Body Dressing Minimal Assist   Lower Body Dressing Description Grab bar;Set-up of equipment;Supervision for safety;Verbal cueing  (min A for standing balance to pull underwear/pants up)   Toileting Minimal Assist   Toileting Description Verbal cueing;Grab bar;Set-up of equipment;Supervision for safety   Toilet Transfers Contact Guard Assist   Toilet Transfer Description Grab bar;Supervision for safety;Set-up of equipment;Verbal cueing   Tub / Shower Transfers Contact Guard Assist   Tub Shower Transfer Description Grab bar;Shower bench;Set-up of equipment;Supervision for safety   Fine Motor / Dexterity    Fine Motor / Dexterity Interventions Worked on typing skills using B UE and initially with all eight home keys.  Patient typed at 2 wpm over 3:15 at 40% accuracy with therapist calling out letters to by typed.  Next, practiced the letters \"F\" and \"J\" with 95% accuracy over 1 minute.     Bed Mobility    Sit to Supine Stand by Assist   Interdisciplinary Plan of Care Collaboration   Patient " Position at End of Therapy In Bed;Call Light within Reach;Tray Table within Reach;Bed Alarm On   OT Total Time Spent   OT Individual Total Time Spent (Mins) 60   OT Charge Group   OT Self Care / ADL 3   OT Therapy Activity 1     CGA SPT w/c to bed with bedrail.    Assessment    Patient with similar to slightly improved abilities with adl routine today compared to evaluation on Thursday last week.  Demonstrates deficits with typing skills, which are impacted by vision deficits and and discoordination of right hand.  Strengths: Alert and oriented, Able to follow instructions, Effective communication skills, Good insight into deficits/needs, Independent prior level of function, Manages pain appropriately, Motivated for self care and independence, Pleasant and cooperative, Supportive family, Willingly participates in therapeutic activities  Barriers: Decreased endurance, Fatigue, Generalized weakness, Hemiparesis, Impaired activity tolerance, Impaired balance, Limited mobility, Visual impairment(double vision)    Plan    R UE strengthening/GMC/FMC, Standing balance (static and dynamic), ADLs, IADLs, typing, vision rehab for diplopia    Occupational Therapy Goals     Problem: Bathing     Dates: Start: 10/08/20       Goal: STG-Within one week, patient will bathe     Dates: Start: 10/08/20       Description: 1) Individualized Goal:  body with CGA (for standing portion) using AE/AD  2) Interventions:  OT Self Care/ADL, OT Manual Ther Technique, OT Neuro Re-Ed/Balance, OT Sensory Int Techniques, OT Therapeutic Activity, OT Evaluation, and OT Therapeutic Exercise                  Problem: Dressing     Dates: Start: 10/08/20       Goal: STG-Within one week, patient will dress LB     Dates: Start: 10/08/20       Description: 1) Individualized Goal:  with CGA for standing portion using grab bar  2) Interventions:  OT Self Care/ADL, OT Manual Ther Technique, OT Neuro Re-Ed/Balance, OT Sensory Int Techniques, OT Therapeutic  Activity, OT Evaluation, and OT Therapeutic Exercise                Problem: Functional Transfers     Dates: Start: 10/08/20       Goal: STG-Within one week, patient will transfer to toilet     Dates: Start: 10/08/20       Description: 1) Individualized Goal:  with min A using grab bar for balance/support  2) Interventions:  OT Self Care/ADL, OT Manual Ther Technique, OT Neuro Re-Ed/Balance, OT Sensory Int Techniques, OT Therapeutic Activity, OT Evaluation, and OT Therapeutic Exercise                Problem: OT Long Term Goals     Dates: Start: 10/08/20       Goal: LTG-By discharge, patient will complete basic self care tasks     Dates: Start: 10/08/20       Description: 1) Individualized Goal:  with mod I using AE/AD/techniques as needed  2) Interventions:  OT Self Care/ADL, OT Manual Ther Technique, OT Neuro Re-Ed/Balance, OT Sensory Int Techniques, OT Therapeutic Activity, OT Evaluation, and OT Therapeutic Exercise          Goal: LTG-By discharge, patient will perform bathroom transfers     Dates: Start: 10/08/20       Description: 1) Individualized Goal:  with mod I using AE/AD/techniques as needed  2) Interventions:  OT Self Care/ADL, OT Manual Ther Technique, OT Neuro Re-Ed/Balance, OT Sensory Int Techniques, OT Therapeutic Activity, OT Evaluation, and OT Therapeutic Exercise          Goal: LTG-By discharge, patient will complete basic home management     Dates: Start: 10/08/20       Description: 1) Individualized Goal:  with supervision using AE/AD/techniques as needed  2) Interventions:  OT Self Care/ADL, OT Manual Ther Technique, OT Neuro Re-Ed/Balance, OT Sensory Int Techniques, OT Therapeutic Activity, OT Evaluation, and OT Therapeutic Exercise

## 2020-10-12 NOTE — PROGRESS NOTES
Pharmacy Warfarin Consult   10/12/2020     45 y.o.   female     Indication for anticoagulation: Stroke     Goal INR = 2 - 3    Recent Labs     10/10/20  0558 10/11/20  0607 10/12/20  0646   INR 1.62* 1.94* 1.91*   HEMOGLOBIN  --   --  12.2   HEMATOCRIT  --   --  36.8*       Pertinent Drug/Drug Interactions:  ABX, Statin, prn trazodone  Outpatient Warfarin Regimen:  New start  Recent Warfarin Dosing:    Dose from last 7 days     Date/Time Dose (mg)    10/12/20 0646  6    10/11/20 1444  5    10/10/20 1300  5    10/09/20 0721  5    10/08/20 0707  5    10/07/20 1517  5    10/07/20 1443  --          Bridge Therapy: Lovenox 80 mg q12h           1.  Warfarin 6 mg tonight for INR = 1.91           Sha Weiss [facility]

## 2020-10-12 NOTE — CARE PLAN
Problem: Bathing  Goal: STG-Within one week, patient will bathe  Description: 1) Individualized Goal:  body with CGA (for standing portion) using AE/AD  2) Interventions:  OT Self Care/ADL, OT Manual Ther Technique, OT Neuro Re-Ed/Balance, OT Sensory Int Techniques, OT Therapeutic Activity, OT Evaluation, and OT Therapeutic Exercise    Outcome: NOT MET  Note: Min A     Problem: Dressing  Goal: STG-Within one week, patient will dress LB  Description: 1) Individualized Goal:  with CGA for standing portion using grab bar  2) Interventions:  OT Self Care/ADL, OT Manual Ther Technique, OT Neuro Re-Ed/Balance, OT Sensory Int Techniques, OT Therapeutic Activity, OT Evaluation, and OT Therapeutic Exercise  Outcome: NOT MET  Note: Min A for balance     Problem: Functional Transfers  Goal: STG-Within one week, patient will transfer to toilet  Description: 1) Individualized Goal:  with min A using grab bar for balance/support  2) Interventions:  OT Self Care/ADL, OT Manual Ther Technique, OT Neuro Re-Ed/Balance, OT Sensory Int Techniques, OT Therapeutic Activity, OT Evaluation, and OT Therapeutic Exercise  Outcome: MET  Note: Min A with grab bar

## 2020-10-12 NOTE — CARE PLAN
Problem: Problem Solving STGs  Goal: STG-Within one week, patient will  Description: 1) Individualized goal:  complete functional organization tasks related to medication and financial mngt. With 100% accuracy provided SPV  2) Interventions:  SLP Cognitive Skill Development and SLP Group Treatment      Outcome: NOT MET  Note: Simple math problems 32% IND, MIN to MOD cues to achieve 100%.   Goal: STG-Within one week, patient will  Description: 1) Individualized goal:  complete alternating attention tasks with 80% accuracy provided MIN cues.   2) Interventions:  SLP Cognitive Skill Development and SLP Group Treatment      Outcome: NOT MET  Note: Will continue to target.      Problem: Swallowing STGs  Goal: STG-Within one week, patient will  Description: 1) Individualized goal: complete a MBSS for further evaluation of swallow function; with additional goals pending results  2) Interventions:  SLP Video Swallow Evaluation      Outcome: MET  Note: Aspiration on serial cup sip thin liquids. Single sips without issue. Advanced to soft/bite size with trials of regular over weekend, subsequent upgrade at that time.

## 2020-10-13 LAB
BACTERIA UR CULT: ABNORMAL
BACTERIA UR CULT: ABNORMAL
INR PPP: 1.97 (ref 0.87–1.13)
PROTHROMBIN TIME: 23 SEC (ref 12–14.6)
SIGNIFICANT IND 70042: ABNORMAL
SITE SITE: ABNORMAL
SOURCE SOURCE: ABNORMAL

## 2020-10-13 PROCEDURE — 97130 THER IVNTJ EA ADDL 15 MIN: CPT

## 2020-10-13 PROCEDURE — 36415 COLL VENOUS BLD VENIPUNCTURE: CPT

## 2020-10-13 PROCEDURE — 700111 HCHG RX REV CODE 636 W/ 250 OVERRIDE (IP): Performed by: PHYSICAL MEDICINE & REHABILITATION

## 2020-10-13 PROCEDURE — 97530 THERAPEUTIC ACTIVITIES: CPT

## 2020-10-13 PROCEDURE — 99231 SBSQ HOSP IP/OBS SF/LOW 25: CPT | Performed by: PHYSICAL MEDICINE & REHABILITATION

## 2020-10-13 PROCEDURE — 97110 THERAPEUTIC EXERCISES: CPT

## 2020-10-13 PROCEDURE — 97116 GAIT TRAINING THERAPY: CPT

## 2020-10-13 PROCEDURE — 97112 NEUROMUSCULAR REEDUCATION: CPT

## 2020-10-13 PROCEDURE — 97129 THER IVNTJ 1ST 15 MIN: CPT

## 2020-10-13 PROCEDURE — 700102 HCHG RX REV CODE 250 W/ 637 OVERRIDE(OP): Performed by: PHYSICAL MEDICINE & REHABILITATION

## 2020-10-13 PROCEDURE — 85610 PROTHROMBIN TIME: CPT

## 2020-10-13 PROCEDURE — A9270 NON-COVERED ITEM OR SERVICE: HCPCS | Performed by: PHYSICAL MEDICINE & REHABILITATION

## 2020-10-13 PROCEDURE — 770010 HCHG ROOM/CARE - REHAB SEMI PRIVAT*

## 2020-10-13 RX ORDER — WARFARIN SODIUM 7.5 MG/1
7.5 TABLET ORAL
Status: COMPLETED | OUTPATIENT
Start: 2020-10-13 | End: 2020-10-13

## 2020-10-13 RX ADMIN — WARFARIN SODIUM 7.5 MG: 7.5 TABLET ORAL at 17:25

## 2020-10-13 RX ADMIN — TRAZODONE HYDROCHLORIDE 50 MG: 50 TABLET ORAL at 20:52

## 2020-10-13 RX ADMIN — ENOXAPARIN SODIUM 80 MG: 100 INJECTION SUBCUTANEOUS at 20:51

## 2020-10-13 RX ADMIN — LEVETIRACETAM 500 MG: 500 TABLET, FILM COATED ORAL at 08:12

## 2020-10-13 RX ADMIN — CEFUROXIME AXETIL 500 MG: 500 TABLET ORAL at 08:12

## 2020-10-13 RX ADMIN — DOCUSATE SODIUM 50 MG AND SENNOSIDES 8.6 MG 2 TABLET: 8.6; 5 TABLET, FILM COATED ORAL at 20:52

## 2020-10-13 RX ADMIN — LEVETIRACETAM 500 MG: 500 TABLET, FILM COATED ORAL at 20:51

## 2020-10-13 RX ADMIN — ATORVASTATIN CALCIUM 40 MG: 40 TABLET, FILM COATED ORAL at 20:52

## 2020-10-13 RX ADMIN — CEFUROXIME AXETIL 500 MG: 500 TABLET ORAL at 20:52

## 2020-10-13 RX ADMIN — ENOXAPARIN SODIUM 80 MG: 100 INJECTION SUBCUTANEOUS at 08:12

## 2020-10-13 ASSESSMENT — PATIENT HEALTH QUESTIONNAIRE - PHQ9
SUM OF ALL RESPONSES TO PHQ9 QUESTIONS 1 AND 2: 0
2. FEELING DOWN, DEPRESSED, IRRITABLE, OR HOPELESS: NOT AT ALL
1. LITTLE INTEREST OR PLEASURE IN DOING THINGS: NOT AT ALL
2. FEELING DOWN, DEPRESSED, IRRITABLE, OR HOPELESS: NOT AT ALL
1. LITTLE INTEREST OR PLEASURE IN DOING THINGS: NOT AT ALL
SUM OF ALL RESPONSES TO PHQ9 QUESTIONS 1 AND 2: 0

## 2020-10-13 ASSESSMENT — GAIT ASSESSMENTS
DISTANCE (FEET): 75
DEVIATION: ATAXIC;BRADYKINETIC;DECREASED BASE OF SUPPORT
ASSISTIVE DEVICE: FRONT WHEEL WALKER
GAIT LEVEL OF ASSIST: MINIMAL ASSIST

## 2020-10-13 ASSESSMENT — BALANCE ASSESSMENTS
REACHING FORWARD WITH OUTSTRETCHED ARM WHILE STANDING: 0
STANDING UNSUPPORTED WITH EYES CLOSED: 2
PLACE ALTERNATE FOOT ON STEP OR STOOL WHILE STANDING UNSUPPORTED: 0
SITTING TO STANDING: 1
LONG VERSION TOTAL SCORE (MAX 56): 8
TRANSFERS: 1
STANDING UNSUPPORTED: 0
STANDING UNSUPPORTED ONE FOOT IN FRONT: 0
STANDING UNSUPPORTED WITH FEET TOGETHER: 0
PICK UP OBJECT FROM THE FLOOR FROM A STANDING POSITION: 0
STANDING ON ONE LEG: 0
TURN 360 DEGREES: 0
LOOK OVER LEFT AND RIGHT SHOULDERS WHILE STANDING: 0
SITTING UNSUPPORTED: 4
STANDING TO SITTING: 0
LONG VERSION TOTAL SCORE (MAX 56): 8

## 2020-10-13 NOTE — THERAPY
Occupational Therapy  Daily Treatment     Patient Name: Obdulia High  Age:  45 y.o., Sex:  female  Medical Record #: [id number]  Today's Date: 10/13/2020     Precautions  Precautions: (P) Fall Risk, Other (See Comments)  Comments: (P) dizzy, double vision         Subjective    Patient lying in bed awake with spouse visiting in room.  Agreeable to OT.     Objective       10/13/20 1301   Precautions   Precautions Fall Risk;Other (See Comments)   Comments dizzy, double vision   Standing Upper Body Exercises   Standing Upper Body Exercises Yes   Comments Standing water bike level 2 x 5:20 and 4:00 for cardiovascular endurance.   Fine Motor / Dexterity    Fine Motor / Dexterity Interventions R hand FMC working on twirling highlighter around in [place], both directions.  Also worked on walking fingers up/down length of highlighter.   Balance   Standing Balance (Dynamic) Trace +   Comments Trace + standing balance with spouse supporting patient via gait belt and min/mod assist at times while patient used R UE to reach for basketballs and shoot baskets.     Bed Mobility    Supine to Sit Supervised   Scooting Stand by Assist   Interdisciplinary Plan of Care Collaboration   Patient Position at End of Therapy Seated;Chair Alarm On;Self Releasing Lap Belt Applied  (spouse taking pt back to room)   OT Total Time Spent   OT Individual Total Time Spent (Mins) 60   OT Charge Group   OT Neuromuscular Re-education / Balance 1   OT Therapy Activity 2   OT Therapeutic Exercise  1     Spouse assisted patient with squat pivot transfer bed to /c with CGA/min A to the right.    Assessment    Right handed FMC activities such as walking fingers up/down length of highlighter and rotating highlighter in a [place] were too difficult for patient to do effectively at this time.  Required some hand over hand assist and verbal cues with minimal ability to complete.  Strengths: Alert and oriented, Able to follow instructions, Effective communication  skills, Good insight into deficits/needs, Independent prior level of function, Manages pain appropriately, Motivated for self care and independence, Pleasant and cooperative, Supportive family, Willingly participates in therapeutic activities  Barriers: Decreased endurance, Fatigue, Generalized weakness, Hemiparesis, Impaired activity tolerance, Impaired balance, Limited mobility, Visual impairment(double vision)    Plan    R UE strengthening/GMC/FMC, Standing balance (static and dynamic), ADLs, IADLs, typing, vision rehab for diplopia    Occupational Therapy Goals     Problem: Bathing     Dates: Start: 10/08/20       Goal: STG-Within one week, patient will bathe     Dates: Start: 10/08/20       Description: 1) Individualized Goal:  body with CGA (for standing portion) using AE/AD  2) Interventions:  OT Self Care/ADL, OT Manual Ther Technique, OT Neuro Re-Ed/Balance, OT Sensory Int Techniques, OT Therapeutic Activity, OT Evaluation, and OT Therapeutic Exercise      Note:     Goal Note filed on 10/12/20 1305 by Dario Cooley, OT/L    Min A                        Problem: Dressing     Dates: Start: 10/08/20       Goal: STG-Within one week, patient will dress LB     Dates: Start: 10/08/20       Description: 1) Individualized Goal:  with CGA for standing portion using grab bar  2) Interventions:  OT Self Care/ADL, OT Manual Ther Technique, OT Neuro Re-Ed/Balance, OT Sensory Int Techniques, OT Therapeutic Activity, OT Evaluation, and OT Therapeutic Exercise    Note:     Goal Note filed on 10/12/20 1305 by Dario Cooley, OT/L    Min A for balance                        Problem: Functional Transfers     Dates: Start: 10/12/20       Goal: STG-Within one week, patient will transfer to toilet     Dates: Start: 10/12/20       Description: 1) Individualized Goal:  with SBA using grab bar  2) Interventions:  OT Self Care/ADL, OT Manual Ther Technique, OT Neuro Re-Ed/Balance, OT Sensory Int Techniques, OT Therapeutic  Activity, OT Evaluation, and OT Therapeutic Exercise                Problem: OT Long Term Goals     Dates: Start: 10/08/20       Goal: LTG-By discharge, patient will complete basic self care tasks     Dates: Start: 10/08/20       Description: 1) Individualized Goal:  with mod I using AE/AD/techniques as needed  2) Interventions:  OT Self Care/ADL, OT Manual Ther Technique, OT Neuro Re-Ed/Balance, OT Sensory Int Techniques, OT Therapeutic Activity, OT Evaluation, and OT Therapeutic Exercise          Goal: LTG-By discharge, patient will perform bathroom transfers     Dates: Start: 10/08/20       Description: 1) Individualized Goal:  with mod I using AE/AD/techniques as needed  2) Interventions:  OT Self Care/ADL, OT Manual Ther Technique, OT Neuro Re-Ed/Balance, OT Sensory Int Techniques, OT Therapeutic Activity, OT Evaluation, and OT Therapeutic Exercise          Goal: LTG-By discharge, patient will complete basic home management     Dates: Start: 10/08/20       Description: 1) Individualized Goal:  with supervision using AE/AD/techniques as needed  2) Interventions:  OT Self Care/ADL, OT Manual Ther Technique, OT Neuro Re-Ed/Balance, OT Sensory Int Techniques, OT Therapeutic Activity, OT Evaluation, and OT Therapeutic Exercise

## 2020-10-13 NOTE — THERAPY
"Physical Therapy   Daily Treatment     Patient Name: Obdulia High  Age:  45 y.o., Sex:  female  Medical Record #: [id number]  Today's Date: 10/13/2020     Precautions  Precautions: (P) Fall Risk, Other (See Comments)  Comments: (P) dizzy, double vision    Subjective    Patient and spouse receptive to education and training. Patient reported nausea/ dizziness throughout session, that would resolve with rest.      Objective       10/13/20 1501   Precautions   Precautions Fall Risk;Other (See Comments)   Comments dizzy, double vision   Wheelchair Functional Level of Assist   Wheelchair Assist Supervised   Distance Wheelchair (Feet or Distance) 300   Wheelchair Description Limited by fatigue;Verbal cueing;Supervision for safety   Transfer Functional Level of Assist   Bed, Chair, Wheelchair Transfer Minimal Assist   Bed Chair Wheelchair Transfer Description Adaptive equipment;Squat pivot transfer to wheelchair;Set-up of equipment   Sitting Lower Body Exercises   Comments Habituation/ vestibular exercises initiated: horizontal/ vertical head turns focusing on letter \"A\" written on white paper, 1 min each, 1 eye at a time, elliciting symptoms, then allowing sx to resolve.    Bed Mobility    Supine to Sit Supervised   Sit to Supine Supervised   Sit to Stand Minimal Assist   Neuro-Muscular Treatments   Neuro-Muscular Treatments Weight Shift Left;Weight Shift Right;Verbal Cuing;Tactile Cuing;Sequencing;Postural Facilitation;Postural Changes;Facilitation   Comments Fall recovery initiation: Mat- supine <> prone <> LENY <> quad <> kneeling <> tall kneeling. Tall kneeling balance 1 min x 2-3 min A/CGA.   Outcome Measures   Outcome Measures Utilized Bello Balance Scale   Bello Balance Scale   Sitting Unsupported (Score 0-4) 4   Change Of Positon: Sitting To Standing (Score 0-4) 1   Change Of Positon: Standing To Sitting (Score 0-4) 0   Transfers (Score 0-4) 1   Standing Unsupported (Score 0-4) 0   Standing With Eyes Closed (Score " 0-4) 2   Standing With Feet Together (Score 0-4) 0   Tandem Standing (Score 0-4) 0   Standing On One Leg (Score 0-4) 0   Turning Trunk (Feet Fixed) (Score 0-4) 0   Retrieving Objects From Floor (Score 0-4) 0   Turning 360 Degrees (Score 0-4) 0   Stool Stepping (Score 0-4) 0   Reaching Forward While Standing (Score 0-4) 0   Proctor Balance Total Score (0-56) 8   Interdisciplinary Plan of Care Collaboration   IDT Collaboration with  Family / Caregiver   Patient Position at End of Therapy In Bed;Family / Friend in Room;Call Light within Reach;Tray Table within Reach   Collaboration Comments Education on systems of balance- including vestibular, somatosensory, and visual. Patch donned during session. Education on typical patch rotation between eyes.      Strengths & Barriers   Strengths Independent prior level of function;Making steady progress towards goals;Motivated for self care and independence;Pleasant and cooperative;Supportive family;Willingly participates in therapeutic activities   Barriers Decreased endurance;Fatigue;Impaired activity tolerance;Impaired balance;Visual impairment;Limited mobility;Impulsive   PT Total Time Spent   PT Individual Total Time Spent (Mins) 60   PT Charge Group   PT Therapeutic Exercise 1   PT Neuromuscular Re-Education / Balance 2   PT Therapeutic Activities 1       Assessment    Patient unable to complete PROCTOR without physical assist due to balance impairment, presumably from multiple systems including vestibular and visual impairments. Spouse remains very supportive and attentive. PROCTOR score 8/56= fall risk. Patient progressed to mat mobility with emphasis on fall recovery sequencing, and tall kneeling balance. Vestibular exercises were introduced with gaze stabilization and head turns to metronome beat. Patient becomes tearful at times.     Strengths: (P) Independent prior level of function, Making steady progress towards goals, Motivated for self care and independence, Pleasant and  cooperative, Supportive family, Willingly participates in therapeutic activities  Barriers: (P) Decreased endurance, Fatigue, Impaired activity tolerance, Impaired balance, Visual impairment, Limited mobility, Impulsive    Plan    Vestibular habituation exercises, compensation for visual deficit with functional mobility, progress family training when safe, transfer safety, progress to full fall recovery training, standing balance.     Physical Therapy Problems     Problem: Mobility     Dates: Start: 10/08/20       Goal: STG-Within one week, patient will propel wheelchair community     Dates: Start: 10/08/20       Description: 1) Individualized goal:  W/C mobility x 150 feet with SBA  2) Interventions:  PT Group Therapy, PT E Stim Attended, PT Gait Training, PT Therapeutic Exercises, PT Neuro Re-Ed/Balance, PT Aquatic Therapy, PT Therapeutic Activity, PT Manual Therapy, and PT Evaluation      Note:     Goal Note filed on 10/12/20 1315 by Rakel Mcneal, BRET    Min A, impaired coordination                  Goal: STG-Within one week, patient will ambulate household distance     Dates: Start: 10/08/20       Description: 1) Individualized goal:  AMB x 25 feet with FWW and CGA  2) Interventions:  PT Group Therapy, PT E Stim Attended, PT Gait Training, PT Therapeutic Exercises, PT Neuro Re-Ed/Balance, PT Aquatic Therapy, PT Therapeutic Activity, PT Manual Therapy, and PT Evaluation      Note:     Goal Note filed on 10/12/20 1315 by Rakel Mcneal, BRET    // bars 20ft x 3 CGA,   R neglect, safety concerns                        Problem: Mobility Transfers     Dates: Start: 10/12/20       Goal: STG-Within one week, patient will transfer bed to chair     Dates: Start: 10/12/20       Description: 1) Individualized goal:  SBA reach pivot + set up wc <> bed right/ left  2) Interventions:  PT Orthotics Training, PT Gait Training, PT Self Care/Home Eval, PT Therapeutic Exercises, PT Neuro Re-Ed/Balance, PT Aquatic Therapy, PT  Therapeutic Activity, and PT Manual Therapy                  Problem: PT-Long Term Goals     Dates: Start: 10/08/20       Goal: LTG-By discharge, patient will ambulate     Dates: Start: 10/08/20       Description: 1) Individualized goal:  AMB x 150 feet with LRD and SPV  2) Interventions:  PT Group Therapy, PT E Stim Attended, PT Gait Training, PT Therapeutic Exercises, PT Neuro Re-Ed/Balance, PT Aquatic Therapy, PT Therapeutic Activity, PT Manual Therapy, and PT Evaluation            Goal: LTG-By discharge, patient will transfer one surface to another     Dates: Start: 10/08/20       Description: 1) Individualized goal:  SPT with LRD and SPV  2) Interventions:  PT Group Therapy, PT E Stim Attended, PT Gait Training, PT Therapeutic Exercises, PT Neuro Re-Ed/Balance, PT Aquatic Therapy, PT Therapeutic Activity, PT Manual Therapy, and PT Evaluation            Goal: LTG-By discharge, patient will ambulate up/down 4-6 stairs     Dates: Start: 10/08/20       Description: 1) Individualized goal:  Up/down 4 steps with BHR and CGA  2) Interventions:  PT Group Therapy, PT E Stim Attended, PT Gait Training, PT Therapeutic Exercises, PT Neuro Re-Ed/Balance, PT Aquatic Therapy, PT Therapeutic Activity, PT Manual Therapy, and PT Evaluation            Goal: LTG-By discharge, patient will transfer in/out of a car     Dates: Start: 10/08/20       Description: 1) Individualized goal:  Car transfer with LRD and SPV  2) Interventions:  PT Group Therapy, PT E Stim Attended, PT Gait Training, PT Therapeutic Exercises, PT Neuro Re-Ed/Balance, PT Aquatic Therapy, PT Therapeutic Activity, PT Manual Therapy, and PT Evaluation

## 2020-10-13 NOTE — PROGRESS NOTES
"Rehab Progress Note     Date of Service: 10/13/2020  Chief Complaint: follow up stroke    Interval Events (Subjective)    Patient seen and examined today in her room.  Her  is present.  She is very happy that she is able to go outside today in her wheelchair.  We discussed the results of her urine culture which came back with E. coli.  We discussed her last postvoid residuals.  She is able to make sounds with her voice today which is an improvement.  She denies any pain or headache.  She reports she is sleeping okay.  She has no new complaints.    Objective:  VITAL SIGNS: /73   Pulse 82   Temp 36.4 °C (97.6 °F) (Temporal)   Resp 18   Ht 1.702 m (5' 7.01\")   Wt 88 kg (194 lb)   SpO2 96%   BMI 30.38 kg/m²   Gen: alert, no apparent distress  CV: regular rate and rhythm, no murmurs, no peripheral edema  Resp: clear to ascultation bilaterally, normal respiratory effort  GI: soft, non-tender abdomen, bowel sounds present  Neuro: notable for double vision, left ptosis, improved hypophonia  Psych: Improved affect    Recent Results (from the past 72 hour(s))   Prothrombin Time    Collection Time: 10/11/20  6:07 AM   Result Value Ref Range    PT 22.8 (H) 12.0 - 14.6 sec    INR 1.94 (H) 0.87 - 1.13   Prothrombin Time    Collection Time: 10/12/20  6:46 AM   Result Value Ref Range    PT 22.5 (H) 12.0 - 14.6 sec    INR 1.91 (H) 0.87 - 1.13   CBC WITH DIFFERENTIAL    Collection Time: 10/12/20  6:46 AM   Result Value Ref Range    WBC 8.7 4.8 - 10.8 K/uL    RBC 3.94 (L) 4.20 - 5.40 M/uL    Hemoglobin 12.2 12.0 - 16.0 g/dL    Hematocrit 36.8 (L) 37.0 - 47.0 %    MCV 93.4 81.4 - 97.8 fL    MCH 31.0 27.0 - 33.0 pg    MCHC 33.2 (L) 33.6 - 35.0 g/dL    RDW 42.6 35.9 - 50.0 fL    Platelet Count 453 (H) 164 - 446 K/uL    MPV 8.5 (L) 9.0 - 12.9 fL    Neutrophils-Polys 71.70 44.00 - 72.00 %    Lymphocytes 19.70 (L) 22.00 - 41.00 %    Monocytes 5.20 0.00 - 13.40 %    Eosinophils 1.80 0.00 - 6.90 %    Basophils 0.50 0.00 - " 1.80 %    Immature Granulocytes 1.10 (H) 0.00 - 0.90 %    Nucleated RBC 0.00 /100 WBC    Neutrophils (Absolute) 6.25 2.00 - 7.15 K/uL    Lymphs (Absolute) 1.72 1.00 - 4.80 K/uL    Monos (Absolute) 0.45 0.00 - 0.85 K/uL    Eos (Absolute) 0.16 0.00 - 0.51 K/uL    Baso (Absolute) 0.04 0.00 - 0.12 K/uL    Immature Granulocytes (abs) 0.10 0.00 - 0.11 K/uL    NRBC (Absolute) 0.00 K/uL   Prothrombin Time    Collection Time: 10/13/20  5:15 AM   Result Value Ref Range    PT 23.0 (H) 12.0 - 14.6 sec    INR 1.97 (H) 0.87 - 1.13       Current Facility-Administered Medications   Medication Frequency   • warfarin (COUMADIN) tablet 7.5 mg ONCE AT 1800   • levETIRAcetam (KEPPRA) tablet 500 mg Q12HRS   • cefUROXime (CEFTIN) tablet 500 mg Q12HRS   • traZODone (DESYREL) tablet 50 mg QHS   • melatonin tablet 3 mg QHS   • hydrOXYzine HCl (ATARAX) tablet 50 mg Q6HRS PRN   • Respiratory Therapy Consult Continuous RT   • Pharmacy Consult Request ...Pain Management Review 1 Each PHARMACY TO DOSE   • hydrALAZINE (APRESOLINE) tablet 10 mg Q8HRS PRN   • artificial tears ophthalmic solution 1 Drop PRN   • benzocaine-menthol (CEPACOL) lozenge 1 Lozenge Q2HRS PRN   • mag hydrox-al hydrox-simeth (MAALOX PLUS ES or MYLANTA DS) suspension 20 mL Q2HRS PRN   • ondansetron (ZOFRAN ODT) dispertab 4 mg 4X/DAY PRN    Or   • ondansetron (ZOFRAN) syringe/vial injection 4 mg 4X/DAY PRN   • sodium chloride (OCEAN) 0.65 % nasal spray 2 Spray PRN   • lactulose 20 GM/30ML solution 30 mL QDAY PRN   • docusate sodium (ENEMEEZ) enema 283 mg QDAY PRN   • fleet enema 133 mL QDAY PRN   • atorvastatin (LIPITOR) tablet 40 mg Q EVENING   • senna-docusate (PERICOLACE or SENOKOT S) 8.6-50 MG per tablet 2 Tab BID    And   • polyethylene glycol/lytes (MIRALAX) PACKET 1 Packet QDAY PRN    And   • magnesium hydroxide (MILK OF MAGNESIA) suspension 30 mL QDAY PRN    And   • bisacodyl (DULCOLAX) suppository 10 mg QDAY PRN   • acetaminophen (TYLENOL) tablet 650 mg Q4HRS PRN   •  acetaminophen/caffeine/butalbital 325-40-50 mg (FIORICET) -40 MG per tablet 1 Tab Q6HRS PRN   • enoxaparin (LOVENOX) inj 80 mg Q12HRS   • MD Alert...Warfarin per Pharmacy PHARMACY TO DOSE       Orders Placed This Encounter   Procedures   • Diet Order Regular (meds whole 1:1 with thins)     Standing Status:   Standing     Number of Occurrences:   1     Order Specific Question:   Diet:     Answer:   Regular [1]     Comments:   meds whole 1:1 with thins       Assessment:  Active Hospital Problems    Diagnosis   • *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    I led and attended the weekly conference, and agree with the IDT conference documentation and plan of care as noted below.    Date of conference: 10/12/2020    Goals and barriers: See IDT note.    Biggest barriers: double vision, impaired motor planning, impaired standing balance, fatigue, right hemiparesis, impaired coordination    CM/social support:  very supportive    Anticipated DC date: 10/27    Home health: TBD    Equip: shower chair, grab bars    Follow up: PCP, stroke bridge clinic, Dr. Becker - rehab clinic, neuro-ophthalmology, ENT      Medical Decision Making and Plan:    Midbrain and khang left ischemic stroke  Left vertebral artery dissection  S/p thrombectomy Dr. 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 Lubin  Outpatient follow up with stroke bridge clinic, referral made  Outpatient follow up with Dr. Becker, rehab clinic referral made  Outpatient follow up with Dr. Rosas, neurophthalmology referral made    Hypophonia, improved  Suspect from intubation, not from stroke location  Speech therapy  FEES with impaired mobility of cords, and possible pathology on cords  Follow up with outpatient ENT, referral made    Hypertension  On admission to acute  Monitor need to start medications, currently well controlled off medications     History of migraines  PRN tylenol or Fioricet, not requiring     Insomnia, improved  Schedule melatonin and trazodone    Oral thrush     Continue nystatin     Seizure prophylaxis  Continue Keppra  Outpatient follow up with stroke bridge clinic    Leukocytosis, resolved  UTI  Negative CXR    UTI, E Coli  Continue cefuroxime    Bowel  Meds as needed  Last BM 10/12    Bladder  Check PVRs - 220, 123, 23  Patient not retaining   ICP for over 400 cc  Scheduled toileting    DVT prophylaxis  Lovenox bridge to coumadin    Total time:  16 minutes.  I spent greater than 50% of the time for patient care, counseling, and coordination on this date, including patient face-to face time, unit/floor time with review of records/pertinent lab data and studies, as well as discussing diagnostic evaluation/work up, planned therapeutic interventions, and future disposition of care, as per the interval events/subjective and the assessment and plan as noted above.    I have performed a physical exam, reviewed and updated ROS, as well as the assessment and plan today 10/13/2020. In review of note from 10/12/2020 there are no new changes except as documented above.    Eden Mendoza M.D.   Physical Medicine and Rehabilitation

## 2020-10-13 NOTE — PROGRESS NOTES
Pharmacy Warfarin Consult   10/13/2020     45 y.o.   female     Indication for anticoagulation: Stroke    Goal INR = 2 to 3     Recent Labs     10/11/20  0607 10/12/20  0646 10/13/20  0515   INR 1.94* 1.91* 1.97*   HEMOGLOBIN  --  12.2  --    HEMATOCRIT  --  36.8*  --        Pertinent Drug/Drug Interactions:  Antibiotics, statin, prn trazadone   Outpatient Warfarin Regimen:  New start   Recent Warfarin Dosing:    Dose from last 7 days     Date/Time Dose (mg)    10/12/20 0646  6    10/11/20 1444  5    10/10/20 1300  5    10/09/20 0721  5    10/08/20 0707  5    10/07/20 1517  5    10/07/20 1443  --        Bridge Therapy: Lovenox 80mg q 12 hours         1.  Warfarin 7.5mg tonight for INR = 1.97        Kate [facility]

## 2020-10-13 NOTE — THERAPY
"Speech Language Pathology  Daily Treatment     Patient Name: Obdulia High  Age:  45 y.o., Sex:  female  Medical Record #: [id number]  Today's Date: 10/13/2020     Precautions  Precautions: Fall Risk, Other (See Comments)  Comments: dizzy, double vision    Subjective    Pt pleasant and cooperative, agreeable to therapy.      Objective     10/13/20 0834   Cognition   Moderate Attention Minimal (4)   Medication Management  Supervision (5)   Interdisciplinary Plan of Care Collaboration   Patient Position at End of Therapy Seated;Self Releasing Lap Belt Applied;Chair Alarm On   SLP Total Time Spent   SLP Individual Total Time Spent (Mins) 60   Treatment Charges   SLP Cognitive Skill Development First 15 Minutes 1   SLP Cognitive Skill Development Additional 15 Minutes 3       Assessment    Functional med sort completed for 2nd time to ensure carryover and independence with task. Pt sorted meds with 100% accuracy provided set up A. Pt using non dominant RIGHT hand and strategies trained from day prior IND.   Alternating attention task using simple math calculations (\"who has more?\") with 80% accuracy IND. Pt able to correct errors when broken into smaller units and narrowed visual field to assist with focus.      Strengths: Able to follow instructions, Alert and oriented, Effective communication skills, Independent prior level of function, Making steady progress towards goals, Motivated for self care and independence, Pleasant and cooperative, Supportive family, Willingly participates in therapeutic activities  Barriers: Fatigue(diplopia )    Plan    Continue to target $ mngt, initiate practice with typing on computer for simple tasks    Speech Therapy Problems     Problem: Problem Solving STGs     Dates: Start: 10/08/20       Goal: STG-Within one week, patient will     Dates: Start: 10/08/20       Description: 1) Individualized goal:  complete functional organization tasks related to medication and financial mngt. With " 100% accuracy provided SPV  2) Interventions:  SLP Cognitive Skill Development and SLP Group Treatment        Note:     Goal Note filed on 10/12/20 0801 by Patricia Long MS,CCC-SLP    Simple math problems 32% IND, MIN to MOD cues to achieve 100%.                   Goal: STG-Within one week, patient will     Dates: Start: 10/08/20       Description: 1) Individualized goal:  complete alternating attention tasks with 80% accuracy provided MIN cues.   2) Interventions:  SLP Cognitive Skill Development and SLP Group Treatment        Note:     Goal Note filed on 10/12/20 0801 by Patricia Long MS,CCC-SLP    Will continue to target.                         Problem: Speech/Swallowing LTGs     Dates: Start: 10/08/20       Goal: LTG-By discharge, patient will safely swallow     Dates: Start: 10/08/20       Description: 1) Individualized goal:  regular textures/thin liquids for safe return to PLOF.   2) Interventions:  SLP Swallowing Dysfunction Treatment, SLP Oral Pharyngeal Evaluation, SLP Video Swallow Evaluation, SLP Cognitive Skill Development, and SLP Group Treatment              Goal: LTG-By discharge, patient will solve complex problem     Dates: Start: 10/08/20       Description: 1) Individualized goal: By utilizing compensatory intervention for memory and problem-solving to allow for safe completion of daily activities with MOD I in order to prepare for safe d/c home  2) Interventions:  SLP Cognitive Skill Development and SLP Group Treatment

## 2020-10-13 NOTE — THERAPY
Physical Therapy   Daily Treatment     Patient Name: Obdulia High  Age:  45 y.o., Sex:  female  Medical Record #: [id number]  Today's Date: 10/13/2020     Precautions  Precautions: Fall Risk, Other (See Comments)  Comments: dizzy, double vision    Subjective    Patient received sitting in w/c at bedside; agreeable to PT.     Objective       10/13/20 0931   Vitals   O2 (LPM) 0   O2 Delivery Device None - Room Air   Cognition    Comments Worked on wearing the patch over the right eye during 25 min of therapy session to increase use of L.   Gait Functional Level of Assist    Gait Level Of Assist Minimal Assist   Assistive Device Front Wheel Walker  (and gait belt and R eye patch)   Distance (Feet) 75  (72-78 feet)   # of Times Distance was Traveled 7  (about 1-1.5 min seated breaks between reps)   Deviation Ataxic;Bradykinetic;Decreased Base Of Support  (focus on decreased verbal cueing today)   Bed Mobility    Sit to Supine Supervised   Sit to Stand Minimal Assist  (CGA-Min A, FWW, gait belt, limited cueing, setup, eye patch)   Scooting Stand by Assist   Interdisciplinary Plan of Care Collaboration   Patient Position at End of Therapy In Bed;Bed Alarm On;Call Light within Reach;Tray Table within Reach   PT Total Time Spent   PT Individual Total Time Spent (Mins) 30   PT Charge Group   PT Gait Training 2       Assessment    Patient has significantly improving endurance and activity tolerance today; pt denies HA, dizziness, and other symptoms today; good motivation; hypophonic; mild safety awareness deficits are more complicated due to combination with L visual deficits; great motivation; no SOB noted.    Strengths: Independent prior level of function, Making steady progress towards goals, Motivated for self care and independence, Pleasant and cooperative, Supportive family, Willingly participates in therapeutic activities  Barriers: Decreased endurance, Fatigue, Impaired activity tolerance, Impaired balance, Visual  impairment, Limited mobility, Impulsive    Plan    FWW gait training, trial LiteGait prn, standing balance & coordination activities, vestibular habituation exercises, ther ex for strengthening & endurance      Physical Therapy Problems     Problem: Mobility     Dates: Start: 10/08/20       Goal: STG-Within one week, patient will propel wheelchair community     Dates: Start: 10/08/20       Description: 1) Individualized goal:  W/C mobility x 150 feet with SBA  2) Interventions:  PT Group Therapy, PT E Stim Attended, PT Gait Training, PT Therapeutic Exercises, PT Neuro Re-Ed/Balance, PT Aquatic Therapy, PT Therapeutic Activity, PT Manual Therapy, and PT Evaluation      Note:     Goal Note filed on 10/12/20 1315 by Rakel Mcneal, ZARIT    Min A, impaired coordination                  Goal: STG-Within one week, patient will ambulate household distance     Dates: Start: 10/08/20       Description: 1) Individualized goal:  AMB x 25 feet with FWW and CGA  2) Interventions:  PT Group Therapy, PT E Stim Attended, PT Gait Training, PT Therapeutic Exercises, PT Neuro Re-Ed/Balance, PT Aquatic Therapy, PT Therapeutic Activity, PT Manual Therapy, and PT Evaluation      Note:     Goal Note filed on 10/12/20 1315 by Rakel Mcneal, BRET    // bars 20ft x 3 CGA,   R neglect, safety concerns                        Problem: Mobility Transfers     Dates: Start: 10/12/20       Goal: STG-Within one week, patient will transfer bed to chair     Dates: Start: 10/12/20       Description: 1) Individualized goal:  SBA reach pivot + set up wc <> bed right/ left  2) Interventions:  PT Orthotics Training, PT Gait Training, PT Self Care/Home Eval, PT Therapeutic Exercises, PT Neuro Re-Ed/Balance, PT Aquatic Therapy, PT Therapeutic Activity, and PT Manual Therapy                  Problem: PT-Long Term Goals     Dates: Start: 10/08/20       Goal: LTG-By discharge, patient will ambulate     Dates: Start: 10/08/20       Description: 1) Individualized  goal:  AMB x 150 feet with LRD and SPV  2) Interventions:  PT Group Therapy, PT E Stim Attended, PT Gait Training, PT Therapeutic Exercises, PT Neuro Re-Ed/Balance, PT Aquatic Therapy, PT Therapeutic Activity, PT Manual Therapy, and PT Evaluation            Goal: LTG-By discharge, patient will transfer one surface to another     Dates: Start: 10/08/20       Description: 1) Individualized goal:  SPT with LRD and SPV  2) Interventions:  PT Group Therapy, PT E Stim Attended, PT Gait Training, PT Therapeutic Exercises, PT Neuro Re-Ed/Balance, PT Aquatic Therapy, PT Therapeutic Activity, PT Manual Therapy, and PT Evaluation            Goal: LTG-By discharge, patient will ambulate up/down 4-6 stairs     Dates: Start: 10/08/20       Description: 1) Individualized goal:  Up/down 4 steps with BHR and CGA  2) Interventions:  PT Group Therapy, PT E Stim Attended, PT Gait Training, PT Therapeutic Exercises, PT Neuro Re-Ed/Balance, PT Aquatic Therapy, PT Therapeutic Activity, PT Manual Therapy, and PT Evaluation            Goal: LTG-By discharge, patient will transfer in/out of a car     Dates: Start: 10/08/20       Description: 1) Individualized goal:  Car transfer with LRD and SPV  2) Interventions:  PT Group Therapy, PT E Stim Attended, PT Gait Training, PT Therapeutic Exercises, PT Neuro Re-Ed/Balance, PT Aquatic Therapy, PT Therapeutic Activity, PT Manual Therapy, and PT Evaluation

## 2020-10-14 LAB
INR PPP: 2 (ref 0.87–1.13)
PROTHROMBIN TIME: 23.3 SEC (ref 12–14.6)

## 2020-10-14 PROCEDURE — A9270 NON-COVERED ITEM OR SERVICE: HCPCS | Performed by: PHYSICAL MEDICINE & REHABILITATION

## 2020-10-14 PROCEDURE — 36415 COLL VENOUS BLD VENIPUNCTURE: CPT

## 2020-10-14 PROCEDURE — 97535 SELF CARE MNGMENT TRAINING: CPT

## 2020-10-14 PROCEDURE — 97530 THERAPEUTIC ACTIVITIES: CPT

## 2020-10-14 PROCEDURE — 700102 HCHG RX REV CODE 250 W/ 637 OVERRIDE(OP): Performed by: PHYSICAL MEDICINE & REHABILITATION

## 2020-10-14 PROCEDURE — 99231 SBSQ HOSP IP/OBS SF/LOW 25: CPT | Performed by: PHYSICAL MEDICINE & REHABILITATION

## 2020-10-14 PROCEDURE — 700111 HCHG RX REV CODE 636 W/ 250 OVERRIDE (IP): Performed by: PHYSICAL MEDICINE & REHABILITATION

## 2020-10-14 PROCEDURE — 97129 THER IVNTJ 1ST 15 MIN: CPT

## 2020-10-14 PROCEDURE — 85610 PROTHROMBIN TIME: CPT

## 2020-10-14 PROCEDURE — 97130 THER IVNTJ EA ADDL 15 MIN: CPT

## 2020-10-14 PROCEDURE — 770010 HCHG ROOM/CARE - REHAB SEMI PRIVAT*

## 2020-10-14 RX ORDER — WARFARIN SODIUM 7.5 MG/1
7.5 TABLET ORAL
Status: COMPLETED | OUTPATIENT
Start: 2020-10-14 | End: 2020-10-14

## 2020-10-14 RX ADMIN — ATORVASTATIN CALCIUM 40 MG: 40 TABLET, FILM COATED ORAL at 20:32

## 2020-10-14 RX ADMIN — ENOXAPARIN SODIUM 80 MG: 100 INJECTION SUBCUTANEOUS at 20:34

## 2020-10-14 RX ADMIN — ENOXAPARIN SODIUM 80 MG: 100 INJECTION SUBCUTANEOUS at 08:33

## 2020-10-14 RX ADMIN — LEVETIRACETAM 500 MG: 500 TABLET, FILM COATED ORAL at 08:33

## 2020-10-14 RX ADMIN — WARFARIN SODIUM 7.5 MG: 7.5 TABLET ORAL at 17:55

## 2020-10-14 RX ADMIN — LEVETIRACETAM 500 MG: 500 TABLET, FILM COATED ORAL at 20:32

## 2020-10-14 RX ADMIN — DOCUSATE SODIUM 50 MG AND SENNOSIDES 8.6 MG 2 TABLET: 8.6; 5 TABLET, FILM COATED ORAL at 08:33

## 2020-10-14 ASSESSMENT — ACTIVITIES OF DAILY LIVING (ADL)
TOILET_TRANSFER_DESCRIPTION: GRAB BAR;SUPERVISION FOR SAFETY;SET-UP OF EQUIPMENT
TUB_SHOWER_TRANSFER_DESCRIPTION: GRAB BAR;SHOWER BENCH;SET-UP OF EQUIPMENT;SUPERVISION FOR SAFETY
BED_CHAIR_WHEELCHAIR_TRANSFER_DESCRIPTION: ADAPTIVE EQUIPMENT;SET-UP OF EQUIPMENT;SUPERVISION FOR SAFETY;VERBAL CUEING
BED_CHAIR_WHEELCHAIR_TRANSFER_DESCRIPTION: SQUAT PIVOT TRANSFER TO WHEELCHAIR;SUPERVISION FOR SAFETY;SET-UP OF EQUIPMENT
TOILETING_LEVEL_OF_ASSIST_DESCRIPTION: GRAB BAR;SET-UP OF EQUIPMENT;SUPERVISION FOR SAFETY

## 2020-10-14 ASSESSMENT — GAIT ASSESSMENTS
ASSISTIVE DEVICE: FRONT WHEEL WALKER
GAIT LEVEL OF ASSIST: MINIMAL ASSIST
DEVIATION: ATAXIC;BRADYKINETIC;DECREASED HEEL STRIKE;DECREASED TOE OFF
DISTANCE (FEET): 188

## 2020-10-14 NOTE — CARE PLAN
Problem: Safety  Goal: Will remain free from falls  Outcome: PROGRESSING AS EXPECTED  Patient is able to use call light and ask for help when needed. Bed in lowest position, call light in reach.         Problem: Knowledge Deficit  Goal: Knowledge of disease process/condition, treatment plan, diagnostic tests, and medications will improve  Outcome: PROGRESSING AS EXPECTED  Patient understands plan of care.

## 2020-10-14 NOTE — THERAPY
Physical Therapy   Daily Treatment     Patient Name: Obdulia High  Age:  45 y.o., Sex:  female  Medical Record #: [id number]  Today's Date: 10/14/2020     Precautions  Precautions: Fall Risk, Other (See Comments)  Comments: dizzy, double vision    Subjective    Pt was supine in bed upon arrival and agreeable to treatment.  Pt's spouse present during session.      Objective       10/14/20 1401   Precautions   Precautions Fall Risk;Other (See Comments)   Gait Functional Level of Assist    Gait Level Of Assist Minimal Assist   Assistive Device Front Wheel Walker   Distance (Feet) 188  (x55 feet)   # of Times Distance was Traveled 1   Deviation Ataxic;Bradykinetic;Decreased Heel Strike;Decreased Toe Off  (increased B lateral sway, R veering)   Transfer Functional Level of Assist   Bed, Chair, Wheelchair Transfer Contact Guard Assist   Bed Chair Wheelchair Transfer Description Adaptive equipment;Set-up of equipment;Supervision for safety;Verbal cueing  (FWW)   Bed Mobility    Supine to Sit Modified Independent   Sit to Supine Modified Independent   Sit to Stand Contact Guard Assist   Scooting Independent   Rolling Modified Independent   Interdisciplinary Plan of Care Collaboration   Patient Position at End of Therapy In Bed;Call Light within Reach;Tray Table within Reach;Phone within Reach;Family / Friend in Room   PT Total Time Spent   PT Individual Total Time Spent (Mins) 60   PT Charge Group   PT Therapeutic Activities 4     Supine habituation exercises in supine: head turns to L x 5, R x 5 with return to baseline dizziness between reps.  Completed x 2 cycles and pt given this exercise as a HEP.  Completed functional mobility (supine </> sit, STS, transfer, and ambulation) with focus on sequencing and managing dizziness with movement.  Completed each movement x 2 reps to assess dizziness.     Assessment    Pt demonstrated improving tolerance to position change this session with pt's subjective dizziness increasing only  to 4/10 with movement (baseline dizziness is reported at a constant 2/10) using slower segmental movement patterns.  Pt with good tolerance for habituation activities this session.    Strengths: Independent prior level of function, Making steady progress towards goals, Motivated for self care and independence, Pleasant and cooperative, Supportive family, Willingly participates in therapeutic activities  Barriers: Decreased endurance, Fatigue, Impaired activity tolerance, Impaired balance, Visual impairment, Limited mobility, Impulsive    Plan    Progress habituation activities in supine as tolerated, postural stability and balance activities in // bars, continue gait training with FWW, trial step in // bars progressing to consecutive steps.    Physical Therapy Problems     Problem: Mobility     Dates: Start: 10/08/20       Goal: STG-Within one week, patient will propel wheelchair community     Dates: Start: 10/08/20       Description: 1) Individualized goal:  W/C mobility x 150 feet with SBA  2) Interventions:  PT Group Therapy, PT E Stim Attended, PT Gait Training, PT Therapeutic Exercises, PT Neuro Re-Ed/Balance, PT Aquatic Therapy, PT Therapeutic Activity, PT Manual Therapy, and PT Evaluation      Note:     Goal Note filed on 10/12/20 1315 by Rakel Mcneal DPT    Min A, impaired coordination                  Goal: STG-Within one week, patient will ambulate household distance     Dates: Start: 10/08/20       Description: 1) Individualized goal:  AMB x 25 feet with FWW and CGA  2) Interventions:  PT Group Therapy, PT E Stim Attended, PT Gait Training, PT Therapeutic Exercises, PT Neuro Re-Ed/Balance, PT Aquatic Therapy, PT Therapeutic Activity, PT Manual Therapy, and PT Evaluation      Note:     Goal Note filed on 10/12/20 1315 by Rakel Mcneal DPT    // dillan 20ft x 3 CGA,   R neglect, safety concerns                        Problem: Mobility Transfers     Dates: Start: 10/12/20       Goal: STG-Within one week,  patient will transfer bed to chair     Dates: Start: 10/12/20       Description: 1) Individualized goal:  SBA reach pivot + set up wc <> bed right/ left  2) Interventions:  PT Orthotics Training, PT Gait Training, PT Self Care/Home Eval, PT Therapeutic Exercises, PT Neuro Re-Ed/Balance, PT Aquatic Therapy, PT Therapeutic Activity, and PT Manual Therapy                  Problem: PT-Long Term Goals     Dates: Start: 10/08/20       Goal: LTG-By discharge, patient will ambulate     Dates: Start: 10/08/20       Description: 1) Individualized goal:  AMB x 150 feet with LRD and SPV  2) Interventions:  PT Group Therapy, PT E Stim Attended, PT Gait Training, PT Therapeutic Exercises, PT Neuro Re-Ed/Balance, PT Aquatic Therapy, PT Therapeutic Activity, PT Manual Therapy, and PT Evaluation            Goal: LTG-By discharge, patient will transfer one surface to another     Dates: Start: 10/08/20       Description: 1) Individualized goal:  SPT with LRD and SPV  2) Interventions:  PT Group Therapy, PT E Stim Attended, PT Gait Training, PT Therapeutic Exercises, PT Neuro Re-Ed/Balance, PT Aquatic Therapy, PT Therapeutic Activity, PT Manual Therapy, and PT Evaluation            Goal: LTG-By discharge, patient will ambulate up/down 4-6 stairs     Dates: Start: 10/08/20       Description: 1) Individualized goal:  Up/down 4 steps with BHR and CGA  2) Interventions:  PT Group Therapy, PT E Stim Attended, PT Gait Training, PT Therapeutic Exercises, PT Neuro Re-Ed/Balance, PT Aquatic Therapy, PT Therapeutic Activity, PT Manual Therapy, and PT Evaluation            Goal: LTG-By discharge, patient will transfer in/out of a car     Dates: Start: 10/08/20       Description: 1) Individualized goal:  Car transfer with LRD and SPV  2) Interventions:  PT Group Therapy, PT E Stim Attended, PT Gait Training, PT Therapeutic Exercises, PT Neuro Re-Ed/Balance, PT Aquatic Therapy, PT Therapeutic Activity, PT Manual Therapy, and PT Evaluation

## 2020-10-14 NOTE — THERAPY
"Speech Language Pathology  Daily Treatment     Patient Name: Obdulia High  Age:  45 y.o., Sex:  female  Medical Record #: [id number]  Today's Date: 10/14/2020     Precautions  Precautions: Fall Risk, Other (See Comments)  Comments: dizzy, double vision    Subjective    Pt reports sleeping \"so so\" last night, \"I hear every beep.\" Agreeable to therapy and appeared in good spirits.      Objective     10/14/20 1034   Cognition   Moderate Attention Minimal (4)   Executive Functioning / Organization Minimal (4)   Interdisciplinary Plan of Care Collaboration   IDT Collaboration with  Physician   Patient Position at End of Therapy Seated;Call Light within Reach;Tray Table within Reach   Collaboration Comments CLOF and POC   SLP Total Time Spent   SLP Individual Total Time Spent (Mins) 60   Treatment Charges   SLP Cognitive Skill Development First 15 Minutes 1   SLP Cognitive Skill Development Additional 15 Minutes 3       Assessment    Alternating attention using simple money calculations - 57% IND, minor errors with calculations - pt will correct in subsequent therapy sessions.   Calendar organization task - pt entered all pertinent dates/times/events into calendar with 83% accuracy IND, answering questions related to entries 70% IND, with MIN cues increased to 100%. Pt pleased with performance \"this was easier today.\" Felt she did not fatigue as quickly given complex tasks.     Strengths: Able to follow instructions, Alert and oriented, Effective communication skills, Independent prior level of function, Making steady progress towards goals, Motivated for self care and independence, Pleasant and cooperative, Supportive family, Willingly participates in therapeutic activities  Barriers: Fatigue(diplopia )    Plan    Continue to target attention, organization    Speech Therapy Problems     Problem: Problem Solving STGs     Dates: Start: 10/08/20       Goal: STG-Within one week, patient will     Dates: Start: 10/08/20       " Description: 1) Individualized goal:  complete functional organization tasks related to medication and financial mngt. With 100% accuracy provided SPV  2) Interventions:  SLP Cognitive Skill Development and SLP Group Treatment        Note:     Goal Note filed on 10/12/20 0801 by Patricia Long MS,CCC-SLP    Simple math problems 32% IND, MIN to MOD cues to achieve 100%.                   Goal: STG-Within one week, patient will     Dates: Start: 10/08/20       Description: 1) Individualized goal:  complete alternating attention tasks with 80% accuracy provided MIN cues.   2) Interventions:  SLP Cognitive Skill Development and SLP Group Treatment        Note:     Goal Note filed on 10/12/20 0801 by Patricia Long MS,CCC-SLP    Will continue to target.                         Problem: Speech/Swallowing LTGs     Dates: Start: 10/08/20       Goal: LTG-By discharge, patient will safely swallow     Dates: Start: 10/08/20       Description: 1) Individualized goal:  regular textures/thin liquids for safe return to PLOF.   2) Interventions:  SLP Swallowing Dysfunction Treatment, SLP Oral Pharyngeal Evaluation, SLP Video Swallow Evaluation, SLP Cognitive Skill Development, and SLP Group Treatment              Goal: LTG-By discharge, patient will solve complex problem     Dates: Start: 10/08/20       Description: 1) Individualized goal: By utilizing compensatory intervention for memory and problem-solving to allow for safe completion of daily activities with MOD I in order to prepare for safe d/c home  2) Interventions:  SLP Cognitive Skill Development and SLP Group Treatment

## 2020-10-14 NOTE — THERAPY
Occupational Therapy  Daily Treatment     Patient Name: Obdulia High  Age:  45 y.o., Sex:  female  Medical Record #: [id number]  Today's Date: 10/14/2020     Precautions  Precautions: Fall Risk, Other (See Comments)  Comments: dizzy, double vision         Subjective    Patient in bed, but awake at 7 am.  Agreeable to OT and a shower this morning.     Objective       10/14/20 0701   Precautions   Precautions Fall Risk;Other (See Comments)   Comments dizzy, double vision   Functional Level of Assist   Grooming Supervision   Grooming Description Verbal cueing;Supervision for safety;Seated in wheelchair at sink  (verbal cue to brush hair herself and not wait for spouse )   Bathing Supervision   Bathing Description Grab bar;Hand held shower;Tub bench;Set-up of equipment;Supervision for safety;Verbal cueing  (setup/sba)   Upper Body Dressing Supervision   Upper Body Dressing Description Set-up of equipment   Lower Body Dressing Stand by Assist   Lower Body Dressing Description Set-up of equipment;Supervision for safety;Grab bar  (setup and sba w/ gb for balance to pull pants/pullups up)   Toileting Stand by Assist   Toileting Description Grab bar;Set-up of equipment;Supervision for safety   Bed, Chair, Wheelchair Transfer Minimal Assist   Bed Chair Wheelchair Transfer Description Squat pivot transfer to wheelchair;Supervision for safety;Set-up of equipment   Toilet Transfers Stand by Assist   Toilet Transfer Description Grab bar;Supervision for safety;Set-up of equipment   Tub / Shower Transfers Stand by Assist   Tub Shower Transfer Description Grab bar;Shower bench;Set-up of equipment;Supervision for safety  (sba)   Bed Mobility    Supine to Sit Modified Independent   Sit to Supine Modified Independent   Scooting Independent   Interdisciplinary Plan of Care Collaboration   Patient Position at End of Therapy In Bed;Call Light within Reach;Tray Table within Reach   OT Total Time Spent   OT Individual Total Time Spent (Mins)  60   OT Charge Group   OT Self Care / ADL 4       Assessment    Patient is demonstrating good safety awareness and use of hand holds when in a standing position during transfers and pulling pants up/down for dressing and toileting.  Speed of adl routine continues to improve.  Required verbal cue to brush her hair independently rather than waiting for spouse to come in later and brush for her.    Strengths: Alert and oriented, Able to follow instructions, Effective communication skills, Good insight into deficits/needs, Independent prior level of function, Manages pain appropriately, Motivated for self care and independence, Pleasant and cooperative, Supportive family, Willingly participates in therapeutic activities  Barriers: Decreased endurance, Fatigue, Generalized weakness, Hemiparesis, Impaired activity tolerance, Impaired balance, Limited mobility, Visual impairment(double vision)    Plan    R UE strengthening/GMC/FMC, Standing balance (static and dynamic), ADLs, IADLs, typing, vision rehab for diplopia    Occupational Therapy Goals     Problem: Bathing     Dates: Start: 10/08/20       Goal: STG-Within one week, patient will bathe     Dates: Start: 10/08/20       Description: 1) Individualized Goal:  body with CGA (for standing portion) using AE/AD  2) Interventions:  OT Self Care/ADL, OT Manual Ther Technique, OT Neuro Re-Ed/Balance, OT Sensory Int Techniques, OT Therapeutic Activity, OT Evaluation, and OT Therapeutic Exercise      Note:     Goal Note filed on 10/12/20 1305 by Dario Cooley OT/LUANNE MCGILL                        Problem: Dressing     Dates: Start: 10/08/20       Goal: STG-Within one week, patient will dress LB     Dates: Start: 10/08/20       Description: 1) Individualized Goal:  with CGA for standing portion using grab bar  2) Interventions:  OT Self Care/ADL, OT Manual Ther Technique, OT Neuro Re-Ed/Balance, OT Sensory Int Techniques, OT Therapeutic Activity, OT Evaluation, and OT  Therapeutic Exercise    Note:     Goal Note filed on 10/12/20 1305 by Dario Cooley OT/L    Min A for balance                        Problem: Functional Transfers     Dates: Start: 10/12/20       Goal: STG-Within one week, patient will transfer to toilet     Dates: Start: 10/12/20       Description: 1) Individualized Goal:  with SBA using grab bar  2) Interventions:  OT Self Care/ADL, OT Manual Ther Technique, OT Neuro Re-Ed/Balance, OT Sensory Int Techniques, OT Therapeutic Activity, OT Evaluation, and OT Therapeutic Exercise                Problem: OT Long Term Goals     Dates: Start: 10/08/20       Goal: LTG-By discharge, patient will complete basic self care tasks     Dates: Start: 10/08/20       Description: 1) Individualized Goal:  with mod I using AE/AD/techniques as needed  2) Interventions:  OT Self Care/ADL, OT Manual Ther Technique, OT Neuro Re-Ed/Balance, OT Sensory Int Techniques, OT Therapeutic Activity, OT Evaluation, and OT Therapeutic Exercise          Goal: LTG-By discharge, patient will perform bathroom transfers     Dates: Start: 10/08/20       Description: 1) Individualized Goal:  with mod I using AE/AD/techniques as needed  2) Interventions:  OT Self Care/ADL, OT Manual Ther Technique, OT Neuro Re-Ed/Balance, OT Sensory Int Techniques, OT Therapeutic Activity, OT Evaluation, and OT Therapeutic Exercise          Goal: LTG-By discharge, patient will complete basic home management     Dates: Start: 10/08/20       Description: 1) Individualized Goal:  with supervision using AE/AD/techniques as needed  2) Interventions:  OT Self Care/ADL, OT Manual Ther Technique, OT Neuro Re-Ed/Balance, OT Sensory Int Techniques, OT Therapeutic Activity, OT Evaluation, and OT Therapeutic Exercise

## 2020-10-14 NOTE — PROGRESS NOTES
Pharmacy Warfarin Consult   10/14/2020     45 y.o.   female     Indication for anticoagulation: Stroke    Goal INR = 2 - 3    Recent Labs     10/12/20  0646 10/13/20  0515 10/14/20  0527   INR 1.91* 1.97* 2.00*   HEMOGLOBIN 12.2  --   --    HEMATOCRIT 36.8*  --   --        Pertinent Drug/Drug Interactions:  Antibiotics, Statin, prn trazodone  Outpatient Warfarin Regimen:  New start  Recent Warfarin Dosing:    Dose from last 7 days     Date/Time Dose (mg)    10/14/20 0527  7.5    10/13/20 0515  7.5    10/12/20 0646  6    10/11/20 1444  5    10/10/20 1300  5    10/09/20 0721  5    10/08/20 0707  5    10/07/20 1517  5    10/07/20 1443  --          Bridge Therapy: Lovenox 80 mg q 12 h         1.  Warfarin 7.5 mg tonight for INR = 2.0  2. Stop enoxaparin if INR >2 for one more day        Shane Roberts [facility]

## 2020-10-14 NOTE — CARE PLAN
Problem: Safety  Goal: Will remain free from falls  Outcome: PROGRESSING AS EXPECTED  Intervention: Implement fall precautions  Flowsheets (Taken 10/14/2020 0228)  Bed Alarm: Yes - Alarm On  Environmental Precautions:   Treaded Slipper Socks on Patient   Personal Belongings, Wastebasket, Call Bell etc. in Easy Reach   Bed in Low Position  Note: Safety instructions reinforced , compliant to safety measures , does not self transfer. Calls appropriately. Needs anticipaed and attended. Call light within easy reach. Pt free from fall and injury.     Problem: Infection  Goal: Will remain free from infection  Outcome: PROGRESSING AS EXPECTED  Intervention: Assess signs and symptoms of infection  Note: Afebrile, vital signs stable, no s/s of infection noted.     Problem: Pain Management  Goal: Pain level will decrease to patient's comfort goal  Outcome: PROGRESSING AS EXPECTED  Intervention: Educate and implement non-pharmacologic comfort measures. Examples: relaxation, distration, play therapy, activity therapy, massage, etc.  Note: Assessed for pain and discomfort, denies pain . Verbalizing feeling okay. Cont monitored.

## 2020-10-14 NOTE — PROGRESS NOTES
"Rehab Progress Note     Date of Service: 10/14/2020  Chief Complaint: follow up stroke    Interval Events (Subjective)    Patient seen and examined today prior to her speech therapy session in her room.  Per PT she was tearful yesterday and wants to see psychology today.  She denies any pain.  She reports she is sleeping okay but does not want to increase her sleeping medications.  She denies any issues with her bowel or bladder.  She has no other new complaints.    Objective:  VITAL SIGNS: BP (!) 99/66   Pulse 78   Temp 36.8 °C (98.3 °F) (Temporal)   Resp 18   Ht 1.702 m (5' 7.01\")   Wt 88 kg (194 lb)   SpO2 98%   BMI 30.38 kg/m²   Gen: alert, no apparent distress  CV: regular rate and rhythm, no murmurs, no peripheral edema  Resp: clear to ascultation bilaterally, normal respiratory effort  GI: soft, non-tender abdomen, bowel sounds present  Neuro: notable for left ptosis, double vision, slightly improved hypophonia    Recent Results (from the past 72 hour(s))   Prothrombin Time    Collection Time: 10/12/20  6:46 AM   Result Value Ref Range    PT 22.5 (H) 12.0 - 14.6 sec    INR 1.91 (H) 0.87 - 1.13   CBC WITH DIFFERENTIAL    Collection Time: 10/12/20  6:46 AM   Result Value Ref Range    WBC 8.7 4.8 - 10.8 K/uL    RBC 3.94 (L) 4.20 - 5.40 M/uL    Hemoglobin 12.2 12.0 - 16.0 g/dL    Hematocrit 36.8 (L) 37.0 - 47.0 %    MCV 93.4 81.4 - 97.8 fL    MCH 31.0 27.0 - 33.0 pg    MCHC 33.2 (L) 33.6 - 35.0 g/dL    RDW 42.6 35.9 - 50.0 fL    Platelet Count 453 (H) 164 - 446 K/uL    MPV 8.5 (L) 9.0 - 12.9 fL    Neutrophils-Polys 71.70 44.00 - 72.00 %    Lymphocytes 19.70 (L) 22.00 - 41.00 %    Monocytes 5.20 0.00 - 13.40 %    Eosinophils 1.80 0.00 - 6.90 %    Basophils 0.50 0.00 - 1.80 %    Immature Granulocytes 1.10 (H) 0.00 - 0.90 %    Nucleated RBC 0.00 /100 WBC    Neutrophils (Absolute) 6.25 2.00 - 7.15 K/uL    Lymphs (Absolute) 1.72 1.00 - 4.80 K/uL    Monos (Absolute) 0.45 0.00 - 0.85 K/uL    Eos (Absolute) 0.16 " 0.00 - 0.51 K/uL    Baso (Absolute) 0.04 0.00 - 0.12 K/uL    Immature Granulocytes (abs) 0.10 0.00 - 0.11 K/uL    NRBC (Absolute) 0.00 K/uL   Prothrombin Time    Collection Time: 10/13/20  5:15 AM   Result Value Ref Range    PT 23.0 (H) 12.0 - 14.6 sec    INR 1.97 (H) 0.87 - 1.13   Prothrombin Time    Collection Time: 10/14/20  5:27 AM   Result Value Ref Range    PT 23.3 (H) 12.0 - 14.6 sec    INR 2.00 (H) 0.87 - 1.13       Current Facility-Administered Medications   Medication Frequency   • warfarin (COUMADIN) tablet 7.5 mg ONCE AT 1800   • levETIRAcetam (KEPPRA) tablet 500 mg Q12HRS   • traZODone (DESYREL) tablet 50 mg QHS   • melatonin tablet 3 mg QHS   • hydrOXYzine HCl (ATARAX) tablet 50 mg Q6HRS PRN   • Respiratory Therapy Consult Continuous RT   • Pharmacy Consult Request ...Pain Management Review 1 Each PHARMACY TO DOSE   • hydrALAZINE (APRESOLINE) tablet 10 mg Q8HRS PRN   • artificial tears ophthalmic solution 1 Drop PRN   • benzocaine-menthol (CEPACOL) lozenge 1 Lozenge Q2HRS PRN   • mag hydrox-al hydrox-simeth (MAALOX PLUS ES or MYLANTA DS) suspension 20 mL Q2HRS PRN   • ondansetron (ZOFRAN ODT) dispertab 4 mg 4X/DAY PRN    Or   • ondansetron (ZOFRAN) syringe/vial injection 4 mg 4X/DAY PRN   • sodium chloride (OCEAN) 0.65 % nasal spray 2 Spray PRN   • lactulose 20 GM/30ML solution 30 mL QDAY PRN   • docusate sodium (ENEMEEZ) enema 283 mg QDAY PRN   • fleet enema 133 mL QDAY PRN   • atorvastatin (LIPITOR) tablet 40 mg Q EVENING   • senna-docusate (PERICOLACE or SENOKOT S) 8.6-50 MG per tablet 2 Tab BID    And   • polyethylene glycol/lytes (MIRALAX) PACKET 1 Packet QDAY PRN    And   • magnesium hydroxide (MILK OF MAGNESIA) suspension 30 mL QDAY PRN    And   • bisacodyl (DULCOLAX) suppository 10 mg QDAY PRN   • acetaminophen (TYLENOL) tablet 650 mg Q4HRS PRN   • acetaminophen/caffeine/butalbital 325-40-50 mg (FIORICET) -40 MG per tablet 1 Tab Q6HRS PRN   • enoxaparin (LOVENOX) inj 80 mg Q12HRS   • MD  Alert...Warfarin per Pharmacy PHARMACY TO DOSE       Orders Placed This Encounter   Procedures   • Diet Order Regular (meds whole 1:1 with thins)     Standing Status:   Standing     Number of Occurrences:   1     Order Specific Question:   Diet:     Answer:   Regular [1]     Comments:   meds whole 1:1 with thins       Assessment:  Active Hospital Problems    Diagnosis   • *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    I led and attended the weekly conference, and agree with the IDT conference documentation and plan of care as noted below.    Date of conference: 10/12/2020    Goals and barriers: See IDT note.    Biggest barriers: double vision, impaired motor planning, impaired standing balance, fatigue, right hemiparesis, impaired coordination    CM/social support:  very supportive    Anticipated DC date: 10/27    Home health: TBD    Equip: shower chair, grab bars    Follow up: PCP, stroke bridge clinic, Dr. Becker - rehab clinic, neuro-ophthalmology, ENT      Medical Decision Making and Plan:    Midbrain and khang left ischemic stroke  Left vertebral artery dissection  S/p thrombectomy Dr. 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 Lubin  Outpatient follow up with stroke bridge clinic, referral made  Outpatient follow up with Dr. Becker, rehab clinic referral made  Outpatient follow up with Dr. Rosas, neurophthalmology referral made    Anxiety/depression  Dr. Hutchins consulted, appreciate assistance    Hypophonia, improved  Suspect from intubation, not from stroke location  Speech therapy  FEES with impaired mobility of cords, and possible pathology on cords  Follow up with outpatient ENT, referral made    Hypertension  On admission to acute  Monitor need to start medications, currently well controlled off medications     History of migraines  PRN tylenol or Fioricet, not requiring     Insomnia, improved  Schedule melatonin and trazodone    Oral thrush , resolved    S/p nystatin     Seizure prophylaxis  Continue Keppra  Outpatient follow up with stroke bridge clinic    Leukocytosis, resolved  UTI  Negative CXR    UTI, E Coli  S/p cefuroxime    Bowel  Meds as needed  Last BM 10/12    Bladder  Check PVRs - 220, 123, 23  Patient not retaining   ICP for over 400 cc  Scheduled toileting    DVT prophylaxis  Lovenox bridge to coumadin    Total time:  17 minutes.  I spent greater than 50% of the time for patient care, counseling, and coordination on this date, including patient face-to face time, unit/floor time with review of records/pertinent lab data and studies, as well as discussing diagnostic evaluation/work up, planned therapeutic interventions, and future disposition of care, as per the interval events/subjective and the assessment and plan as noted above.    I have performed a physical exam, reviewed and updated ROS, as well as the assessment and plan today 10/14/2020. In review of note from 10/13/2020 there are no new changes except as documented above.          Eden Mendoza M.D.   Physical Medicine and Rehabilitation

## 2020-10-15 PROBLEM — F43.23 ADJUSTMENT DISORDER WITH MIXED ANXIETY AND DEPRESSED MOOD: Status: ACTIVE | Noted: 2020-10-15

## 2020-10-15 LAB
INR PPP: 2.35 (ref 0.87–1.13)
PROTHROMBIN TIME: 26.5 SEC (ref 12–14.6)

## 2020-10-15 PROCEDURE — 700111 HCHG RX REV CODE 636 W/ 250 OVERRIDE (IP): Performed by: PHYSICAL MEDICINE & REHABILITATION

## 2020-10-15 PROCEDURE — 97129 THER IVNTJ 1ST 15 MIN: CPT

## 2020-10-15 PROCEDURE — 97112 NEUROMUSCULAR REEDUCATION: CPT

## 2020-10-15 PROCEDURE — 85610 PROTHROMBIN TIME: CPT

## 2020-10-15 PROCEDURE — 97116 GAIT TRAINING THERAPY: CPT

## 2020-10-15 PROCEDURE — 97535 SELF CARE MNGMENT TRAINING: CPT

## 2020-10-15 PROCEDURE — 700102 HCHG RX REV CODE 250 W/ 637 OVERRIDE(OP): Performed by: PHYSICAL MEDICINE & REHABILITATION

## 2020-10-15 PROCEDURE — 97530 THERAPEUTIC ACTIVITIES: CPT

## 2020-10-15 PROCEDURE — 97110 THERAPEUTIC EXERCISES: CPT

## 2020-10-15 PROCEDURE — 36415 COLL VENOUS BLD VENIPUNCTURE: CPT

## 2020-10-15 PROCEDURE — 770010 HCHG ROOM/CARE - REHAB SEMI PRIVAT*

## 2020-10-15 PROCEDURE — 97130 THER IVNTJ EA ADDL 15 MIN: CPT

## 2020-10-15 PROCEDURE — A9270 NON-COVERED ITEM OR SERVICE: HCPCS | Performed by: PHYSICAL MEDICINE & REHABILITATION

## 2020-10-15 PROCEDURE — 99231 SBSQ HOSP IP/OBS SF/LOW 25: CPT | Performed by: PHYSICAL MEDICINE & REHABILITATION

## 2020-10-15 RX ORDER — WARFARIN SODIUM 3 MG/1
6 TABLET ORAL
Status: COMPLETED | OUTPATIENT
Start: 2020-10-15 | End: 2020-10-15

## 2020-10-15 RX ADMIN — WARFARIN SODIUM 6 MG: 3 TABLET ORAL at 18:08

## 2020-10-15 RX ADMIN — LEVETIRACETAM 500 MG: 500 TABLET, FILM COATED ORAL at 08:26

## 2020-10-15 RX ADMIN — LEVETIRACETAM 500 MG: 500 TABLET, FILM COATED ORAL at 21:28

## 2020-10-15 RX ADMIN — ENOXAPARIN SODIUM 80 MG: 100 INJECTION SUBCUTANEOUS at 08:26

## 2020-10-15 RX ADMIN — DOCUSATE SODIUM 50 MG AND SENNOSIDES 8.6 MG 2 TABLET: 8.6; 5 TABLET, FILM COATED ORAL at 08:26

## 2020-10-15 RX ADMIN — ATORVASTATIN CALCIUM 40 MG: 40 TABLET, FILM COATED ORAL at 21:28

## 2020-10-15 ASSESSMENT — GAIT ASSESSMENTS
GAIT LEVEL OF ASSIST: CONTACT GUARD ASSIST
DISTANCE (FEET): 250
ASSISTIVE DEVICE: FRONT WHEEL WALKER

## 2020-10-15 ASSESSMENT — ACTIVITIES OF DAILY LIVING (ADL): BED_CHAIR_WHEELCHAIR_TRANSFER_DESCRIPTION: SET-UP OF EQUIPMENT;SUPERVISION FOR SAFETY;VERBAL CUEING;ADAPTIVE EQUIPMENT

## 2020-10-15 NOTE — THERAPY
Occupational Therapy  Daily Treatment     Patient Name: Obdulia High  Age:  45 y.o., Sex:  female  Medical Record #: [id number]  Today's Date: 10/15/2020     Precautions  Precautions: (P) Fall Risk, Other (See Comments)  Comments: (P) dizzy, double vision         Subjective    Patient lying in bed awake and spouse sitting in w/c in room upon OT arrival.  Agreeable to work on standing balance.     Objective       10/15/20 1401   Precautions   Precautions Fall Risk;Other (See Comments)   Comments dizzy, double vision   Standing Upper Body Exercises   Other Exercises Standing SCI FIT x 5 minutes at level 4.5 for cardiovascular endurance.     Balance   Standing Balance (Dynamic) Fair -   Comments Dynamic standing balance challenges with single and no UE support while reaching with R UE to the right to  an object x 40 attempts, then tossing at target on the floor 5-6' away with CGA/SBA.   Also in standing with no UE support, patient rotated body to the left and grasped object near left shoulder with right hand, rotated back to midline and threw object x 10 attempts at bucket on floor.   Bed Mobility    Supine to Sit Modified Independent   Scooting Modified Independent   Interdisciplinary Plan of Care Collaboration   IDT Collaboration with  Family / Caregiver;Speech Therapist   Collaboration Comments spouse present; hand off to SLP   OT Total Time Spent   OT Individual Total Time Spent (Mins) 30   OT Charge Group   OT Neuromuscular Re-education / Balance 1   OT Therapy Activity 1       Assessment    Patient continues to demonstrate improved dynamic standing balance.  Endurance appears to be improving as well.  Spouse is very supportive.  Strengths: Alert and oriented, Able to follow instructions, Effective communication skills, Good insight into deficits/needs, Independent prior level of function, Manages pain appropriately, Motivated for self care and independence, Pleasant and cooperative, Supportive family,  Willingly participates in therapeutic activities  Barriers: Decreased endurance, Fatigue, Generalized weakness, Hemiparesis, Impaired activity tolerance, Impaired balance, Limited mobility, Visual impairment(double vision)    Plan    R UE strengthening/GMC/FMC, Standing balance (static and dynamic), ADLs, IADLs, typing    Occupational Therapy Goals     Problem: Bathing     Dates: Start: 10/08/20       Goal: STG-Within one week, patient will bathe     Dates: Start: 10/08/20       Description: 1) Individualized Goal:  body with CGA (for standing portion) using AE/AD  2) Interventions:  OT Self Care/ADL, OT Manual Ther Technique, OT Neuro Re-Ed/Balance, OT Sensory Int Techniques, OT Therapeutic Activity, OT Evaluation, and OT Therapeutic Exercise      Note:     Goal Note filed on 10/12/20 1305 by Dario Cooley OT/L    Min A                        Problem: Dressing     Dates: Start: 10/08/20       Goal: STG-Within one week, patient will dress LB     Dates: Start: 10/08/20       Description: 1) Individualized Goal:  with CGA for standing portion using grab bar  2) Interventions:  OT Self Care/ADL, OT Manual Ther Technique, OT Neuro Re-Ed/Balance, OT Sensory Int Techniques, OT Therapeutic Activity, OT Evaluation, and OT Therapeutic Exercise    Note:     Goal Note filed on 10/12/20 1305 by Dario Cooley OT/L    Min A for balance                        Problem: Functional Transfers     Dates: Start: 10/12/20       Goal: STG-Within one week, patient will transfer to toilet     Dates: Start: 10/12/20       Description: 1) Individualized Goal:  with SBA using grab bar  2) Interventions:  OT Self Care/ADL, OT Manual Ther Technique, OT Neuro Re-Ed/Balance, OT Sensory Int Techniques, OT Therapeutic Activity, OT Evaluation, and OT Therapeutic Exercise                Problem: OT Long Term Goals     Dates: Start: 10/08/20       Goal: LTG-By discharge, patient will complete basic self care tasks     Dates: Start: 10/08/20        Description: 1) Individualized Goal:  with mod I using AE/AD/techniques as needed  2) Interventions:  OT Self Care/ADL, OT Manual Ther Technique, OT Neuro Re-Ed/Balance, OT Sensory Int Techniques, OT Therapeutic Activity, OT Evaluation, and OT Therapeutic Exercise          Goal: LTG-By discharge, patient will perform bathroom transfers     Dates: Start: 10/08/20       Description: 1) Individualized Goal:  with mod I using AE/AD/techniques as needed  2) Interventions:  OT Self Care/ADL, OT Manual Ther Technique, OT Neuro Re-Ed/Balance, OT Sensory Int Techniques, OT Therapeutic Activity, OT Evaluation, and OT Therapeutic Exercise          Goal: LTG-By discharge, patient will complete basic home management     Dates: Start: 10/08/20       Description: 1) Individualized Goal:  with supervision using AE/AD/techniques as needed  2) Interventions:  OT Self Care/ADL, OT Manual Ther Technique, OT Neuro Re-Ed/Balance, OT Sensory Int Techniques, OT Therapeutic Activity, OT Evaluation, and OT Therapeutic Exercise

## 2020-10-15 NOTE — THERAPY
Occupational Therapy  Daily Treatment     Patient Name: Obdulia High  Age:  45 y.o., Sex:  female  Medical Record #: [id number]  Today's Date: 10/15/2020     Precautions  Precautions: (P) Fall Risk, Other (See Comments)  Comments: dizzy, double vision         Subjective    Patient lying in bed awake.  Stated she didn't take any sleep aides last night, but still slept okay.       Objective       10/15/20 0831   Precautions   Precautions Fall Risk;Other (See Comments)   Functional Level of Assist   Grooming Stand by Assist   Grooming Description Supervision for safety;Standing at sink  (SBA standing at sink to brush teeth, wash face, brush hair)   Bed, Chair, Wheelchair Transfer Stand by Assist   Bed Chair Wheelchair Transfer Description Squat pivot transfer to wheelchair;Other (comment);Set-up of equipment  (bed rail)   Hand Strengthening   Hand Strengthening Right ;Right Pinch;Right Finger Flexion;Right Finger Extension;Theraputty (Comment on Resistance);Gross Grasp Right   Comment Right hand and finger flexion/extension strengthening using pink putty.  Also removed 15 small pegs from pink putty for finger flexion strengthening.   Fine Motor / Dexterity    Fine Motor / Dexterity Interventions With right palm on tabletop, patient performed isolated finger extensions 1 x 15 each with digits 2-5; alternating finger flexion/extension with digits 2-5 in a drumming pattern from 2nd to 5th digit and 5th digit to 2nd digit.  Finger to palm and palm to finger translation picking up 7 poker chips x 2 from tabletop top, storing in palm, then placing into slotted jar.   Bed Mobility    Supine to Sit Modified Independent   Sit to Supine Modified Independent   Scooting Modified Independent   Interdisciplinary Plan of Care Collaboration   Patient Position at End of Therapy In Bed;Call Light within Reach;Tray Table within Reach   OT Total Time Spent   OT Individual Total Time Spent (Mins) 60   OT Charge Group   OT Self Care /  ADL 1   OT Therapy Activity 1   OT Therapeutic Exercise  2       Assessment    Patient completed all exercises asked of her for right hand/finger strengthening and FMC/dexterity building; however, hand and fingers became quite fatigued.  Strengths: Alert and oriented, Able to follow instructions, Effective communication skills, Good insight into deficits/needs, Independent prior level of function, Manages pain appropriately, Motivated for self care and independence, Pleasant and cooperative, Supportive family, Willingly participates in therapeutic activities  Barriers: Decreased endurance, Fatigue, Generalized weakness, Hemiparesis, Impaired activity tolerance, Impaired balance, Limited mobility, Visual impairment(double vision)    Plan     R UE strengthening/GMC/FMC, Standing balance (static and dynamic), ADLs, IADLs, typing, vision rehab for diplopia      Occupational Therapy Goals     Problem: Bathing     Dates: Start: 10/08/20       Goal: STG-Within one week, patient will bathe     Dates: Start: 10/08/20       Description: 1) Individualized Goal:  body with CGA (for standing portion) using AE/AD  2) Interventions:  OT Self Care/ADL, OT Manual Ther Technique, OT Neuro Re-Ed/Balance, OT Sensory Int Techniques, OT Therapeutic Activity, OT Evaluation, and OT Therapeutic Exercise      Note:     Goal Note filed on 10/12/20 1305 by Dario Cooley, OT/L    Min A                        Problem: Dressing     Dates: Start: 10/08/20       Goal: STG-Within one week, patient will dress LB     Dates: Start: 10/08/20       Description: 1) Individualized Goal:  with CGA for standing portion using grab bar  2) Interventions:  OT Self Care/ADL, OT Manual Ther Technique, OT Neuro Re-Ed/Balance, OT Sensory Int Techniques, OT Therapeutic Activity, OT Evaluation, and OT Therapeutic Exercise    Note:     Goal Note filed on 10/12/20 1305 by Dario Cooley, OT/L    Min A for balance                        Problem: Functional  Transfers     Dates: Start: 10/12/20       Goal: STG-Within one week, patient will transfer to toilet     Dates: Start: 10/12/20       Description: 1) Individualized Goal:  with SBA using grab bar  2) Interventions:  OT Self Care/ADL, OT Manual Ther Technique, OT Neuro Re-Ed/Balance, OT Sensory Int Techniques, OT Therapeutic Activity, OT Evaluation, and OT Therapeutic Exercise                Problem: OT Long Term Goals     Dates: Start: 10/08/20       Goal: LTG-By discharge, patient will complete basic self care tasks     Dates: Start: 10/08/20       Description: 1) Individualized Goal:  with mod I using AE/AD/techniques as needed  2) Interventions:  OT Self Care/ADL, OT Manual Ther Technique, OT Neuro Re-Ed/Balance, OT Sensory Int Techniques, OT Therapeutic Activity, OT Evaluation, and OT Therapeutic Exercise          Goal: LTG-By discharge, patient will perform bathroom transfers     Dates: Start: 10/08/20       Description: 1) Individualized Goal:  with mod I using AE/AD/techniques as needed  2) Interventions:  OT Self Care/ADL, OT Manual Ther Technique, OT Neuro Re-Ed/Balance, OT Sensory Int Techniques, OT Therapeutic Activity, OT Evaluation, and OT Therapeutic Exercise          Goal: LTG-By discharge, patient will complete basic home management     Dates: Start: 10/08/20       Description: 1) Individualized Goal:  with supervision using AE/AD/techniques as needed  2) Interventions:  OT Self Care/ADL, OT Manual Ther Technique, OT Neuro Re-Ed/Balance, OT Sensory Int Techniques, OT Therapeutic Activity, OT Evaluation, and OT Therapeutic Exercise

## 2020-10-15 NOTE — CARE PLAN
Problem: Bowel/Gastric:  Goal: Normal bowel function is maintained or improved  Outcome: PROGRESSING AS EXPECTED  Note: Bowel sounds present in all quadrants.      Problem: Pain Management  Goal: Pain level will decrease to patient's comfort goal  Outcome: PROGRESSING AS EXPECTED  Note: Patient denies pain or discomfort this shift.      Problem: Skin Integrity  Goal: Risk for impaired skin integrity will decrease  Outcome: PROGRESSING AS EXPECTED  Note: Skin intact, no open areas noted.

## 2020-10-15 NOTE — PROGRESS NOTES
"Rehab Progress Note     Date of Service: 10/15/2020  Chief Complaint: follow up stroke    Interval Events (Subjective)    Patient seen and examined today in the hallway working with PT. She is able to ambulate with a walker. She is complaining of a mild headache, as well as light sensitivity. She was seen by Dr. Hutchins of psychology yesterday. She continues to have double vision, as well as dizziness even when her eye is patched. She has no other complaints.     Objective:  VITAL SIGNS: BP (!) 98/68   Pulse 89   Temp 36.6 °C (97.9 °F) (Temporal)   Resp 18   Ht 1.702 m (5' 7.01\")   Wt 88 kg (194 lb)   SpO2 96%   BMI 30.38 kg/m²   Gen: alert, no apparent distress  CV: regular rate and rhythm, no murmurs, no peripheral edema  Resp: clear to ascultation bilaterally, normal respiratory effort  GI: soft, non-tender abdomen, bowel sounds present  Neuro: notable for double vision, ataxia, right sided incoordination    Recent Results (from the past 72 hour(s))   Prothrombin Time    Collection Time: 10/13/20  5:15 AM   Result Value Ref Range    PT 23.0 (H) 12.0 - 14.6 sec    INR 1.97 (H) 0.87 - 1.13   Prothrombin Time    Collection Time: 10/14/20  5:27 AM   Result Value Ref Range    PT 23.3 (H) 12.0 - 14.6 sec    INR 2.00 (H) 0.87 - 1.13   Prothrombin Time    Collection Time: 10/15/20  6:59 AM   Result Value Ref Range    PT 26.5 (H) 12.0 - 14.6 sec    INR 2.35 (H) 0.87 - 1.13       Current Facility-Administered Medications   Medication Frequency   • warfarin (COUMADIN) tablet 6 mg ONCE AT 1800   • levETIRAcetam (KEPPRA) tablet 500 mg Q12HRS   • traZODone (DESYREL) tablet 50 mg QHS   • melatonin tablet 3 mg QHS   • hydrOXYzine HCl (ATARAX) tablet 50 mg Q6HRS PRN   • Respiratory Therapy Consult Continuous RT   • Pharmacy Consult Request ...Pain Management Review 1 Each PHARMACY TO DOSE   • hydrALAZINE (APRESOLINE) tablet 10 mg Q8HRS PRN   • artificial tears ophthalmic solution 1 Drop PRN   • benzocaine-menthol (CEPACOL) " lozenge 1 Lozenge Q2HRS PRN   • mag hydrox-al hydrox-simeth (MAALOX PLUS ES or MYLANTA DS) suspension 20 mL Q2HRS PRN   • ondansetron (ZOFRAN ODT) dispertab 4 mg 4X/DAY PRN    Or   • ondansetron (ZOFRAN) syringe/vial injection 4 mg 4X/DAY PRN   • sodium chloride (OCEAN) 0.65 % nasal spray 2 Spray PRN   • lactulose 20 GM/30ML solution 30 mL QDAY PRN   • docusate sodium (ENEMEEZ) enema 283 mg QDAY PRN   • fleet enema 133 mL QDAY PRN   • atorvastatin (LIPITOR) tablet 40 mg Q EVENING   • senna-docusate (PERICOLACE or SENOKOT S) 8.6-50 MG per tablet 2 Tab BID    And   • polyethylene glycol/lytes (MIRALAX) PACKET 1 Packet QDAY PRN    And   • magnesium hydroxide (MILK OF MAGNESIA) suspension 30 mL QDAY PRN    And   • bisacodyl (DULCOLAX) suppository 10 mg QDAY PRN   • acetaminophen (TYLENOL) tablet 650 mg Q4HRS PRN   • acetaminophen/caffeine/butalbital 325-40-50 mg (FIORICET) -40 MG per tablet 1 Tab Q6HRS PRN   • MD Alert...Warfarin per Pharmacy PHARMACY TO DOSE       Orders Placed This Encounter   Procedures   • Diet Order Regular (meds whole 1:1 with thins)     Standing Status:   Standing     Number of Occurrences:   1     Order Specific Question:   Diet:     Answer:   Regular [1]     Comments:   meds whole 1:1 with thins       Assessment:  Active Hospital Problems    Diagnosis   • *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    I led and attended the weekly conference, and agree with the IDT conference documentation and plan of care as noted below.    Date of conference: 10/12/2020    Goals and barriers: See IDT note.    Biggest  barriers: double vision, impaired motor planning, impaired standing balance, fatigue, right hemiparesis, impaired coordination    CM/social support:  very supportive    Anticipated DC date: 10/27    Home health: TBD    Equip: shower chair, grab bars    Follow up: PCP, stroke bridge clinic, Dr. Becker - rehab clinic, neuro-ophthalmology, ENT      Medical Decision Making and Plan:    Midbrain and khang left ischemic stroke  Left vertebral artery dissection  S/p thrombectomy Dr. 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 Lubin  Outpatient follow up with stroke bridge clinic, referral made  Outpatient follow up with Dr. Becker, rehab clinic referral made  Outpatient follow up with Dr. Rosas, neurophthalmology referral made    Anxiety/depression  Dr. Hutchins consulted, appreciate assistance    Hypophonia, improved  Suspect from intubation, not from stroke location  Speech therapy  FEES with impaired mobility of cords, and possible pathology on cords  Follow up with outpatient ENT, referral made    Hypertension, resolved    Hypotension  Check orthostatics  Check BMP in am for dehydration     History of migraines  Intermittent headache  PRN tylenol or Fioricet, not requiring     Insomnia, improved  Scheduled melatonin and trazodone    Oral thrush , resolved    S/p nystatin     Seizure prophylaxis  Continue Keppra  Outpatient follow up with stroke bridge clinic    Leukocytosis, resolved  UTI  Negative CXR    UTI, E Coli, treated  S/p cefuroxime    Bowel  Meds as needed  Last BM 10/14    Bladder  Check PVRs - 220,  123, 23  Patient not retaining   ICP for over 400 cc  Scheduled toileting    DVT prophylaxis  S/p Lovenox bridge to coumadin  Pharmacy dosing coumadin    Total time:  16 minutes.  I spent greater than 50% of the time for patient care, counseling, and coordination on this date, including patient face-to face time, unit/floor time with review of records/pertinent lab data and studies, as well as discussing diagnostic evaluation/work up, planned therapeutic interventions, and future disposition of care, as per the interval events/subjective and the assessment and plan as noted above.    I have performed a physical exam, reviewed and updated ROS, as well as the assessment and plan today 10/15/2020. In review of note from 10/14/2020 there are no new changes except as documented above.    Eden Mendoza M.D.   Physical Medicine and Rehabilitation

## 2020-10-15 NOTE — CONSULTS
"[facility]  Initial Psychological Consultation      Patient Name: Obdulia High  Patient MRN: [id number]  Today's Date: 10/14/2020     Type of session: Initial evaluation  Length of session: 25 minutes  Persons in attendance:Patient and Spouse/Partner    Subjective:     Chief complaint/referral question:    Obdulia High is a 45 y.o.  female who was referred by Dr. Mendoza because of concerns related to depression.    History of present illness:     Young woman s/p stroke from vertebral artery dissection. Depressed and tearful. Wants to talk to someone. I'm open to starting anything you suggest. Her  is a primary care doctor in the [employer], he's usually around and is very appropriate and supportive.  Per Dr. Conde's consult \"The patient is a 45 y.o. left hand dominant female with no known past medical history;  who presented on 9/29/2020  8:22 PM as a transfer from outside hospital where she initially presented with a 2-day history of retro-orbital headache, left facial numbness and seizure-like activity.  Patient received Ativan and was intubated for airway protection prior to being transferred to [facility].  Work-up with MRI brain found brainstem/pontine/midbrain ischemia, and follow-up CTA showed diffuse thrombosis in the left and right PCAs, left vertebral, and basilar arteries. Patient then received basilar artery thrombectomy with Dr. Albert. NIH SS 28 on admission, and NIH SS of 9 today 10/2/2020.   Patient is feeling better today, however does report right arm and leg numbness and tingling, constipation, Faust dependency, some ptosis, lethargy and weakness.\"     In addition to the above history, patient had 10 minutes of seizure at the outside hospital - [facility], requiring Valium, Ativan, prior to intubation. EEG on acute was negative for seizures, though patient is on anti-seizure medication. COVID negative.      Post thrombectomy TICI 3 " basilar, and per neurology, for high risk of re-thrombosing due to other nearby occluded arteries - left vertebral and bilateral PCAs. Repeat imagining also with small amount of subarachnoid hemorrhage versus contrast staining. She was extubated on 10/1. Repeat MRI with known stroke in midbrain as well as small new punctate infarctions in the left internal capsule, right cerebellum, and left khang. Per review of notes, patient apparently had started a strenuous workout program several days prior to her stroke, which is thought to be the etiology of her dissection. She was initially on heparin drip, now on a Lovenox bridge to coumadin. Plan for repeat imaging in 3 months (CTA) to determine whether patient can discontinue warfarin and switch to aspirin. Plan to also wean Keppra in the outpatient stroke bridge clinic. Patient had repeat Head CT on 10/5 without any new findings of hemorrhage. Patient initially had a Cortrak for nutrition, now removed.      Patient current reports right sided weakness, right face and right hand paresthesias, hoarse voice, and double vision. She denies any pain or headache. She moved her bowels yesterday. Her dueñas was removed this morning and she has been able to urinate without retention. She reports her mood is stable, though she is tearful, and is eager to get started with therapy and her recovery.    Current psychological symptoms:    The patient reports depressed mood insomnia and frequent episodes of crying the patient denies a history of hypomania, crow, auditory hallucinations, thought disorder, and somatic complaints.  The patient also reports some anxiety associated with her recovery and not necessarily feeling she is being given the accurate information about her current health condition.    Relevant history:    Social History: The patient has been  for 20 years to a [employer] family medicine physician.  She has a 22-year-old daughter who is currently in nursing school and  15-year-old son who currently lives at home.  The patient denies a history of abuse and neglect.    Psychiatric History: The patient denies a history of psychiatric hospitalization, suicide attempt, and other self-injurious behavior.  The patient denies a history of psychotherapy and psychotropic medication.    Substance Use History: The patient denies the use of alcohol, cannabis,    MEDICAL HISTORY    Past medical/surgical history:   Past Medical History:   Diagnosis Date   • Migraine    • Polycystic ovarian syndrome       No current facility-administered medications on file prior to encounter.      Current Outpatient Medications on File Prior to Encounter   Medication Sig Dispense Refill   • atorvastatin (LIPITOR) 40 MG Tab Take 1 Tab by mouth every evening. 30 Tab 0   • levETIRAcetam (KEPPRA) 500 MG Tab Take 1 Tab by mouth 2 Times a Day. 60 Tab 0   • warfarin (COUMADIN) 5 MG Tab Take 1 Tab by mouth every day at 6 PM. 30 Tab 3       History reviewed. No pertinent surgical history.     Objective/Observations:    Patient did not present in acute distress. Patient was appropriately groomed. Patient was alert and oriented x4. Eye contact was appropriate. No abnormalities in attention or concentration were noted. No abnormalities of movement present; psychomotor activity was normal. Speech was fluent and regular in rhythm, rate, volume, and tone. Thought processes linear, logical and goal directed. There was no evidence of thought disorder. No auditory or visual hallucinations. Long and short term memory appeared to be intact. Insight, judgment, and impulse control were deemed to be good.  Reported mood was “depressed.” Affect was full-ranging, tearful at time and appropriate to thought content and conversation.  Patient denied current suicidal and homicidal ideation in plan, intent, and preparatory behavior.      The patient denied any suicide-related ideation and/or behaviors and intent/plan, denied thoughts about  death and dying both in session and during the period since last appointment, or past 2 weeks.    Risk Level: Not Currently at Clinically Significant Risk  Psychiatric hospitalization is not deemed necessary at this time as the patient does not present a clear or imminent danger to self or others. No indication for pursuing higher level of care. Outpatient management related to psychiatric condition is currently most appropriate and least restrictive level of care.    Current risk:   SUICIDE: Low   Homicide: Not applicable   Self-harm: Not applicable   Relapse: Not applicable   Other:    Safety Plan reviewed? No   If evidence of imminent risk is present, intervention/plan:     Diagnoses:   Problem   Adjustment Disorder With Mixed Anxiety and Depressed Mood         Treatment Goal(s)/Objective(s) addressed:     Depression   Goal: Improve overall mood      Avoid napping/sleeping to escape other people and activities   Shower, dress, and then do something every day   Report feeling happy/better overall mood   Get through a day/week without a crying spell   Develop strategies for thought distraction when ruminating on the past      Progress toward Treatment Goals: No change    Plan:    1) The patient will be seen weekly during her treatment at the rehabilitation hospital.  2) The patient and her  were educated about the concerns about depression and anxiety negatively impacting the rehabilitative process.  3) Provided feedback to the Attending Physiatrist that at the present time psychotropic medication is not desired by the patient.  The patient did indicate that she would self monitor her depression and anxiety symptoms and talk with staff if her symptoms worsen or if it negatively impacts her rehabilitative process.  4) Referrals/Consults:  N/A  5) Barriers to Learning:  No  6) Readiness to Learn:  Yes  7) Cultural Concerns:  No  8) Declare these services are medically necessary and appropriate to the patient’s  diagnosis and needs  9) The point of contact at the [facility] regarding this evaluation is Dr. Hutchins, Psychologist.    Efrain Hutchins III, Ed.D.

## 2020-10-15 NOTE — THERAPY
Physical Therapy   Daily Treatment     Patient Name: Obdulia High  Age:  45 y.o., Sex:  female  Medical Record #: [id number]  Today's Date: 10/15/2020     Precautions  Precautions: Fall Risk, Other (See Comments)  Comments: dizzy, double vision    Subjective    Pt was supine in bed upon arrival and agreeable to treatment.  Pt reported increased headache today, rated 4/10.     Objective       10/15/20 1031   Precautions   Precautions Fall Risk;Other (See Comments)   Gait Functional Level of Assist    Gait Level Of Assist Contact Guard Assist   Assistive Device Front Wheel Walker   Distance (Feet) 250   # of Times Distance was Traveled 1   Deviation Ataxic;Trendelenberg;Bradykinetic  (R sided veering)   Transfer Functional Level of Assist   Bed, Chair, Wheelchair Transfer Contact Guard Assist  (with FWW)   Bed Chair Wheelchair Transfer Description Set-up of equipment;Supervision for safety;Verbal cueing;Adaptive equipment   Bed Mobility    Supine to Sit Modified Independent   Sit to Supine Modified Independent   Sit to Stand Contact Guard Assist   Scooting Modified Independent   Interdisciplinary Plan of Care Collaboration   Patient Position at End of Therapy In Bed;Call Light within Reach;Tray Table within Reach;Phone within Reach   PT Total Time Spent   PT Individual Total Time Spent (Mins) 60   PT Charge Group   PT Gait Training 1   PT Neuromuscular Re-Education / Balance 2   PT Therapeutic Activities 1     Static balance activities in // bars with focus on 4 point foot and increased body awareness/compensation with use of somatosensory system: FA, FT x 30 sec x 2 with mild to moderate sway, B staggered stance x 15 sec each side with increased sway R>L.  Education on balance systems and strategies.   Ankle and hip strategy practice with B UEs, one UE, and no UE with mild perturbations x 5 reps each strategy.  AMB in // bars with one UE x 40 feet.  Review of supine habitation HEP.    Assessment    Pt with improving  use of ankle and hip strategy with repetition; however, pt continues to be limited with balance secondary to deficits in vestibular, visual, and somatosensory systems. Pt continues with significant R sided veering with ambulation with VCing needed, but with less B lateral sway noted this session.  Pt with one mild self correct LOB with ambulation while turning.     Strengths: Independent prior level of function, Making steady progress towards goals, Motivated for self care and independence, Pleasant and cooperative, Supportive family, Willingly participates in therapeutic activities  Barriers: Decreased endurance, Fatigue, Impaired activity tolerance, Impaired balance, Visual impairment, Limited mobility, Impulsive    Plan    Progress habituation activities in supine as tolerated, postural stability and balance activities in // bars, continue gait training with FWW, trial step in // bars progressing to consecutive steps    Physical Therapy Problems     Problem: Mobility     Dates: Start: 10/08/20       Goal: STG-Within one week, patient will propel wheelchair community     Dates: Start: 10/08/20       Description: 1) Individualized goal:  W/C mobility x 150 feet with SBA  2) Interventions:  PT Group Therapy, PT E Stim Attended, PT Gait Training, PT Therapeutic Exercises, PT Neuro Re-Ed/Balance, PT Aquatic Therapy, PT Therapeutic Activity, PT Manual Therapy, and PT Evaluation      Note:     Goal Note filed on 10/12/20 1315 by Rakel Mcneal DPT    Min A, impaired coordination                  Goal: STG-Within one week, patient will ambulate household distance     Dates: Start: 10/08/20       Description: 1) Individualized goal:  AMB x 25 feet with FWW and CGA  2) Interventions:  PT Group Therapy, PT E Stim Attended, PT Gait Training, PT Therapeutic Exercises, PT Neuro Re-Ed/Balance, PT Aquatic Therapy, PT Therapeutic Activity, PT Manual Therapy, and PT Evaluation      Note:     Goal Note filed on 10/12/20 1315 by  Rakel Mcneal, DPT    // bars 20ft x 3 CGA,   R neglect, safety concerns                        Problem: Mobility Transfers     Dates: Start: 10/12/20       Goal: STG-Within one week, patient will transfer bed to chair     Dates: Start: 10/12/20       Description: 1) Individualized goal:  SBA reach pivot + set up wc <> bed right/ left  2) Interventions:  PT Orthotics Training, PT Gait Training, PT Self Care/Home Eval, PT Therapeutic Exercises, PT Neuro Re-Ed/Balance, PT Aquatic Therapy, PT Therapeutic Activity, and PT Manual Therapy                  Problem: PT-Long Term Goals     Dates: Start: 10/08/20       Goal: LTG-By discharge, patient will ambulate     Dates: Start: 10/08/20       Description: 1) Individualized goal:  AMB x 150 feet with LRD and SPV  2) Interventions:  PT Group Therapy, PT E Stim Attended, PT Gait Training, PT Therapeutic Exercises, PT Neuro Re-Ed/Balance, PT Aquatic Therapy, PT Therapeutic Activity, PT Manual Therapy, and PT Evaluation            Goal: LTG-By discharge, patient will transfer one surface to another     Dates: Start: 10/08/20       Description: 1) Individualized goal:  SPT with LRD and SPV  2) Interventions:  PT Group Therapy, PT E Stim Attended, PT Gait Training, PT Therapeutic Exercises, PT Neuro Re-Ed/Balance, PT Aquatic Therapy, PT Therapeutic Activity, PT Manual Therapy, and PT Evaluation            Goal: LTG-By discharge, patient will ambulate up/down 4-6 stairs     Dates: Start: 10/08/20       Description: 1) Individualized goal:  Up/down 4 steps with BHR and CGA  2) Interventions:  PT Group Therapy, PT E Stim Attended, PT Gait Training, PT Therapeutic Exercises, PT Neuro Re-Ed/Balance, PT Aquatic Therapy, PT Therapeutic Activity, PT Manual Therapy, and PT Evaluation            Goal: LTG-By discharge, patient will transfer in/out of a car     Dates: Start: 10/08/20       Description: 1) Individualized goal:  Car transfer with LRD and SPV  2) Interventions:  PT Group  Therapy, PT E Stim Attended, PT Gait Training, PT Therapeutic Exercises, PT Neuro Re-Ed/Balance, PT Aquatic Therapy, PT Therapeutic Activity, PT Manual Therapy, and PT Evaluation

## 2020-10-15 NOTE — CARE PLAN
Problem: Safety  Goal: Will remain free from falls  Outcome: PROGRESSING AS EXPECTED  Patient is able to utilize call light when needing help. Patient is not impulsive. Bed in lowest position, floor free from clutter, fall risk education provided and patient verbalized understanding. Call light in reach.     Problem: Infection  Goal: Will remain free from infection  Outcome: PROGRESSING AS EXPECTED  Patient verbalized understanding infection risk education; patient stated that she washes hands often.

## 2020-10-15 NOTE — THERAPY
Speech Language Pathology  Daily Treatment     Patient Name: Obdulia High  Age:  45 y.o., Sex:  female  Medical Record #: [id number]  Today's Date: 10/15/2020     Precautions  Precautions: Fall Risk, Other (See Comments)  Comments: dizzy, double vision    Subjective    Patient was willing to participate.      Objective       10/15/20 1002   Cognition   Complex Attention Moderate (3)   Executive Functioning / Organization Moderate (3)   Functional Math / Financial Management Minimal (4)   SLP Total Time Spent   SLP Individual Total Time Spent (Mins) 30   Treatment Charges   SLP Cognitive Skill Development First 15 Minutes 1   SLP Cognitive Skill Development Additional 15 Minutes 1       Assessment    Patient required mod cues to correct items from previous $ comparison task. Cues to slow pace and break down tasks into smaller units. Discussed strategies for organization.     Strengths: Able to follow instructions, Alert and oriented, Effective communication skills, Independent prior level of function, Making steady progress towards goals, Motivated for self care and independence, Pleasant and cooperative, Supportive family, Willingly participates in therapeutic activities  Barriers: Fatigue(diplopia )    Plan    Continue to target attention and organization    Speech Therapy Problems     Problem: Problem Solving STGs     Dates: Start: 10/08/20       Goal: STG-Within one week, patient will     Dates: Start: 10/08/20       Description: 1) Individualized goal:  complete functional organization tasks related to medication and financial mngt. With 100% accuracy provided SPV  2) Interventions:  SLP Cognitive Skill Development and SLP Group Treatment        Note:     Goal Note filed on 10/12/20 0801 by Patricia Long MS,CCC-SLP    Simple math problems 32% IND, MIN to MOD cues to achieve 100%.                   Goal: STG-Within one week, patient will     Dates: Start: 10/08/20       Description: 1) Individualized goal:   complete alternating attention tasks with 80% accuracy provided MIN cues.   2) Interventions:  SLP Cognitive Skill Development and SLP Group Treatment        Note:     Goal Note filed on 10/12/20 0801 by Patricia Long MS,CCC-SLP    Will continue to target.                         Problem: Speech/Swallowing LTGs     Dates: Start: 10/08/20       Goal: LTG-By discharge, patient will safely swallow     Dates: Start: 10/08/20       Description: 1) Individualized goal:  regular textures/thin liquids for safe return to PLOF.   2) Interventions:  SLP Swallowing Dysfunction Treatment, SLP Oral Pharyngeal Evaluation, SLP Video Swallow Evaluation, SLP Cognitive Skill Development, and SLP Group Treatment              Goal: LTG-By discharge, patient will solve complex problem     Dates: Start: 10/08/20       Description: 1) Individualized goal: By utilizing compensatory intervention for memory and problem-solving to allow for safe completion of daily activities with MOD I in order to prepare for safe d/c home  2) Interventions:  SLP Cognitive Skill Development and SLP Group Treatment

## 2020-10-16 LAB
ANION GAP SERPL CALC-SCNC: 11 MMOL/L (ref 7–16)
BUN SERPL-MCNC: 19 MG/DL (ref 8–22)
CALCIUM SERPL-MCNC: 9.3 MG/DL (ref 8.5–10.5)
CHLORIDE SERPL-SCNC: 102 MMOL/L (ref 96–112)
CO2 SERPL-SCNC: 25 MMOL/L (ref 20–33)
CREAT SERPL-MCNC: 0.61 MG/DL (ref 0.5–1.4)
GLUCOSE SERPL-MCNC: 107 MG/DL (ref 65–99)
INR PPP: 2.42 (ref 0.87–1.13)
POTASSIUM SERPL-SCNC: 4.3 MMOL/L (ref 3.6–5.5)
PROTHROMBIN TIME: 27.1 SEC (ref 12–14.6)
SODIUM SERPL-SCNC: 138 MMOL/L (ref 135–145)

## 2020-10-16 PROCEDURE — 97129 THER IVNTJ 1ST 15 MIN: CPT

## 2020-10-16 PROCEDURE — 99231 SBSQ HOSP IP/OBS SF/LOW 25: CPT | Performed by: PHYSICAL MEDICINE & REHABILITATION

## 2020-10-16 PROCEDURE — A9270 NON-COVERED ITEM OR SERVICE: HCPCS | Performed by: PHYSICAL MEDICINE & REHABILITATION

## 2020-10-16 PROCEDURE — 97116 GAIT TRAINING THERAPY: CPT

## 2020-10-16 PROCEDURE — 97530 THERAPEUTIC ACTIVITIES: CPT

## 2020-10-16 PROCEDURE — 97110 THERAPEUTIC EXERCISES: CPT

## 2020-10-16 PROCEDURE — 770010 HCHG ROOM/CARE - REHAB SEMI PRIVAT*

## 2020-10-16 PROCEDURE — 700102 HCHG RX REV CODE 250 W/ 637 OVERRIDE(OP): Performed by: PHYSICAL MEDICINE & REHABILITATION

## 2020-10-16 PROCEDURE — 80048 BASIC METABOLIC PNL TOTAL CA: CPT

## 2020-10-16 PROCEDURE — 97130 THER IVNTJ EA ADDL 15 MIN: CPT

## 2020-10-16 PROCEDURE — 97112 NEUROMUSCULAR REEDUCATION: CPT

## 2020-10-16 PROCEDURE — 36415 COLL VENOUS BLD VENIPUNCTURE: CPT

## 2020-10-16 PROCEDURE — 85610 PROTHROMBIN TIME: CPT

## 2020-10-16 RX ORDER — MECLIZINE HYDROCHLORIDE 25 MG/1
25 TABLET ORAL 3 TIMES DAILY PRN
Status: DISCONTINUED | OUTPATIENT
Start: 2020-10-16 | End: 2020-10-27 | Stop reason: HOSPADM

## 2020-10-16 RX ORDER — WARFARIN SODIUM 3 MG/1
6 TABLET ORAL
Status: COMPLETED | OUTPATIENT
Start: 2020-10-16 | End: 2020-10-16

## 2020-10-16 RX ADMIN — DOCUSATE SODIUM 50 MG AND SENNOSIDES 8.6 MG 2 TABLET: 8.6; 5 TABLET, FILM COATED ORAL at 08:08

## 2020-10-16 RX ADMIN — LEVETIRACETAM 500 MG: 500 TABLET, FILM COATED ORAL at 20:12

## 2020-10-16 RX ADMIN — LEVETIRACETAM 500 MG: 500 TABLET, FILM COATED ORAL at 08:08

## 2020-10-16 RX ADMIN — WARFARIN SODIUM 6 MG: 3 TABLET ORAL at 18:04

## 2020-10-16 RX ADMIN — ATORVASTATIN CALCIUM 40 MG: 40 TABLET, FILM COATED ORAL at 20:12

## 2020-10-16 ASSESSMENT — GAIT ASSESSMENTS
ASSISTIVE DEVICE: FRONT WHEEL WALKER
GAIT LEVEL OF ASSIST: CONTACT GUARD ASSIST
DISTANCE (FEET): 614

## 2020-10-16 ASSESSMENT — ACTIVITIES OF DAILY LIVING (ADL): BED_CHAIR_WHEELCHAIR_TRANSFER_DESCRIPTION: ADAPTIVE EQUIPMENT;SET-UP OF EQUIPMENT;SUPERVISION FOR SAFETY;VERBAL CUEING

## 2020-10-16 NOTE — PROGRESS NOTES
Pharmacy Warfarin Consult   10/16/2020     45 y.o.   female     Indication for anticoagulation: Stroke     Goal INR = 2 - 3    Recent Labs     10/14/20  0527 10/15/20  0659 10/16/20  0540   INR 2.00* 2.35* 2.42*       Pertinent Drug/Drug Interactions:  ABX, Statin, prn trazodone  Outpatient Warfarin Regimen:  New start  Recent Warfarin Dosing:    Dose from last 7 days     Date/Time Dose (mg)    10/16/20 0540  6    10/15/20 0659  6    10/14/20 0527  7.5    10/13/20 0515  7.5    10/12/20 0646  6    10/11/20 1444  5    10/10/20 1300  5          Bridge Therapy: None           1.  Warfarin 6 mg tonight for INR = 2.42           Sha Weiss [facility]

## 2020-10-16 NOTE — CARE PLAN
Patient is alert and oriented x 4.  Left facial droop and states some double vision.  Wears eye patch.     Skin intact no open areas noted.  Respirations even and unlabored.  Bowel sounds present in all 4 quadrants and normoactive.  Continent of bowel and bladder.  No complaints of pain.   Call light within reach, fresh water at bedside.   Problem: Communication  Goal: The ability to communicate needs accurately and effectively will improve  Outcome: PROGRESSING AS EXPECTED     Problem: Safety  Goal: Will remain free from injury  Outcome: PROGRESSING AS EXPECTED     Problem: Bowel/Gastric:  Goal: Normal bowel function is maintained or improved  Outcome: PROGRESSING AS EXPECTED     Problem: Discharge Barriers/Planning  Goal: Patient's continuum of care needs will be met  Outcome: PROGRESSING AS EXPECTED     Problem: Pain Management  Goal: Pain level will decrease to patient's comfort goal  Outcome: PROGRESSING AS EXPECTED     Problem: Skin Integrity  Goal: Risk for impaired skin integrity will decrease  Outcome: PROGRESSING AS EXPECTED

## 2020-10-16 NOTE — THERAPY
"Physical Therapy   Daily Treatment     Patient Name: Obdulia High  Age:  45 y.o., Sex:  female  Medical Record #: [id number]  Today's Date: 10/16/2020     Precautions  Precautions: Fall Risk, Other (See Comments)  Comments: dizzy, double vision    Subjective    Pt was supine in bed upon arrival and agreeable to treatment.  Pt reported increased dizziness today with some RUE fatigue/weakness.     Objective       10/16/20 0831   Precautions   Precautions Fall Risk;Other (See Comments)   Gait Functional Level of Assist    Gait Level Of Assist Contact Guard Assist   Assistive Device Front Wheel Walker   Distance (Feet) 614   # of Times Distance was Traveled 1   Deviation Increased Base Of Support;Bradykinetic;Decreased Heel Strike;Decreased Toe Off;Trendelenberg  (R sided veering)   Stairs Functional Level of Assist   Level of Assist with Stairs Contact Guard Assist   # of Stairs Climbed 3  (4\" step in // bars)   Stairs Description Extra time;Hand rails;Safety concerns;Supervision for safety;Verbal cueing   Transfer Functional Level of Assist   Bed, Chair, Wheelchair Transfer Stand by Assist   Bed Chair Wheelchair Transfer Description Adaptive equipment;Set-up of equipment;Supervision for safety;Verbal cueing   Bed Mobility    Supine to Sit Modified Independent   Sit to Supine Modified Independent   Sit to Stand Contact Guard Assist   Scooting Modified Independent   Interdisciplinary Plan of Care Collaboration   Patient Position at End of Therapy In Bed;Call Light within Reach;Tray Table within Reach;Phone within Reach   PT Total Time Spent   PT Individual Total Time Spent (Mins) 60   PT Charge Group   PT Gait Training 2   PT Neuromuscular Re-Education / Balance 1   PT Therapeutic Activities 1     Demonstration of platform step training with FWW.  Static balance with FA x 30 sec x 2 without UE support. Habituation exercises in supine: segmental head turns to L and R x 5 reps each direction allowing for dizziness to " return to baseline between reps with progress to continuous head turns x 5 reps.  Progressed habituation HEP.    Assessment    Pt continues to make steady improvements in functional mobility and balance.  Pt with no LOB noted this session with improving upright tolerance.  Pt continues to be able to keep dizziness to a 4-5/10 intensity with movement (baseline today 2-3/10 intensity).  Pt with decreased R sided veering with ambulation with use of visual cues.  Strengths: Independent prior level of function, Making steady progress towards goals, Motivated for self care and independence, Pleasant and cooperative, Supportive family, Willingly participates in therapeutic activities  Barriers: Decreased endurance, Fatigue, Impaired activity tolerance, Impaired balance, Visual impairment, Limited mobility, Impulsive    Plan    Continue gait training with FWW, complete family training with pt's spouse on ambulation with pt as appropriate, continue stair training progressing to consecutive steps, trial platform step training with FWW to mimic home entrance, continue balance training, vestibular habituation exercises    Physical Therapy Problems     Problem: Mobility     Dates: Start: 10/08/20       Goal: STG-Within one week, patient will propel wheelchair community     Dates: Start: 10/08/20       Description: 1) Individualized goal:  W/C mobility x 150 feet with SBA  2) Interventions:  PT Group Therapy, PT E Stim Attended, PT Gait Training, PT Therapeutic Exercises, PT Neuro Re-Ed/Balance, PT Aquatic Therapy, PT Therapeutic Activity, PT Manual Therapy, and PT Evaluation      Note:     Goal Note filed on 10/12/20 1315 by Rakel Mcneal DPT    Min A, impaired coordination                  Goal: STG-Within one week, patient will ambulate household distance     Dates: Start: 10/08/20       Description: 1) Individualized goal:  AMB x 25 feet with FWW and CGA  2) Interventions:  PT Group Therapy, PT E Stim Attended, PT Gait  Training, PT Therapeutic Exercises, PT Neuro Re-Ed/Balance, PT Aquatic Therapy, PT Therapeutic Activity, PT Manual Therapy, and PT Evaluation      Note:     Goal Note filed on 10/12/20 1315 by Rakel Mcneal, ZARIT    // bars 20ft x 3 CGA,   R neglect, safety concerns                        Problem: Mobility Transfers     Dates: Start: 10/12/20       Goal: STG-Within one week, patient will transfer bed to chair     Dates: Start: 10/12/20       Description: 1) Individualized goal:  SBA reach pivot + set up wc <> bed right/ left  2) Interventions:  PT Orthotics Training, PT Gait Training, PT Self Care/Home Eval, PT Therapeutic Exercises, PT Neuro Re-Ed/Balance, PT Aquatic Therapy, PT Therapeutic Activity, and PT Manual Therapy                  Problem: PT-Long Term Goals     Dates: Start: 10/08/20       Goal: LTG-By discharge, patient will ambulate     Dates: Start: 10/08/20       Description: 1) Individualized goal:  AMB x 150 feet with LRD and SPV  2) Interventions:  PT Group Therapy, PT E Stim Attended, PT Gait Training, PT Therapeutic Exercises, PT Neuro Re-Ed/Balance, PT Aquatic Therapy, PT Therapeutic Activity, PT Manual Therapy, and PT Evaluation            Goal: LTG-By discharge, patient will transfer one surface to another     Dates: Start: 10/08/20       Description: 1) Individualized goal:  SPT with LRD and SPV  2) Interventions:  PT Group Therapy, PT E Stim Attended, PT Gait Training, PT Therapeutic Exercises, PT Neuro Re-Ed/Balance, PT Aquatic Therapy, PT Therapeutic Activity, PT Manual Therapy, and PT Evaluation            Goal: LTG-By discharge, patient will ambulate up/down 4-6 stairs     Dates: Start: 10/08/20       Description: 1) Individualized goal:  Up/down 4 steps with BHR and CGA  2) Interventions:  PT Group Therapy, PT E Stim Attended, PT Gait Training, PT Therapeutic Exercises, PT Neuro Re-Ed/Balance, PT Aquatic Therapy, PT Therapeutic Activity, PT Manual Therapy, and PT Evaluation             Goal: LTG-By discharge, patient will transfer in/out of a car     Dates: Start: 10/08/20       Description: 1) Individualized goal:  Car transfer with LRD and SPV  2) Interventions:  PT Group Therapy, PT E Stim Attended, PT Gait Training, PT Therapeutic Exercises, PT Neuro Re-Ed/Balance, PT Aquatic Therapy, PT Therapeutic Activity, PT Manual Therapy, and PT Evaluation

## 2020-10-16 NOTE — THERAPY
Speech Language Pathology  Daily Treatment     Patient Name: Obdulia High  Age:  45 y.o., Sex:  female  Medical Record #: [id number]  Today's Date: 10/16/2020     Precautions  Precautions: Fall Risk, Other (See Comments)  Comments: dizzy, double vision    Subjective    Pt pleasant and cooperative, reports her thinking is improving as evidenced by speed of completion of structured tasks.      Objective     10/16/20 0934   Cognition   Executive Functioning / Organization Minimal (4)   Interdisciplinary Plan of Care Collaboration   Patient Position at End of Therapy Seated;Call Light within Reach   SLP Total Time Spent   SLP Individual Total Time Spent (Mins) 60   Treatment Charges   SLP Cognitive Skill Development First 15 Minutes 1   SLP Cognitive Skill Development Additional 15 Minutes 3       Assessment    Alternating visual attention to alphabetize word list - pt given initial set up and completed with >95% accuracy IND, Simple organization/scheduling tasks: 1st trial: 75% IND with MIN cues 100%, 2nd trial: 100% IND.   Pt reports her processing speed is improving and the tasks given are easier to get through.     Strengths: Able to follow instructions, Alert and oriented, Effective communication skills, Independent prior level of function, Making steady progress towards goals, Motivated for self care and independence, Pleasant and cooperative, Supportive family, Willingly participates in therapeutic activities  Barriers: Fatigue(diplopia )    Plan    Continue to target $ mngt/organization tasks.     Speech Therapy Problems     Problem: Problem Solving STGs     Dates: Start: 10/08/20       Goal: STG-Within one week, patient will     Dates: Start: 10/08/20       Description: 1) Individualized goal:  complete functional organization tasks related to medication and financial mngt. With 100% accuracy provided SPV  2) Interventions:  SLP Cognitive Skill Development and SLP Group Treatment        Note:     Goal Note filed  on 10/12/20 0801 by Patricia Long MS,CCC-SLP    Simple math problems 32% IND, MIN to MOD cues to achieve 100%.                   Goal: STG-Within one week, patient will     Dates: Start: 10/08/20       Description: 1) Individualized goal:  complete alternating attention tasks with 80% accuracy provided MIN cues.   2) Interventions:  SLP Cognitive Skill Development and SLP Group Treatment        Note:     Goal Note filed on 10/12/20 0801 by Patricia Long MS,CCC-SLP    Will continue to target.                         Problem: Speech/Swallowing LTGs     Dates: Start: 10/08/20       Goal: LTG-By discharge, patient will safely swallow     Dates: Start: 10/08/20       Description: 1) Individualized goal:  regular textures/thin liquids for safe return to PLOF.   2) Interventions:  SLP Swallowing Dysfunction Treatment, SLP Oral Pharyngeal Evaluation, SLP Video Swallow Evaluation, SLP Cognitive Skill Development, and SLP Group Treatment              Goal: LTG-By discharge, patient will solve complex problem     Dates: Start: 10/08/20       Description: 1) Individualized goal: By utilizing compensatory intervention for memory and problem-solving to allow for safe completion of daily activities with MOD I in order to prepare for safe d/c home  2) Interventions:  SLP Cognitive Skill Development and SLP Group Treatment

## 2020-10-16 NOTE — DISCHARGE PLANNING
CM  [facility]aged [facility] to confirm if they service [place] and if [facility] has a benefit for their program.   Will follow.       Update: Kirstie is not contracted with [facility].

## 2020-10-16 NOTE — PROGRESS NOTES
"Rehab Progress Note     Date of Service: 10/16/2020  Chief Complaint: follow up stroke    Interval Events (Subjective)    Patient seen and examined today in her room.  She continues to have double vision and intermittent dizziness.  Orthostatics were negative.  Labs were negative for any evidence of dehydration.  Patient currently is denying any pain.  She thinks that therapy is going well.  She reports she slept well last night.  She has no complaints today.    Objective:  VITAL SIGNS: /80   Pulse 65   Temp 36.6 °C (97.9 °F) (Tympanic)   Resp 18   Ht 1.702 m (5' 7.01\")   Wt 88 kg (194 lb)   SpO2 96%   BMI 30.38 kg/m²   Gen: alert, no apparent distress  CV: regular rate and rhythm, no murmurs, no peripheral edema  Resp: clear to ascultation bilaterally, normal respiratory effort  GI: soft, non-tender abdomen, bowel sounds present  Neuro: notable for right-sided incoordination, double vision, left ptosis    Recent Results (from the past 72 hour(s))   Prothrombin Time    Collection Time: 10/14/20  5:27 AM   Result Value Ref Range    PT 23.3 (H) 12.0 - 14.6 sec    INR 2.00 (H) 0.87 - 1.13   Prothrombin Time    Collection Time: 10/15/20  6:59 AM   Result Value Ref Range    PT 26.5 (H) 12.0 - 14.6 sec    INR 2.35 (H) 0.87 - 1.13   Prothrombin Time    Collection Time: 10/16/20  5:40 AM   Result Value Ref Range    PT 27.1 (H) 12.0 - 14.6 sec    INR 2.42 (H) 0.87 - 1.13   Basic Metabolic Panel    Collection Time: 10/16/20  5:40 AM   Result Value Ref Range    Sodium 138 135 - 145 mmol/L    Potassium 4.3 3.6 - 5.5 mmol/L    Chloride 102 96 - 112 mmol/L    Co2 25 20 - 33 mmol/L    Glucose 107 (H) 65 - 99 mg/dL    Bun 19 8 - 22 mg/dL    Creatinine 0.61 0.50 - 1.40 mg/dL    Calcium 9.3 8.5 - 10.5 mg/dL    Anion Gap 11.0 7.0 - 16.0   ESTIMATED GFR    Collection Time: 10/16/20  5:40 AM   Result Value Ref Range    GFR If African American >60 >60 mL/min/1.73 m 2    GFR If Non African American >60 >60 mL/min/1.73 m 2 "       Current Facility-Administered Medications   Medication Frequency   • warfarin (COUMADIN) tablet 6 mg ONCE AT 1800   • levETIRAcetam (KEPPRA) tablet 500 mg Q12HRS   • traZODone (DESYREL) tablet 50 mg QHS   • melatonin tablet 3 mg QHS   • hydrOXYzine HCl (ATARAX) tablet 50 mg Q6HRS PRN   • Respiratory Therapy Consult Continuous RT   • Pharmacy Consult Request ...Pain Management Review 1 Each PHARMACY TO DOSE   • hydrALAZINE (APRESOLINE) tablet 10 mg Q8HRS PRN   • artificial tears ophthalmic solution 1 Drop PRN   • benzocaine-menthol (CEPACOL) lozenge 1 Lozenge Q2HRS PRN   • mag hydrox-al hydrox-simeth (MAALOX PLUS ES or MYLANTA DS) suspension 20 mL Q2HRS PRN   • ondansetron (ZOFRAN ODT) dispertab 4 mg 4X/DAY PRN    Or   • ondansetron (ZOFRAN) syringe/vial injection 4 mg 4X/DAY PRN   • sodium chloride (OCEAN) 0.65 % nasal spray 2 Spray PRN   • lactulose 20 GM/30ML solution 30 mL QDAY PRN   • docusate sodium (ENEMEEZ) enema 283 mg QDAY PRN   • fleet enema 133 mL QDAY PRN   • atorvastatin (LIPITOR) tablet 40 mg Q EVENING   • senna-docusate (PERICOLACE or SENOKOT S) 8.6-50 MG per tablet 2 Tab BID    And   • polyethylene glycol/lytes (MIRALAX) PACKET 1 Packet QDAY PRN    And   • magnesium hydroxide (MILK OF MAGNESIA) suspension 30 mL QDAY PRN    And   • bisacodyl (DULCOLAX) suppository 10 mg QDAY PRN   • acetaminophen (TYLENOL) tablet 650 mg Q4HRS PRN   • acetaminophen/caffeine/butalbital 325-40-50 mg (FIORICET) -40 MG per tablet 1 Tab Q6HRS PRN   • MD Alert...Warfarin per Pharmacy PHARMACY TO DOSE       Orders Placed This Encounter   Procedures   • Diet Order Regular (meds whole 1:1 with thins)     Standing Status:   Standing     Number of Occurrences:   1     Order Specific Question:   Diet:     Answer:   Regular [1]     Comments:   meds whole 1:1 with thins       Assessment:  Active Hospital Problems    Diagnosis   • *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    I led and attended the weekly conference, and agree with the IDT conference documentation and plan of care as noted below.    Date of conference: 10/12/2020    Goals and barriers: See IDT note.    Biggest barriers: double vision, impaired motor planning, impaired standing balance, fatigue, right hemiparesis, impaired coordination    CM/social support:  very supportive    Anticipated DC date: 10/27    Home health: TBD    Equip: shower chair, grab bars    Follow up: PCP, stroke bridge clinic, Dr. Becker - rehab clinic, neuro-ophthalmology, ENT      Medical Decision Making and Plan:    Midbrain and khang left ischemic stroke  Left vertebral artery dissection  S/p thrombectomy Dr. 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 Lubin  Outpatient follow up with stroke bridge clinic, referral made  Outpatient follow up with Dr. Becker, rehab clinic referral made  Outpatient follow up with Dr. Rosas, neurophthalmology referral made    Dizziness  Likely from double vision/cerebellar stroke  Eye patch  Orthostatics negative   PRN meclizine    Anxiety/depression  Dr. Hutchins consulted, appreciate assistance  No need for medications at this time    Hypophonia,  improved  Suspect from intubation, not from stroke location  Speech therapy  FEES with impaired mobility of cords, and possible pathology on cords  Follow up with outpatient ENT, referral made    Hypertension, resolved    Hypotension  Negative orthostatics  BMP without signs of dehydration  Likely close to her baseline given her age     History of migraines  Intermittent headache  PRN tylenol or Fioricet, not requiring/using     Insomnia, improved  Scheduled melatonin and trazodone    Oral thrush , resolved    S/p nystatin     Seizure prophylaxis  Continue Keppra  Outpatient follow up with stroke bridge clinic    Leukocytosis, resolved  UTI  Negative CXR    UTI, E Coli, treated  S/p cefuroxime    Bowel  Meds as needed  Last BM 10/16    Bladder  Check PVRs - 220, 123, 23  Patient not retaining   ICP for over 400 cc  Scheduled toileting    DVT prophylaxis  S/p Lovenox bridge to coumadin  Pharmacy dosing coumadin    Total time:  17 minutes.  I spent greater than 50% of the time for patient care, counseling, and coordination on this date, including patient face-to face time, unit/floor time with review of records/pertinent lab data and studies, as well as discussing diagnostic evaluation/work up, planned therapeutic interventions, and future disposition of care, as per the interval events/subjective and the assessment and plan as noted above.    I have performed a physical exam, reviewed and updated ROS, as well as the assessment and plan today 10/16/2020. In review of note from 10/15/2020 there are no new changes except as documented above.        Eden Mendoza M.D.   Physical Medicine and Rehabilitation

## 2020-10-16 NOTE — THERAPY
"Occupational Therapy  Daily Treatment     Patient Name: Obdulia High  Age:  45 y.o., Sex:  female  Medical Record #: [id number]  Today's Date: 10/16/2020     Precautions  Precautions: (P) Fall Risk, Other (See Comments)  Comments: (P) dizzy, double vision         Subjective    \"My arm is done\", patient stated while smiling at end of session     Objective       10/16/20 1231   Precautions   Precautions Fall Risk;Other (See Comments)   Comments dizzy, double vision   Fine Motor / Dexterity    Fine Motor / Dexterity Interventions Practiced typing skills on computer with keyboard using the letters J, F and space bar on typing.com.  With this lession, patient typed 15 wpm at 96% accuracy.  Then worked with letters J, R, U, K at 13 wpm and 95% accuracy.     Sitting Lower Body Exercises   Sitting Lower Body Exercises Yes   Nustep Time (See Comments)  (nustep machine level 8 x 12 minutes w/ B UE/LE)   [place] Manual Dexterity Test   Placing Test Right 217 seconds and 181 seconds during two trials, respectively   Interdisciplinary Plan of Care Collaboration   Patient Position at End of Therapy In Bed;Call Light within Reach;Tray Table within Reach;Family / Friend in Room   OT Total Time Spent   OT Individual Total Time Spent (Mins) 60   OT Charge Group   OT Therapy Activity 3   OT Therapeutic Exercise  1     Functional mobility with FWW and CGA from room <> family lounge gym.  OT provided CGA with patient to the gym and spouse provided CGA with patient to the room.    Right  strength with dynamometer re-tested over three trials: 55 lbs, 45 lbs and 46 lbs.  Right pinch strength with pinch meter re-tested over three trials: 11, 9, 10 lbs    Assessment    Patient improved her speed with Manual Dexterity Placing Test by a large amount compared to one week ago.  Overall activity tolerance and endurance continue to improve.  Balance in standing improving as well.  R  and pinch strength has improved since one week ago as " well.  Strengths: Alert and oriented, Able to follow instructions, Effective communication skills, Good insight into deficits/needs, Independent prior level of function, Manages pain appropriately, Motivated for self care and independence, Pleasant and cooperative, Supportive family, Willingly participates in therapeutic activities  Barriers: Decreased endurance, Fatigue, Generalized weakness, Hemiparesis, Impaired activity tolerance, Impaired balance, Limited mobility, Visual impairment(double vision)    Plan    R UE strengthening/GMC/FMC, Standing balance (static and dynamic), ADLs, IADLs, typing    Occupational Therapy Goals     Problem: Bathing     Dates: Start: 10/08/20       Goal: STG-Within one week, patient will bathe     Dates: Start: 10/08/20       Description: 1) Individualized Goal:  body with CGA (for standing portion) using AE/AD  2) Interventions:  OT Self Care/ADL, OT Manual Ther Technique, OT Neuro Re-Ed/Balance, OT Sensory Int Techniques, OT Therapeutic Activity, OT Evaluation, and OT Therapeutic Exercise      Note:     Goal Note filed on 10/12/20 1305 by Dario Cooley, OT/L    Min A                        Problem: Dressing     Dates: Start: 10/08/20       Goal: STG-Within one week, patient will dress LB     Dates: Start: 10/08/20       Description: 1) Individualized Goal:  with CGA for standing portion using grab bar  2) Interventions:  OT Self Care/ADL, OT Manual Ther Technique, OT Neuro Re-Ed/Balance, OT Sensory Int Techniques, OT Therapeutic Activity, OT Evaluation, and OT Therapeutic Exercise    Note:     Goal Note filed on 10/12/20 1305 by Dario Cooley, OT/L    Min A for balance                        Problem: Functional Transfers     Dates: Start: 10/12/20       Goal: STG-Within one week, patient will transfer to toilet     Dates: Start: 10/12/20       Description: 1) Individualized Goal:  with SBA using grab bar  2) Interventions:  OT Self Care/ADL, OT Manual Ther Technique, OT  Neuro Re-Ed/Balance, OT Sensory Int Techniques, OT Therapeutic Activity, OT Evaluation, and OT Therapeutic Exercise                Problem: OT Long Term Goals     Dates: Start: 10/08/20       Goal: LTG-By discharge, patient will complete basic self care tasks     Dates: Start: 10/08/20       Description: 1) Individualized Goal:  with mod I using AE/AD/techniques as needed  2) Interventions:  OT Self Care/ADL, OT Manual Ther Technique, OT Neuro Re-Ed/Balance, OT Sensory Int Techniques, OT Therapeutic Activity, OT Evaluation, and OT Therapeutic Exercise          Goal: LTG-By discharge, patient will perform bathroom transfers     Dates: Start: 10/08/20       Description: 1) Individualized Goal:  with mod I using AE/AD/techniques as needed  2) Interventions:  OT Self Care/ADL, OT Manual Ther Technique, OT Neuro Re-Ed/Balance, OT Sensory Int Techniques, OT Therapeutic Activity, OT Evaluation, and OT Therapeutic Exercise          Goal: LTG-By discharge, patient will complete basic home management     Dates: Start: 10/08/20       Description: 1) Individualized Goal:  with supervision using AE/AD/techniques as needed  2) Interventions:  OT Self Care/ADL, OT Manual Ther Technique, OT Neuro Re-Ed/Balance, OT Sensory Int Techniques, OT Therapeutic Activity, OT Evaluation, and OT Therapeutic Exercise

## 2020-10-17 LAB
INR PPP: 2.63 (ref 0.87–1.13)
PROTHROMBIN TIME: 28.9 SEC (ref 12–14.6)

## 2020-10-17 PROCEDURE — 770010 HCHG ROOM/CARE - REHAB SEMI PRIVAT*

## 2020-10-17 PROCEDURE — A9270 NON-COVERED ITEM OR SERVICE: HCPCS | Performed by: PHYSICAL MEDICINE & REHABILITATION

## 2020-10-17 PROCEDURE — 36415 COLL VENOUS BLD VENIPUNCTURE: CPT

## 2020-10-17 PROCEDURE — 85610 PROTHROMBIN TIME: CPT

## 2020-10-17 PROCEDURE — 700102 HCHG RX REV CODE 250 W/ 637 OVERRIDE(OP): Performed by: PHYSICAL MEDICINE & REHABILITATION

## 2020-10-17 RX ORDER — WARFARIN SODIUM 5 MG/1
5 TABLET ORAL
Status: COMPLETED | OUTPATIENT
Start: 2020-10-17 | End: 2020-10-17

## 2020-10-17 RX ADMIN — WARFARIN SODIUM 5 MG: 5 TABLET ORAL at 17:34

## 2020-10-17 RX ADMIN — ATORVASTATIN CALCIUM 40 MG: 40 TABLET, FILM COATED ORAL at 20:01

## 2020-10-17 RX ADMIN — LEVETIRACETAM 500 MG: 500 TABLET, FILM COATED ORAL at 08:24

## 2020-10-17 RX ADMIN — LEVETIRACETAM 500 MG: 500 TABLET, FILM COATED ORAL at 20:01

## 2020-10-17 NOTE — CARE PLAN
Problem: Communication  Goal: The ability to communicate needs accurately and effectively will improve  Outcome: PROGRESSING AS EXPECTED  Note: Able to express needs in a clear and effective manner. Pleasant and cooperative.      Problem: Safety  Goal: Will remain free from injury  Outcome: PROGRESSING AS EXPECTED  Note: Has made no attempts to get out of bed unassisted. Demonstrated to nurse how to use call light.   Goal: Will remain free from falls  Outcome: PROGRESSING AS EXPECTED     Problem: Venous Thromboembolism (VTW)/Deep Vein Thrombosis (DVT) Prevention:  Goal: Patient will participate in Venous Thrombosis (VTE)/Deep Vein Thrombosis (DVT)Prevention Measures  Outcome: PROGRESSING AS EXPECTED  Note:      Problem: Bowel/Gastric:  Goal: Normal bowel function is maintained or improved  Outcome: PROGRESSING AS EXPECTED  Note: Refused pericolace, reports they are continent and regular with last bm 10/16  Goal: Will not experience complications related to bowel motility  Outcome: PROGRESSING AS EXPECTED     Problem: Knowledge Deficit  Goal: Knowledge of disease process/condition, treatment plan, diagnostic tests, and medications will improve  Outcome: PROGRESSING AS EXPECTED  Goal: Knowledge of the prescribed therapeutic regimen will improve  Outcome: PROGRESSING AS EXPECTED     Problem: Pain Management  Goal: Pain level will decrease to patient's comfort goal  Outcome: PROGRESSING AS EXPECTED  Note: Pt denies any pain      Problem: Skin Integrity  Goal: Risk for impaired skin integrity will decrease  Outcome: PROGRESSING AS EXPECTED  Note: Skin remains intact

## 2020-10-17 NOTE — PROGRESS NOTES
Inpatient Anticoagulation Service Note    Date: 10/17/2020    Reason for Anticoagulation: Stroke   Target INR: 2.0 to 3.0         Hemoglobin Value: 12.2  Hematocrit Value: (!) 36.8  Lab Platelet Value: (!) 453    INR from last 7 days     Date/Time INR Value    10/17/20 0528  (!) 2.63    10/16/20 0540  (!) 2.42    10/15/20 0659  (!) 2.35    10/14/20 0527  (!) 2    10/13/20 0515  (!) 1.97    10/12/20 0646  (!) 1.91    10/11/20 0607  (!) 1.94        Dose from last 7 days     Date/Time Dose (mg)    10/17/20 0528  5    10/16/20 0540  6    10/15/20 0659  6    10/14/20 0527  7.5    10/13/20 0515  7.5    10/12/20 0646  6    10/11/20 1444  5    10/10/20 1300  5        Significant Interactions: Antibiotics, Statin  Bridge Therapy: No(Lovenox 80 mg SQ Q12H d/c on 10/15 INR > 2 x 2 days)  Bridge Therapy Start Date: 10/07/20  Days of Overlap Therapy: 5 (If less than 5 days and overlap therapy discontinued -- document reason (i.e. Bleed Risk))    (If still on overlap therapy, if No -- document reason (i.e. Bleed Risk))         Plan:  Give Coumadin 5 mg tonight for INR 2.63  Education Material Provided?: No     Zaira Barrientos [facility]

## 2020-10-18 LAB
INR PPP: 2.53 (ref 0.87–1.13)
PROTHROMBIN TIME: 28 SEC (ref 12–14.6)

## 2020-10-18 PROCEDURE — 97535 SELF CARE MNGMENT TRAINING: CPT

## 2020-10-18 PROCEDURE — 97116 GAIT TRAINING THERAPY: CPT

## 2020-10-18 PROCEDURE — 97112 NEUROMUSCULAR REEDUCATION: CPT

## 2020-10-18 PROCEDURE — A9270 NON-COVERED ITEM OR SERVICE: HCPCS | Performed by: PHYSICAL MEDICINE & REHABILITATION

## 2020-10-18 PROCEDURE — 97130 THER IVNTJ EA ADDL 15 MIN: CPT

## 2020-10-18 PROCEDURE — 97530 THERAPEUTIC ACTIVITIES: CPT

## 2020-10-18 PROCEDURE — 36415 COLL VENOUS BLD VENIPUNCTURE: CPT

## 2020-10-18 PROCEDURE — 85610 PROTHROMBIN TIME: CPT

## 2020-10-18 PROCEDURE — 700102 HCHG RX REV CODE 250 W/ 637 OVERRIDE(OP): Performed by: PHYSICAL MEDICINE & REHABILITATION

## 2020-10-18 PROCEDURE — 770010 HCHG ROOM/CARE - REHAB SEMI PRIVAT*

## 2020-10-18 PROCEDURE — 97129 THER IVNTJ 1ST 15 MIN: CPT

## 2020-10-18 RX ORDER — WARFARIN SODIUM 5 MG/1
5 TABLET ORAL
Status: COMPLETED | OUTPATIENT
Start: 2020-10-18 | End: 2020-10-18

## 2020-10-18 RX ADMIN — LEVETIRACETAM 500 MG: 500 TABLET, FILM COATED ORAL at 19:22

## 2020-10-18 RX ADMIN — ATORVASTATIN CALCIUM 40 MG: 40 TABLET, FILM COATED ORAL at 19:22

## 2020-10-18 RX ADMIN — LEVETIRACETAM 500 MG: 500 TABLET, FILM COATED ORAL at 08:13

## 2020-10-18 RX ADMIN — MELATONIN TAB 3 MG 3 MG: 3 TAB at 19:22

## 2020-10-18 RX ADMIN — DOCUSATE SODIUM 50 MG AND SENNOSIDES 8.6 MG 2 TABLET: 8.6; 5 TABLET, FILM COATED ORAL at 08:13

## 2020-10-18 RX ADMIN — WARFARIN SODIUM 5 MG: 5 TABLET ORAL at 17:52

## 2020-10-18 ASSESSMENT — FIBROSIS 4 INDEX: FIB4 SCORE: 0.27

## 2020-10-18 ASSESSMENT — ACTIVITIES OF DAILY LIVING (ADL)
BED_CHAIR_WHEELCHAIR_TRANSFER_DESCRIPTION: SET-UP OF EQUIPMENT;SQUAT PIVOT TRANSFER TO WHEELCHAIR;SUPERVISION FOR SAFETY;VERBAL CUEING
TUB_SHOWER_TRANSFER_DESCRIPTION: GRAB BAR;SET-UP OF EQUIPMENT;SUPERVISION FOR SAFETY;VERBAL CUEING

## 2020-10-18 ASSESSMENT — GAIT ASSESSMENTS
DISTANCE (FEET): 200
GAIT LEVEL OF ASSIST: MAXIMAL ASSIST

## 2020-10-18 NOTE — PROGRESS NOTES
Inpatient Anticoagulation Service Note    Date: 10/18/2020    Reason for Anticoagulation: Stroke   Target INR: 2.0 to 3.0         Hemoglobin Value: 12.2  Hematocrit Value: (!) 36.8  Lab Platelet Value: (!) 453    INR from last 7 days     Date/Time INR Value    10/18/20 0556  (!) 2.53    10/17/20 0528  (!) 2.63    10/16/20 0540  (!) 2.42    10/15/20 0659  (!) 2.35    10/14/20 0527  (!) 2    10/13/20 0515  (!) 1.97    10/12/20 0646  (!) 1.91        Dose from last 7 days     Date/Time Dose (mg)    10/18/20 0556  5    10/17/20 0528  5    10/16/20 0540  6    10/15/20 0659  6    10/14/20 0527  7.5    10/13/20 0515  7.5    10/12/20 0646  6    10/11/20 1444  5        Significant Interactions: Antibiotics, Statin  Bridge Therapy: No(Lovenox 80 mg SQ Q12H d/c on 10/15 INR > 2 x 2 days)  Bridge Therapy Start Date: 10/07/20  Days of Overlap Therapy: 5 (If less than 5 days and overlap therapy discontinued -- document reason (i.e. Bleed Risk))    (If still on overlap therapy, if No -- document reason (i.e. Bleed Risk))         Plan:  Give Coumadin 5 mg tonight for INR 2.53    Zaira Barrientos [facility]

## 2020-10-18 NOTE — CARE PLAN
Problem: Communication  Goal: The ability to communicate needs accurately and effectively will improve  Outcome: PROGRESSING AS EXPECTED  Note: Able to express needs in a clear and effective manner. Pleasant and cooperative.      Problem: Safety  Goal: Will remain free from injury  Outcome: PROGRESSING AS EXPECTED  Note: Has made no attempts to get out of bed unassisted. Demonstrated to nurse how to use call light.   Goal: Will remain free from falls  Outcome: PROGRESSING AS EXPECTED     Problem: Venous Thromboembolism (VTW)/Deep Vein Thrombosis (DVT) Prevention:  Goal: Patient will participate in Venous Thrombosis (VTE)/Deep Vein Thrombosis (DVT)Prevention Measures  Outcome: PROGRESSING AS EXPECTED  Note:      Problem: Bowel/Gastric:  Goal: Normal bowel function is maintained or improved  Outcome: PROGRESSING AS EXPECTED  Note: Refused pericolace, reports they are continent and regular with last bm 10/17  Goal: Will not experience complications related to bowel motility  Outcome: PROGRESSING AS EXPECTED     Problem: Knowledge Deficit  Goal: Knowledge of disease process/condition, treatment plan, diagnostic tests, and medications will improve  Outcome: PROGRESSING AS EXPECTED  Goal: Knowledge of the prescribed therapeutic regimen will improve  Outcome: PROGRESSING AS EXPECTED       Problem: Pain Management  Goal: Pain level will decrease to patient's comfort goal  Outcome: MET     Problem: Skin Integrity  Goal: Risk for impaired skin integrity will decrease  Outcome: MET

## 2020-10-18 NOTE — PROGRESS NOTES
Pt alert, oriented. Requires CGA with transfers. Continent of bowel and bladder. Vital signs stable on room air. INR today 2.53; coumadin ordered by pharmacist to be administered at 1800. No signs of distress at this time. Call light within reach, bed in lowest position, hourly rounds.

## 2020-10-18 NOTE — THERAPY
Speech Language Pathology  Daily Treatment     Patient Name: Obdulia High  Age:  45 y.o., Sex:  female  Medical Record #: [id number]  Today's Date: 10/18/2020     Precautions  Precautions: Fall Risk, Other (See Comments)  Comments: dizzy, double vision    Subjective    Patient pleasant and cooperative.     Objective       10/18/20 1301   Cognition   Moderate Attention Minimal (4)   Functional Memory Activities Minimal (4)   Functional Math / Financial Management Minimal (4)   SLP Total Time Spent   SLP Individual Total Time Spent (Mins) 60   Treatment Charges   SLP Cognitive Skill Development First 15 Minutes 1   SLP Cognitive Skill Development Additional 15 Minutes 3       Assessment    Patient performed kitchen math task #1 with 80% acc and min cues to improve.  Completed check book caluculation and alternating attention task with min cues to id errors and correct for 100% acc. Completed kitchen math task #2 correctly tallying shopping lists 1/4 completely correct.  Will need to self correct this task.    Strengths: Able to follow instructions, Alert and oriented, Effective communication skills, Independent prior level of function, Making steady progress towards goals, Motivated for self care and independence, Pleasant and cooperative, Supportive family, Willingly participates in therapeutic activities  Barriers: Fatigue(diplopia )    Plan    Target attention, financial math, functional organization.    Speech Therapy Problems     Problem: Problem Solving STGs     Dates: Start: 10/08/20       Goal: STG-Within one week, patient will     Dates: Start: 10/08/20       Description: 1) Individualized goal:  complete functional organization tasks related to medication and financial mngt. With 100% accuracy provided SPV  2) Interventions:  SLP Cognitive Skill Development and SLP Group Treatment        Note:     Goal Note filed on 10/12/20 0801 by Patricia Long MS,CCC-SLP    Simple math problems 32% IND, MIN to MOD cues  to achieve 100%.                   Goal: STG-Within one week, patient will     Dates: Start: 10/08/20       Description: 1) Individualized goal:  complete alternating attention tasks with 80% accuracy provided MIN cues.   2) Interventions:  SLP Cognitive Skill Development and SLP Group Treatment        Note:     Goal Note filed on 10/12/20 0801 by Patricia Long MS,CCC-SLP    Will continue to target.                         Problem: Speech/Swallowing LTGs     Dates: Start: 10/08/20       Goal: LTG-By discharge, patient will safely swallow     Dates: Start: 10/08/20       Description: 1) Individualized goal:  regular textures/thin liquids for safe return to PLOF.   2) Interventions:  SLP Swallowing Dysfunction Treatment, SLP Oral Pharyngeal Evaluation, SLP Video Swallow Evaluation, SLP Cognitive Skill Development, and SLP Group Treatment              Goal: LTG-By discharge, patient will solve complex problem     Dates: Start: 10/08/20       Description: 1) Individualized goal: By utilizing compensatory intervention for memory and problem-solving to allow for safe completion of daily activities with MOD I in order to prepare for safe d/c home  2) Interventions:  SLP Cognitive Skill Development and SLP Group Treatment

## 2020-10-18 NOTE — THERAPY
Physical Therapy   Daily Treatment     Patient Name: Obdulia High  Age:  45 y.o., Sex:  female  Medical Record #: [id number]  Today's Date: 10/18/2020     Precautions  Precautions: Fall Risk  Comments: dizzy, double vision     Subjective    Pt agreeable to therapy. Emotional liable throughout therapy.      Objective     10/18/20 1401   Precautions   Precautions Fall Risk   Comments dizzy, double vision    Gait Functional Level of Assist    Gait Level Of Assist Maximal Assist  (mod manual cue right trunk, vcs for quad/glute faciliation)   Assistive Device None   Distance (Feet) 200   # of Times Distance was Traveled 1   Neuro-Muscular Treatments   Neuro-Muscular Treatments Weight Shift Right;Weight Shift Left;Verbal Cuing;Taping;Tactile Cuing;Postural Facilitation   Comments see note for details    PT Total Time Spent   PT Individual Total Time Spent (Mins) 60   PT Charge Group   PT Gait Training 2   PT Neuromuscular Re-Education / Balance 2     Neuro re-education:  - seated sit to stand no UE support visual feedback, vcs for equal weight distributed between right and left foot and across foot. Pt given mod manual cues faded to min on right abdominals and glute with facilitation into anterior weight shift to standing postural control. Repeated practice sit to full stand 3 x 10.  - standing step ups with left foot to 6 inch block, increasing speed, mod manual cues on right trunk, tapping and vcs for right quad activation. Pt improves and requires one min manual cues and vcs. Repeated practice (15 min)   - standing left foot on bolster, stance on RLE, Left foot extends and then pulls bolster back while maintaining balance on right. Max improved to mod manual cue for right trunk control   - left stance, right foot on bolster, performing knee extension and flexion min A for RLE coordination and control.     Gait training :  - lite gait no UE support, pt instructed to let arms swing at side. Speed increased throughout up  to 1.8 mph. Pt ambulated for 7 min 56 seconds 947 ft.   - over ground training with no device 200 ft w/c follow for safety. R stance impaired postural control. Mod manual cues at right trunk and vc for glute and quad activation.     Assessment  Patient demonstrates very good learning and is able to apply verbal cues for correction of weight shift and RLE activation in stance phase. Improved ambulation tolerance and speed following session.     Strengths: Independent prior level of function, Making steady progress towards goals, Motivated for self care and independence, Pleasant and cooperative, Supportive family, Willingly participates in therapeutic activities  Barriers: Decreased endurance, Fatigue, Impaired activity tolerance, Impaired balance, Visual impairment, Limited mobility, Impulsive    Plan    Continue gait training on lite gait and vector no device, neuro re-education for stance postural control.     Physical Therapy Problems     Problem: Mobility     Dates: Start: 10/08/20       Goal: STG-Within one week, patient will propel wheelchair community     Dates: Start: 10/08/20       Description: 1) Individualized goal:  W/C mobility x 150 feet with SBA  2) Interventions:  PT Group Therapy, PT E Stim Attended, PT Gait Training, PT Therapeutic Exercises, PT Neuro Re-Ed/Balance, PT Aquatic Therapy, PT Therapeutic Activity, PT Manual Therapy, and PT Evaluation      Note:     Goal Note filed on 10/12/20 1315 by Rakel Mcneal DPT    Min A, impaired coordination                  Goal: STG-Within one week, patient will ambulate household distance     Dates: Start: 10/08/20       Description: 1) Individualized goal:  AMB x 25 feet with FWW and CGA  2) Interventions:  PT Group Therapy, PT E Stim Attended, PT Gait Training, PT Therapeutic Exercises, PT Neuro Re-Ed/Balance, PT Aquatic Therapy, PT Therapeutic Activity, PT Manual Therapy, and PT Evaluation      Note:     Goal Note filed on 10/12/20 1315 by Rakel Mcneal  DPT    // bars 20ft x 3 CGA,   R neglect, safety concerns                        Problem: Mobility Transfers     Dates: Start: 10/12/20       Goal: STG-Within one week, patient will transfer bed to chair     Dates: Start: 10/12/20       Description: 1) Individualized goal:  SBA reach pivot + set up wc <> bed right/ left  2) Interventions:  PT Orthotics Training, PT Gait Training, PT Self Care/Home Eval, PT Therapeutic Exercises, PT Neuro Re-Ed/Balance, PT Aquatic Therapy, PT Therapeutic Activity, and PT Manual Therapy                  Problem: PT-Long Term Goals     Dates: Start: 10/08/20       Goal: LTG-By discharge, patient will ambulate     Dates: Start: 10/08/20       Description: 1) Individualized goal:  AMB x 150 feet with LRD and SPV  2) Interventions:  PT Group Therapy, PT E Stim Attended, PT Gait Training, PT Therapeutic Exercises, PT Neuro Re-Ed/Balance, PT Aquatic Therapy, PT Therapeutic Activity, PT Manual Therapy, and PT Evaluation            Goal: LTG-By discharge, patient will transfer one surface to another     Dates: Start: 10/08/20       Description: 1) Individualized goal:  SPT with LRD and SPV  2) Interventions:  PT Group Therapy, PT E Stim Attended, PT Gait Training, PT Therapeutic Exercises, PT Neuro Re-Ed/Balance, PT Aquatic Therapy, PT Therapeutic Activity, PT Manual Therapy, and PT Evaluation            Goal: LTG-By discharge, patient will ambulate up/down 4-6 stairs     Dates: Start: 10/08/20       Description: 1) Individualized goal:  Up/down 4 steps with BHR and CGA  2) Interventions:  PT Group Therapy, PT E Stim Attended, PT Gait Training, PT Therapeutic Exercises, PT Neuro Re-Ed/Balance, PT Aquatic Therapy, PT Therapeutic Activity, PT Manual Therapy, and PT Evaluation            Goal: LTG-By discharge, patient will transfer in/out of a car     Dates: Start: 10/08/20       Description: 1) Individualized goal:  Car transfer with LRD and SPV  2) Interventions:  PT Group Therapy, PT E Stim  Attended, PT Gait Training, PT Therapeutic Exercises, PT Neuro Re-Ed/Balance, PT Aquatic Therapy, PT Therapeutic Activity, PT Manual Therapy, and PT Evaluation

## 2020-10-18 NOTE — CARE PLAN
Problem: Safety  Goal: Will remain free from injury  Outcome: PROGRESSING AS EXPECTED  Note: Pt demonstrates ability to use call light.  Goal: Will remain free from falls  Outcome: PROGRESSING AS EXPECTED  Note: Call light within reach, bed in lowest position, hourly rounds     Problem: Venous Thromboembolism (VTW)/Deep Vein Thrombosis (DVT) Prevention:  Goal: Patient will participate in Venous Thrombosis (VTE)/Deep Vein Thrombosis (DVT)Prevention Measures  Outcome: PROGRESSING AS EXPECTED  Flowsheets (Taken 10/18/2020 1120)  Pharmacologic Prophylaxis Used: Warfarin (Coumadin)     Problem: Bowel/Gastric:  Goal: Normal bowel function is maintained or improved  Outcome: PROGRESSING AS EXPECTED

## 2020-10-18 NOTE — THERAPY
"Occupational Therapy  Daily Treatment     Patient Name: Obdulia High  Age:  45 y.o., Sex:  female  Medical Record #: [id number]  Today's Date: 10/18/2020     Precautions  Precautions: (P) Fall Risk, Other (See Comments)  Comments: (P) dizzy, double vision         Subjective    \"I just feel really weak, I'm going to need your help.\" pt reported during dressing tasks      Objective       10/18/20 0701   Precautions   Precautions Fall Risk;Other (See Comments)   Comments dizzy, double vision   Pain   Intervention Declines   Pain 0 - 10 Group   Pain Rating Scale (NPRS) 0   Therapist Pain Assessment Post Activity Pain Same as Prior to Activity   Cognition    Level of Consciousness Alert   Sleep/Wake Cycle   Sleep & Rest Awake   Functional Level of Assist   Eating Modified Independent   Eating Description   (sitting up in bed )   Grooming Modified Independent   Grooming Description Seated in wheelchair at sink  (brushed teeth/hair )   Bathing Supervision   Bathing Description Grab bar;Hand held shower;Tub bench;Set-up of equipment;Supervision for safety;Verbal cueing   Upper Body Dressing Minimal Assist   Upper Body Dressing Description Set-up of equipment;Supervision for safety;Increased time  (assist needed to don sports bra after shower)   Lower Body Dressing Minimal Assist   Lower Body Dressing Description Increased time;Initial preparation for task;Set-up of equipment;Supervision for safety;Verbal cueing  (assist to pull pants over hips in standing )   Bed, Chair, Wheelchair Transfer Contact Guard Assist   Bed Chair Wheelchair Transfer Description Set-up of equipment;Squat pivot transfer to wheelchair;Supervision for safety;Verbal cueing  (cues for safety- pt plopping into chair very unsteady)   Tub / Shower Transfers Contact Guard Assist   Tub Shower Transfer Description Grab bar;Set-up of equipment;Supervision for safety;Verbal cueing   Fine Motor / Dexterity    Fine Motor / Dexterity Yes   Fine Motor / Dexterity " Interventions issued pt blue foam for increasing hand  strength, pink thera-putty for in the room w/ handout on exercises and rubber band. Therapist verbally and visually demo each exercise    Bed Mobility    Supine to Sit Modified Independent   Sit to Supine Modified Independent   Scooting Modified Independent   Rolling Modified Independent   Skilled Intervention   (use of bed rail)   Interdisciplinary Plan of Care Collaboration   Patient Position at End of Therapy In Bed;Call Light within Reach;Tray Table within Reach;Phone within Reach   OT Total Time Spent   OT Individual Total Time Spent (Mins) 60   OT Charge Group   OT Self Care / ADL 3   OT Therapy Activity 1       Assessment    Pt tolerated session well. Pt reported dizziness and lightheadedness during standing to doff pants/brief. Pt reported feeling very weak today and needing more assist with dressing tasks than previous session after showering. Pt still able to complete shower at supervision level w/ increased time. Pt noted to be very unsteady during squat pivot xfer from EOB to w/c first thing in the morning and plopping down into the chair rather than slowly bringing herself down- educated pt on the importance of safety during xfer's- noted improvement as session went on with transfers and safety. Issued pt HEP for FMC w/ thera-putty, rubber band and blue foam.     Strengths: Alert and oriented, Able to follow instructions, Effective communication skills, Good insight into deficits/needs, Independent prior level of function, Manages pain appropriately, Motivated for self care and independence, Pleasant and cooperative, Supportive family, Willingly participates in therapeutic activities  Barriers: Decreased endurance, Fatigue, Generalized weakness, Hemiparesis, Impaired activity tolerance, Impaired balance, Limited mobility, Visual impairment(double vision)    Plan    R UE strengthening/GMC/FMC, Standing balance (static and dynamic), ADLs, IADLs,  typing    Occupational Therapy Goals     Problem: Bathing     Dates: Start: 10/08/20       Goal: STG-Within one week, patient will bathe     Dates: Start: 10/08/20       Description: 1) Individualized Goal:  body with CGA (for standing portion) using AE/AD  2) Interventions:  OT Self Care/ADL, OT Manual Ther Technique, OT Neuro Re-Ed/Balance, OT Sensory Int Techniques, OT Therapeutic Activity, OT Evaluation, and OT Therapeutic Exercise      Note:     Goal Note filed on 10/12/20 1305 by Dario Cooley, OT/L    Min A                        Problem: Dressing     Dates: Start: 10/08/20       Goal: STG-Within one week, patient will dress LB     Dates: Start: 10/08/20       Description: 1) Individualized Goal:  with CGA for standing portion using grab bar  2) Interventions:  OT Self Care/ADL, OT Manual Ther Technique, OT Neuro Re-Ed/Balance, OT Sensory Int Techniques, OT Therapeutic Activity, OT Evaluation, and OT Therapeutic Exercise    Note:     Goal Note filed on 10/12/20 1305 by Dario Cooley, OT/L    Min A for balance                        Problem: Functional Transfers     Dates: Start: 10/12/20       Goal: STG-Within one week, patient will transfer to toilet     Dates: Start: 10/12/20       Description: 1) Individualized Goal:  with SBA using grab bar  2) Interventions:  OT Self Care/ADL, OT Manual Ther Technique, OT Neuro Re-Ed/Balance, OT Sensory Int Techniques, OT Therapeutic Activity, OT Evaluation, and OT Therapeutic Exercise                Problem: OT Long Term Goals     Dates: Start: 10/08/20       Goal: LTG-By discharge, patient will complete basic self care tasks     Dates: Start: 10/08/20       Description: 1) Individualized Goal:  with mod I using AE/AD/techniques as needed  2) Interventions:  OT Self Care/ADL, OT Manual Ther Technique, OT Neuro Re-Ed/Balance, OT Sensory Int Techniques, OT Therapeutic Activity, OT Evaluation, and OT Therapeutic Exercise          Goal: LTG-By discharge, patient  will perform bathroom transfers     Dates: Start: 10/08/20       Description: 1) Individualized Goal:  with mod I using AE/AD/techniques as needed  2) Interventions:  OT Self Care/ADL, OT Manual Ther Technique, OT Neuro Re-Ed/Balance, OT Sensory Int Techniques, OT Therapeutic Activity, OT Evaluation, and OT Therapeutic Exercise          Goal: LTG-By discharge, patient will complete basic home management     Dates: Start: 10/08/20       Description: 1) Individualized Goal:  with supervision using AE/AD/techniques as needed  2) Interventions:  OT Self Care/ADL, OT Manual Ther Technique, OT Neuro Re-Ed/Balance, OT Sensory Int Techniques, OT Therapeutic Activity, OT Evaluation, and OT Therapeutic Exercise

## 2020-10-19 LAB
INR PPP: 2.21 (ref 0.87–1.13)
PROTHROMBIN TIME: 25.2 SEC (ref 12–14.6)

## 2020-10-19 PROCEDURE — 97130 THER IVNTJ EA ADDL 15 MIN: CPT

## 2020-10-19 PROCEDURE — 90832 PSYTX W PT 30 MINUTES: CPT | Performed by: PSYCHOLOGIST

## 2020-10-19 PROCEDURE — 770010 HCHG ROOM/CARE - REHAB SEMI PRIVAT*

## 2020-10-19 PROCEDURE — 97112 NEUROMUSCULAR REEDUCATION: CPT

## 2020-10-19 PROCEDURE — A9270 NON-COVERED ITEM OR SERVICE: HCPCS | Performed by: PHYSICAL MEDICINE & REHABILITATION

## 2020-10-19 PROCEDURE — 97530 THERAPEUTIC ACTIVITIES: CPT

## 2020-10-19 PROCEDURE — 97129 THER IVNTJ 1ST 15 MIN: CPT

## 2020-10-19 PROCEDURE — 99233 SBSQ HOSP IP/OBS HIGH 50: CPT | Performed by: PHYSICAL MEDICINE & REHABILITATION

## 2020-10-19 PROCEDURE — 85610 PROTHROMBIN TIME: CPT

## 2020-10-19 PROCEDURE — 97116 GAIT TRAINING THERAPY: CPT

## 2020-10-19 PROCEDURE — 700102 HCHG RX REV CODE 250 W/ 637 OVERRIDE(OP): Performed by: PHYSICAL MEDICINE & REHABILITATION

## 2020-10-19 PROCEDURE — 36415 COLL VENOUS BLD VENIPUNCTURE: CPT

## 2020-10-19 RX ORDER — WARFARIN SODIUM 3 MG/1
6 TABLET ORAL
Status: COMPLETED | OUTPATIENT
Start: 2020-10-19 | End: 2020-10-19

## 2020-10-19 RX ADMIN — LEVETIRACETAM 500 MG: 500 TABLET, FILM COATED ORAL at 09:00

## 2020-10-19 RX ADMIN — LEVETIRACETAM 500 MG: 500 TABLET, FILM COATED ORAL at 20:00

## 2020-10-19 RX ADMIN — ATORVASTATIN CALCIUM 40 MG: 40 TABLET, FILM COATED ORAL at 20:00

## 2020-10-19 RX ADMIN — DOCUSATE SODIUM 50 MG AND SENNOSIDES 8.6 MG 2 TABLET: 8.6; 5 TABLET, FILM COATED ORAL at 08:09

## 2020-10-19 RX ADMIN — ACETAMINOPHEN 650 MG: 325 TABLET, FILM COATED ORAL at 17:11

## 2020-10-19 RX ADMIN — WARFARIN SODIUM 6 MG: 3 TABLET ORAL at 17:12

## 2020-10-19 ASSESSMENT — GAIT ASSESSMENTS
GAIT LEVEL OF ASSIST: MINIMAL ASSIST
DISTANCE (FEET): 300
ASSISTIVE DEVICE: OTHER (COMMENTS)

## 2020-10-19 ASSESSMENT — ACTIVITIES OF DAILY LIVING (ADL): BED_CHAIR_WHEELCHAIR_TRANSFER_DESCRIPTION: SET-UP OF EQUIPMENT

## 2020-10-19 NOTE — CARE PLAN
Problem: Problem Solving STGs  Goal: STG-Within one week, patient will  Description: 1) Individualized goal:  complete functional organization tasks related to medication and financial mngt. With 100% accuracy provided SPV  2) Interventions:  SLP Cognitive Skill Development and SLP Group Treatment      Outcome: MET  Goal: STG-Within one week, patient will  Description: 1) Individualized goal:  complete alternating attention tasks with 80% accuracy provided MIN cues.   2) Interventions:  SLP Cognitive Skill Development and SLP Group Treatment      Outcome: MET

## 2020-10-19 NOTE — CARE PLAN
Problem: Mobility Transfers  Goal: STG-Within one week, patient will transfer bed to chair  Description: 1) Individualized goal:  SBA reach pivot + set up wc <> bed right/ left  2) Interventions:  PT Orthotics Training, PT Gait Training, PT Self Care/Home Eval, PT Therapeutic Exercises, PT Neuro Re-Ed/Balance, PT Aquatic Therapy, PT Therapeutic Activity, and PT Manual Therapy    Outcome: PROGRESSING AS EXPECTED  Note: CGA-SBA, PROCTOR 8/56, fall risk     Problem: Mobility  Goal: STG-Within one week, patient will propel wheelchair community  Description: 1) Individualized goal:  W/C mobility x 150 feet with SBA  2) Interventions:  PT Group Therapy, PT E Stim Attended, PT Gait Training, PT Therapeutic Exercises, PT Neuro Re-Ed/Balance, PT Aquatic Therapy, PT Therapeutic Activity, PT Manual Therapy, and PT Evaluation    Outcome: MET  Goal: STG-Within one week, patient will ambulate household distance  Description: 1) Individualized goal:  AMB x 25 feet with FWW and CGA  2) Interventions:  PT Group Therapy, PT E Stim Attended, PT Gait Training, PT Therapeutic Exercises, PT Neuro Re-Ed/Balance, PT Aquatic Therapy, PT Therapeutic Activity, PT Manual Therapy, and PT Evaluation    Outcome: MET  Note: GURJIT rizo

## 2020-10-19 NOTE — THERAPY
"Physical Therapy   Daily Treatment     Patient Name: Obdulia High  Age:  45 y.o., Sex:  female  Medical Record #: [id number]  Today's Date: 10/19/2020     Precautions  Precautions: Fall Risk  Comments: dizzy, diplopia    Subjective    Patient and spouse were pleased with overall progress, and enjoyed Vector training.       Objective       10/19/20 1031   Precautions   Precautions Fall Risk   Comments dizzy, diplopia   Vitals   Pulse 92   Blood Pressure 117/73   Gait Functional Level of Assist    Gait Level Of Assist Minimal Assist   Assistive Device Other (Comments)  (CGA  + no  HHA, both in Vector )   Distance (Feet) 300   # of Times Distance was Traveled 2   Deviation Shuffled Gait;Decreased Toe Off;Decreased Heel Strike;Bradykinetic   Stairs Functional Level of Assist   Level of Assist with Stairs Contact Guard Assist  (Vector harness)   # of Stairs Climbed 6  (6 4\" platform step, 6 6\" curb )   Stairs Description Extra time;Requires incidental assist;Walker   Transfer Functional Level of Assist   Bed, Chair, Wheelchair Transfer Contact Guard Assist   Bed Chair Wheelchair Transfer Description Set-up of equipment  (bed to wc)   Bed Mobility    Supine to Sit Modified Independent   Sit to Stand Contact Guard Assist   Scooting Modified Independent   Rolling Modified Independent   Neuro-Muscular Treatments   Neuro-Muscular Treatments Weight Shift Right;Weight Shift Left;Verbal Cuing;Tactile Cuing;Sequencing;Postural Facilitation;Postural Changes;Facilitation   Interdisciplinary Plan of Care Collaboration   IDT Collaboration with  Physician;Family / Caregiver   Patient Position at End of Therapy Edge of Bed;Seated;Family / Friend in Room   Collaboration Comments Spouse engaged in session, educated on step/ stair sequencing., POC.   Strengths & Barriers   Strengths Independent prior level of function;Making steady progress towards goals;Motivated for self care and independence;Pleasant and " "cooperative;Supportive family;Willingly participates in therapeutic activities   Barriers Decreased endurance;Fatigue;Impaired activity tolerance;Impaired balance;Visual impairment;Limited mobility;Impulsive   PT Total Time Spent   PT Individual Total Time Spent (Mins) 60   PT Charge Group   PT Gait Training 1   PT Neuromuscular Re-Education / Balance 2   PT Therapeutic Activities 1       Assessment    Patient progressed to Vector system training for ambulation (with and without AD), and for platform steps (4\" and 6\") with FWW. Patient c/o dizziness upon transition from sup to sit, that dec with seated rest. Patient demonstrated good direction following and sequencing during curb training with FWW. Patient completed 17 min of active training in Vector, while in the system for a total of 38 min.      Strengths: Independent prior level of function, Making steady progress towards goals, Motivated for self care and independence, Pleasant and cooperative, Supportive family, Willingly participates in therapeutic activities  Barriers: Decreased endurance, Fatigue, Impaired activity tolerance, Impaired balance, Visual impairment, Limited mobility, Impulsive    Plan    Hands on FWW training with spouse for in room ambulation and transfers. Continue gait training on lite gait and vector ([facility]) no device, neuro re-education for stance postural control. Ongoing habituation exercises for vestibular system, balance re-training.      Physical Therapy Problems     Problem: Mobility     Dates: Start: 10/19/20       Goal: STG-Within one week, patient will ambulate community distances     Dates: Start: 10/19/20       Description: 1) Individualized goal:  Patient will amb with FWW SBA indoors, CGA outdoors >150 ft  2) Interventions:  PT Group Therapy, PT Gait Training, PT Self Care/Home Eval, PT Therapeutic Exercises, PT Neuro Re-Ed/Balance, PT Aquatic Therapy, PT Therapeutic Activity, and PT Manual Therapy            Goal: " STG-Within one week, patient will ambulate up/down a curb     Dates: Start: 10/19/20       Description: 1) Individualized goal:  Patient will amb up/down curb 2x with FWW SBA  2) Interventions:  PT Group Therapy, PT Gait Training, PT Self Care/Home Eval, PT Therapeutic Exercises, PT Neuro Re-Ed/Balance, PT Aquatic Therapy, PT Therapeutic Activity, and PT Manual Therapy                  Problem: Mobility Transfers     Dates: Start: 10/12/20       Goal: STG-Within one week, patient will transfer bed to chair     Dates: Start: 10/12/20       Description: 1) Individualized goal:  SBA reach pivot + set up wc <> bed right/ left  2) Interventions:  PT Orthotics Training, PT Gait Training, PT Self Care/Home Eval, PT Therapeutic Exercises, PT Neuro Re-Ed/Balance, PT Aquatic Therapy, PT Therapeutic Activity, and PT Manual Therapy      Note:     Goal Note filed on 10/19/20 1233 by Rakel Mcneal, DPT    CGA-SBA, PROCTOR 8/56, fall risk                        Problem: PT-Long Term Goals     Dates: Start: 10/08/20       Goal: LTG-By discharge, patient will ambulate     Dates: Start: 10/08/20       Description: 1) Individualized goal:  AMB x 150 feet with LRD and SPV  2) Interventions:  PT Group Therapy, PT E Stim Attended, PT Gait Training, PT Therapeutic Exercises, PT Neuro Re-Ed/Balance, PT Aquatic Therapy, PT Therapeutic Activity, PT Manual Therapy, and PT Evaluation            Goal: LTG-By discharge, patient will transfer one surface to another     Dates: Start: 10/08/20       Description: 1) Individualized goal:  SPT with LRD and SPV  2) Interventions:  PT Group Therapy, PT E Stim Attended, PT Gait Training, PT Therapeutic Exercises, PT Neuro Re-Ed/Balance, PT Aquatic Therapy, PT Therapeutic Activity, PT Manual Therapy, and PT Evaluation            Goal: LTG-By discharge, patient will ambulate up/down 4-6 stairs     Dates: Start: 10/08/20       Description: 1) Individualized goal:  Up/down 4 steps with BHR and CGA  2)  Interventions:  PT Group Therapy, PT E Stim Attended, PT Gait Training, PT Therapeutic Exercises, PT Neuro Re-Ed/Balance, PT Aquatic Therapy, PT Therapeutic Activity, PT Manual Therapy, and PT Evaluation            Goal: LTG-By discharge, patient will transfer in/out of a car     Dates: Start: 10/08/20       Description: 1) Individualized goal:  Car transfer with LRD and SPV  2) Interventions:  PT Group Therapy, PT E Stim Attended, PT Gait Training, PT Therapeutic Exercises, PT Neuro Re-Ed/Balance, PT Aquatic Therapy, PT Therapeutic Activity, PT Manual Therapy, and PT Evaluation

## 2020-10-19 NOTE — PROGRESS NOTES
"Rehab Progress Note     Date of Service: 10/19/2020  Chief Complaint: follow up stroke    Interval Events (Subjective)    Patient seen and examined today in the therapy gym. She is walking with PT in the [facility] for the first time. Her  Keith is present. She denies any pain. She is sleeping well. Moving her bowels and bladder without any issues. She does continue to have double vision, as well as intermittent dizziness. She also continues to report abnormal feeling on her right arm, like there is a sock on it.  She has no other complaints.     Objective:  VITAL SIGNS: /71   Pulse 83   Temp 36.1 °C (97 °F) (Temporal)   Resp 18   Ht 1.702 m (5' 7.01\")   Wt 83.5 kg (184 lb 1.4 oz)   SpO2 94%   BMI 28.82 kg/m²   Gen: alert, no apparent distress  CV: regular rate and rhythm, no murmurs, no peripheral edema  Resp: clear to ascultation bilaterally, normal respiratory effort  GI: soft, non-tender abdomen, bowel sounds present  Neuro: notable for left ptosis, unable to adduct left eye, double vision, right sided incoordination and abnormal sensation.     Recent Results (from the past 72 hour(s))   Prothrombin Time    Collection Time: 10/17/20  5:28 AM   Result Value Ref Range    PT 28.9 (H) 12.0 - 14.6 sec    INR 2.63 (H) 0.87 - 1.13   Prothrombin Time    Collection Time: 10/18/20  5:56 AM   Result Value Ref Range    PT 28.0 (H) 12.0 - 14.6 sec    INR 2.53 (H) 0.87 - 1.13   Prothrombin Time    Collection Time: 10/19/20  7:15 AM   Result Value Ref Range    PT 25.2 (H) 12.0 - 14.6 sec    INR 2.21 (H) 0.87 - 1.13       Current Facility-Administered Medications   Medication Frequency   • meclizine (ANTIVERT) tablet 25 mg TID PRN   • levETIRAcetam (KEPPRA) tablet 500 mg Q12HRS   • traZODone (DESYREL) tablet 50 mg QHS   • melatonin tablet 3 mg QHS   • hydrOXYzine HCl (ATARAX) tablet 50 mg Q6HRS PRN   • Respiratory Therapy Consult Continuous RT   • Pharmacy Consult Request ...Pain Management Review 1 Each " PHARMACY TO DOSE   • hydrALAZINE (APRESOLINE) tablet 10 mg Q8HRS PRN   • artificial tears ophthalmic solution 1 Drop PRN   • benzocaine-menthol (CEPACOL) lozenge 1 Lozenge Q2HRS PRN   • mag hydrox-al hydrox-simeth (MAALOX PLUS ES or MYLANTA DS) suspension 20 mL Q2HRS PRN   • ondansetron (ZOFRAN ODT) dispertab 4 mg 4X/DAY PRN    Or   • ondansetron (ZOFRAN) syringe/vial injection 4 mg 4X/DAY PRN   • sodium chloride (OCEAN) 0.65 % nasal spray 2 Spray PRN   • lactulose 20 GM/30ML solution 30 mL QDAY PRN   • docusate sodium (ENEMEEZ) enema 283 mg QDAY PRN   • fleet enema 133 mL QDAY PRN   • atorvastatin (LIPITOR) tablet 40 mg Q EVENING   • senna-docusate (PERICOLACE or SENOKOT S) 8.6-50 MG per tablet 2 Tab BID    And   • polyethylene glycol/lytes (MIRALAX) PACKET 1 Packet QDAY PRN    And   • magnesium hydroxide (MILK OF MAGNESIA) suspension 30 mL QDAY PRN    And   • bisacodyl (DULCOLAX) suppository 10 mg QDAY PRN   • acetaminophen (TYLENOL) tablet 650 mg Q4HRS PRN   • acetaminophen/caffeine/butalbital 325-40-50 mg (FIORICET) -40 MG per tablet 1 Tab Q6HRS PRN   • MD Alert...Warfarin per Pharmacy PHARMACY TO DOSE       Orders Placed This Encounter   Procedures   • Diet Order Regular (meds whole 1:1 with thins)     Standing Status:   Standing     Number of Occurrences:   1     Order Specific Question:   Diet:     Answer:   Regular [1]     Comments:   meds whole 1:1 with thins       Assessment:  Active Hospital Problems    Diagnosis   • *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    I led and attended the weekly conference, and agree with  the IDT conference documentation and plan of care as noted below.    Date of conference: 10/19/2020    Goals and barriers: See IDT note.    Biggest barriers: visual impairments, impaired balance (initial PROCTOR 8/56), numb right upper extremity    CM/social support:  very supportive    Anticipated DC date: 10/27    Home health: PT/OT/SLP/RN    Equip: shower chair, grab bars, FWW    Follow up: PCP, stroke bridge clinic, Dr. Becker - rehab clinic, neuro-ophthalmology, optometry, ENT, Dr. Albert       Medical Decision Making and Plan:    Midbrain and khang left ischemic stroke  Left vertebral artery dissection  S/p thrombectomy Dr. 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 Albert   Outpatient follow up with stroke bridge clinic, referral made  Outpatient follow up with Dr. Becker, rehab clinic referral made  Outpatient follow up with Dr. Rosas, neurophthalmology referral made  Can also follow up with [facility] Freed, optometry, may benefit from prism glasses    Dizziness  Likely from double vision/cerebellar stroke  Continue eye patch  Orthostatics negative   PRN meclizine    Anxiety/depression  Dr. Hutchins consulted, appreciate assistance  No need for medications at this time    Hypophonia, improved  Suspect from intubation, not from stroke location  Speech therapy  FEES with impaired mobility of cords, and possible pathology on cords  Follow up with outpatient ENT, referral made    Hypertension, resolved    Hypotension  Negative orthostatics  BMP without signs of dehydration  Likely close to her  baseline given her age     History of migraines  Intermittent headache  PRN tylenol or Fioricet, not requiring/using     Insomnia, improved  Scheduled melatonin and trazodone    Oral thrush , resolved    S/p nystatin     Seizure prophylaxis  Continue [facility]  Outpatient follow up with stroke bridge clinic    Leukocytosis, resolved  UTI  Negative CXR    UTI, E Coli, treated  S/p cefuroxime    Bowel  Meds as needed  Last BM 10/17    Bladder  Check PVRs - 23, 17, 79  Patient not retaining   ICP for over 400 cc  Scheduled toileting    DVT prophylaxis  S/p Lovenox bridge to coumadin  Pharmacy dosing coumadin    Total time:  40 minutes.  I spent greater than 50% of the time for patient care, counseling, and coordination on this date, including patient face-to face time, unit/floor time with review of records/pertinent lab data and studies, as well as discussing diagnostic evaluation/work up, planned therapeutic interventions, and future disposition of care, as per the interval events/subjective and the assessment and plan as noted above.          Eden Mendoza M.D.   Physical Medicine and Rehabilitation

## 2020-10-19 NOTE — THERAPY
"Occupational Therapy  Daily Treatment     Patient Name: Obdulia High  Age:  45 y.o., Sex:  female  Medical Record #: [id number]  Today's Date: 10/19/2020     Precautions  Precautions: Fall Risk  Comments: dizzy, diplopia    Subjective    Patient reported low BP earlier this AM. Vitals re-checked, within normal range (see below).     Continues to report diplopia; patient stated she typically wears eye patch on L eye. Discussed alternating patch Q ~2 hrs to prevent extraocular muscles weakness. Patient stated L eye was very blurry when first switching eye patch onto R eye during this session, however acuity improved by end of session.      Objective     10/19/20 0701   Precautions   Precautions Fall Risk   Comments dizzy, diplopia   Vitals   Pulse 83   Patient BP Position Supine   Blood Pressure 102/71   O2 Delivery Device None - Room Air   Cognition    Level of Consciousness Alert   Interdisciplinary Plan of Care Collaboration   Patient Position at End of Therapy In Bed;Call Light within Reach;Tray Table within Reach  (with breakfast s/u)   OT Total Time Spent   OT Individual Total Time Spent (Mins) 60   OT Charge Group   OT Neuromuscular Re-education / Balance 2   OT Therapy Activity 2     Stereognosis - patient able to correctly ID 3/4 objects using RUE. Per patient, she's experiencing some numbness in RUE.     Patient able to grasp, pinch-open and place clothespins of varied resistance onto vertical and horizontal dowels using RUE. Able to complete task using 3-jaw pinch with yellow, red, green and blue clothespins, however required use of lateral pinch to complete task with black clothespins (high resistance).     Patient opened/closed all 7 compartments of pill organizer and able to retrieve \"pills\" (beads) using  RUE with occasional dropping of beads.     Brief typing activity using BUEs (patient attempted to access email). Difficulty with accuracy (due to decreased RUE FMC) and patient unable to recall her " password.      Assessment    Patient tolerated OT session well with focus on RUE strengthening, GMC/FMC. Patient required increased time to complete clothespin task due to decreased RUE coordination and impaired depth perception. Eye patch changed to R eye and visual acuity in L eye improved within session.    Strengths: Alert and oriented, Able to follow instructions, Effective communication skills, Good insight into deficits/needs, Independent prior level of function, Manages pain appropriately, Motivated for self care and independence, Pleasant and cooperative, Supportive family, Willingly participates in therapeutic activities  Barriers: Decreased endurance, Fatigue, Generalized weakness, Hemiparesis, Impaired activity tolerance, Impaired balance, Limited mobility, Visual impairment(double vision)    Plan    R UE strengthening/GMC/FMC, Standing balance (static and dynamic), ADLs, IADLs, typing       Occupational Therapy Goals     Problem: Bathing     Dates: Start: 10/08/20       Goal: STG-Within one week, patient will bathe     Dates: Start: 10/08/20       Description: 1) Individualized Goal:  body with CGA (for standing portion) using AE/AD  2) Interventions:  OT Self Care/ADL, OT Manual Ther Technique, OT Neuro Re-Ed/Balance, OT Sensory Int Techniques, OT Therapeutic Activity, OT Evaluation, and OT Therapeutic Exercise      Note:     Goal Note filed on 10/12/20 1305 by Dario Cooley OT/LUANNE    Min A                        Problem: Dressing     Dates: Start: 10/08/20       Goal: STG-Within one week, patient will dress LB     Dates: Start: 10/08/20       Description: 1) Individualized Goal:  with CGA for standing portion using grab bar  2) Interventions:  OT Self Care/ADL, OT Manual Ther Technique, OT Neuro Re-Ed/Balance, OT Sensory Int Techniques, OT Therapeutic Activity, OT Evaluation, and OT Therapeutic Exercise    Note:     Goal Note filed on 10/12/20 1305 by Dario Cooley OT/L    Min A for balance                         Problem: Functional Transfers     Dates: Start: 10/12/20       Goal: STG-Within one week, patient will transfer to toilet     Dates: Start: 10/12/20       Description: 1) Individualized Goal:  with SBA using grab bar  2) Interventions:  OT Self Care/ADL, OT Manual Ther Technique, OT Neuro Re-Ed/Balance, OT Sensory Int Techniques, OT Therapeutic Activity, OT Evaluation, and OT Therapeutic Exercise                Problem: OT Long Term Goals     Dates: Start: 10/08/20       Goal: LTG-By discharge, patient will complete basic self care tasks     Dates: Start: 10/08/20       Description: 1) Individualized Goal:  with mod I using AE/AD/techniques as needed  2) Interventions:  OT Self Care/ADL, OT Manual Ther Technique, OT Neuro Re-Ed/Balance, OT Sensory Int Techniques, OT Therapeutic Activity, OT Evaluation, and OT Therapeutic Exercise          Goal: LTG-By discharge, patient will perform bathroom transfers     Dates: Start: 10/08/20       Description: 1) Individualized Goal:  with mod I using AE/AD/techniques as needed  2) Interventions:  OT Self Care/ADL, OT Manual Ther Technique, OT Neuro Re-Ed/Balance, OT Sensory Int Techniques, OT Therapeutic Activity, OT Evaluation, and OT Therapeutic Exercise          Goal: LTG-By discharge, patient will complete basic home management     Dates: Start: 10/08/20       Description: 1) Individualized Goal:  with supervision using AE/AD/techniques as needed  2) Interventions:  OT Self Care/ADL, OT Manual Ther Technique, OT Neuro Re-Ed/Balance, OT Sensory Int Techniques, OT Therapeutic Activity, OT Evaluation, and OT Therapeutic Exercise

## 2020-10-19 NOTE — THERAPY
"Speech Language Pathology  Daily Treatment     Patient Name: Obdulia High  Age:  45 y.o., Sex:  female  Medical Record #: [id number]  Today's Date: 10/19/2020     Precautions  Precautions: Fall Risk  Comments: dizzy, diplopia    Subjective    \"I feel like my thinking it getting better, it's just my vision and my left side sucks.\" Pt reports having a good PT session this afternoon, \"it kicked my butt.\"      Objective     10/19/20 1504   Interdisciplinary Plan of Care Collaboration   Patient Position at End of Therapy In Bed;Call Light within Reach;Tray Table within Reach;Phone within Reach   SLP Total Time Spent   SLP Individual Total Time Spent (Mins) 30   Treatment Charges   SLP Cognitive Skill Development First 15 Minutes 1   SLP Cognitive Skill Development Additional 15 Minutes 1       Assessment    Path finding task - 50% IND, MIN cues to achieve 100%, scheduling tasks x2 trial: 1st trial - 4/7 (57%) IND, with MIN cues pt able to achieve 100%. 2nd trial: 1/7 initially, with MIN to MOD cues pt able to achieve 100%. Vision appears to be biggest barrier to completion of tasks with higher accuracy initially. Pt benefits from narrowing visual field when able and larger print, extra white space.     Strengths: Making steady progress towards goals, Pleasant and cooperative, Supportive family, Willingly participates in therapeutic activities, Motivated for self care and independence, Independent prior level of function, Effective communication skills, Alert and oriented, Able to follow instructions  Barriers: Visual impairment, Hemiparesis    Plan    Complete follow up outcome assessment testing to track progress using SCCAN    Speech Therapy Problems     Problem: Speech/Swallowing LTGs     Dates: Start: 10/08/20       Goal: LTG-By discharge, patient will safely swallow     Dates: Start: 10/08/20       Description: 1) Individualized goal:  regular textures/thin liquids for safe return to PLOF.   2) Interventions:  SLP " Swallowing Dysfunction Treatment, SLP Oral Pharyngeal Evaluation, SLP Video Swallow Evaluation, SLP Cognitive Skill Development, and SLP Group Treatment              Goal: LTG-By discharge, patient will solve complex problem     Dates: Start: 10/08/20       Description: 1) Individualized goal: By utilizing compensatory intervention for memory and problem-solving to allow for safe completion of daily activities with MOD I in order to prepare for safe d/c home  2) Interventions:  SLP Cognitive Skill Development and SLP Group Treatment

## 2020-10-19 NOTE — CARE PLAN
Problem: Dressing  Goal: STG-Within one week, patient will dress LB  Description: 1) Individualized Goal:  with CGA for standing portion using grab bar  2) Interventions:  OT Self Care/ADL, OT Manual Ther Technique, OT Neuro Re-Ed/Balance, OT Sensory Int Techniques, OT Therapeutic Activity, OT Evaluation, and OT Therapeutic Exercise  Outcome: NOT MET     Problem: Bathing  Goal: STG-Within one week, patient will bathe  Description: 1) Individualized Goal:  body with CGA (for standing portion) using AE/AD  2) Interventions:  OT Self Care/ADL, OT Manual Ther Technique, OT Neuro Re-Ed/Balance, OT Sensory Int Techniques, OT Therapeutic Activity, OT Evaluation, and OT Therapeutic Exercise    Outcome: MET

## 2020-10-19 NOTE — THERAPY
Speech Language Pathology  Daily Treatment     Patient Name: Obdulia High  Age:  45 y.o., Sex:  female  Medical Record #: [id number]  Today's Date: 10/19/2020     Precautions  Precautions: Fall Risk  Comments: dizzy, diplopia    Subjective    Pt just finishing breakfast as SLP entered the room. Pleasant and cooperative.      Objective       10/19/20 0834   Cognition   Executive Functioning / Organization Minimal (4)   Functional Math / Financial Management Supervision (5)   Interdisciplinary Plan of Care Collaboration   Patient Position at End of Therapy Seated;Call Light within Reach   SLP Total Time Spent   SLP Individual Total Time Spent (Mins) 30   Treatment Charges   SLP Cognitive Skill Development First 15 Minutes 1   SLP Cognitive Skill Development Additional 15 Minutes 1       Assessment    Pt corrected errors on 2 tasks from Grocery List #2 initiated day prior with 100% accuracy. Completed grocery task #4 using estimations with MIN A - errors made primarily due to vision vs processing calculation - pt often times transposing information from line above.     Strengths: Making steady progress towards goals, Pleasant and cooperative, Supportive family, Willingly participates in therapeutic activities, Motivated for self care and independence, Independent prior level of function, Effective communication skills, Alert and oriented, Able to follow instructions  Barriers: Visual impairment, Hemiparesis    Plan    Continue to target executive function/planning tasks.     Speech Therapy Problems     Problem: Speech/Swallowing LTGs     Dates: Start: 10/08/20       Goal: LTG-By discharge, patient will safely swallow     Dates: Start: 10/08/20       Description: 1) Individualized goal:  regular textures/thin liquids for safe return to PLOF.   2) Interventions:  SLP Swallowing Dysfunction Treatment, SLP Oral Pharyngeal Evaluation, SLP Video Swallow Evaluation, SLP Cognitive Skill Development, and SLP Group Treatment               Goal: LTG-By discharge, patient will solve complex problem     Dates: Start: 10/08/20       Description: 1) Individualized goal: By utilizing compensatory intervention for memory and problem-solving to allow for safe completion of daily activities with MOD I in order to prepare for safe d/c home  2) Interventions:  SLP Cognitive Skill Development and SLP Group Treatment

## 2020-10-20 LAB
ERYTHROCYTE [DISTWIDTH] IN BLOOD BY AUTOMATED COUNT: 45.1 FL (ref 35.9–50)
HCT VFR BLD AUTO: 38 % (ref 37–47)
HGB BLD-MCNC: 12.3 G/DL (ref 12–16)
INR PPP: 2.49 (ref 0.87–1.13)
MCH RBC QN AUTO: 30.4 PG (ref 27–33)
MCHC RBC AUTO-ENTMCNC: 32.4 G/DL (ref 33.6–35)
MCV RBC AUTO: 93.8 FL (ref 81.4–97.8)
PLATELET # BLD AUTO: 432 K/UL (ref 164–446)
PMV BLD AUTO: 8.9 FL (ref 9–12.9)
PROTHROMBIN TIME: 27.7 SEC (ref 12–14.6)
RBC # BLD AUTO: 4.05 M/UL (ref 4.2–5.4)
WBC # BLD AUTO: 5.1 K/UL (ref 4.8–10.8)

## 2020-10-20 PROCEDURE — 97530 THERAPEUTIC ACTIVITIES: CPT

## 2020-10-20 PROCEDURE — A9270 NON-COVERED ITEM OR SERVICE: HCPCS | Performed by: PHYSICAL MEDICINE & REHABILITATION

## 2020-10-20 PROCEDURE — 97535 SELF CARE MNGMENT TRAINING: CPT

## 2020-10-20 PROCEDURE — 97110 THERAPEUTIC EXERCISES: CPT

## 2020-10-20 PROCEDURE — 85610 PROTHROMBIN TIME: CPT

## 2020-10-20 PROCEDURE — 36415 COLL VENOUS BLD VENIPUNCTURE: CPT

## 2020-10-20 PROCEDURE — 99231 SBSQ HOSP IP/OBS SF/LOW 25: CPT | Performed by: PHYSICAL MEDICINE & REHABILITATION

## 2020-10-20 PROCEDURE — 85027 COMPLETE CBC AUTOMATED: CPT

## 2020-10-20 PROCEDURE — 97130 THER IVNTJ EA ADDL 15 MIN: CPT

## 2020-10-20 PROCEDURE — 770010 HCHG ROOM/CARE - REHAB SEMI PRIVAT*

## 2020-10-20 PROCEDURE — 97112 NEUROMUSCULAR REEDUCATION: CPT

## 2020-10-20 PROCEDURE — 97129 THER IVNTJ 1ST 15 MIN: CPT

## 2020-10-20 PROCEDURE — 700102 HCHG RX REV CODE 250 W/ 637 OVERRIDE(OP): Performed by: PHYSICAL MEDICINE & REHABILITATION

## 2020-10-20 RX ORDER — WARFARIN SODIUM 5 MG/1
5 TABLET ORAL
Status: COMPLETED | OUTPATIENT
Start: 2020-10-20 | End: 2020-10-20

## 2020-10-20 RX ADMIN — DOCUSATE SODIUM 50 MG AND SENNOSIDES 8.6 MG 2 TABLET: 8.6; 5 TABLET, FILM COATED ORAL at 08:08

## 2020-10-20 RX ADMIN — DOCUSATE SODIUM 50 MG AND SENNOSIDES 8.6 MG 2 TABLET: 8.6; 5 TABLET, FILM COATED ORAL at 19:59

## 2020-10-20 RX ADMIN — WARFARIN SODIUM 5 MG: 5 TABLET ORAL at 17:53

## 2020-10-20 RX ADMIN — ATORVASTATIN CALCIUM 40 MG: 40 TABLET, FILM COATED ORAL at 19:59

## 2020-10-20 RX ADMIN — LEVETIRACETAM 500 MG: 500 TABLET, FILM COATED ORAL at 08:08

## 2020-10-20 RX ADMIN — LEVETIRACETAM 500 MG: 500 TABLET, FILM COATED ORAL at 19:59

## 2020-10-20 ASSESSMENT — PATIENT HEALTH QUESTIONNAIRE - PHQ9
1. LITTLE INTEREST OR PLEASURE IN DOING THINGS: NOT AT ALL
SUM OF ALL RESPONSES TO PHQ9 QUESTIONS 1 AND 2: 0
2. FEELING DOWN, DEPRESSED, IRRITABLE, OR HOPELESS: NOT AT ALL
1. LITTLE INTEREST OR PLEASURE IN DOING THINGS: NOT AT ALL
2. FEELING DOWN, DEPRESSED, IRRITABLE, OR HOPELESS: NOT AT ALL
SUM OF ALL RESPONSES TO PHQ9 QUESTIONS 1 AND 2: 0

## 2020-10-20 ASSESSMENT — GAIT ASSESSMENTS
DEVIATION: DECREASED HEEL STRIKE;DECREASED TOE OFF
ASSISTIVE DEVICE: FRONT WHEEL WALKER
DISTANCE (FEET): 350
GAIT LEVEL OF ASSIST: CONTACT GUARD ASSIST

## 2020-10-20 ASSESSMENT — ACTIVITIES OF DAILY LIVING (ADL)
BED_CHAIR_WHEELCHAIR_TRANSFER_DESCRIPTION: ADAPTIVE EQUIPMENT
TOILET_TRANSFER_DESCRIPTION: ADAPTIVE EQUIPMENT

## 2020-10-20 NOTE — THERAPY
"Speech Language Pathology  Daily Treatment     Patient Name: Obdulia High  Age:  45 y.o., Sex:  female  Medical Record #: [id number]  Today's Date: 10/20/2020     Precautions  Precautions: Fall Risk  Comments: dizzy, diplopia    Subjective    \"Dario kicked my ass today!\" Pt appeared in good spirits, agreeable to ST. Voice continues to improve with pt able to voice (no periods of aphonia) for entire 60 minute session. Pt reports with fatigue voice becomes weaker.      Objective     10/20/20 1004   SCCAN (Scales of Cognitive and Communicative Ability for Neurorehabilitation)   Oral Expression - Raw Score 18   Oral Expression - Scale Performance Score 95   Orientation - Raw Score 12   Orientation - Scale Performance Score 100   Memory - Raw Score 19   Memory - Scale Performance Score 100   Speech Comprehension - Raw Score 13   Speech Comprehension - Scale Performance Score 100   Reading Comprehension - Raw Score 12   Reading Comprehension - Scale Performance Score 100   Writing - Raw Score 7   Writing - Scale Performance Score 100   Attention - Raw Score 15   Attention - Scale Performance Score 94   Problem Solving - Raw Score 22   Problem Solving - Scale Performance Score 96   SCCAN Total Raw Score 92   SCCAN Degree of Severity Typical Functioning   Speech Language Pathologist Assigned   Assigned SLP / Extension ES/30 cog only SPLIT OK   SLP Total Time Spent   SLP Individual Total Time Spent (Mins) 60   Treatment Charges   SLP Cognitive Skill Development First 15 Minutes 1   SLP Cognitive Skill Development Additional 15 Minutes 3       Assessment    The Scales of Cognitive and Communicative Ability for Neurorehabilitation (SCCAN) assesses cognitive-communicative deficits and functional ability in patients in rehabilitation hospitals, clinics, and skilled nursing facilities. The SCCAN is appropriate for a broad range of neurological patients, provides a measure of both impairment and functional ability.     SCCAN (Scales " of Cognitive and Communicative Ability for Neurorehabilitation)  Oral Expression - Raw Score: 18  Oral Expression - Scale Performance Score: 95  Orientation - Raw Score: 12  Orientation - Scale Performance Score: 100  Memory - Raw Score: 19  Memory - Scale Performance Score: 100  Speech Comprehension - Raw Score: 13  Speech Comprehension - Scale Performance Score: 100  Reading Comprehension - Raw Score: 12  Reading Comprehension - Scale Performance Score: 100  Writing - Raw Score: 7  Writing - Scale Performance Score: 100  Attention - Raw Score: 15  Attention - Scale Performance Score: 94  Problem Solving - Raw Score: 22  Problem Solving - Scale Performance Score: 96  SCCAN Total Raw Score: 92  SCCAN Degree of Severity: Typical Functioning    Follow up outcome assessment testing completed this date to monitor progress. Pt with improvements across all domains tested, overall score improved from 81 (mild impairment) to 92 (within typical functioning). Pt continues to report improvement in cognition and reports vision is biggest barrier to visual/paper-pencil tasks.     Strengths: Making steady progress towards goals, Pleasant and cooperative, Supportive family, Willingly participates in therapeutic activities, Motivated for self care and independence, Independent prior level of function, Effective communication skills, Alert and oriented, Able to follow instructions  Barriers: Visual impairment, Hemiparesis    Plan    Decrease ST to 30 minutes, continue to target complex information processing, executive function    Speech Therapy Problems     Problem: Speech/Swallowing LTGs     Dates: Start: 10/08/20       Goal: LTG-By discharge, patient will safely swallow     Dates: Start: 10/08/20       Description: 1) Individualized goal:  regular textures/thin liquids for safe return to PLOF.   2) Interventions:  SLP Swallowing Dysfunction Treatment, SLP Oral Pharyngeal Evaluation, SLP Video Swallow Evaluation, SLP Cognitive  Skill Development, and SLP Group Treatment              Goal: LTG-By discharge, patient will solve complex problem     Dates: Start: 10/08/20       Description: 1) Individualized goal: By utilizing compensatory intervention for memory and problem-solving to allow for safe completion of daily activities with MOD I in order to prepare for safe d/c home  2) Interventions:  SLP Cognitive Skill Development and SLP Group Treatment

## 2020-10-20 NOTE — DISCHARGE PLANNING
Met with patient following Team Conference to relay progress. Patient aware she needs a FWW. Choice for home health care - [facility] . Name of PCP is Dr. Hernandez.   Patient states she made progress today. Opportunity for questions/discussion provided.

## 2020-10-20 NOTE — PROGRESS NOTES
"Rehab Progress Note     Date of Service: 10/20/2020  Chief Complaint: follow up stroke    Interval Events (Subjective)    Patient seen and examined today in her room.  Her voice continues to improve.  She is wearing the eye patch on the right eye today so she continued to improve the movements of her left eye.  She does report continued mild blurry vision out of the left eye.  She continues to work on her fine motor coordination of her right hand which she thinks in part is due to her visual impairments.  She is also dizzy when she first tries to sit up.  She denies any pain or headache.  No issues with her bowel bladder.  She is sleeping and eating well.  She has no new complaints today.  Physical therapy arrives and patient is eager to walk in the vector again today.    Objective:  VITAL SIGNS: /68   Pulse 92   Temp 36.6 °C (97.8 °F) (Temporal)   Resp 16   Ht 1.702 m (5' 7.01\")   Wt 83.5 kg (184 lb 1.4 oz)   SpO2 90%   BMI 28.82 kg/m²   Gen: alert, no apparent distress  CV: regular rate and rhythm, no murmurs, no peripheral edema  Resp: clear to ascultation bilaterally, normal respiratory effort  GI: soft, non-tender abdomen, bowel sounds present  Neuro: notable for improved hypophonia, improved left ptosis with improvements today in vertical left eye movements as well as abduction, continue double vision      Recent Results (from the past 72 hour(s))   Prothrombin Time    Collection Time: 10/18/20  5:56 AM   Result Value Ref Range    PT 28.0 (H) 12.0 - 14.6 sec    INR 2.53 (H) 0.87 - 1.13   Prothrombin Time    Collection Time: 10/19/20  7:15 AM   Result Value Ref Range    PT 25.2 (H) 12.0 - 14.6 sec    INR 2.21 (H) 0.87 - 1.13   Prothrombin Time    Collection Time: 10/20/20  7:13 AM   Result Value Ref Range    PT 27.7 (H) 12.0 - 14.6 sec    INR 2.49 (H) 0.87 - 1.13   CBC WITHOUT DIFFERENTIAL    Collection Time: 10/20/20  7:13 AM   Result Value Ref Range    WBC 5.1 4.8 - 10.8 K/uL    RBC 4.05 (L) 4.20 " - 5.40 M/uL    Hemoglobin 12.3 12.0 - 16.0 g/dL    Hematocrit 38.0 37.0 - 47.0 %    MCV 93.8 81.4 - 97.8 fL    MCH 30.4 27.0 - 33.0 pg    MCHC 32.4 (L) 33.6 - 35.0 g/dL    RDW 45.1 35.9 - 50.0 fL    Platelet Count 432 164 - 446 K/uL    MPV 8.9 (L) 9.0 - 12.9 fL       Current Facility-Administered Medications   Medication Frequency   • warfarin (COUMADIN) tablet 5 mg ONCE AT 1800   • meclizine (ANTIVERT) tablet 25 mg TID PRN   • levETIRAcetam (KEPPRA) tablet 500 mg Q12HRS   • traZODone (DESYREL) tablet 50 mg QHS   • melatonin tablet 3 mg QHS   • hydrOXYzine HCl (ATARAX) tablet 50 mg Q6HRS PRN   • Respiratory Therapy Consult Continuous RT   • Pharmacy Consult Request ...Pain Management Review 1 Each PHARMACY TO DOSE   • hydrALAZINE (APRESOLINE) tablet 10 mg Q8HRS PRN   • artificial tears ophthalmic solution 1 Drop PRN   • benzocaine-menthol (CEPACOL) lozenge 1 Lozenge Q2HRS PRN   • mag hydrox-al hydrox-simeth (MAALOX PLUS ES or MYLANTA DS) suspension 20 mL Q2HRS PRN   • ondansetron (ZOFRAN ODT) dispertab 4 mg 4X/DAY PRN    Or   • ondansetron (ZOFRAN) syringe/vial injection 4 mg 4X/DAY PRN   • sodium chloride (OCEAN) 0.65 % nasal spray 2 Spray PRN   • lactulose 20 GM/30ML solution 30 mL QDAY PRN   • docusate sodium (ENEMEEZ) enema 283 mg QDAY PRN   • fleet enema 133 mL QDAY PRN   • atorvastatin (LIPITOR) tablet 40 mg Q EVENING   • senna-docusate (PERICOLACE or SENOKOT S) 8.6-50 MG per tablet 2 Tab BID    And   • polyethylene glycol/lytes (MIRALAX) PACKET 1 Packet QDAY PRN    And   • magnesium hydroxide (MILK OF MAGNESIA) suspension 30 mL QDAY PRN    And   • bisacodyl (DULCOLAX) suppository 10 mg QDAY PRN   • acetaminophen (TYLENOL) tablet 650 mg Q4HRS PRN   • acetaminophen/caffeine/butalbital 325-40-50 mg (FIORICET) -40 MG per tablet 1 Tab Q6HRS PRN   • MD Alert...Warfarin per Pharmacy PHARMACY TO DOSE       Orders Placed This Encounter   Procedures   • Diet Order Regular (meds whole 1:1 with thins)     Standing  Status:   Standing     Number of Occurrences:   1     Order Specific Question:   Diet:     Answer:   Regular [1]     Comments:   meds whole 1:1 with thins       Assessment:  Active Hospital Problems    Diagnosis   • *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    I led and attended the weekly conference, and agree with the IDT conference documentation and plan of care as noted below.    Date of conference: 10/19/2020    Goals and barriers: See IDT note.    Biggest barriers: visual impairments, impaired balance (initial PROCTOR 8/56), numb right upper extremity    CM/social support:  very supportive    Anticipated DC date: 10/27    Home health: PT/OT/SLP/RN    Equip: shower chair, grab bars, FWW    Follow up: PCP, stroke bridge clinic, Dr. Becker - rehab clinic, neuro-ophthalmology, optometry, ENT, Dr. Albert       Medical Decision Making and Plan:    Midbrain and khang left ischemic stroke  Left vertebral artery dissection  S/p thrombectomy Dr. 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 Albert   Outpatient follow up with stroke bridge clinic, referral made  Outpatient follow up with   [facility], rehab clinic referral made  Outpatient follow up with Dr. Rosas, neurophthalmology referral made  Can also follow up with Clinton Freed, optometry, may benefit from prism glasses    Dizziness, continues  Likely from double vision/cerebellar stroke  Continue eye patch  Orthostatics negative   PRN meclizine    Anxiety/depression  Dr. Hutchins consulted, appreciate assistance  No need for medications at this time    Hypophonia, improved  Suspect from intubation, not from stroke location  Speech therapy  FEES with impaired mobility of cords, and possible pathology on cords  Follow up with outpatient ENT, referral made    Hypertension, resolved    Hypotension  Negative orthostatics  BMP without signs of dehydration  Likely close to her baseline given her age     History of migraines  Intermittent headache  PRN tylenol or Fioricet, not requiring/using     Insomnia, improved/resolved  Scheduled melatonin and trazodone    Oral thrush , resolved    S/p nystatin     Seizure prophylaxis  Continue Keppra  Outpatient follow up with stroke bridge clinic    Leukocytosis, resolved  UTI  Negative CXR    UTI, E Coli, treated  S/p cefuroxime    Bowel  Meds as needed  Last BM 10/19    Bladder  Check PVRs - 23, 17, 79  Patient not retaining   ICP for over 400 cc  Scheduled toileting    DVT prophylaxis  S/p Lovenox bridge to coumadin  Pharmacy dosing coumadin    Total time:  16 minutes.  I spent greater than 50% of the time for patient care, counseling, and coordination on this date, including patient face-to face time, unit/floor time with review of records/pertinent lab data and studies, as well as discussing diagnostic evaluation/work up, planned therapeutic interventions, and future disposition of care, as per the interval events/subjective and the assessment and plan as noted above.    I have performed a physical exam, reviewed and updated ROS, as well as the assessment and plan today 10/20/2020. In review of note from  10/19/2020 there are no new changes except as documented above.      Eden Mendoza M.D.   Physical Medicine and Rehabilitation

## 2020-10-20 NOTE — CARE PLAN
Problem: Safety  Goal: Will remain free from injury  Outcome: PROGRESSING AS EXPECTED  Note: Pt uses call light  appropriately. Waits for assistance and does not attempt self transfer this shift. Able to verbalize needs.      Problem: Bowel/Gastric:  Goal: Normal bowel function is maintained or improved  Outcome: PROGRESSING AS EXPECTED  Note: Pt continent of bowel. She refused her scheduled senna tonight and reports a regular daily bowel movement. LBM 10/19/20.

## 2020-10-20 NOTE — THERAPY
"Physical Therapy   Daily Treatment     Patient Name: Obdulia High  Age:  45 y.o., Sex:  female  Medical Record #: [id number]  Today's Date: 10/20/2020     Precautions  Precautions: (P) Fall Risk  Comments: (P) dizzy, diplopia    Subjective    Patient preferred to retest PROCTOR tomorrow, due to fatigue from previous sessions. Spouse reported that the 2 garage steps are 16\" D, but the front entrance is 1 step.      Objective       10/20/20 1401   Precautions   Precautions Fall Risk   Comments dizzy, diplopia   Gait Functional Level of Assist    Gait Level Of Assist Contact Guard Assist   Assistive Device Front Wheel Walker   Distance (Feet) 350   # of Times Distance was Traveled 1   Deviation Decreased Heel Strike;Decreased Toe Off   Transfer Functional Level of Assist   Bed, Chair, Wheelchair Transfer Contact Guard Assist  (SPT FWW, spouse training)   Bed Chair Wheelchair Transfer Description Adaptive equipment   Toilet Transfers Contact Guard Assist  (CGA/SBA with FWW SPT, spouse trained)   Toilet Transfer Description Adaptive equipment   Bed Mobility    Supine to Sit Modified Independent   Sit to Supine Modified Independent   Sit to Stand Stand by Assist   Scooting Modified Independent   Rolling Modified Independent   Neuro-Muscular Treatments   Neuro-Muscular Treatments Verbal Cuing;Tactile Cuing;Sequencing;Weight Shift Right;Weight Shift Left   Comments Reach to targets and cross body with cones, to emphasis functional head turns, eye movement, and balance, in all four quadrants, no UE support SBA, 4x 7 cones each. Patient was accurate with target placement. Ball catch/ toss with spouse x 2-3 min, with targets progressing to inc challenge, CGA. Balloon toss with RUE, no UE support, CGA, x 3 min, with progressing pace and difficulty. Airex foam balance with narrowing TABATHA, and intermittent eye closed, SBA.        Interdisciplinary Plan of Care Collaboration   IDT Collaboration with  Family / Caregiver   Patient " Position at End of Therapy Seated;Family / Friend in Room   Collaboration Comments Family training with spouse for in room ambulation and transfers with FWW, using gait belt and guarding skills. Roomboard updated.    Strengths & Barriers   Strengths Independent prior level of function;Making steady progress towards goals;Motivated for self care and independence;Pleasant and cooperative;Supportive family;Willingly participates in therapeutic activities   Barriers Decreased endurance;Fatigue;Impaired activity tolerance;Impaired balance;Visual impairment;Limited mobility;Impulsive   PT Total Time Spent   PT Individual Total Time Spent (Mins) 60   PT Charge Group   PT Therapeutic Exercise 2   PT Neuromuscular Re-Education / Balance 1   PT Therapeutic Activities 1     Physician present at start of session: ADD movement noted in eye  Standing balance with head turns SBA, flat ground     Assessment    Patient demonstrated the ability to manage dizziness with moving targets including cones, ball, and balloons. Patient demonstrated no LOB, and using minimal UE support. Spouse remains engaged and participatory in session. Spouse cleared to transfer and amb with patient in room. Patient's baseline dizziness: 2/10, and elevated to 4/10 during dynamic head turns, with eye movement.      Strengths: (P) Independent prior level of function, Making steady progress towards goals, Motivated for self care and independence, Pleasant and cooperative, Supportive family, Willingly participates in therapeutic activities  Barriers: (P) Decreased endurance, Fatigue, Impaired activity tolerance, Impaired balance, Visual impairment, Limited mobility, Impulsive    Plan    Reassess PROCTOR (previously 8/56), progress curb mobility with FWW (front entrance simulation), stair mobility progressing to 2 steps with HHA prior to D/C- with training with spouse (garage entrance simulation), balance, endurance, habituation exercise progression.     Physical  Therapy Problems     Problem: Mobility     Dates: Start: 10/19/20       Goal: STG-Within one week, patient will ambulate community distances     Dates: Start: 10/19/20       Description: 1) Individualized goal:  Patient will amb with FWW SBA indoors, CGA outdoors >150 ft  2) Interventions:  PT Group Therapy, PT Gait Training, PT Self Care/Home Eval, PT Therapeutic Exercises, PT Neuro Re-Ed/Balance, PT Aquatic Therapy, PT Therapeutic Activity, and PT Manual Therapy            Goal: STG-Within one week, patient will ambulate up/down a curb     Dates: Start: 10/19/20       Description: 1) Individualized goal:  Patient will amb up/down curb 2x with FWW SBA  2) Interventions:  PT Group Therapy, PT Gait Training, PT Self Care/Home Eval, PT Therapeutic Exercises, PT Neuro Re-Ed/Balance, PT Aquatic Therapy, PT Therapeutic Activity, and PT Manual Therapy                  Problem: Mobility Transfers     Dates: Start: 10/12/20       Goal: STG-Within one week, patient will transfer bed to chair     Dates: Start: 10/12/20       Description: 1) Individualized goal:  SBA reach pivot + set up wc <> bed right/ left  2) Interventions:  PT Orthotics Training, PT Gait Training, PT Self Care/Home Eval, PT Therapeutic Exercises, PT Neuro Re-Ed/Balance, PT Aquatic Therapy, PT Therapeutic Activity, and PT Manual Therapy      Note:     Goal Note filed on 10/19/20 1233 by Rakel Mcneal, DPT    CGA-SBA, PROCTOR 8/56, fall risk                        Problem: PT-Long Term Goals     Dates: Start: 10/08/20       Goal: LTG-By discharge, patient will ambulate     Dates: Start: 10/08/20       Description: 1) Individualized goal:  AMB x 150 feet with LRD and SPV  2) Interventions:  PT Group Therapy, PT E Stim Attended, PT Gait Training, PT Therapeutic Exercises, PT Neuro Re-Ed/Balance, PT Aquatic Therapy, PT Therapeutic Activity, PT Manual Therapy, and PT Evaluation            Goal: LTG-By discharge, patient will transfer one surface to another      Dates: Start: 10/08/20       Description: 1) Individualized goal:  SPT with LRD and SPV  2) Interventions:  PT Group Therapy, PT E Stim Attended, PT Gait Training, PT Therapeutic Exercises, PT Neuro Re-Ed/Balance, PT Aquatic Therapy, PT Therapeutic Activity, PT Manual Therapy, and PT Evaluation            Goal: LTG-By discharge, patient will ambulate up/down 4-6 stairs     Dates: Start: 10/08/20       Description: 1) Individualized goal:  Up/down 4 steps with BHR and CGA  2) Interventions:  PT Group Therapy, PT E Stim Attended, PT Gait Training, PT Therapeutic Exercises, PT Neuro Re-Ed/Balance, PT Aquatic Therapy, PT Therapeutic Activity, PT Manual Therapy, and PT Evaluation            Goal: LTG-By discharge, patient will transfer in/out of a car     Dates: Start: 10/08/20       Description: 1) Individualized goal:  Car transfer with LRD and SPV  2) Interventions:  PT Group Therapy, PT E Stim Attended, PT Gait Training, PT Therapeutic Exercises, PT Neuro Re-Ed/Balance, PT Aquatic Therapy, PT Therapeutic Activity, PT Manual Therapy, and PT Evaluation

## 2020-10-20 NOTE — PROGRESS NOTES
Pharmacy Warfarin Consult   10/20/2020     45 y.o.   female     Indication for anticoagulation: Stroke     Goal INR = 2 - 3    Recent Labs     10/18/20  0556 10/19/20  0715 10/20/20  0713   INR 2.53* 2.21* 2.49*   HEMOGLOBIN  --   --  12.3   HEMATOCRIT  --   --  38.0       Pertinent Drug/Drug Interactions:  Statin, prn trazodone  Outpatient Warfarin Regimen:  New start  Recent Warfarin Dosing:    Dose from last 7 days     Date/Time Dose (mg)    10/20/20 0713  5    10/19/20 0715  6    10/18/20 0556  5    10/17/20 0528  5    10/16/20 0540  6    10/15/20 0659  6    10/14/20 0527  7.5          Bridge Therapy: None           1.  Warfarin 5 mg tonight for INR = 2.49           Sha Weiss [facility]

## 2020-10-20 NOTE — THERAPY
Occupational Therapy  Daily Treatment     Patient Name: Obdulia High  Age:  45 y.o., Sex:  female  Medical Record #: [id number]  Today's Date: 10/20/2020     Precautions  Precautions: Fall Risk  Comments: dizzy, diplopia         Subjective    Patient lying in bed awake, just finished breakfast.  States that it feels like there is a sock on her right arm.     Objective       10/20/20 0831   Precautions   Precautions Fall Risk   Comments dizzy, diplopia   Functional Level of Assist   Grooming Stand by Assist   Grooming Description Standing at sink;Supervision for safety  (SBA standing at sink to brush hair/teeth, wash face/hands)   Lower Body Dressing Supervision   Lower Body Dressing Description Set-up of equipment;Supervision for safety  (to don shoes with laces)   Fine Motor / Dexterity    Fine Motor / Dexterity Interventions Used right thumb and index finger and right thumb and middle finger to move nut up and down a screw focusing on the quality of movement with the fingers working together.     IADL Treatments   IADL Treatments Kitchen mobility education   Kitchen Mobility Education Completed kitchen mobility with FWW and CGA/SBA while retrieving items from high/low cupboard, drawers, counter and various appliances.  Removed a casserole dish from oven with hot wilian using left hand.     Sitting Lower Body Exercises   Nustep Time (See Comments)  (level 9 x 10 minutes with B UE/LE for endurance building)   Bed Mobility    Supine to Sit Modified Independent   Sit to Supine Modified Independent   Scooting Modified Independent   Interdisciplinary Plan of Care Collaboration   Patient Position at End of Therapy In Bed;Call Light within Reach;Tray Table within Reach   OT Total Time Spent   OT Individual Total Time Spent (Mins) 60   OT Charge Group   OT Self Care / ADL 1   OT Therapy Activity 2   OT Therapeutic Exercise  1   Functional mobility with FWW from room <> family [employer] gym with CGA.      Assessment    Patient was  breathing heavily during use of nustep, but completed ten minutes without a rest break.  Right hand fatigues quickly with FMC exercises.  Strengths: Alert and oriented, Able to follow instructions, Effective communication skills, Good insight into deficits/needs, Independent prior level of function, Manages pain appropriately, Motivated for self care and independence, Pleasant and cooperative, Supportive family, Willingly participates in therapeutic activities  Barriers: Decreased endurance, Fatigue, Generalized weakness, Hemiparesis, Impaired activity tolerance, Impaired balance, Limited mobility, Visual impairment(double vision)    Plan    R UE strengthening/GMC/FMC, Standing balance (static and dynamic), ADLs, IADLs, typing    Occupational Therapy Goals     Problem: Dressing     Dates: Start: 10/08/20       Goal: STG-Within one week, patient will dress LB     Dates: Start: 10/08/20       Description: 1) Individualized Goal:  with CGA for standing portion using grab bar  2) Interventions:  OT Self Care/ADL, OT Manual Ther Technique, OT Neuro Re-Ed/Balance, OT Sensory Int Techniques, OT Therapeutic Activity, OT Evaluation, and OT Therapeutic Exercise    Note:     Goal Note filed on 10/12/20 3365 by Dario Cooley OT/L    Min A for balance                        Problem: Functional Transfers     Dates: Start: 10/12/20       Goal: STG-Within one week, patient will transfer to toilet     Dates: Start: 10/12/20       Description: 1) Individualized Goal:  with SBA using grab bar  2) Interventions:  OT Self Care/ADL, OT Manual Ther Technique, OT Neuro Re-Ed/Balance, OT Sensory Int Techniques, OT Therapeutic Activity, OT Evaluation, and OT Therapeutic Exercise                Problem: OT Long Term Goals     Dates: Start: 10/08/20       Goal: LTG-By discharge, patient will complete basic self care tasks     Dates: Start: 10/08/20       Description: 1) Individualized Goal:  with mod I using AE/AD/techniques as needed  2)  Interventions:  OT Self Care/ADL, OT Manual Ther Technique, OT Neuro Re-Ed/Balance, OT Sensory Int Techniques, OT Therapeutic Activity, OT Evaluation, and OT Therapeutic Exercise          Goal: LTG-By discharge, patient will perform bathroom transfers     Dates: Start: 10/08/20       Description: 1) Individualized Goal:  with mod I using AE/AD/techniques as needed  2) Interventions:  OT Self Care/ADL, OT Manual Ther Technique, OT Neuro Re-Ed/Balance, OT Sensory Int Techniques, OT Therapeutic Activity, OT Evaluation, and OT Therapeutic Exercise          Goal: LTG-By discharge, patient will complete basic home management     Dates: Start: 10/08/20       Description: 1) Individualized Goal:  with supervision using AE/AD/techniques as needed  2) Interventions:  OT Self Care/ADL, OT Manual Ther Technique, OT Neuro Re-Ed/Balance, OT Sensory Int Techniques, OT Therapeutic Activity, OT Evaluation, and OT Therapeutic Exercise

## 2020-10-21 DIAGNOSIS — I63.22 ACUTE ISCHEMIC VERTEBROBASILAR ARTERY BRAINSTEM STROKE INVOLVING LEFT-SIDED VESSEL (HCC): ICD-10-CM

## 2020-10-21 DIAGNOSIS — I63.212 ACUTE ISCHEMIC VERTEBROBASILAR ARTERY BRAINSTEM STROKE INVOLVING LEFT-SIDED VESSEL (HCC): ICD-10-CM

## 2020-10-21 LAB
INR PPP: 2.19 (ref 0.87–1.13)
PROTHROMBIN TIME: 25 SEC (ref 12–14.6)

## 2020-10-21 PROCEDURE — 700102 HCHG RX REV CODE 250 W/ 637 OVERRIDE(OP): Performed by: PHYSICAL MEDICINE & REHABILITATION

## 2020-10-21 PROCEDURE — 99231 SBSQ HOSP IP/OBS SF/LOW 25: CPT | Performed by: PHYSICAL MEDICINE & REHABILITATION

## 2020-10-21 PROCEDURE — 97530 THERAPEUTIC ACTIVITIES: CPT

## 2020-10-21 PROCEDURE — 770010 HCHG ROOM/CARE - REHAB SEMI PRIVAT*

## 2020-10-21 PROCEDURE — 97116 GAIT TRAINING THERAPY: CPT

## 2020-10-21 PROCEDURE — 85610 PROTHROMBIN TIME: CPT

## 2020-10-21 PROCEDURE — 97112 NEUROMUSCULAR REEDUCATION: CPT

## 2020-10-21 PROCEDURE — 97535 SELF CARE MNGMENT TRAINING: CPT

## 2020-10-21 PROCEDURE — 36415 COLL VENOUS BLD VENIPUNCTURE: CPT

## 2020-10-21 PROCEDURE — 97129 THER IVNTJ 1ST 15 MIN: CPT

## 2020-10-21 PROCEDURE — A9270 NON-COVERED ITEM OR SERVICE: HCPCS | Performed by: PHYSICAL MEDICINE & REHABILITATION

## 2020-10-21 PROCEDURE — 97130 THER IVNTJ EA ADDL 15 MIN: CPT

## 2020-10-21 RX ORDER — WARFARIN SODIUM 3 MG/1
6 TABLET ORAL
Status: COMPLETED | OUTPATIENT
Start: 2020-10-21 | End: 2020-10-21

## 2020-10-21 RX ADMIN — DOCUSATE SODIUM 50 MG AND SENNOSIDES 8.6 MG 2 TABLET: 8.6; 5 TABLET, FILM COATED ORAL at 21:03

## 2020-10-21 RX ADMIN — LEVETIRACETAM 500 MG: 500 TABLET, FILM COATED ORAL at 08:39

## 2020-10-21 RX ADMIN — WARFARIN SODIUM 6 MG: 3 TABLET ORAL at 17:34

## 2020-10-21 RX ADMIN — ATORVASTATIN CALCIUM 40 MG: 40 TABLET, FILM COATED ORAL at 21:03

## 2020-10-21 RX ADMIN — LEVETIRACETAM 500 MG: 500 TABLET, FILM COATED ORAL at 21:03

## 2020-10-21 RX ADMIN — MELATONIN TAB 3 MG 3 MG: 3 TAB at 21:03

## 2020-10-21 ASSESSMENT — BALANCE ASSESSMENTS
STANDING UNSUPPORTED ONE FOOT IN FRONT: 3
STANDING TO SITTING: 3
PICK UP OBJECT FROM THE FLOOR FROM A STANDING POSITION: 0
STANDING UNSUPPORTED: 3
LOOK OVER LEFT AND RIGHT SHOULDERS WHILE STANDING: 1
SITTING UNSUPPORTED: 4
REACHING FORWARD WITH OUTSTRETCHED ARM WHILE STANDING: 1
STANDING ON ONE LEG: 0
TURN 360 DEGREES: 1
SITTING TO STANDING: 4
TRANSFERS: 2
LONG VERSION TOTAL SCORE (MAX 56): 27
STANDING UNSUPPORTED WITH EYES CLOSED: 3
LONG VERSION TOTAL SCORE (MAX 56): 27
STANDING UNSUPPORTED WITH FEET TOGETHER: 2
PLACE ALTERNATE FOOT ON STEP OR STOOL WHILE STANDING UNSUPPORTED: 0

## 2020-10-21 ASSESSMENT — GAIT ASSESSMENTS
ASSISTIVE DEVICE: FRONT WHEEL WALKER
GAIT LEVEL OF ASSIST: MINIMAL ASSIST
DEVIATION: BRADYKINETIC;DECREASED HEEL STRIKE;DECREASED TOE OFF
DISTANCE (FEET): 1000

## 2020-10-21 ASSESSMENT — PATIENT HEALTH QUESTIONNAIRE - PHQ9
2. FEELING DOWN, DEPRESSED, IRRITABLE, OR HOPELESS: NOT AT ALL
SUM OF ALL RESPONSES TO PHQ9 QUESTIONS 1 AND 2: 0
1. LITTLE INTEREST OR PLEASURE IN DOING THINGS: NOT AT ALL
1. LITTLE INTEREST OR PLEASURE IN DOING THINGS: NOT AT ALL
2. FEELING DOWN, DEPRESSED, IRRITABLE, OR HOPELESS: NOT AT ALL
SUM OF ALL RESPONSES TO PHQ9 QUESTIONS 1 AND 2: 0

## 2020-10-21 ASSESSMENT — ACTIVITIES OF DAILY LIVING (ADL)
TUB_SHOWER_TRANSFER_DESCRIPTION: GRAB BAR;SHOWER BENCH;SUPERVISION FOR SAFETY;SET-UP OF EQUIPMENT
BED_CHAIR_WHEELCHAIR_TRANSFER_DESCRIPTION: ADAPTIVE EQUIPMENT

## 2020-10-21 NOTE — PROGRESS NOTES
Pharmacy Warfarin Consult   10/21/2020     45 y.o.   female     Indication for anticoagulation: Stroke    Goal INR = 2 - 3    Recent Labs     10/19/20  0715 10/20/20  0713 10/21/20  0721   INR 2.21* 2.49* 2.19*   HEMOGLOBIN  --  12.3  --    HEMATOCRIT  --  38.0  --        Pertinent Drug/Drug Interactions:  Statin, prn trazodone  Outpatient Warfarin Regimen:  New start  Recent Warfarin Dosing:    Dose from last 7 days     Date/Time Dose (mg)    10/21/20 0721  6    10/20/20 0713  5    10/19/20 0715  6    10/18/20 0556  5    10/17/20 0528  5    10/16/20 0540  6    10/15/20 0659  6          Bridge Therapy: None        1.  Warfarin 6 mg tonight for INR = 2.19        Shane Roberts [facility]

## 2020-10-21 NOTE — CONSULTS
"[facility]  Follow-up Psychological Consultation      Patient Name: Obdulia High  Patient MRN: [id number]  Today's Date: 10/19/2020     Type of session:Individual psychotherapy  Length of session: 16 minutes  Persons in attendance:Patient    Subjective:     Chief complaint/referral question:     Obdulia High is a 45 y.o.  female who was referred by Dr. Mendoza because of concerns related to depression.     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 Depressed and tearful. Wants to talk to someone. I'm open to starting anything you suggest. Her  is a primary care doctor in the [employer], he's usually around and is very appropriate and supportive.  Per Dr. Conde's consult \"The patient is a 45 y.o. left hand dominant female with no known past medical history;  who presented on 9/29/2020  8:22 PM as a transfer from outside hospital where she initially presented with a 2-day history of retro-orbital headache, left facial numbness and seizure-like activity.  Patient received Ativan and was intubated for airway protection prior to being transferred to [facility].  Work-up with MRI brain found brainstem/pontine/midbrain ischemia, and follow-up CTA showed diffuse thrombosis in the left and right PCAs, left vertebral, and basilar arteries. Patient then received basilar artery thrombectomy with Dr. Albert. NIH SS 28 on admission, and NIH SS of 9 today 10/2/2020.   Patient is feeling better today, however does report right arm and leg numbness and tingling, constipation, Faust dependency, some ptosis, lethargy and weakness.\"     In addition to the above history, patient had 10 minutes of seizure at the outside hospital - [facility], requiring Valium, Ativan, prior to intubation. EEG on acute was negative for seizures, though patient is on anti-seizure medication. COVID negative.      Post thrombectomy TICI 3 " basilar, and per neurology, for high risk of re-thrombosing due to other nearby occluded arteries - left vertebral and bilateral PCAs. Repeat imagining also with small amount of subarachnoid hemorrhage versus contrast staining. She was extubated on 10/1. Repeat MRI with known stroke in midbrain as well as small new punctate infarctions in the left internal capsule, right cerebellum, and left khang. Per review of notes, patient apparently had started a strenuous workout program several days prior to her stroke, which is thought to be the etiology of her dissection. She was initially on heparin drip, now on a Lovenox bridge to coumadin. Plan for repeat imaging in 3 months (CTA) to determine whether patient can discontinue warfarin and switch to aspirin. Plan to also wean Keppra in the outpatient stroke bridge clinic. Patient had repeat Head CT on 10/5 without any new findings of hemorrhage. Patient initially had a Cortrak for nutrition, now removed.     Interval History: The patient's estimated global assessment of functioning indicates a mild level of difficulty.  The patient is nonetheless functioning well and has some significant relationships.  There was no indications to suggest that her mild depressive symptoms is negatively impacting her rehabilitative process.  The patient actually reports things are going well she is actively participating in therapy.    MEDICAL HISTORY    Past medical/surgical history:   Past Medical History:   Diagnosis Date   • Migraine    • Polycystic ovarian syndrome       History reviewed. No pertinent surgical history.       Objective/Observations:    Patient did not present in acute distress. Patient was appropriately groomed. Patient was alert and oriented x4. Eye contact was appropriate. No abnormalities in attention or concentration were noted. No abnormalities of movement present; psychomotor activity was normal. Speech was fluent and regular in rhythm, rate, volume, and tone.  Thought processes linear, logical and goal directed. There was no evidence of thought disorder. No auditory or visual hallucinations. Long and short term memory appeared to be intact. Insight, judgment, and impulse control were deemed to be good.  Reported mood was “depressed.” Affect was full-ranging and appropriate to thought content and conversation.  Patient denied current suicidal and homicidal ideation in plan, intent, and preparatory behavior.    The patient denied any suicide-related ideation and/or behaviors and intent/plan, denied thoughts about death and dying both in session and during the period since last appointment, or past 2 weeks.    Risk Level: Not Currently at Clinically Significant Risk  Psychiatric hospitalization is not deemed necessary at this time as the patient does not present a clear or imminent danger to self or others. No indication for pursuing higher level of care. Outpatient management related to psychiatric condition is currently most appropriate and least restrictive level of care.    Current risk:   SUICIDE: Low   Homicide: Not applicable   Self-harm: Not applicable   Relapse: Not applicable   Other:    Safety Plan reviewed? No   If evidence of imminent risk is present, intervention/plan:     Diagnoses:   Problem   Adjustment Disorder With Mixed Anxiety and Depressed Mood         Treatment Goal(s)/Objective(s) addressed:     Depression   Goal: Improve overall mood      Avoid napping/sleeping to escape other people and activities   Shower, dress, and then do something every day   Report feeling happy/better overall mood   Get through a day/week without a crying spell   Develop strategies for thought distraction when ruminating on the past       Progress toward Treatment Goals: Moderate improvement    Plan:  1. The patient will be seen weekly during her treatment at the rehabilitation hospital.  2. The patient and her  were educated about the concerns about depression and  anxiety negatively impacting the rehabilitative process.  3. Provided feedback to the Attending Physiatrist that at the present time psychotropic medication is not desired by the patient.  The patient did indicate that she would self monitor her depression and anxiety symptoms and talk with staff if her symptoms worsen or if it negatively impacts her rehabilitative process.  4. Referrals/Consults:  N/A  5. Barriers to Learning:  No  6. Readiness to Learn:  Yes  7. Cultural Concerns:  No  8. Declare these services are medically necessary and appropriate to the patient’s diagnosis and needs  9. The point of contact regarding this evaluation is Dr. Hutchins, Psychologist.    Efrain Hutchins III, Ed.D.

## 2020-10-21 NOTE — DISCHARGE PLANNING
Met with patient and her , provided list of referrals Dr. Mendoza has made (list from 10.21.20 at 1:07PM. .  is a MD, works with patient's primary care PA, and will work to get prior auth through  for these specialists.

## 2020-10-21 NOTE — DISCHARGE PLANNING
Primary Care:  [facility]  [address]  [place] [address]  [phone]  Message left requesting appointment      [facility]  [address]  [address]  [place] [id number] [phone]      Physical Medicine Rehab Clinic  Yovana Becker DO  [address] [facility] [address]  [phone]    Neuro-Ophthalmology  Srikanth Pruitt MD  [address]  [address]  [facility] [address]  [phone]    Optometry   [place]  [address] [facility]  [address]  [facility] [address]  [phone]    ENT- of patient's choice.    Radiology  Gallito Albert M.D.  [address] [facility] [address]  [phone]   Appointment  Monday, Nov. 2, 2020 @ 10:30AM for 11AM appointment.

## 2020-10-21 NOTE — THERAPY
Speech Language Pathology  Daily Treatment     Patient Name: Obdulia High  Age:  45 y.o., Sex:  female  Medical Record #: [id number]  Today's Date: 10/21/2020     Precautions  Precautions: Fall Risk  Comments: dizzy, diplopia    Subjective    Pt pleasant and agreeable to therapy.      Objective       10/21/20 0904   SLP Total Time Spent   SLP Individual Total Time Spent (Mins) 30   Treatment Charges   SLP Cognitive Skill Development First 15 Minutes 1   SLP Cognitive Skill Development Additional 15 Minutes 1       Assessment    Using activity 1 from FAVRES pt required MIN cues to identify specific parameters of task as well as reviewing each option before making most appropriate selection. Pt benefits from highlighting pertinent details for ease of scanning/tracking as well as crossing out items not relevant. Pt was able to complete task and state rationale following cues.     Strengths: Making steady progress towards goals, Pleasant and cooperative, Supportive family, Willingly participates in therapeutic activities, Motivated for self care and independence, Independent prior level of function, Effective communication skills, Alert and oriented, Able to follow instructions  Barriers: Visual impairment, Hemiparesis    Plan    Continue task #2 on FAVRES    Speech Therapy Problems     Problem: Problem Solving STGs     Dates: Start: 10/21/20       Goal: STG-Within one week, patient will     Dates: Start: 10/21/20       Description: 1) Individualized goal:  complete complex organization/executive function tasks with >90% accuracy provided SPV  2) Interventions:  SLP Cognitive Skill Development and SLP Group Treatment                    Problem: Speech/Swallowing LTGs     Dates: Start: 10/08/20       Goal: LTG-By discharge, patient will safely swallow     Dates: Start: 10/08/20       Description: 1) Individualized goal:  regular textures/thin liquids for safe return to PLOF.   2) Interventions:  SLP Swallowing Dysfunction  Treatment, SLP Oral Pharyngeal Evaluation, SLP Video Swallow Evaluation, SLP Cognitive Skill Development, and SLP Group Treatment              Goal: LTG-By discharge, patient will solve complex problem     Dates: Start: 10/08/20       Description: 1) Individualized goal: By utilizing compensatory intervention for memory and problem-solving to allow for safe completion of daily activities with MOD I in order to prepare for safe d/c home  2) Interventions:  SLP Cognitive Skill Development and SLP Group Treatment

## 2020-10-21 NOTE — THERAPY
Occupational Therapy  Daily Treatment     Patient Name: Obdulia High  Age:  45 y.o., Sex:  female  Medical Record #: [id number]  Today's Date: 10/21/2020     Precautions  Precautions: Fall Risk  Comments: dizzy, diplopia         Subjective    Patient agreeable to shower and working on typing skills this morning.     Objective       10/21/20 0931   Precautions   Precautions Fall Risk   Comments dizzy, diploplia   Functional Level of Assist   Grooming Modified Independent   Grooming Description Seated in wheelchair at sink   Bathing Supervision   Bathing Description Grab bar;Supervision for safety;Set-up of equipment   Upper Body Dressing Modified Independent   Upper Body Dressing Description   (in w/c)   Lower Body Dressing Modified Independent   Lower Body Dressing Description Grab bar  (in w/c)   Tub / Shower Transfers Supervised   Tub Shower Transfer Description Grab bar;Shower bench;Supervision for safety;Set-up of equipment   Interdisciplinary Plan of Care Collaboration   Patient Position at End of Therapy Seated;Self Releasing Lap Belt Applied   OT Total Time Spent   OT Individual Total Time Spent (Mins) 60   OT Charge Group   OT Self Care / ADL 2   OT Therapy Activity 2     Worked on FMC/dexterity of R hand and bilateral integration while typing on [email or web address].  Finished up lesson that was previously started on letters u/r/k/j/f with 11 wpm and 92% accuracy.  Started another lesson using letters d/e/i/k/j/r at 16 wpm and 96% accuracy.    Functional mobility with FWW room <> family lounge gym with CGA/SBA.    Assessment    Patient completed adl routine with setup/supervision to mod I with good safety.  Speed with adls continues to increase. Typing skills continue to improve with practice.  Strengths: Alert and oriented, Able to follow instructions, Effective communication skills, Good insight into deficits/needs, Independent prior level of function, Manages pain appropriately, Motivated for self care and  independence, Pleasant and cooperative, Supportive family, Willingly participates in therapeutic activities  Barriers: Decreased endurance, Fatigue, Generalized weakness, Hemiparesis, Impaired activity tolerance, Impaired balance, Limited mobility, Visual impairment(double vision)    Plan    R UE strengthening/GMC/FMC, Standing balance (static and dynamic), ADLs, IADLs, typing    Occupational Therapy Goals     Problem: Dressing     Dates: Start: 10/08/20       Goal: STG-Within one week, patient will dress LB     Dates: Start: 10/08/20       Description: 1) Individualized Goal:  with CGA for standing portion using grab bar  2) Interventions:  OT Self Care/ADL, OT Manual Ther Technique, OT Neuro Re-Ed/Balance, OT Sensory Int Techniques, OT Therapeutic Activity, OT Evaluation, and OT Therapeutic Exercise    Note:     Goal Note filed on 10/12/20 5885 by Dario Cooley OT/L    Min A for balance                        Problem: Functional Transfers     Dates: Start: 10/12/20       Goal: STG-Within one week, patient will transfer to toilet     Dates: Start: 10/12/20       Description: 1) Individualized Goal:  with SBA using grab bar  2) Interventions:  OT Self Care/ADL, OT Manual Ther Technique, OT Neuro Re-Ed/Balance, OT Sensory Int Techniques, OT Therapeutic Activity, OT Evaluation, and OT Therapeutic Exercise                Problem: OT Long Term Goals     Dates: Start: 10/08/20       Goal: LTG-By discharge, patient will complete basic self care tasks     Dates: Start: 10/08/20       Description: 1) Individualized Goal:  with mod I using AE/AD/techniques as needed  2) Interventions:  OT Self Care/ADL, OT Manual Ther Technique, OT Neuro Re-Ed/Balance, OT Sensory Int Techniques, OT Therapeutic Activity, OT Evaluation, and OT Therapeutic Exercise          Goal: LTG-By discharge, patient will perform bathroom transfers     Dates: Start: 10/08/20       Description: 1) Individualized Goal:  with mod I using AE/AD/techniques  as needed  2) Interventions:  OT Self Care/ADL, OT Manual Ther Technique, OT Neuro Re-Ed/Balance, OT Sensory Int Techniques, OT Therapeutic Activity, OT Evaluation, and OT Therapeutic Exercise          Goal: LTG-By discharge, patient will complete basic home management     Dates: Start: 10/08/20       Description: 1) Individualized Goal:  with supervision using AE/AD/techniques as needed  2) Interventions:  OT Self Care/ADL, OT Manual Ther Technique, OT Neuro Re-Ed/Balance, OT Sensory Int Techniques, OT Therapeutic Activity, OT Evaluation, and OT Therapeutic Exercise

## 2020-10-21 NOTE — THERAPY
Physical Therapy   Daily Treatment     Patient Name: Obdulia High  Age:  45 y.o., Sex:  female  Medical Record #: [id number]  Today's Date: 10/21/2020     Precautions  Precautions: (P) Fall Risk  Comments: (P) dizzy, diplopia    Subjective    Patient and spouse were pleased with progress.      Objective       10/21/20 1401   Precautions   Precautions Fall Risk   Comments dizzy, diplopia   Gait Functional Level of Assist    Gait Level Of Assist Minimal Assist  (Primarily CGA, intermittent min A outdoors/ curb cuts)   Assistive Device Front Wheel Walker   Distance (Feet) 1000  (1000 ft + 300 x 2-3)   # of Times Distance was Traveled 1   Deviation Bradykinetic;Decreased Heel Strike;Decreased Toe Off   Stairs Functional Level of Assist   Level of Assist with Stairs Contact Guard Assist   # of Stairs Climbed 2   Stairs Description Assist device/equipment;Supervision for safety;Verbal cueing;Walker  (Curb training outdoors with spouse guarding, FWW)   Bed Mobility    Sit to Stand Stand by Assist   Neuro-Muscular Treatments   Neuro-Muscular Treatments Weight Shift Left;Weight Shift Right;Verbal Cuing;Tactile Cuing;Sequencing;Postural Facilitation;Postural Changes   Comments EOM <> floor 2x SBA. Car transfer into patient's vehicle with running board 2x CGA.    Interdisciplinary Plan of Care Collaboration   IDT Collaboration with  Family / Caregiver   Patient Position at End of Therapy Seated;Family / Friend in Room  (handoff to CM)   Collaboration Comments Spouse participated in hands on car transfer training and outdoor/ on unit ambulation with patient, using gait belt effectively and cueing for safety.    Strengths & Barriers   Strengths Independent prior level of function;Making steady progress towards goals;Motivated for self care and independence;Pleasant and cooperative;Supportive family;Willingly participates in therapeutic activities   Barriers Decreased endurance;Fatigue;Impaired activity tolerance;Impaired  balance;Visual impairment;Limited mobility;Impulsive   PT Total Time Spent   PT Individual Total Time Spent (Mins) 60   PT Charge Group   PT Gait Training 2   PT Neuromuscular Re-Education / Balance 1   PT Therapeutic Activities 1       Assessment    Spouse demonstrated good safety guarding patient during functional mobility tasks including SUV transfer, bed transfer, outdoor gait, and curb training. Spouse is able to utilize gait belt and cue patient appropriately. Patient demonstrated improved overall activity tolerance. Floor recovery training elicited mild -mod dizziness that resolved with seated rest.      Strengths: (P) Independent prior level of function, Making steady progress towards goals, Motivated for self care and independence, Pleasant and cooperative, Supportive family, Willingly participates in therapeutic activities  Barriers: (P) Decreased endurance, Fatigue, Impaired activity tolerance, Impaired balance, Visual impairment, Limited mobility, Impulsive    Plan    Ongoing safety with FWW, balance training, trial gait with HHA, ongoing family training, D/C next week.     Physical Therapy Problems     Problem: Mobility     Dates: Start: 10/19/20       Goal: STG-Within one week, patient will ambulate community distances     Dates: Start: 10/19/20       Description: 1) Individualized goal:  Patient will amb with FWW SBA indoors, CGA outdoors >150 ft  2) Interventions:  PT Group Therapy, PT Gait Training, PT Self Care/Home Eval, PT Therapeutic Exercises, PT Neuro Re-Ed/Balance, PT Aquatic Therapy, PT Therapeutic Activity, and PT Manual Therapy            Goal: STG-Within one week, patient will ambulate up/down a curb     Dates: Start: 10/19/20       Description: 1) Individualized goal:  Patient will amb up/down curb 2x with FWW SBA  2) Interventions:  PT Group Therapy, PT Gait Training, PT Self Care/Home Eval, PT Therapeutic Exercises, PT Neuro Re-Ed/Balance, PT Aquatic Therapy, PT Therapeutic Activity,  and PT Manual Therapy                  Problem: Mobility Transfers     Dates: Start: 10/12/20       Goal: STG-Within one week, patient will transfer bed to chair     Dates: Start: 10/12/20       Description: 1) Individualized goal:  SBA reach pivot + set up wc <> bed right/ left  2) Interventions:  PT Orthotics Training, PT Gait Training, PT Self Care/Home Eval, PT Therapeutic Exercises, PT Neuro Re-Ed/Balance, PT Aquatic Therapy, PT Therapeutic Activity, and PT Manual Therapy      Note:     Goal Note filed on 10/19/20 1233 by Rakel Mcneal, DPT    CGA-SBA, PROCTOR 8/56, fall risk                        Problem: PT-Long Term Goals     Dates: Start: 10/08/20       Goal: LTG-By discharge, patient will ambulate     Dates: Start: 10/08/20       Description: 1) Individualized goal:  AMB x 150 feet with LRD and SPV  2) Interventions:  PT Group Therapy, PT E Stim Attended, PT Gait Training, PT Therapeutic Exercises, PT Neuro Re-Ed/Balance, PT Aquatic Therapy, PT Therapeutic Activity, PT Manual Therapy, and PT Evaluation            Goal: LTG-By discharge, patient will transfer one surface to another     Dates: Start: 10/08/20       Description: 1) Individualized goal:  SPT with LRD and SPV  2) Interventions:  PT Group Therapy, PT E Stim Attended, PT Gait Training, PT Therapeutic Exercises, PT Neuro Re-Ed/Balance, PT Aquatic Therapy, PT Therapeutic Activity, PT Manual Therapy, and PT Evaluation            Goal: LTG-By discharge, patient will ambulate up/down 4-6 stairs     Dates: Start: 10/08/20       Description: 1) Individualized goal:  Up/down 4 steps with BHR and CGA  2) Interventions:  PT Group Therapy, PT E Stim Attended, PT Gait Training, PT Therapeutic Exercises, PT Neuro Re-Ed/Balance, PT Aquatic Therapy, PT Therapeutic Activity, PT Manual Therapy, and PT Evaluation            Goal: LTG-By discharge, patient will transfer in/out of a car     Dates: Start: 10/08/20       Description: 1) Individualized goal:  Car  transfer with LRD and SPV  2) Interventions:  PT Group Therapy, PT E Stim Attended, PT Gait Training, PT Therapeutic Exercises, PT Neuro Re-Ed/Balance, PT Aquatic Therapy, PT Therapeutic Activity, PT Manual Therapy, and PT Evaluation

## 2020-10-21 NOTE — THERAPY
Physical Therapy   Daily Treatment     Patient Name: Obdulia High  Age:  45 y.o., Sex:  female  Medical Record #: [id number]  Today's Date: 10/21/2020     Precautions  Precautions: (P) Fall Risk  Comments: (P) dizzy, diplopia    Subjective    Patient continues to report dizziness during fwd lean, turning body, and head turns.      Objective       10/21/20 1031   Precautions   Precautions Fall Risk   Comments dizzy, diplopia   Transfer Functional Level of Assist   Bed, Chair, Wheelchair Transfer Stand by Assist   Bed Chair Wheelchair Transfer Description Adaptive equipment   Bed Mobility    Sit to Supine Modified Independent   Sit to Stand Stand by Assist   Neuro-Muscular Treatments   Neuro-Muscular Treatments Verbal Cuing;Sequencing;Tactile Cuing;Weight Shift Right;Weight Shift Left   Bello Balance Scale   Sitting Unsupported (Score 0-4) 4   Change Of Positon: Sitting To Standing (Score 0-4) 4   Change Of Positon: Standing To Sitting (Score 0-4) 3   Transfers (Score 0-4) 2   Standing Unsupported (Score 0-4) 3   Standing With Eyes Closed (Score 0-4) 3   Standing With Feet Together (Score 0-4) 2   Tandem Standing (Score 0-4) 3   Standing On One Leg (Score 0-4) 0   Turning Trunk (Feet Fixed) (Score 0-4) 1   Retrieving Objects From Floor (Score 0-4) 0  (LOB return to standing)   Turning 360 Degrees (Score 0-4) 1   Stool Stepping (Score 0-4) 0   Reaching Forward While Standing (Score 0-4) 1   Bello Balance Total Score (0-56) 27   Strengths & Barriers   Strengths Independent prior level of function;Making steady progress towards goals;Motivated for self care and independence;Pleasant and cooperative;Supportive family;Willingly participates in therapeutic activities   Barriers Decreased endurance;Fatigue;Impaired activity tolerance;Impaired balance;Visual impairment;Limited mobility;Impulsive   PT Total Time Spent   PT Individual Total Time Spent (Mins) 30   PT Charge Group   PT Neuromuscular Re-Education / Balance 1   PT  Therapeutic Activities 1       Assessment    Patient continues to report dizziness during fwd lean, turning body, and head turns. Patient improved on PROCTOR from 8/56 to 27/56 = true clinical change. Patient remains a fall risk at this time, but self awareness has improved.    Strengths: (P) Independent prior level of function, Making steady progress towards goals, Motivated for self care and independence, Pleasant and cooperative, Supportive family, Willingly participates in therapeutic activities  Barriers: (P) Decreased endurance, Fatigue, Impaired activity tolerance, Impaired balance, Visual impairment, Limited mobility, Impulsive    Plan    Car transfer with spouse, outdoor ambulation with FWW, balance, Vector no AD.     Physical Therapy Problems     Problem: Mobility     Dates: Start: 10/19/20       Goal: STG-Within one week, patient will ambulate community distances     Dates: Start: 10/19/20       Description: 1) Individualized goal:  Patient will amb with FWW SBA indoors, CGA outdoors >150 ft  2) Interventions:  PT Group Therapy, PT Gait Training, PT Self Care/Home Eval, PT Therapeutic Exercises, PT Neuro Re-Ed/Balance, PT Aquatic Therapy, PT Therapeutic Activity, and PT Manual Therapy            Goal: STG-Within one week, patient will ambulate up/down a curb     Dates: Start: 10/19/20       Description: 1) Individualized goal:  Patient will amb up/down curb 2x with FWW SBA  2) Interventions:  PT Group Therapy, PT Gait Training, PT Self Care/Home Eval, PT Therapeutic Exercises, PT Neuro Re-Ed/Balance, PT Aquatic Therapy, PT Therapeutic Activity, and PT Manual Therapy                  Problem: Mobility Transfers     Dates: Start: 10/12/20       Goal: STG-Within one week, patient will transfer bed to chair     Dates: Start: 10/12/20       Description: 1) Individualized goal:  SBA reach pivot + set up wc <> bed right/ left  2) Interventions:  PT Orthotics Training, PT Gait Training, PT Self Care/Home Eval, PT  Therapeutic Exercises, PT Neuro Re-Ed/Balance, PT Aquatic Therapy, PT Therapeutic Activity, and PT Manual Therapy      Note:     Goal Note filed on 10/19/20 1233 by Rakel Mcneal DPT    CGA-SBA, PROCTOR 8/56, fall risk                        Problem: PT-Long Term Goals     Dates: Start: 10/08/20       Goal: LTG-By discharge, patient will ambulate     Dates: Start: 10/08/20       Description: 1) Individualized goal:  AMB x 150 feet with LRD and SPV  2) Interventions:  PT Group Therapy, PT E Stim Attended, PT Gait Training, PT Therapeutic Exercises, PT Neuro Re-Ed/Balance, PT Aquatic Therapy, PT Therapeutic Activity, PT Manual Therapy, and PT Evaluation            Goal: LTG-By discharge, patient will transfer one surface to another     Dates: Start: 10/08/20       Description: 1) Individualized goal:  SPT with LRD and SPV  2) Interventions:  PT Group Therapy, PT E Stim Attended, PT Gait Training, PT Therapeutic Exercises, PT Neuro Re-Ed/Balance, PT Aquatic Therapy, PT Therapeutic Activity, PT Manual Therapy, and PT Evaluation            Goal: LTG-By discharge, patient will ambulate up/down 4-6 stairs     Dates: Start: 10/08/20       Description: 1) Individualized goal:  Up/down 4 steps with BHR and CGA  2) Interventions:  PT Group Therapy, PT E Stim Attended, PT Gait Training, PT Therapeutic Exercises, PT Neuro Re-Ed/Balance, PT Aquatic Therapy, PT Therapeutic Activity, PT Manual Therapy, and PT Evaluation            Goal: LTG-By discharge, patient will transfer in/out of a car     Dates: Start: 10/08/20       Description: 1) Individualized goal:  Car transfer with LRD and SPV  2) Interventions:  PT Group Therapy, PT E Stim Attended, PT Gait Training, PT Therapeutic Exercises, PT Neuro Re-Ed/Balance, PT Aquatic Therapy, PT Therapeutic Activity, PT Manual Therapy, and PT Evaluation

## 2020-10-21 NOTE — PROGRESS NOTES
"Rehab Progress Note     Date of Service: 10/21/2020  Chief Complaint: follow up stroke    Interval Events (Subjective)    Patient seen and examined today in her room.  Her  is present.  Case management is giving a list of follow-up appointments which need preop by the patient's insurance.  We discussed Coumadin management after her discharge.  Patient reports her visual symptoms are slightly better today.  Her voice continues to improve as well.  She is very happy with how long she was able to ambulate today but she is very tired.  Speech therapy is decreasing to 30 minutes.  Patient has no other complaints today.  Mood is upbeat.    Objective:  VITAL SIGNS: /68   Pulse (!) 59   Temp 36.6 °C (97.8 °F) (Temporal)   Resp 18   Ht 1.702 m (5' 7.01\")   Wt 83.5 kg (184 lb 1.4 oz)   SpO2 95%   BMI 28.82 kg/m²   Gen: alert, no apparent distress  CV: regular rate and rhythm, no murmurs, no peripheral edema  Resp: clear to ascultation bilaterally, normal respiratory effort  GI: soft, non-tender abdomen, bowel sounds present  Neuro: notable for left ptosis, left cranial nerve IV palsy, double vision      Recent Results (from the past 72 hour(s))   Prothrombin Time    Collection Time: 10/19/20  7:15 AM   Result Value Ref Range    PT 25.2 (H) 12.0 - 14.6 sec    INR 2.21 (H) 0.87 - 1.13   Prothrombin Time    Collection Time: 10/20/20  7:13 AM   Result Value Ref Range    PT 27.7 (H) 12.0 - 14.6 sec    INR 2.49 (H) 0.87 - 1.13   CBC WITHOUT DIFFERENTIAL    Collection Time: 10/20/20  7:13 AM   Result Value Ref Range    WBC 5.1 4.8 - 10.8 K/uL    RBC 4.05 (L) 4.20 - 5.40 M/uL    Hemoglobin 12.3 12.0 - 16.0 g/dL    Hematocrit 38.0 37.0 - 47.0 %    MCV 93.8 81.4 - 97.8 fL    MCH 30.4 27.0 - 33.0 pg    MCHC 32.4 (L) 33.6 - 35.0 g/dL    RDW 45.1 35.9 - 50.0 fL    Platelet Count 432 164 - 446 K/uL    MPV 8.9 (L) 9.0 - 12.9 fL   Prothrombin Time    Collection Time: 10/21/20  7:21 AM   Result Value Ref Range    PT 25.0 " (H) 12.0 - 14.6 sec    INR 2.19 (H) 0.87 - 1.13       Current Facility-Administered Medications   Medication Frequency   • warfarin (COUMADIN) tablet 6 mg ONCE AT 1800   • meclizine (ANTIVERT) tablet 25 mg TID PRN   • levETIRAcetam (KEPPRA) tablet 500 mg Q12HRS   • traZODone (DESYREL) tablet 50 mg QHS   • melatonin tablet 3 mg QHS   • hydrOXYzine HCl (ATARAX) tablet 50 mg Q6HRS PRN   • Respiratory Therapy Consult Continuous RT   • Pharmacy Consult Request ...Pain Management Review 1 Each PHARMACY TO DOSE   • hydrALAZINE (APRESOLINE) tablet 10 mg Q8HRS PRN   • artificial tears ophthalmic solution 1 Drop PRN   • benzocaine-menthol (CEPACOL) lozenge 1 Lozenge Q2HRS PRN   • mag hydrox-al hydrox-simeth (MAALOX PLUS ES or MYLANTA DS) suspension 20 mL Q2HRS PRN   • ondansetron (ZOFRAN ODT) dispertab 4 mg 4X/DAY PRN    Or   • ondansetron (ZOFRAN) syringe/vial injection 4 mg 4X/DAY PRN   • sodium chloride (OCEAN) 0.65 % nasal spray 2 Spray PRN   • lactulose 20 GM/30ML solution 30 mL QDAY PRN   • docusate sodium (ENEMEEZ) enema 283 mg QDAY PRN   • fleet enema 133 mL QDAY PRN   • atorvastatin (LIPITOR) tablet 40 mg Q EVENING   • senna-docusate (PERICOLACE or SENOKOT S) 8.6-50 MG per tablet 2 Tab BID    And   • polyethylene glycol/lytes (MIRALAX) PACKET 1 Packet QDAY PRN    And   • magnesium hydroxide (MILK OF MAGNESIA) suspension 30 mL QDAY PRN    And   • bisacodyl (DULCOLAX) suppository 10 mg QDAY PRN   • acetaminophen (TYLENOL) tablet 650 mg Q4HRS PRN   • acetaminophen/caffeine/butalbital 325-40-50 mg (FIORICET) -40 MG per tablet 1 Tab Q6HRS PRN   • MD Alert...Warfarin per Pharmacy PHARMACY TO DOSE       Orders Placed This Encounter   Procedures   • Diet Order Regular (meds whole 1:1 with thins)     Standing Status:   Standing     Number of Occurrences:   1     Order Specific Question:   Diet:     Answer:   Regular [1]     Comments:   meds whole 1:1 with thins       Assessment:  Active Hospital Problems    Diagnosis    • *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    I led and attended the weekly conference, and agree with the IDT conference documentation and plan of care as noted below.    Date of conference: 10/19/2020    Goals and barriers: See IDT note.    Biggest barriers: visual impairments, impaired balance (initial PROCTOR 8/56), numb right upper extremity    CM/social support:  very supportive    Anticipated DC date: 10/27    Home health: PT/OT/SLP/RN    Equip: shower chair, grab bars, FWW    Follow up: PCP, stroke bridge clinic, Dr. Becker - rehab clinic, neuro-ophthalmology, optometry, ENT, Dr. Albert       Medical Decision Making and Plan:    Midbrain and khang left ischemic stroke  Left vertebral artery dissection  S/p thrombectomy Dr. 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 Albert   Outpatient follow up with stroke bridge clinic, referral made  Outpatient follow up with Dr. Becker, rehab clinic referral made  Outpatient follow up with Dr. Rosas, neurophthalmology referral made  Can also follow up with [facility] Freed, optometry, may benefit from prism glasses    Dizziness,  continues  Likely from double vision/cerebellar stroke  Continue eye patch  Orthostatics negative   PRN meclizine    Anxiety/depression  Dr. Hutchins consulted, appreciate assistance  No need for medications at this time    Hypophonia, improved  Suspect from intubation, not from stroke location  Speech therapy  FEES with impaired mobility of cords, and possible pathology on cords  Follow up with outpatient ENT, referral made    Hypertension, resolved    Hypotension  Negative orthostatics  BMP without signs of dehydration  Likely close to her baseline given her age     History of migraines  Intermittent headache  PRN tylenol or Fioricet, not requiring/using     Insomnia, improved/resolved  Scheduled melatonin and trazodone    Oral thrush , resolved    S/p nystatin     Seizure prophylaxis  Continue Keppra  Outpatient follow up with stroke bridge clinic    Leukocytosis, resolved  UTI  Negative CXR    UTI, E Coli, treated  S/p cefuroxime    Bowel  Meds as needed  Last BM 10/21    Bladder  Check PVRs - 23, 17, 79  Patient not retaining   ICP for over 400 cc  Scheduled toileting    DVT prophylaxis  S/p Lovenox bridge to coumadin  Pharmacy dosing coumadin    Total time:  17 minutes.  I spent greater than 50% of the time for patient care, counseling, and coordination on this date, including patient face-to face time, unit/floor time with review of records/pertinent lab data and studies, as well as discussing diagnostic evaluation/work up, planned therapeutic interventions, and future disposition of care, as per the interval events/subjective and the assessment and plan as noted above.    I have performed a physical exam, reviewed and updated ROS, as well as the assessment and plan today 10/21/2020. In review of note from 10/20/2020 there are no new changes except as documented above.            Eden Mendoza M.D.   Physical Medicine and Rehabilitation

## 2020-10-21 NOTE — CARE PLAN
Problem: Safety  Goal: Will remain free from falls  Intervention: Implement fall precautions  Note: Use of call light encouraged to make needs known.Fall precautions and frequent rounding in place for safety.Call light within reach.Will continue to monitor and assess needs and safety.     Problem: Bowel/Gastric:  Goal: Normal bowel function is maintained or improved  Intervention: Educate patient and significant other/support system about signs and symptoms of constipation and interventions to implement  Note: Pt is continent of bowel per report.Scheduled senna given at hs.LBM 10/19.Will continue to monitor.

## 2020-10-21 NOTE — DISCHARGE PLANNING
Faxed updated clinical information to Tammy at  at [phone]. Auth # [id number].   I also called Tammy at [phone] who provided authorization to discharge date of 10/27/20. Tammy requested to be notified of discharge.

## 2020-10-22 LAB
INR PPP: 2.2 (ref 0.87–1.13)
PROTHROMBIN TIME: 25.1 SEC (ref 12–14.6)

## 2020-10-22 PROCEDURE — 97110 THERAPEUTIC EXERCISES: CPT

## 2020-10-22 PROCEDURE — 97129 THER IVNTJ 1ST 15 MIN: CPT

## 2020-10-22 PROCEDURE — 700102 HCHG RX REV CODE 250 W/ 637 OVERRIDE(OP): Performed by: PHYSICAL MEDICINE & REHABILITATION

## 2020-10-22 PROCEDURE — 97130 THER IVNTJ EA ADDL 15 MIN: CPT

## 2020-10-22 PROCEDURE — 36415 COLL VENOUS BLD VENIPUNCTURE: CPT

## 2020-10-22 PROCEDURE — 85610 PROTHROMBIN TIME: CPT

## 2020-10-22 PROCEDURE — 770010 HCHG ROOM/CARE - REHAB SEMI PRIVAT*

## 2020-10-22 PROCEDURE — 97112 NEUROMUSCULAR REEDUCATION: CPT

## 2020-10-22 PROCEDURE — 97535 SELF CARE MNGMENT TRAINING: CPT

## 2020-10-22 PROCEDURE — 97530 THERAPEUTIC ACTIVITIES: CPT | Mod: CO

## 2020-10-22 PROCEDURE — 99231 SBSQ HOSP IP/OBS SF/LOW 25: CPT | Performed by: PHYSICAL MEDICINE & REHABILITATION

## 2020-10-22 PROCEDURE — 97150 GROUP THERAPEUTIC PROCEDURES: CPT

## 2020-10-22 PROCEDURE — A9270 NON-COVERED ITEM OR SERVICE: HCPCS | Performed by: PHYSICAL MEDICINE & REHABILITATION

## 2020-10-22 PROCEDURE — 97116 GAIT TRAINING THERAPY: CPT

## 2020-10-22 RX ORDER — WARFARIN SODIUM 3 MG/1
6 TABLET ORAL
Status: COMPLETED | OUTPATIENT
Start: 2020-10-22 | End: 2020-10-22

## 2020-10-22 RX ADMIN — LEVETIRACETAM 500 MG: 500 TABLET, FILM COATED ORAL at 08:17

## 2020-10-22 RX ADMIN — LEVETIRACETAM 500 MG: 500 TABLET, FILM COATED ORAL at 20:42

## 2020-10-22 RX ADMIN — WARFARIN SODIUM 6 MG: 3 TABLET ORAL at 17:34

## 2020-10-22 RX ADMIN — MELATONIN TAB 3 MG 3 MG: 3 TAB at 20:42

## 2020-10-22 RX ADMIN — ATORVASTATIN CALCIUM 40 MG: 40 TABLET, FILM COATED ORAL at 20:42

## 2020-10-22 ASSESSMENT — PATIENT HEALTH QUESTIONNAIRE - PHQ9
SUM OF ALL RESPONSES TO PHQ9 QUESTIONS 1 AND 2: 0
2. FEELING DOWN, DEPRESSED, IRRITABLE, OR HOPELESS: NOT AT ALL
2. FEELING DOWN, DEPRESSED, IRRITABLE, OR HOPELESS: NOT AT ALL
SUM OF ALL RESPONSES TO PHQ9 QUESTIONS 1 AND 2: 0
1. LITTLE INTEREST OR PLEASURE IN DOING THINGS: NOT AT ALL
1. LITTLE INTEREST OR PLEASURE IN DOING THINGS: NOT AT ALL

## 2020-10-22 ASSESSMENT — GAIT ASSESSMENTS
DEVIATION: BRADYKINETIC;DECREASED HEEL STRIKE;DECREASED TOE OFF
DEVIATION: BRADYKINETIC;DECREASED HEEL STRIKE;DECREASED TOE OFF
GAIT LEVEL OF ASSIST: CONTACT GUARD ASSIST
DISTANCE (FEET): 360
DISTANCE (FEET): 300
ASSISTIVE DEVICE: NONE;FRONT WHEEL WALKER
GAIT LEVEL OF ASSIST: CONTACT GUARD ASSIST

## 2020-10-22 ASSESSMENT — PAIN DESCRIPTION - PAIN TYPE: TYPE: ACUTE PAIN

## 2020-10-22 NOTE — THERAPY
"Physical Therapy   Daily Treatment     Patient Name: Obdulia High  Age:  45 y.o., Sex:  female  Medical Record #: [id number]  Today's Date: 10/22/2020     Precautions  Precautions: (P) Fall Risk  Comments: (P) dizzy, diplopia    Subjective    Pt was lying in bed upon arrival; agreeable to tx.      Objective       10/22/20 0931   Precautions   Precautions Fall Risk   Comments dizzy, diplopia   Gait Functional Level of Assist    Gait Level Of Assist Contact Guard Assist   Assistive Device None   Distance (Feet) 360   # of Times Distance was Traveled 1   Deviation Bradykinetic;Decreased Heel Strike;Decreased Toe Off   Standing Lower Body Exercises   Comments Standing marching with no AD 2 x 3 x 20 with TCs for core/ glute med activation   Interdisciplinary Plan of Care Collaboration   Patient Position at End of Therapy Seated;Call Light within Reach;Tray Table within Reach;Phone within Reach   PT Total Time Spent   PT Individual Total Time Spent (Mins) 30   PT Charge Group   PT Gait Training 2     Supine to sit: SPV  SPT x 2 SPV with FWW    Additional AMB 2 x 20ft fwd no AD CGA, 2 x20ft retro SBA with FWW    Core/ balance training at 6\" step with no UE support, TCs for core activation, CGA:  3 x 10 reciprocal marching  3 x 10 reciprocal step ups    Assessment    Pt motivated by progress in AMB this session/ decreased need for AD. Continues to be limited by proximal strength, R obstacle avoidance during gait and complaints of visual impairment resulting in dizziness during AMB; pt did not require termination of AMB d/t complaint of vision impairment.     Strengths: Independent prior level of function, Making steady progress towards goals, Motivated for self care and independence, Pleasant and cooperative, Supportive family, Willingly participates in therapeutic activities  Barriers: Decreased endurance, Fatigue, Impaired activity tolerance, Impaired balance, Visual impairment, Limited mobility, " Impulsive    Plan    Proximal stability, standing balance, AMB with FWW, obstacle avoidance, AMB with no AD, ongoing family training    Physical Therapy Problems     Problem: Mobility     Dates: Start: 10/19/20       Goal: STG-Within one week, patient will ambulate community distances     Dates: Start: 10/19/20       Description: 1) Individualized goal:  Patient will amb with FWW SBA indoors, CGA outdoors >150 ft  2) Interventions:  PT Group Therapy, PT Gait Training, PT Self Care/Home Eval, PT Therapeutic Exercises, PT Neuro Re-Ed/Balance, PT Aquatic Therapy, PT Therapeutic Activity, and PT Manual Therapy            Goal: STG-Within one week, patient will ambulate up/down a curb     Dates: Start: 10/19/20       Description: 1) Individualized goal:  Patient will amb up/down curb 2x with FWW SBA  2) Interventions:  PT Group Therapy, PT Gait Training, PT Self Care/Home Eval, PT Therapeutic Exercises, PT Neuro Re-Ed/Balance, PT Aquatic Therapy, PT Therapeutic Activity, and PT Manual Therapy                  Problem: Mobility Transfers     Dates: Start: 10/12/20       Goal: STG-Within one week, patient will transfer bed to chair     Dates: Start: 10/12/20       Description: 1) Individualized goal:  SBA reach pivot + set up wc <> bed right/ left  2) Interventions:  PT Orthotics Training, PT Gait Training, PT Self Care/Home Eval, PT Therapeutic Exercises, PT Neuro Re-Ed/Balance, PT Aquatic Therapy, PT Therapeutic Activity, and PT Manual Therapy      Note:     Goal Note filed on 10/19/20 1233 by ZARI WayneT    CGA-SBA, PROCTOR 8/56, fall risk                        Problem: PT-Long Term Goals     Dates: Start: 10/08/20       Goal: LTG-By discharge, patient will ambulate     Dates: Start: 10/08/20       Description: 1) Individualized goal:  AMB x 150 feet with LRD and SPV  2) Interventions:  PT Group Therapy, PT E Stim Attended, PT Gait Training, PT Therapeutic Exercises, PT Neuro Re-Ed/Balance, PT Aquatic Therapy, PT  Therapeutic Activity, PT Manual Therapy, and PT Evaluation            Goal: LTG-By discharge, patient will transfer one surface to another     Dates: Start: 10/08/20       Description: 1) Individualized goal:  SPT with LRD and SPV  2) Interventions:  PT Group Therapy, PT E Stim Attended, PT Gait Training, PT Therapeutic Exercises, PT Neuro Re-Ed/Balance, PT Aquatic Therapy, PT Therapeutic Activity, PT Manual Therapy, and PT Evaluation            Goal: LTG-By discharge, patient will ambulate up/down 4-6 stairs     Dates: Start: 10/08/20       Description: 1) Individualized goal:  Up/down 4 steps with BHR and CGA  2) Interventions:  PT Group Therapy, PT E Stim Attended, PT Gait Training, PT Therapeutic Exercises, PT Neuro Re-Ed/Balance, PT Aquatic Therapy, PT Therapeutic Activity, PT Manual Therapy, and PT Evaluation            Goal: LTG-By discharge, patient will transfer in/out of a car     Dates: Start: 10/08/20       Description: 1) Individualized goal:  Car transfer with LRD and SPV  2) Interventions:  PT Group Therapy, PT E Stim Attended, PT Gait Training, PT Therapeutic Exercises, PT Neuro Re-Ed/Balance, PT Aquatic Therapy, PT Therapeutic Activity, PT Manual Therapy, and PT Evaluation

## 2020-10-22 NOTE — PROGRESS NOTES
Pharmacy Warfarin Consult   10/22/2020     45 y.o.   female     Indication for anticoagulation: Stroke    Goal INR = 2 to 3     Recent Labs     10/20/20  0713 10/21/20  0721 10/22/20  0735   INR 2.49* 2.19* 2.20*   HEMOGLOBIN 12.3  --   --    HEMATOCRIT 38.0  --   --        Pertinent Drug/Drug Interactions:  Statin, prn trazadon  Outpatient Warfarin Regimen:  New Start  Recent Warfarin Dosing:    Dose from last 7 days     Date/Time Dose (mg)    10/22/20 0735  6    10/21/20 0721  6    10/20/20 0713  5    10/19/20 0715  6    10/18/20 0556  5    10/17/20 0528  5    10/16/20 0540  6          Bridge Therapy: None        1.  Coumadin 6mg tonight for INR = 2.2        Kate [facility]

## 2020-10-22 NOTE — CARE PLAN
Problem: Safety  Goal: Will remain free from injury  Outcome: PROGRESSING AS EXPECTED  Note: Call light within reach, patient encouraged to use for assistance for any needs and for assistance for safe transferring.      Goal: Will remain free from falls  Outcome: PROGRESSING AS EXPECTED

## 2020-10-22 NOTE — THERAPY
Occupational Therapy  Daily Treatment     Patient Name: Obdulia High  Age:  45 y.o., Sex:  female  Medical Record #: [id number]  Today's Date: 10/22/2020     Precautions  Precautions: Fall Risk  Comments: dizzy, diplopia         Subjective    Patient declined offer to work on cooking while at rehab as she states she won't be doing that anytime soon.  Requested to work on right hand and UB strengthening.     Objective       10/22/20 0831   Precautions   Precautions Fall Risk   Comments dizzy, diplopia   Functional Level of Assist   Grooming Stand by Assist   Grooming Description Standing at sink;Supervision for safety   Sitting Upper Body Exercises   Sitting Upper Body Exercises Yes   Lat Pull 3 sets of 10;Bilateral;Weight (See Comments for lbs)  (25, 30, 30 lbs on equalizer)   Hand Strengthening   Hand Strengthening Right ;Right Finger Flexion;Gross Grasp Right;Right Finger Extension   Comment Right hand and finger flexion/extension using pink putty.  Also removed 15 small pegs from pink putty for finger flexion strengthening.   Bed Mobility    Supine to Sit Modified Independent   Sit to Supine Modified Independent   Scooting Modified Independent   Interdisciplinary Plan of Care Collaboration   Patient Position at End of Therapy In Bed;Call Light within Reach;Tray Table within Reach   OT Total Time Spent   OT Individual Total Time Spent (Mins) 60   OT Charge Group   OT Self Care / ADL 1   OT Therapy Activity 1   OT Therapeutic Exercise  2   Functional mobility with FWW and CGA/SBA from room to [facility] gym, back gym and back to room.    Picked up, manipulated in hand and placed resistive clothespins (all colors) on vertical and horizontal bars with R UE for gross motor/fine motor coordination and hand strengthening.    Assessment    Patient's coordination and strength continue to improve.  Still has occasional small losses of balance with functional mobility.  Strengths: Alert and oriented, Able to follow  instructions, Effective communication skills, Good insight into deficits/needs, Independent prior level of function, Manages pain appropriately, Motivated for self care and independence, Pleasant and cooperative, Supportive family, Willingly participates in therapeutic activities  Barriers: Decreased endurance, Fatigue, Generalized weakness, Hemiparesis, Impaired activity tolerance, Impaired balance, Limited mobility, Visual impairment(double vision)    Plan    R UE strengthening/GMC/FMC, Standing balance (static and dynamic), ADLs, IADLs, typing    Occupational Therapy Goals     Problem: Dressing     Dates: Start: 10/08/20       Goal: STG-Within one week, patient will dress LB     Dates: Start: 10/08/20       Description: 1) Individualized Goal:  with CGA for standing portion using grab bar  2) Interventions:  OT Self Care/ADL, OT Manual Ther Technique, OT Neuro Re-Ed/Balance, OT Sensory Int Techniques, OT Therapeutic Activity, OT Evaluation, and OT Therapeutic Exercise    Note:     Goal Note filed on 10/12/20 6415 by Dario Cooley OT/L    Min NANO for balance                        Problem: Functional Transfers     Dates: Start: 10/12/20       Goal: STG-Within one week, patient will transfer to toilet     Dates: Start: 10/12/20       Description: 1) Individualized Goal:  with SBA using grab bar  2) Interventions:  OT Self Care/ADL, OT Manual Ther Technique, OT Neuro Re-Ed/Balance, OT Sensory Int Techniques, OT Therapeutic Activity, OT Evaluation, and OT Therapeutic Exercise                Problem: OT Long Term Goals     Dates: Start: 10/08/20       Goal: LTG-By discharge, patient will complete basic self care tasks     Dates: Start: 10/08/20       Description: 1) Individualized Goal:  with mod I using AE/AD/techniques as needed  2) Interventions:  OT Self Care/ADL, OT Manual Ther Technique, OT Neuro Re-Ed/Balance, OT Sensory Int Techniques, OT Therapeutic Activity, OT Evaluation, and OT Therapeutic Exercise           Goal: LTG-By discharge, patient will perform bathroom transfers     Dates: Start: 10/08/20       Description: 1) Individualized Goal:  with mod I using AE/AD/techniques as needed  2) Interventions:  OT Self Care/ADL, OT Manual Ther Technique, OT Neuro Re-Ed/Balance, OT Sensory Int Techniques, OT Therapeutic Activity, OT Evaluation, and OT Therapeutic Exercise          Goal: LTG-By discharge, patient will complete basic home management     Dates: Start: 10/08/20       Description: 1) Individualized Goal:  with supervision using AE/AD/techniques as needed  2) Interventions:  OT Self Care/ADL, OT Manual Ther Technique, OT Neuro Re-Ed/Balance, OT Sensory Int Techniques, OT Therapeutic Activity, OT Evaluation, and OT Therapeutic Exercise

## 2020-10-22 NOTE — THERAPY
Speech Language Pathology  Daily Treatment     Patient Name: Obdulia High  Age:  45 y.o., Sex:  female  Medical Record #: [id number]  Today's Date: 10/22/2020     Precautions  Precautions: Fall Risk  Comments: dizzy, diplopia    Subjective    Pt pleasant and cooperative.      Objective       10/22/20 1433   SLP Total Time Spent   SLP Individual Total Time Spent (Mins) 30   Treatment Charges   SLP Cognitive Skill Development First 15 Minutes 1   SLP Cognitive Skill Development Additional 15 Minutes 1       Assessment    Using activity 2 from [facility] pt required MIN cues for attention to detail, completion of all tasks and organization in order to fit within the given time constraints. Pt reports feeling much better about cognition, pt reporting faster processing and feeling more confident returning back to work.     Strengths: Making steady progress towards goals, Pleasant and cooperative, Supportive family, Willingly participates in therapeutic activities, Motivated for self care and independence, Independent prior level of function, Effective communication skills, Alert and oriented, Able to follow instructions  Barriers: Visual impairment, Hemiparesis    Plan    Cont complex executive functioning tasks     Speech Therapy Problems     Problem: Problem Solving STGs     Dates: Start: 10/21/20       Goal: STG-Within one week, patient will     Dates: Start: 10/21/20       Description: 1) Individualized goal:  complete complex organization/executive function tasks with >90% accuracy provided SPV  2) Interventions:  SLP Cognitive Skill Development and SLP Group Treatment                    Problem: Speech/Swallowing LTGs     Dates: Start: 10/08/20       Goal: LTG-By discharge, patient will safely swallow     Dates: Start: 10/08/20       Description: 1) Individualized goal:  regular textures/thin liquids for safe return to PLOF.   2) Interventions:  SLP Swallowing Dysfunction Treatment, SLP Oral Pharyngeal Evaluation, SLP  Video Swallow Evaluation, SLP Cognitive Skill Development, and SLP Group Treatment              Goal: LTG-By discharge, patient will solve complex problem     Dates: Start: 10/08/20       Description: 1) Individualized goal: By utilizing compensatory intervention for memory and problem-solving to allow for safe completion of daily activities with MOD I in order to prepare for safe d/c home  2) Interventions:  SLP Cognitive Skill Development and SLP Group Treatment

## 2020-10-22 NOTE — THERAPY
"Physical Therapy   Daily Treatment     Patient Name: Obdulia High  Age:  45 y.o., Sex:  female  Medical Record #: [id number]  Today's Date: 10/22/2020     Precautions  Precautions: Fall Risk  Comments: dizzy, diplopia    Subjective    Pt was seated in w/c upon arrival and agreeable to treatment.  Pt reported she could not tolerate clear glasses with tape occulusion.      Objective       10/22/20 1331   Precautions   Precautions Fall Risk   Gait Functional Level of Assist    Gait Level Of Assist Contact Guard Assist   Assistive Device None;Front Wheel Walker   Distance (Feet) 300  (x100 with no AD)   # of Times Distance was Traveled 1   Deviation Bradykinetic;Decreased Heel Strike;Decreased Toe Off   Interdisciplinary Plan of Care Collaboration   Patient Position at End of Therapy Seated  (Hand off to ST)   PT Total Time Spent   PT Individual Total Time Spent (Mins) 60   PT Charge Group   PT Gait Training 2   PT Therapeutic Exercise 1   PT Neuromuscular Re-Education / Balance 1     Discussion of progress/POC.  Completed gait training with FWW with focus on obstacle negotiation.  Completed obstacle course x 2 reps.  RLE neuro re ed: targeted stepping on 2\" and 6\" cone x 10 reps x 2.  With 6\" cone target stepping at 25% faster than self selected speed x 20 reps with 80% accuracy, then progressing to one UE support at self selected speed (100% accurate) then no UE support (60% accuracy) x 40 reps total.  Step ups with 6\" step x 10 rep, with 4\" step x 10 reps with focus on R knee control and glut activation.     Assessment    Pt with improving tolerance to gait training with less subjective dizziness noted.  Pt with improving obstacle negotiation using compensatory methods but increased dizziness with R turns.  Pt demonstrated poor R knee control initially with step up training, but improvements with practice.   Strengths: Independent prior level of function, Making steady progress towards goals, Motivated for self care " and independence, Pleasant and cooperative, Supportive family, Willingly participates in therapeutic activities  Barriers: Decreased endurance, Fatigue, Impaired activity tolerance, Impaired balance, Visual impairment, Limited mobility, Impulsive    Plan    Proximal stability, standing balance, AMB with FWW, obstacle avoidance, AMB with no AD, ongoing family training    Physical Therapy Problems     Problem: Mobility     Dates: Start: 10/19/20       Goal: STG-Within one week, patient will ambulate community distances     Dates: Start: 10/19/20       Description: 1) Individualized goal:  Patient will amb with FWW SBA indoors, CGA outdoors >150 ft  2) Interventions:  PT Group Therapy, PT Gait Training, PT Self Care/Home Eval, PT Therapeutic Exercises, PT Neuro Re-Ed/Balance, PT Aquatic Therapy, PT Therapeutic Activity, and PT Manual Therapy            Goal: STG-Within one week, patient will ambulate up/down a curb     Dates: Start: 10/19/20       Description: 1) Individualized goal:  Patient will amb up/down curb 2x with FWW SBA  2) Interventions:  PT Group Therapy, PT Gait Training, PT Self Care/Home Eval, PT Therapeutic Exercises, PT Neuro Re-Ed/Balance, PT Aquatic Therapy, PT Therapeutic Activity, and PT Manual Therapy                  Problem: Mobility Transfers     Dates: Start: 10/12/20       Goal: STG-Within one week, patient will transfer bed to chair     Dates: Start: 10/12/20       Description: 1) Individualized goal:  SBA reach pivot + set up wc <> bed right/ left  2) Interventions:  PT Orthotics Training, PT Gait Training, PT Self Care/Home Eval, PT Therapeutic Exercises, PT Neuro Re-Ed/Balance, PT Aquatic Therapy, PT Therapeutic Activity, and PT Manual Therapy      Note:     Goal Note filed on 10/19/20 1233 by Rakel Mcneal DPT    CGA-SBA, PROCTOR 8/56, fall risk                        Problem: PT-Long Term Goals     Dates: Start: 10/08/20       Goal: LTG-By discharge, patient will ambulate     Dates:  Start: 10/08/20       Description: 1) Individualized goal:  AMB x 150 feet with LRD and SPV  2) Interventions:  PT Group Therapy, PT E Stim Attended, PT Gait Training, PT Therapeutic Exercises, PT Neuro Re-Ed/Balance, PT Aquatic Therapy, PT Therapeutic Activity, PT Manual Therapy, and PT Evaluation            Goal: LTG-By discharge, patient will transfer one surface to another     Dates: Start: 10/08/20       Description: 1) Individualized goal:  SPT with LRD and SPV  2) Interventions:  PT Group Therapy, PT E Stim Attended, PT Gait Training, PT Therapeutic Exercises, PT Neuro Re-Ed/Balance, PT Aquatic Therapy, PT Therapeutic Activity, PT Manual Therapy, and PT Evaluation            Goal: LTG-By discharge, patient will ambulate up/down 4-6 stairs     Dates: Start: 10/08/20       Description: 1) Individualized goal:  Up/down 4 steps with BHR and CGA  2) Interventions:  PT Group Therapy, PT E Stim Attended, PT Gait Training, PT Therapeutic Exercises, PT Neuro Re-Ed/Balance, PT Aquatic Therapy, PT Therapeutic Activity, PT Manual Therapy, and PT Evaluation            Goal: LTG-By discharge, patient will transfer in/out of a car     Dates: Start: 10/08/20       Description: 1) Individualized goal:  Car transfer with LRD and SPV  2) Interventions:  PT Group Therapy, PT E Stim Attended, PT Gait Training, PT Therapeutic Exercises, PT Neuro Re-Ed/Balance, PT Aquatic Therapy, PT Therapeutic Activity, PT Manual Therapy, and PT Evaluation

## 2020-10-22 NOTE — THERAPY
Occupational Therapy  Daily Treatment     Patient Name: Obdulia High  Age:  45 y.o., Sex:  female  Medical Record #: [id number]  Today's Date: 10/22/2020     Precautions  Precautions: Fall Risk  Comments: dizzy, diplopia         Subjective    Pt attended 30 min group stroke education class. Pt was alert and cooperative during training session.     Objective       10/22/20 1031   Precautions   Precautions Fall Risk   Comments dizzy, diplopia   Cognition    Level of Consciousness Alert   Reason for Group Therapy   Reason for Group Therapy To Increase Active Participation through Peer Pressure;To Enhance Motivation;To Increase Patient Interaction during Physical Recovery;To Facilitate Goal Discussion    Interdisciplinary Plan of Care Collaboration   Patient Position at End of Therapy In Bed;Bed Alarm On;Call Light within Reach;Tray Table within Reach;Phone within Reach   OT Total Time Spent   OT Group Total Time Spent (Mins) 30   OT Charge Group   OT Group Therapy Group Activities     Pt attended stroke education class. Pt was educated on stroke risk factors via auditory and visual modalities (printed handout reviewed and provided). Topics reviewed included stroke types, controllable stroke risk factors including HTN and cholesterol management medications, weight management, diet, stress management, daily BP monitoring, smoking cessation, and moderating alcohol intake. Pt also educated on relationship between stroke and diabetes, a-fib, sleep apnea. Pt also educated on community resources including Stroke Bridge program and Stroke support group. Patient attended appropriately to information. Patient was encouraged to ask questions, did so, and all questions were answered to patient’s satisfaction. Patient was counseled on the importance of active communication with MD regarding medications, BP tracking and trends to ensure safe BP management.    Assessment    Patient benefited from participating in this stroke education  class as evidenced by verbalizing improved understanding of hypertension and management strategies.    Strengths: Alert and oriented, Able to follow instructions, Effective communication skills, Good insight into deficits/needs, Independent prior level of function, Manages pain appropriately, Motivated for self care and independence, Pleasant and cooperative, Supportive family, Willingly participates in therapeutic activities  Barriers: Decreased endurance, Fatigue, Generalized weakness, Hemiparesis, Impaired activity tolerance, Impaired balance, Limited mobility, Visual impairment(double vision)    Plan    R UE strengthening/GMC/FMC, Standing balance (static and dynamic), ADLs, IADLs, typing       Occupational Therapy Goals     Problem: Dressing     Dates: Start: 10/08/20       Goal: STG-Within one week, patient will dress LB     Dates: Start: 10/08/20       Description: 1) Individualized Goal:  with CGA for standing portion using grab bar  2) Interventions:  OT Self Care/ADL, OT Manual Ther Technique, OT Neuro Re-Ed/Balance, OT Sensory Int Techniques, OT Therapeutic Activity, OT Evaluation, and OT Therapeutic Exercise    Note:     Goal Note filed on 10/12/20 9135 by Dario Cooley OT/L    Min A for balance                        Problem: Functional Transfers     Dates: Start: 10/12/20       Goal: STG-Within one week, patient will transfer to toilet     Dates: Start: 10/12/20       Description: 1) Individualized Goal:  with SBA using grab bar  2) Interventions:  OT Self Care/ADL, OT Manual Ther Technique, OT Neuro Re-Ed/Balance, OT Sensory Int Techniques, OT Therapeutic Activity, OT Evaluation, and OT Therapeutic Exercise                Problem: OT Long Term Goals     Dates: Start: 10/08/20       Goal: LTG-By discharge, patient will complete basic self care tasks     Dates: Start: 10/08/20       Description: 1) Individualized Goal:  with mod I using AE/AD/techniques as needed  2) Interventions:  OT Self  Care/ADL, OT Manual Ther Technique, OT Neuro Re-Ed/Balance, OT Sensory Int Techniques, OT Therapeutic Activity, OT Evaluation, and OT Therapeutic Exercise          Goal: LTG-By discharge, patient will perform bathroom transfers     Dates: Start: 10/08/20       Description: 1) Individualized Goal:  with mod I using AE/AD/techniques as needed  2) Interventions:  OT Self Care/ADL, OT Manual Ther Technique, OT Neuro Re-Ed/Balance, OT Sensory Int Techniques, OT Therapeutic Activity, OT Evaluation, and OT Therapeutic Exercise          Goal: LTG-By discharge, patient will complete basic home management     Dates: Start: 10/08/20       Description: 1) Individualized Goal:  with supervision using AE/AD/techniques as needed  2) Interventions:  OT Self Care/ADL, OT Manual Ther Technique, OT Neuro Re-Ed/Balance, OT Sensory Int Techniques, OT Therapeutic Activity, OT Evaluation, and OT Therapeutic Exercise

## 2020-10-22 NOTE — PROGRESS NOTES
"Rehab Progress Note     Date of Service: 10/22/2020  Chief Complaint: follow up stroke    Interval Events (Subjective)    Patient seen and examined today in the therapy gym. She is working with PT in the parallel bars. She has improvement in her right hip strength with less Trendelenburg. She also was able to walk today without an assistive device. She reports she tried glasses with clear tape, which doesn't work as well as the patch, which I reassured her is fine. Patient with improvement in her ability to adduct her left eye, which was confirmed with the PT who was surprised at the change. Other than the double vision, patient has no other complaints today.     Objective:  VITAL SIGNS: /56   Pulse 69   Temp 36.3 °C (97.3 °F) (Temporal)   Resp 18   Ht 1.702 m (5' 7.01\")   Wt 83.5 kg (184 lb 1.4 oz)   SpO2 93%   BMI 28.82 kg/m²   Gen: alert, no apparent distress  CV: regular rate and rhythm, no murmurs, no peripheral edema  Resp: clear to ascultation bilaterally, normal respiratory effort  GI: soft, non-tender abdomen, bowel sounds present  Neuro: notable for improved ability to adduct the left eye, vertical nystagmus, improved ptosis      Recent Results (from the past 72 hour(s))   Prothrombin Time    Collection Time: 10/20/20  7:13 AM   Result Value Ref Range    PT 27.7 (H) 12.0 - 14.6 sec    INR 2.49 (H) 0.87 - 1.13   CBC WITHOUT DIFFERENTIAL    Collection Time: 10/20/20  7:13 AM   Result Value Ref Range    WBC 5.1 4.8 - 10.8 K/uL    RBC 4.05 (L) 4.20 - 5.40 M/uL    Hemoglobin 12.3 12.0 - 16.0 g/dL    Hematocrit 38.0 37.0 - 47.0 %    MCV 93.8 81.4 - 97.8 fL    MCH 30.4 27.0 - 33.0 pg    MCHC 32.4 (L) 33.6 - 35.0 g/dL    RDW 45.1 35.9 - 50.0 fL    Platelet Count 432 164 - 446 K/uL    MPV 8.9 (L) 9.0 - 12.9 fL   Prothrombin Time    Collection Time: 10/21/20  7:21 AM   Result Value Ref Range    PT 25.0 (H) 12.0 - 14.6 sec    INR 2.19 (H) 0.87 - 1.13   Prothrombin Time    Collection Time: 10/22/20  7:35 " AM   Result Value Ref Range    PT 25.1 (H) 12.0 - 14.6 sec    INR 2.20 (H) 0.87 - 1.13       Current Facility-Administered Medications   Medication Frequency   • warfarin (COUMADIN) tablet 6 mg ONCE AT 1800   • meclizine (ANTIVERT) tablet 25 mg TID PRN   • levETIRAcetam (KEPPRA) tablet 500 mg Q12HRS   • traZODone (DESYREL) tablet 50 mg QHS   • melatonin tablet 3 mg QHS   • hydrOXYzine HCl (ATARAX) tablet 50 mg Q6HRS PRN   • Respiratory Therapy Consult Continuous RT   • Pharmacy Consult Request ...Pain Management Review 1 Each PHARMACY TO DOSE   • hydrALAZINE (APRESOLINE) tablet 10 mg Q8HRS PRN   • artificial tears ophthalmic solution 1 Drop PRN   • benzocaine-menthol (CEPACOL) lozenge 1 Lozenge Q2HRS PRN   • mag hydrox-al hydrox-simeth (MAALOX PLUS ES or MYLANTA DS) suspension 20 mL Q2HRS PRN   • ondansetron (ZOFRAN ODT) dispertab 4 mg 4X/DAY PRN    Or   • ondansetron (ZOFRAN) syringe/vial injection 4 mg 4X/DAY PRN   • sodium chloride (OCEAN) 0.65 % nasal spray 2 Spray PRN   • lactulose 20 GM/30ML solution 30 mL QDAY PRN   • docusate sodium (ENEMEEZ) enema 283 mg QDAY PRN   • fleet enema 133 mL QDAY PRN   • atorvastatin (LIPITOR) tablet 40 mg Q EVENING   • senna-docusate (PERICOLACE or SENOKOT S) 8.6-50 MG per tablet 2 Tab BID    And   • polyethylene glycol/lytes (MIRALAX) PACKET 1 Packet QDAY PRN    And   • magnesium hydroxide (MILK OF MAGNESIA) suspension 30 mL QDAY PRN    And   • bisacodyl (DULCOLAX) suppository 10 mg QDAY PRN   • acetaminophen (TYLENOL) tablet 650 mg Q4HRS PRN   • acetaminophen/caffeine/butalbital 325-40-50 mg (FIORICET) -40 MG per tablet 1 Tab Q6HRS PRN   • MD Alert...Warfarin per Pharmacy PHARMACY TO DOSE       Orders Placed This Encounter   Procedures   • Diet Order Regular (meds whole 1:1 with thins)     Standing Status:   Standing     Number of Occurrences:   1     Order Specific Question:   Diet:     Answer:   Regular [1]     Comments:   meds whole 1:1 with thins        Assessment:  Active Hospital Problems    Diagnosis   • *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    I led and attended the weekly conference, and agree with the IDT conference documentation and plan of care as noted below.    Date of conference: 10/19/2020    Goals and barriers: See IDT note.    Biggest barriers: visual impairments, impaired balance (initial PROCTOR 8/56), numb right upper extremity    CM/social support:  very supportive    Anticipated DC date: 10/27    Home health: PT/OT/SLP/RN    Equip: shower chair, grab bars, FWW    Follow up: PCP, stroke bridge clinic, Dr. Becker - rehab clinic, neuro-ophthalmology, optometry, ENT, Dr. Albert       Medical Decision Making and Plan:    Midbrain and khang left ischemic stroke  Left vertebral artery dissection  S/p thrombectomy Dr. 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 Albert   Outpatient follow up with stroke bridge clinic, referral made  Outpatient follow up with Dr. Becker, rehab clinic referral made  Outpatient follow up with Dr. Rosas, neurophthalmology referral  made  Can also follow up with [facility] Freed, optometry, may benefit from prism glasses    Dizziness, continues  Likely from double vision/cerebellar stroke  Continue eye patch  Orthostatics negative   PRN meclizine    Anxiety/depression  Dr. Hutchins consulted, appreciate assistance  No need for medications at this time    Hypophonia, improved  Suspect from intubation, not from stroke location  Speech therapy  FEES with impaired mobility of cords, and possible pathology on cords  Follow up with outpatient ENT, referral made    Hypertension, resolved    Hypotension  Negative orthostatics  BMP without signs of dehydration  Likely close to her baseline given her age     History of migraines  Intermittent headache  PRN tylenol or Fioricet, not requiring/using     Insomnia, improved/resolved  Scheduled melatonin and trazodone    Oral thrush , resolved    S/p nystatin     Seizure prophylaxis  Continue Keppra  Outpatient follow up with stroke bridge clinic    Leukocytosis, resolved  UTI  Negative CXR    UTI, E Coli, treated  S/p cefuroxime    Bowel  Meds as needed  Last BM 10/22    Bladder  Check PVRs - 23, 17, 79  Patient not retaining   ICP for over 400 cc  Scheduled toileting    DVT prophylaxis  S/p Lovenox bridge to coumadin  Pharmacy dosing coumadin    Total time:  15 minutes.  I spent greater than 50% of the time for patient care, counseling, and coordination on this date, including patient face-to face time, unit/floor time with review of records/pertinent lab data and studies, as well as discussing diagnostic evaluation/work up, planned therapeutic interventions, and future disposition of care, as per the interval events/subjective and the assessment and plan as noted above.    I have performed a physical exam, reviewed and updated ROS, as well as the assessment and plan today 10/22/2020. In review of note from 10/21/2020 there are no new changes except as documented above.    Eden Mendoza M.D.   Physical Medicine  and Rehabilitation

## 2020-10-22 NOTE — THERAPY
"Occupational Therapy  Daily Treatment     Patient Name: Obdulia High  Age:  45 y.o., Sex:  female  Medical Record #: [id number]  Today's Date: 10/22/2020     Precautions  Precautions: (P) Fall Risk  Comments: (P) dizzy, diplopia         Subjective    \" This is pretty good.\"  When first applying safety glasses with partial occlusion        Objective       10/22/20 1301   Precautions   Precautions Fall Risk   Comments dizzy, diplopia   OT Total Time Spent   OT Individual Total Time Spent (Mins) 30   OT Charge Group   OT Therapy Activity 2        seated in w/c   Applied trial use of  Safety glasses with  Left nasal lens partial occlusion   (  1 inch 3M tape)    With head in neutral   Reported no double vision.   With increased head turns left or right she reported  Double vision ( as expected) and increased dizziness      Educated  Regarding benefit of binocular vision as opposed to monocular using patch.  Patient verbalized understanding willing to try glasses during  PT session that directly follows this session .  Verbalized understanding to switch back to patch if unable to tolerate use of the glasses.    At  This time  Glasses may be better suited for  Activity not requiring head turns    Such as watching TV  Or  Reading.      worked on  Oculomotor exericse  Left eye  Adduction   And diagonal adduction  Up and right and down and right.  With eyes closed rest breaks  Between  Three sets of exercises  Assessment    Participates to the best of her ability     agreeable to trial of   Partially occluded vision  As tolerated     Strengths: Alert and oriented, Able to follow instructions, Effective communication skills, Good insight into deficits/needs, Independent prior level of function, Manages pain appropriately, Motivated for self care and independence, Pleasant and cooperative, Supportive family, Willingly participates in therapeutic activities  Barriers: Decreased endurance, Fatigue, Generalized weakness, " Hemiparesis, Impaired activity tolerance, Impaired balance, Limited mobility, Visual impairment(double vision)    Plan    R UE strengthening/GMC/FMC, Standing balance (static and dynamic), ADLs, IADLs, typing       Occupational Therapy Goals     Problem: Dressing     Dates: Start: 10/08/20       Goal: STG-Within one week, patient will dress LB     Dates: Start: 10/08/20       Description: 1) Individualized Goal:  with CGA for standing portion using grab bar  2) Interventions:  OT Self Care/ADL, OT Manual Ther Technique, OT Neuro Re-Ed/Balance, OT Sensory Int Techniques, OT Therapeutic Activity, OT Evaluation, and OT Therapeutic Exercise    Note:     Goal Note filed on 10/12/20 1305 by Dario Cooley OT/L    Min A for balance                        Problem: Functional Transfers     Dates: Start: 10/12/20       Goal: STG-Within one week, patient will transfer to toilet     Dates: Start: 10/12/20       Description: 1) Individualized Goal:  with SBA using grab bar  2) Interventions:  OT Self Care/ADL, OT Manual Ther Technique, OT Neuro Re-Ed/Balance, OT Sensory Int Techniques, OT Therapeutic Activity, OT Evaluation, and OT Therapeutic Exercise                Problem: OT Long Term Goals     Dates: Start: 10/08/20       Goal: LTG-By discharge, patient will complete basic self care tasks     Dates: Start: 10/08/20       Description: 1) Individualized Goal:  with mod I using AE/AD/techniques as needed  2) Interventions:  OT Self Care/ADL, OT Manual Ther Technique, OT Neuro Re-Ed/Balance, OT Sensory Int Techniques, OT Therapeutic Activity, OT Evaluation, and OT Therapeutic Exercise          Goal: LTG-By discharge, patient will perform bathroom transfers     Dates: Start: 10/08/20       Description: 1) Individualized Goal:  with mod I using AE/AD/techniques as needed  2) Interventions:  OT Self Care/ADL, OT Manual Ther Technique, OT Neuro Re-Ed/Balance, OT Sensory Int Techniques, OT Therapeutic Activity, OT Evaluation,  and OT Therapeutic Exercise          Goal: LTG-By discharge, patient will complete basic home management     Dates: Start: 10/08/20       Description: 1) Individualized Goal:  with supervision using AE/AD/techniques as needed  2) Interventions:  OT Self Care/ADL, OT Manual Ther Technique, OT Neuro Re-Ed/Balance, OT Sensory Int Techniques, OT Therapeutic Activity, OT Evaluation, and OT Therapeutic Exercise

## 2020-10-22 NOTE — DISCHARGE PLANNING
Per Jesus at [facility], referral accepted pending receipt of insurance authorization which notmally takes about 3-5 business days.  CM notified.

## 2020-10-23 LAB
INR PPP: 2.29 (ref 0.87–1.13)
PROTHROMBIN TIME: 26 SEC (ref 12–14.6)

## 2020-10-23 PROCEDURE — A9270 NON-COVERED ITEM OR SERVICE: HCPCS | Performed by: PHYSICAL MEDICINE & REHABILITATION

## 2020-10-23 PROCEDURE — 85610 PROTHROMBIN TIME: CPT

## 2020-10-23 PROCEDURE — 99232 SBSQ HOSP IP/OBS MODERATE 35: CPT | Performed by: PHYSICAL MEDICINE & REHABILITATION

## 2020-10-23 PROCEDURE — 36415 COLL VENOUS BLD VENIPUNCTURE: CPT

## 2020-10-23 PROCEDURE — 700101 HCHG RX REV CODE 250: Performed by: PHYSICAL MEDICINE & REHABILITATION

## 2020-10-23 PROCEDURE — 97116 GAIT TRAINING THERAPY: CPT

## 2020-10-23 PROCEDURE — 97530 THERAPEUTIC ACTIVITIES: CPT

## 2020-10-23 PROCEDURE — 97110 THERAPEUTIC EXERCISES: CPT

## 2020-10-23 PROCEDURE — 770010 HCHG ROOM/CARE - REHAB SEMI PRIVAT*

## 2020-10-23 PROCEDURE — 700102 HCHG RX REV CODE 250 W/ 637 OVERRIDE(OP): Performed by: PHYSICAL MEDICINE & REHABILITATION

## 2020-10-23 PROCEDURE — 97535 SELF CARE MNGMENT TRAINING: CPT

## 2020-10-23 PROCEDURE — 97112 NEUROMUSCULAR REEDUCATION: CPT

## 2020-10-23 RX ORDER — METHOCARBAMOL 500 MG/1
500 TABLET, FILM COATED ORAL 4 TIMES DAILY PRN
Status: DISCONTINUED | OUTPATIENT
Start: 2020-10-23 | End: 2020-10-27 | Stop reason: HOSPADM

## 2020-10-23 RX ORDER — LIDOCAINE 50 MG/G
2 PATCH TOPICAL EVERY 24 HOURS
Status: DISCONTINUED | OUTPATIENT
Start: 2020-10-23 | End: 2020-10-27 | Stop reason: HOSPADM

## 2020-10-23 RX ORDER — WARFARIN SODIUM 3 MG/1
6 TABLET ORAL
Status: COMPLETED | OUTPATIENT
Start: 2020-10-23 | End: 2020-10-23

## 2020-10-23 RX ADMIN — ACETAMINOPHEN 650 MG: 325 TABLET, FILM COATED ORAL at 12:33

## 2020-10-23 RX ADMIN — LEVETIRACETAM 500 MG: 500 TABLET, FILM COATED ORAL at 09:10

## 2020-10-23 RX ADMIN — DOCUSATE SODIUM 50 MG AND SENNOSIDES 8.6 MG 2 TABLET: 8.6; 5 TABLET, FILM COATED ORAL at 09:10

## 2020-10-23 RX ADMIN — WARFARIN SODIUM 6 MG: 3 TABLET ORAL at 18:12

## 2020-10-23 RX ADMIN — METHOCARBAMOL 500 MG: 500 TABLET, FILM COATED ORAL at 13:47

## 2020-10-23 RX ADMIN — LEVETIRACETAM 500 MG: 500 TABLET, FILM COATED ORAL at 20:46

## 2020-10-23 RX ADMIN — MELATONIN TAB 3 MG 3 MG: 3 TAB at 20:46

## 2020-10-23 RX ADMIN — TRAZODONE HYDROCHLORIDE 50 MG: 50 TABLET ORAL at 20:46

## 2020-10-23 RX ADMIN — LIDOCAINE 2 PATCH: 50 PATCH TOPICAL at 13:47

## 2020-10-23 RX ADMIN — ATORVASTATIN CALCIUM 40 MG: 40 TABLET, FILM COATED ORAL at 20:46

## 2020-10-23 ASSESSMENT — GAIT ASSESSMENTS
DISTANCE (FEET): 375
ASSISTIVE DEVICE: FRONT WHEEL WALKER
DEVIATION: BRADYKINETIC;DECREASED HEEL STRIKE;DECREASED TOE OFF
GAIT LEVEL OF ASSIST: CONTACT GUARD ASSIST

## 2020-10-23 ASSESSMENT — PAIN DESCRIPTION - PAIN TYPE: TYPE: ACUTE PAIN

## 2020-10-23 ASSESSMENT — ACTIVITIES OF DAILY LIVING (ADL): TUB_SHOWER_TRANSFER_DESCRIPTION: GRAB BAR;SHOWER BENCH;SET-UP OF EQUIPMENT;SUPERVISION FOR SAFETY

## 2020-10-23 NOTE — CARE PLAN
Problem: Communication  Goal: The ability to communicate needs accurately and effectively will improve  Outcome: PROGRESSING AS EXPECTED     Problem: Safety  Goal: Will remain free from injury  Outcome: PROGRESSING AS EXPECTED  Note: Call light within reach, patient encouraged to use for assistance for any needs and for assistance for safe transferring.     Goal: Will remain free from falls  Outcome: PROGRESSING AS EXPECTED

## 2020-10-23 NOTE — THERAPY
Occupational Therapy  Daily Treatment     Patient Name: Obdulia High  Age:  45 y.o., Sex:  female  Medical Record #: [id number]  Today's Date: 10/23/2020     Precautions  Precautions: (P) Fall Risk  Comments: dizzy, diplopia         Subjective    Patient agreeable to shower, work on dynamic standing balance and UB strengthening     Objective       10/23/20 0931   Precautions   Precautions Fall Risk   Functional Level of Assist   Eating Independent   Grooming Modified Independent   Grooming Description Seated in wheelchair at sink   Bathing Supervision   Bathing Description Set-up of equipment;Grab bar;Tub bench   Upper Body Dressing Supervision  (supervised standing at closet to retrieve, mod I to don/doff)   Upper Body Dressing Description Supervision for safety   Lower Body Dressing Supervision   Lower Body Dressing Description Supervision for safety  (supervised standing at closet to retrieve, mod I to don/doff)   Tub / Shower Transfers Supervised   Tub Shower Transfer Description Grab bar;Shower bench;Set-up of equipment;Supervision for safety   Sitting Upper Body Exercises   Sitting Upper Body Exercises Yes   Tricep Press 3 sets of 10;Bilateral;Weight (See Comments for lbs)  (30 lbs on rickshaw forward)   Standing Upper Body Exercises   Standing Upper Body Exercises Yes   Shoulder Extension 3 sets of 10;Right;Left;Weight (See Comments for lbs)  (R 15 lbs, L 20 lbs)   Bicep Curls 3 sets of 10;Right;Left;Weight (See Comments for lbs)  (R 10 lbs, L 15 lbs)   Balance   Standing Balance (Dynamic) Fair +   Comments mild dynamic balance challenges with and without single UE support while shooting basketball at basket with right and left UEs unilaterally x 12 minutes at SBA   Bed Mobility    Supine to Sit Modified Independent   Sit to Supine Modified Independent   Scooting Modified Independent   Interdisciplinary Plan of Care Collaboration   Patient Position at End of Therapy In Bed;Call Light within Reach;Tray Table  within Reach   OT Total Time Spent   OT Individual Total Time Spent (Mins) 90   OT Charge Group   OT Self Care / ADL 3   OT Neuromuscular Re-education / Balance 1   OT Therapeutic Exercise  2       Assessment    Patient demonstrated improved accuracy and dynamic standing balance with shooting baskets with both right and left hands compared to two weeks ago.  Completed adl routine at a mostly setup to supervised level.  Strengths: Alert and oriented, Able to follow instructions, Effective communication skills, Good insight into deficits/needs, Independent prior level of function, Manages pain appropriately, Motivated for self care and independence, Pleasant and cooperative, Supportive family, Willingly participates in therapeutic activities  Barriers: Decreased endurance, Fatigue, Generalized weakness, Hemiparesis, Impaired activity tolerance, Impaired balance, Limited mobility, Visual impairment(double vision)    Plan    R UE strengthening/GMC/FMC, Standing balance (static and dynamic), ADLs, IADLs, typing    Occupational Therapy Goals     Problem: Dressing     Dates: Start: 10/08/20       Goal: STG-Within one week, patient will dress LB     Dates: Start: 10/08/20       Description: 1) Individualized Goal:  with CGA for standing portion using grab bar  2) Interventions:  OT Self Care/ADL, OT Manual Ther Technique, OT Neuro Re-Ed/Balance, OT Sensory Int Techniques, OT Therapeutic Activity, OT Evaluation, and OT Therapeutic Exercise    Note:     Goal Note filed on 10/12/20 1305 by Dario Cooley OT/L    Min A for balance                        Problem: Functional Transfers     Dates: Start: 10/12/20       Goal: STG-Within one week, patient will transfer to toilet     Dates: Start: 10/12/20       Description: 1) Individualized Goal:  with SBA using grab bar  2) Interventions:  OT Self Care/ADL, OT Manual Ther Technique, OT Neuro Re-Ed/Balance, OT Sensory Int Techniques, OT Therapeutic Activity, OT Evaluation, and  OT Therapeutic Exercise                Problem: OT Long Term Goals     Dates: Start: 10/08/20       Goal: LTG-By discharge, patient will complete basic self care tasks     Dates: Start: 10/08/20       Description: 1) Individualized Goal:  with mod I using AE/AD/techniques as needed  2) Interventions:  OT Self Care/ADL, OT Manual Ther Technique, OT Neuro Re-Ed/Balance, OT Sensory Int Techniques, OT Therapeutic Activity, OT Evaluation, and OT Therapeutic Exercise          Goal: LTG-By discharge, patient will perform bathroom transfers     Dates: Start: 10/08/20       Description: 1) Individualized Goal:  with mod I using AE/AD/techniques as needed  2) Interventions:  OT Self Care/ADL, OT Manual Ther Technique, OT Neuro Re-Ed/Balance, OT Sensory Int Techniques, OT Therapeutic Activity, OT Evaluation, and OT Therapeutic Exercise          Goal: LTG-By discharge, patient will complete basic home management     Dates: Start: 10/08/20       Description: 1) Individualized Goal:  with supervision using AE/AD/techniques as needed  2) Interventions:  OT Self Care/ADL, OT Manual Ther Technique, OT Neuro Re-Ed/Balance, OT Sensory Int Techniques, OT Therapeutic Activity, OT Evaluation, and OT Therapeutic Exercise

## 2020-10-23 NOTE — THERAPY
Missed Therapy     Patient Name: Obdulia High  Age:  45 y.o., Sex:  female  Medical Record #: [id number]  Today's Date: 10/23/2020    Discussed missed therapy with Dr Mendoza and [facility] Gomez, pt reporting increased dizziness since this morning as well as neck spasms due to cervical rotation in one direction for prolonged period of time. The patient was provided a MHP to L side of cervical spine and monitored throughout. Per pt report the medication coupled with the hot pack decreased her spasms.        10/23/20 1431   Therapy Missed   Missed Therapy (Minutes) 30   Reason For Missed Therapy Medical - Patient on Hold from Therapy  (hold per MD re: neck spasms/dizziness)

## 2020-10-23 NOTE — THERAPY
Physical Therapy   Daily Treatment     Patient Name: Obdulia High  Age:  45 y.o., Sex:  female  Medical Record #: [id number]  Today's Date: 10/23/2020     Precautions  Precautions: Fall Risk  Comments: dizzy, diplopia    Subjective    Pt was seated in w/c upon arrival and agreeable to treatment.       Objective     10/23/20 0831   Precautions   Precautions Fall Risk   Gait Functional Level of Assist    Gait Level Of Assist Contact Guard Assist  (to SBA)   Assistive Device Front Wheel Walker   Distance (Feet) 375   # of Times Distance was Traveled 1   Deviation Bradykinetic;Decreased Heel Strike;Decreased Toe Off   Stairs Functional Level of Assist   Level of Assist with Stairs Contact Guard Assist   # of Stairs Climbed 8  (additional 4 steps)   Stairs Description Extra time;Hand rails;Limited by fatigue;Safety concerns;Supervision for safety;Verbal cueing   Standing Lower Body Exercises   Hamstring Curl 1 set of 15;Bilateral    Hip Flexion 1 set of 15;Bilateral   Hip Extension 1 set of 15;Bilateral    Hip Abduction 1 set of 15;Bilateral   Marching 1 set of 15   Heel Rise 1 set of 15;Bilateral   Toe Rise 1 set of 15;Bilateral   Mini Squat Partial;1 set of 15   Interdisciplinary Plan of Care Collaboration   Patient Position at End of Therapy Seated;Call Light within Reach;Tray Table within Reach;Phone within Reach   PT Total Time Spent   PT Individual Total Time Spent (Mins) 60   PT Charge Group   PT Gait Training 2   PT Therapeutic Exercise 1   PT Therapeutic Activities 1     Additional gait training without AD x 75 feet.  Pt given standing HEP with focus on R knee and hip stability/activation.     Assessment    Pt continues to improve her functional mobility but with slight limitation this session secondary to increased dizziness.  Pt continues with decreased R LE motor control/weakness with genu recurvatum noted with stair training.  Pt with good ability to activate quad/gluts with standing exercises.     Strengths:  Independent prior level of function, Making steady progress towards goals, Motivated for self care and independence, Pleasant and cooperative, Supportive family, Willingly participates in therapeutic activities  Barriers: Decreased endurance, Fatigue, Impaired activity tolerance, Impaired balance, Visual impairment, Limited mobility, Impulsive    Plan    Continue gait training with FWW, gait without AD for dynamic balance, RLE proximal strengthening/motor control, balance training    Physical Therapy Problems     Problem: Mobility     Dates: Start: 10/19/20       Goal: STG-Within one week, patient will ambulate community distances     Dates: Start: 10/19/20       Description: 1) Individualized goal:  Patient will amb with FWW SBA indoors, CGA outdoors >150 ft  2) Interventions:  PT Group Therapy, PT Gait Training, PT Self Care/Home Eval, PT Therapeutic Exercises, PT Neuro Re-Ed/Balance, PT Aquatic Therapy, PT Therapeutic Activity, and PT Manual Therapy            Goal: STG-Within one week, patient will ambulate up/down a curb     Dates: Start: 10/19/20       Description: 1) Individualized goal:  Patient will amb up/down curb 2x with FWW SBA  2) Interventions:  PT Group Therapy, PT Gait Training, PT Self Care/Home Eval, PT Therapeutic Exercises, PT Neuro Re-Ed/Balance, PT Aquatic Therapy, PT Therapeutic Activity, and PT Manual Therapy                  Problem: Mobility Transfers     Dates: Start: 10/12/20       Goal: STG-Within one week, patient will transfer bed to chair     Dates: Start: 10/12/20       Description: 1) Individualized goal:  SBA reach pivot + set up wc <> bed right/ left  2) Interventions:  PT Orthotics Training, PT Gait Training, PT Self Care/Home Eval, PT Therapeutic Exercises, PT Neuro Re-Ed/Balance, PT Aquatic Therapy, PT Therapeutic Activity, and PT Manual Therapy      Note:     Goal Note filed on 10/19/20 1233 by Rakel Mcneal DPT    CGA-SBA, PROCTOR 8/56, fall risk                         Problem: PT-Long Term Goals     Dates: Start: 10/08/20       Goal: LTG-By discharge, patient will ambulate     Dates: Start: 10/08/20       Description: 1) Individualized goal:  AMB x 150 feet with LRD and SPV  2) Interventions:  PT Group Therapy, PT E Stim Attended, PT Gait Training, PT Therapeutic Exercises, PT Neuro Re-Ed/Balance, PT Aquatic Therapy, PT Therapeutic Activity, PT Manual Therapy, and PT Evaluation            Goal: LTG-By discharge, patient will transfer one surface to another     Dates: Start: 10/08/20       Description: 1) Individualized goal:  SPT with LRD and SPV  2) Interventions:  PT Group Therapy, PT E Stim Attended, PT Gait Training, PT Therapeutic Exercises, PT Neuro Re-Ed/Balance, PT Aquatic Therapy, PT Therapeutic Activity, PT Manual Therapy, and PT Evaluation            Goal: LTG-By discharge, patient will ambulate up/down 4-6 stairs     Dates: Start: 10/08/20       Description: 1) Individualized goal:  Up/down 4 steps with BHR and CGA  2) Interventions:  PT Group Therapy, PT E Stim Attended, PT Gait Training, PT Therapeutic Exercises, PT Neuro Re-Ed/Balance, PT Aquatic Therapy, PT Therapeutic Activity, PT Manual Therapy, and PT Evaluation            Goal: LTG-By discharge, patient will transfer in/out of a car     Dates: Start: 10/08/20       Description: 1) Individualized goal:  Car transfer with LRD and SPV  2) Interventions:  PT Group Therapy, PT E Stim Attended, PT Gait Training, PT Therapeutic Exercises, PT Neuro Re-Ed/Balance, PT Aquatic Therapy, PT Therapeutic Activity, PT Manual Therapy, and PT Evaluation

## 2020-10-23 NOTE — PROGRESS NOTES
"Rehab Progress Note     Date of Service: 10/23/2020  Chief Complaint: follow up stroke    Interval Events (Subjective)    Patient seen and examined today in the hallway. She is about to go outside with her . She is complaining of left sided neck and upper back pain today after watching TV last night with her head turned to the side. She also reports not sleeping well last night due to her roommate needing to get up and go to the bathroom frequently. Will discuss with charge nurse moving patient to different room. Patient has no other complaints.     Objective:  VITAL SIGNS: /64   Pulse 65   Temp 36.4 °C (97.6 °F) (Temporal)   Resp 17   Ht 1.702 m (5' 7.01\")   Wt 83.5 kg (184 lb 1.4 oz)   SpO2 98%   BMI 28.82 kg/m²   Gen: alert, no apparent distress  CV: regular rate and rhythm, no murmurs, no peripheral edema  Resp: clear to ascultation bilaterally, normal respiratory effort  GI: soft, non-tender abdomen, bowel sounds present  Neuro: notable for left ptosis, left eye adduction weakness  Msk: tenderness to palpation with palpable cords of the left scalenes and left upper trapezius    Recent Results (from the past 72 hour(s))   Prothrombin Time    Collection Time: 10/21/20  7:21 AM   Result Value Ref Range    PT 25.0 (H) 12.0 - 14.6 sec    INR 2.19 (H) 0.87 - 1.13   Prothrombin Time    Collection Time: 10/22/20  7:35 AM   Result Value Ref Range    PT 25.1 (H) 12.0 - 14.6 sec    INR 2.20 (H) 0.87 - 1.13   Prothrombin Time    Collection Time: 10/23/20  6:43 AM   Result Value Ref Range    PT 26.0 (H) 12.0 - 14.6 sec    INR 2.29 (H) 0.87 - 1.13       Current Facility-Administered Medications   Medication Frequency   • warfarin (COUMADIN) tablet 6 mg ONCE AT 1800   • methocarbamol (ROBAXIN) tablet 500 mg 4X/DAY PRN   • lidocaine (LIDODERM) 5 % 2 Patch Q24HR   • meclizine (ANTIVERT) tablet 25 mg TID PRN   • levETIRAcetam (KEPPRA) tablet 500 mg Q12HRS   • traZODone (DESYREL) tablet 50 mg QHS   • melatonin " tablet 3 mg QHS   • hydrOXYzine HCl (ATARAX) tablet 50 mg Q6HRS PRN   • Respiratory Therapy Consult Continuous RT   • Pharmacy Consult Request ...Pain Management Review 1 Each PHARMACY TO DOSE   • hydrALAZINE (APRESOLINE) tablet 10 mg Q8HRS PRN   • artificial tears ophthalmic solution 1 Drop PRN   • benzocaine-menthol (CEPACOL) lozenge 1 Lozenge Q2HRS PRN   • mag hydrox-al hydrox-simeth (MAALOX PLUS ES or MYLANTA DS) suspension 20 mL Q2HRS PRN   • ondansetron (ZOFRAN ODT) dispertab 4 mg 4X/DAY PRN    Or   • ondansetron (ZOFRAN) syringe/vial injection 4 mg 4X/DAY PRN   • sodium chloride (OCEAN) 0.65 % nasal spray 2 Spray PRN   • lactulose 20 GM/30ML solution 30 mL QDAY PRN   • docusate sodium (ENEMEEZ) enema 283 mg QDAY PRN   • fleet enema 133 mL QDAY PRN   • atorvastatin (LIPITOR) tablet 40 mg Q EVENING   • senna-docusate (PERICOLACE or SENOKOT S) 8.6-50 MG per tablet 2 Tab BID    And   • polyethylene glycol/lytes (MIRALAX) PACKET 1 Packet QDAY PRN    And   • magnesium hydroxide (MILK OF MAGNESIA) suspension 30 mL QDAY PRN    And   • bisacodyl (DULCOLAX) suppository 10 mg QDAY PRN   • acetaminophen (TYLENOL) tablet 650 mg Q4HRS PRN   • acetaminophen/caffeine/butalbital 325-40-50 mg (FIORICET) -40 MG per tablet 1 Tab Q6HRS PRN   • MD Alert...Warfarin per Pharmacy PHARMACY TO DOSE       Orders Placed This Encounter   Procedures   • Diet Order Regular (meds whole 1:1 with thins)     Standing Status:   Standing     Number of Occurrences:   1     Order Specific Question:   Diet:     Answer:   Regular [1]     Comments:   meds whole 1:1 with thins       Assessment:  Active Hospital Problems    Diagnosis   • *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    I led and attended the weekly conference, and agree with the IDT conference documentation and plan of care as noted below.    Date of conference: 10/19/2020    Goals and barriers: See IDT note.    Biggest barriers: visual impairments, impaired balance (initial PROCTOR 8/56), numb right upper extremity    CM/social support:  very supportive    Anticipated DC date: 10/27    Home health: PT/OT/SLP/RN    Equip: shower chair, grab bars, FWW    Follow up: PCP, stroke bridge clinic, Dr. Becker - rehab clinic, neuro-ophthalmology, optometry, ENT, Dr. Albert       Medical Decision Making and Plan:    Midbrain and khang left ischemic stroke  Left vertebral artery dissection  S/p thrombectomy Dr. 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 Albert   Outpatient follow up with stroke bridge clinic, referral made  Outpatient follow up with Dr. Becker, rehab clinic referral made  Outpatient follow up with Dr. Rosas, neurophthalmology referral made  Can also follow up with [facility] Freed, optometry, may benefit from prism glasses    Left neck pain/spasm  PRN Robaxin  PRN tylenol  Lidoderm patch  Heat PRN    Dizziness, continues  Likely from double vision/cerebellar stroke  Continue eye patch  Orthostatics negative   PRN meclizine    Anxiety/depression  Dr. Hutchins consulted, appreciate assistance  No need for  medications at this time    Hypophonia, improved  Suspect from intubation, not from stroke location  Speech therapy  FEES with impaired mobility of cords, and possible pathology on cords  Follow up with outpatient ENT, referral made    Hypertension, resolved    Hypotension  Negative orthostatics  BMP without signs of dehydration  Likely close to her baseline given her age     History of migraines  Intermittent headache  PRN tylenol or Fioricet     Insomnia, improved/resolved  Scheduled melatonin and trazodone    Oral thrush , resolved    S/p nystatin     Seizure prophylaxis  Continue Keppra  Outpatient follow up with stroke bridge clinic    Leukocytosis, resolved  UTI  Negative CXR    UTI, E Coli, treated  S/p cefuroxime    Bowel  Meds as needed  Last BM 10/22    Bladder  Check PVRs - 23, 17, 79  Patient not retaining   ICP for over 400 cc  Scheduled toileting    DVT prophylaxis  S/p Lovenox bridge to coumadin  Pharmacy dosing coumadin    Total time:  26 minutes.  I spent greater than 50% of the time for patient care, counseling, and coordination on this date, including patient face-to face time, unit/floor time with review of records/pertinent lab data and studies, as well as discussing diagnostic evaluation/work up, planned therapeutic interventions, and future disposition of care, as per the interval events/subjective and the assessment and plan as noted above.    I have performed a physical exam, reviewed and updated ROS, as well as the assessment and plan today 10/23/2020. In review of note from 10/22/2020 there are no new changes except as documented above.    Eden Mendoza M.D.   Physical Medicine and Rehabilitation

## 2020-10-23 NOTE — CARE PLAN
Problem: Safety  Goal: Will remain free from injury  Outcome: PROGRESSING AS EXPECTED  Note: Patient uses call light consistently and appropriately this shift.  Waits for assistance when needed and does not attempt self transfer.  Able to verbalize needs.       Problem: Venous Thromboembolism (VTW)/Deep Vein Thrombosis (DVT) Prevention:  Goal: Patient will participate in Venous Thrombosis (VTE)/Deep Vein Thrombosis (DVT)Prevention Measures  Outcome: PROGRESSING AS EXPECTED  Flowsheets (Taken 10/23/2020 1411)  Pharmacologic Prophylaxis Used: Warfarin (Coumadin)  Note: Pt is up and walking, participates in therapies, and up to dinning room for all meals.

## 2020-10-23 NOTE — PROGRESS NOTES
Inpatient Anticoagulation Service Note    Date: 10/23/2020    Reason for Anticoagulation: Stroke   Target INR: 2.0 to 3.0         Hemoglobin Value: 12.3  Hematocrit Value: 38  Lab Platelet Value: 432    INR from last 7 days     Date/Time INR Value    10/23/20 0643  (!) 2.29    10/22/20 0735  (!) 2.2    10/21/20 0721  (!) 2.19    10/20/20 0713  (!) 2.49    10/19/20 0715  (!) 2.21    10/18/20 0556  (!) 2.53    10/17/20 0528  (!) 2.63        Dose from last 7 days     Date/Time Dose (mg)    10/23/20 1216  6    10/22/20 0735  6    10/21/20 0721  6    10/20/20 0713  5    10/19/20 0715  6    10/18/20 0556  5    10/17/20 0528  5        Significant Interactions: Statin  Bridge Therapy: No(Lovenox 80 mg SQ Q12H d/c on 10/15 INR > 2 x 2 days)  Bridge Therapy Start Date: 10/07/20  Days of Overlap Therapy: 5 (If less than 5 days and overlap therapy discontinued -- document reason (i.e. Bleed Risk))    (If still on overlap therapy, if No -- document reason (i.e. Bleed Risk))         Plan:  Warfarin 6mg po today for inr 2.29      Tristen Swanson [facility]

## 2020-10-23 NOTE — THERAPY
Missed Therapy     Patient Name: Obdulia High  Age:  45 y.o., Sex:  female  Medical Record #: [id number]  Today's Date: 10/23/2020    Discussed missed therapy with PTA, spouse and physician. Pt reports severe neck pain/spasms, provided with lidocaine patches, robaxin and hot pack. Continues to experience pain, dizziness and increased difficulty concentrating. Medical hold placed.         10/23/20 4374   Interdisciplinary Plan of Care Collaboration   IDT Collaboration with  Family / Caregiver;Physical Therapist Assistant (PTA);Physician   Patient Position at End of Therapy In Bed;Call Light within Reach;Tray Table within Reach;Family / Friend in Room   Collaboration Comments CLOF re: neck pain   Therapy Missed   Missed Therapy (Minutes) 30   Reason For Missed Therapy Medical - Patient on Hold from Therapy  (hold placed secondary to neck spasms, dizziness)

## 2020-10-24 LAB
INR PPP: 2.52 (ref 0.87–1.13)
PROTHROMBIN TIME: 27.9 SEC (ref 12–14.6)

## 2020-10-24 PROCEDURE — 36415 COLL VENOUS BLD VENIPUNCTURE: CPT

## 2020-10-24 PROCEDURE — 97130 THER IVNTJ EA ADDL 15 MIN: CPT

## 2020-10-24 PROCEDURE — 85610 PROTHROMBIN TIME: CPT

## 2020-10-24 PROCEDURE — 97129 THER IVNTJ 1ST 15 MIN: CPT

## 2020-10-24 PROCEDURE — 700102 HCHG RX REV CODE 250 W/ 637 OVERRIDE(OP): Performed by: PHYSICAL MEDICINE & REHABILITATION

## 2020-10-24 PROCEDURE — 770010 HCHG ROOM/CARE - REHAB SEMI PRIVAT*

## 2020-10-24 PROCEDURE — A9270 NON-COVERED ITEM OR SERVICE: HCPCS | Performed by: PHYSICAL MEDICINE & REHABILITATION

## 2020-10-24 PROCEDURE — 99231 SBSQ HOSP IP/OBS SF/LOW 25: CPT | Performed by: PHYSICAL MEDICINE & REHABILITATION

## 2020-10-24 RX ORDER — WARFARIN SODIUM 5 MG/1
5 TABLET ORAL
Status: COMPLETED | OUTPATIENT
Start: 2020-10-24 | End: 2020-10-24

## 2020-10-24 RX ADMIN — LEVETIRACETAM 500 MG: 500 TABLET, FILM COATED ORAL at 21:34

## 2020-10-24 RX ADMIN — TRAZODONE HYDROCHLORIDE 50 MG: 50 TABLET ORAL at 21:34

## 2020-10-24 RX ADMIN — ATORVASTATIN CALCIUM 40 MG: 40 TABLET, FILM COATED ORAL at 21:34

## 2020-10-24 RX ADMIN — MELATONIN TAB 3 MG 3 MG: 3 TAB at 21:34

## 2020-10-24 RX ADMIN — LEVETIRACETAM 500 MG: 500 TABLET, FILM COATED ORAL at 09:31

## 2020-10-24 RX ADMIN — DOCUSATE SODIUM 50 MG AND SENNOSIDES 8.6 MG 2 TABLET: 8.6; 5 TABLET, FILM COATED ORAL at 09:30

## 2020-10-24 RX ADMIN — WARFARIN SODIUM 5 MG: 5 TABLET ORAL at 17:50

## 2020-10-24 NOTE — THERAPY
Speech Language Pathology  Daily Treatment     Patient Name: Obdulia High  Age:  45 y.o., Sex:  female  Medical Record #: [id number]  Today's Date: 10/24/2020     Precautions  Precautions: Fall Risk  Comments: dizzy, diplopia    Subjective    Pt pleasant and cooperative during tx.       Objective       10/24/20 1031   Cognition   Executive Functioning / Organization Minimal (4)   SLP Total Time Spent   SLP Individual Total Time Spent (Mins) 30   Treatment Charges   SLP Cognitive Skill Development First 15 Minutes 1   SLP Cognitive Skill Development Additional 15 Minutes 1       Assessment    Pt completed executive functioning task.  Task required pt to answer questions related to a given map and key.  Pt answered questions with 85% accuracy independently; 100% given min verbal cues to attend to written details.      Strengths: Making steady progress towards goals, Pleasant and cooperative, Supportive family, Willingly participates in therapeutic activities, Motivated for self care and independence, Independent prior level of function, Effective communication skills, Alert and oriented, Able to follow instructions  Barriers: Visual impairment, Hemiparesis    Plan    Target executive function.     Speech Therapy Problems     Problem: Problem Solving STGs     Dates: Start: 10/21/20       Goal: STG-Within one week, patient will     Dates: Start: 10/21/20       Description: 1) Individualized goal:  complete complex organization/executive function tasks with >90% accuracy provided SPV  2) Interventions:  SLP Cognitive Skill Development and SLP Group Treatment                    Problem: Speech/Swallowing LTGs     Dates: Start: 10/08/20       Goal: LTG-By discharge, patient will safely swallow     Dates: Start: 10/08/20       Description: 1) Individualized goal:  regular textures/thin liquids for safe return to PLOF.   2) Interventions:  SLP Swallowing Dysfunction Treatment, SLP Oral Pharyngeal Evaluation, SLP Video Swallow  Evaluation, SLP Cognitive Skill Development, and SLP Group Treatment              Goal: LTG-By discharge, patient will solve complex problem     Dates: Start: 10/08/20       Description: 1) Individualized goal: By utilizing compensatory intervention for memory and problem-solving to allow for safe completion of daily activities with MOD I in order to prepare for safe d/c home  2) Interventions:  SLP Cognitive Skill Development and SLP Group Treatment

## 2020-10-24 NOTE — PROGRESS NOTES
"Rehab Progress Note     Date of Service: 10/24/2020  Chief Complaint: follow up stroke    Interval Events (Subjective)  Patient seen and examined today in bed  Left shoulder much improved with lidopatch, heat, and robaxin; 0 pain today  Denies other issues at this time      Objective:  VITAL SIGNS: /68   Pulse 75   Temp 36.6 °C (97.8 °F) (Oral)   Resp 18   Ht 1.702 m (5' 7.01\")   Wt 83.5 kg (184 lb 1.4 oz)   SpO2 98%   BMI 28.82 kg/m²   Gen: alert, no apparent distress  CV: well perfused extremities  Resp: on room air  GI: soft, non-tender abdomen  Neuro: at least antigravity with left arm abduction/flexion      Recent Results (from the past 72 hour(s))   Prothrombin Time    Collection Time: 10/22/20  7:35 AM   Result Value Ref Range    PT 25.1 (H) 12.0 - 14.6 sec    INR 2.20 (H) 0.87 - 1.13   Prothrombin Time    Collection Time: 10/23/20  6:43 AM   Result Value Ref Range    PT 26.0 (H) 12.0 - 14.6 sec    INR 2.29 (H) 0.87 - 1.13   Prothrombin Time    Collection Time: 10/24/20  6:58 AM   Result Value Ref Range    PT 27.9 (H) 12.0 - 14.6 sec    INR 2.52 (H) 0.87 - 1.13       Current Facility-Administered Medications   Medication Frequency   • warfarin (COUMADIN) tablet 5 mg ONCE AT 1800   • methocarbamol (ROBAXIN) tablet 500 mg 4X/DAY PRN   • lidocaine (LIDODERM) 5 % 2 Patch Q24HR   • meclizine (ANTIVERT) tablet 25 mg TID PRN   • levETIRAcetam (KEPPRA) tablet 500 mg Q12HRS   • traZODone (DESYREL) tablet 50 mg QHS   • melatonin tablet 3 mg QHS   • hydrOXYzine HCl (ATARAX) tablet 50 mg Q6HRS PRN   • Respiratory Therapy Consult Continuous RT   • Pharmacy Consult Request ...Pain Management Review 1 Each PHARMACY TO DOSE   • hydrALAZINE (APRESOLINE) tablet 10 mg Q8HRS PRN   • artificial tears ophthalmic solution 1 Drop PRN   • benzocaine-menthol (CEPACOL) lozenge 1 Lozenge Q2HRS PRN   • mag hydrox-al hydrox-simeth (MAALOX PLUS ES or MYLANTA DS) suspension 20 mL Q2HRS PRN   • ondansetron (ZOFRAN ODT) dispertab " 4 mg 4X/DAY PRN    Or   • ondansetron (ZOFRAN) syringe/vial injection 4 mg 4X/DAY PRN   • sodium chloride (OCEAN) 0.65 % nasal spray 2 Spray PRN   • lactulose 20 GM/30ML solution 30 mL QDAY PRN   • docusate sodium (ENEMEEZ) enema 283 mg QDAY PRN   • fleet enema 133 mL QDAY PRN   • atorvastatin (LIPITOR) tablet 40 mg Q EVENING   • senna-docusate (PERICOLACE or SENOKOT S) 8.6-50 MG per tablet 2 Tab BID    And   • polyethylene glycol/lytes (MIRALAX) PACKET 1 Packet QDAY PRN    And   • magnesium hydroxide (MILK OF MAGNESIA) suspension 30 mL QDAY PRN    And   • bisacodyl (DULCOLAX) suppository 10 mg QDAY PRN   • acetaminophen (TYLENOL) tablet 650 mg Q4HRS PRN   • acetaminophen/caffeine/butalbital 325-40-50 mg (FIORICET) -40 MG per tablet 1 Tab Q6HRS PRN   • MD Alert...Warfarin per Pharmacy PHARMACY TO DOSE       Orders Placed This Encounter   Procedures   • Diet Order Regular (meds whole 1:1 with thins)     Standing Status:   Standing     Number of Occurrences:   1     Order Specific Question:   Diet:     Answer:   Regular [1]     Comments:   meds whole 1:1 with thins       Assessment:  Active Hospital Problems    Diagnosis   • *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 Albert   Outpatient follow up with stroke bridge clinic, referral made  Outpatient follow up with Dr. Becker, rehab clinic referral made  Outpatient follow up with Dr. Rosas, neurophthalmology referral made  Can also follow up with [facility] Freed, optometry, may benefit from prism glasses    Left neck pain/spasm  PRN Robaxin  PRN tylenol  Lidoderm patch  Heat PRN  10/24: no pain with above regimen    Dizziness, continues  Likely from double vision/cerebellar stroke  Continue eye patch  Orthostatics negative   PRN meclizine    Anxiety/depression  Dr. Hutchins consulted, appreciate assistance  No need for medications at this time    Hypophonia, improved  Suspect from intubation, not from stroke location  Speech therapy  FEES with impaired mobility of cords, and possible pathology on cords  Follow up with outpatient ENT, referral made    Hypertension, resolved    Hypotension  Negative orthostatics  BMP without signs of dehydration  Likely close to her baseline given her age     History of migraines  Intermittent headache  PRN tylenol or Fioricet     Insomnia, improved/resolved  Scheduled melatonin and trazodone    Oral thrush , resolved    S/p nystatin     Seizure prophylaxis  Continue Keppra  Outpatient follow up with stroke bridge clinic    Leukocytosis, resolved  UTI  Negative CXR    UTI, E Coli, treated  S/p cefuroxime    Bowel  Meds as needed  Last BM 10/22, monitor    Bladder  Check PVRs - 23, 17, 79  Patient not retaining   ICP for over 400 cc  Scheduled toileting    DVT prophylaxis  S/p Lovenox bridge to coumadin  Pharmacy dosing coumadin    Total time:  17 minutes.  I spent greater than 50% of the time for patient care, counseling, and coordination on this date, including patient face-to face time, unit/floor time with review of records/pertinent lab data and studies, including  left shoulder pain management      Luca Murphy M.D.   Physical Medicine and Rehabilitation

## 2020-10-24 NOTE — PROGRESS NOTES
Inpatient Anticoagulation Service Note    Date: 10/24/2020    Reason for Anticoagulation: Stroke   Target INR: 2.0 to 3.0         Hemoglobin Value: 12.3  Hematocrit Value: 38  Lab Platelet Value: 432    INR from last 7 days     Date/Time INR Value    10/24/20 0658  (!) 2.52    10/23/20 0643  (!) 2.29    10/22/20 0735  (!) 2.2    10/21/20 0721  (!) 2.19    10/20/20 0713  (!) 2.49    10/19/20 0715  (!) 2.21    10/18/20 0556  (!) 2.53        Dose from last 7 days     Date/Time Dose (mg)    10/23/20 1216  6    10/22/20 0735  6    10/21/20 0721  6    10/20/20 0713  5    10/19/20 0715  6    10/18/20 0556  5        Significant Interactions: Statin  Bridge Therapy: No(Lovenox 80 mg SQ Q12H d/c on 10/15 INR > 2 x 2 days)  Bridge Therapy Start Date: 10/07/20  Days of Overlap Therapy: 5 (If less than 5 days and overlap therapy discontinued -- document reason (i.e. Bleed Risk))    (If still on overlap therapy, if No -- document reason (i.e. Bleed Risk))         Plan:  Warfarin 5 mg PO x 1  Education Material Provided?: No  Pharmacist suggested discharge dosing: TBLU Garcia, PetraD

## 2020-10-25 LAB
INR PPP: 2.29 (ref 0.87–1.13)
PROTHROMBIN TIME: 25.9 SEC (ref 12–14.6)

## 2020-10-25 PROCEDURE — 770010 HCHG ROOM/CARE - REHAB SEMI PRIVAT*

## 2020-10-25 PROCEDURE — 97110 THERAPEUTIC EXERCISES: CPT

## 2020-10-25 PROCEDURE — 97116 GAIT TRAINING THERAPY: CPT

## 2020-10-25 PROCEDURE — 97130 THER IVNTJ EA ADDL 15 MIN: CPT

## 2020-10-25 PROCEDURE — A9270 NON-COVERED ITEM OR SERVICE: HCPCS | Performed by: PHYSICAL MEDICINE & REHABILITATION

## 2020-10-25 PROCEDURE — 97530 THERAPEUTIC ACTIVITIES: CPT

## 2020-10-25 PROCEDURE — 700102 HCHG RX REV CODE 250 W/ 637 OVERRIDE(OP): Performed by: PHYSICAL MEDICINE & REHABILITATION

## 2020-10-25 PROCEDURE — 97535 SELF CARE MNGMENT TRAINING: CPT

## 2020-10-25 PROCEDURE — 85610 PROTHROMBIN TIME: CPT

## 2020-10-25 PROCEDURE — 97112 NEUROMUSCULAR REEDUCATION: CPT

## 2020-10-25 PROCEDURE — 36415 COLL VENOUS BLD VENIPUNCTURE: CPT

## 2020-10-25 PROCEDURE — 97129 THER IVNTJ 1ST 15 MIN: CPT

## 2020-10-25 RX ORDER — WARFARIN SODIUM 3 MG/1
6 TABLET ORAL
Status: COMPLETED | OUTPATIENT
Start: 2020-10-25 | End: 2020-10-25

## 2020-10-25 RX ADMIN — ATORVASTATIN CALCIUM 40 MG: 40 TABLET, FILM COATED ORAL at 21:10

## 2020-10-25 RX ADMIN — LEVETIRACETAM 500 MG: 500 TABLET, FILM COATED ORAL at 21:10

## 2020-10-25 RX ADMIN — LEVETIRACETAM 500 MG: 500 TABLET, FILM COATED ORAL at 09:03

## 2020-10-25 RX ADMIN — MELATONIN TAB 3 MG 3 MG: 3 TAB at 21:10

## 2020-10-25 RX ADMIN — WARFARIN SODIUM 6 MG: 3 TABLET ORAL at 18:01

## 2020-10-25 RX ADMIN — TRAZODONE HYDROCHLORIDE 50 MG: 50 TABLET ORAL at 21:10

## 2020-10-25 RX ADMIN — DOCUSATE SODIUM 50 MG AND SENNOSIDES 8.6 MG 2 TABLET: 8.6; 5 TABLET, FILM COATED ORAL at 09:03

## 2020-10-25 ASSESSMENT — GAIT ASSESSMENTS
DISTANCE (FEET): 240
GAIT LEVEL OF ASSIST: SUPERVISED
ASSISTIVE DEVICE: FRONT WHEEL WALKER
ASSISTIVE DEVICE: FRONT WHEEL WALKER
GAIT LEVEL OF ASSIST: SUPERVISED
DISTANCE (FEET): 240
DEVIATION: OTHER (COMMENT);DECREASED HEEL STRIKE

## 2020-10-25 ASSESSMENT — ACTIVITIES OF DAILY LIVING (ADL)
TOILETING_LEVEL_OF_ASSIST_DESCRIPTION: GRAB BAR;INCREASED TIME
BED_CHAIR_WHEELCHAIR_TRANSFER_DESCRIPTION: SUPERVISION FOR SAFETY
TOILET_TRANSFER_DESCRIPTION: GRAB BAR;SUPERVISION FOR SAFETY

## 2020-10-25 ASSESSMENT — FIBROSIS 4 INDEX: FIB4 SCORE: 0.28

## 2020-10-25 NOTE — THERAPY
"Occupational Therapy  Daily Treatment     Patient Name: Obdulia High  Age:  45 y.o., Sex:  female  Medical Record #: [id number]  Today's Date: 10/25/2020     Precautions  Precautions: (P) Fall Risk  Comments: (P) dizzy, diplopia    Safety   ADL Safety : (P) Requires Supervision for Safety  Bathroom Safety: (P) Requires Supervision for Safety    Subjective    \"I really want to work on my R hand and getting it stronger.\"     \"can I do puzzles at home?, I love doing puzzles.\"      Objective       10/25/20 0701   Precautions   Precautions Fall Risk   Comments dizzy, diplopia   Safety    ADL Safety  Requires Supervision for Safety   Bathroom Safety Requires Supervision for Safety   Pain   Intervention Declines   Pain 0 - 10 Group   Pain Rating Scale (NPRS) 0   Therapist Pain Assessment Post Activity Pain Same as Prior to Activity   Cognition    Orientation Level Oriented x 4   Level of Consciousness Alert   Sleep/Wake Cycle   Sleep & Rest Awake   Functional Level of Assist   Grooming Modified Independent   Grooming Description Seated in wheelchair at sink   Toileting Supervision   Toileting Description Grab bar;Increased time   Bed, Chair, Wheelchair Transfer Supervised   Bed Chair Wheelchair Transfer Description Supervision for safety  (SPT from EOB to w/c )   Toilet Transfers Supervised   Toilet Transfer Description Grab bar;Supervision for safety  (w/c <> toilet )   Fine Motor / Dexterity    Fine Motor / Dexterity Yes   Fine Motor / Dexterity Interventions handout issued to pt on HEP for FMC   Bed Mobility    Supine to Sit Modified Independent   Sit to Supine Modified Independent   Scooting Modified Independent   Rolling Modified Independent   Interdisciplinary Plan of Care Collaboration   Patient Position at End of Therapy In Bed;Call Light within Reach;Tray Table within Reach;Phone within Reach   OT Total Time Spent   OT Individual Total Time Spent (Mins) 60   OT Charge Group   OT Self Care / ADL 1   OT Therapy " Activity 3       Assessment    Pt tolerated session well. Pt showing improvements in toilet transfers and toileting and only required supervision this session from w/c level. No c/o dizziness throughout session but still requiring eye patch 2/2 diplopia. Pt reported some issues with depth perception 2/2 only able to use one eye at a time. Educated pt on FMC/dexterity tasks for R hand and handout given- went through each exercise with pt on tabletop I.e. picking up coins, flipping them over, placing into slot, picking up 5 coins one at a time and translating to palm-back to finger tips, screwing on nuts-bolts, flipping playing cards and dealing/shuffling them, putting close pins on side of bucket, stringing beads, rolling dice and stacking them, etc..  Pt also worked on placing graded close pins on rungs using R hand only- using 3 jaw emilia pinch pattern throughout. Pt only losing control w/ one close pin. Pt able to gather one small peg in hand from small container and place into pegboard w/ increased time and only dropping 2/15 on first attempt. Pt demonstrated ability to open multiple pill bottles w/o assist. Stereognosis w/ common household items. Able to correctly identify 7/9 correctly.     Strengths: Alert and oriented, Able to follow instructions, Effective communication skills, Good insight into deficits/needs, Independent prior level of function, Manages pain appropriately, Motivated for self care and independence, Pleasant and cooperative, Supportive family, Willingly participates in therapeutic activities  Barriers: Decreased endurance, Fatigue, Generalized weakness, Hemiparesis, Impaired activity tolerance, Impaired balance, Limited mobility, Visual impairment(double vision)    Plan    D/C [id number], pt d/c home Tuesday 10/27- potentially cooking task in kitchen.     Occupational Therapy Goals     Problem: Dressing     Dates: Start: 10/08/20       Goal: STG-Within one week, patient will dress LB      Dates: Start: 10/08/20       Description: 1) Individualized Goal:  with CGA for standing portion using grab bar  2) Interventions:  OT Self Care/ADL, OT Manual Ther Technique, OT Neuro Re-Ed/Balance, OT Sensory Int Techniques, OT Therapeutic Activity, OT Evaluation, and OT Therapeutic Exercise    Note:     Goal Note filed on 10/12/20 1305 by Dario Cooley, OT/L    Min A for balance                        Problem: Functional Transfers     Dates: Start: 10/12/20       Goal: STG-Within one week, patient will transfer to toilet     Dates: Start: 10/12/20       Description: 1) Individualized Goal:  with SBA using grab bar  2) Interventions:  OT Self Care/ADL, OT Manual Ther Technique, OT Neuro Re-Ed/Balance, OT Sensory Int Techniques, OT Therapeutic Activity, OT Evaluation, and OT Therapeutic Exercise                Problem: OT Long Term Goals     Dates: Start: 10/08/20       Goal: LTG-By discharge, patient will complete basic self care tasks     Dates: Start: 10/08/20       Description: 1) Individualized Goal:  with mod I using AE/AD/techniques as needed  2) Interventions:  OT Self Care/ADL, OT Manual Ther Technique, OT Neuro Re-Ed/Balance, OT Sensory Int Techniques, OT Therapeutic Activity, OT Evaluation, and OT Therapeutic Exercise          Goal: LTG-By discharge, patient will perform bathroom transfers     Dates: Start: 10/08/20       Description: 1) Individualized Goal:  with mod I using AE/AD/techniques as needed  2) Interventions:  OT Self Care/ADL, OT Manual Ther Technique, OT Neuro Re-Ed/Balance, OT Sensory Int Techniques, OT Therapeutic Activity, OT Evaluation, and OT Therapeutic Exercise          Goal: LTG-By discharge, patient will complete basic home management     Dates: Start: 10/08/20       Description: 1) Individualized Goal:  with supervision using AE/AD/techniques as needed  2) Interventions:  OT Self Care/ADL, OT Manual Ther Technique, OT Neuro Re-Ed/Balance, OT Sensory Int Techniques, OT  Therapeutic Activity, OT Evaluation, and OT Therapeutic Exercise

## 2020-10-25 NOTE — THERAPY
"Physical Therapy   Daily Treatment     Patient Name: Obdulia High  Age:  45 y.o., Sex:  female  Medical Record #: [id number]  Today's Date: 10/25/2020     Precautions  Precautions: (P) Fall Risk  Comments: (P) dizzy, diplopia    Subjective    Pt found in bed, finished breakfast.  \"I'm dizzy but I think it's because my BP is low.\"     Objective       10/25/20 0831   Precautions   Precautions Fall Risk   Comments dizzy, diplopia   Cognition    Level of Consciousness Alert   Gait Functional Level of Assist    Gait Level Of Assist Supervised   Assistive Device Front Wheel Walker   Distance (Feet) 240   # of Times Distance was Traveled 2   Deviation Other (Comment);Decreased Heel Strike  (decr L LE dissociation)   Neuro-Muscular Treatments   Neuro-Muscular Treatments Tactile Cuing;Verbal Cuing;Weight Shift Right;Weight Shift Left   Comments Pre-gait: at mat table focus on DLS w/ midline trunk orientation w/o UE support, vc's for R Lat weight shift, tc's for ant pelvic tilt into active sitting.  Short sitting EOM focus on interoceptin & proprioception alt marching w/ eyes closed, int vc's for checking in w/ alignment. edu to perform HEP in room. Squats from mid range to full squat w/o UE support performed 3 x 10 .   Interdisciplinary Plan of Care Collaboration   IDT Collaboration with  Physical Therapist   Patient Position at End of Therapy In Bed   Collaboration Comments re: goals, barriers   PT Total Time Spent   PT Individual Total Time Spent (Mins) 30   PT Charge Group   PT Gait Training 1   PT Neuromuscular Re-Education / Balance 1     Manual BP taken in sitting post amb: 110/68.      Assessment    Pt demo decr R side proprioception limiting confidence of mobility w/o an AD.  Reinforced self perofmance of active sitting and marching EC to improve proprioception/interoception.  Strengths: Independent prior level of function, Making steady progress towards goals, Motivated for self care and independence, Pleasant and " cooperative, Supportive family, Willingly participates in therapeutic activities  Barriers: Decreased endurance, Fatigue, Impaired activity tolerance, Impaired balance, Visual impairment, Limited mobility, Impulsive    Plan    Gait with no AD, standing balance: proprioception/reactive& protective balance training    Physical Therapy Problems     Problem: Mobility     Dates: Start: 10/19/20       Goal: STG-Within one week, patient will ambulate community distances     Dates: Start: 10/19/20       Description: 1) Individualized goal:  Patient will amb with FWW SBA indoors, CGA outdoors >150 ft  2) Interventions:  PT Group Therapy, PT Gait Training, PT Self Care/Home Eval, PT Therapeutic Exercises, PT Neuro Re-Ed/Balance, PT Aquatic Therapy, PT Therapeutic Activity, and PT Manual Therapy            Goal: STG-Within one week, patient will ambulate up/down a curb     Dates: Start: 10/19/20       Description: 1) Individualized goal:  Patient will amb up/down curb 2x with FWW SBA  2) Interventions:  PT Group Therapy, PT Gait Training, PT Self Care/Home Eval, PT Therapeutic Exercises, PT Neuro Re-Ed/Balance, PT Aquatic Therapy, PT Therapeutic Activity, and PT Manual Therapy                  Problem: Mobility Transfers     Dates: Start: 10/12/20       Goal: STG-Within one week, patient will transfer bed to chair     Dates: Start: 10/12/20       Description: 1) Individualized goal:  SBA reach pivot + set up wc <> bed right/ left  2) Interventions:  PT Orthotics Training, PT Gait Training, PT Self Care/Home Eval, PT Therapeutic Exercises, PT Neuro Re-Ed/Balance, PT Aquatic Therapy, PT Therapeutic Activity, and PT Manual Therapy      Note:     Goal Note filed on 10/19/20 1233 by ZARI WayneT    CGA-SBA, PROCTOR 8/56, fall risk                        Problem: PT-Long Term Goals     Dates: Start: 10/08/20       Goal: LTG-By discharge, patient will ambulate     Dates: Start: 10/08/20       Description: 1) Individualized goal:   AMB x 150 feet with LRD and SPV  2) Interventions:  PT Group Therapy, PT E Stim Attended, PT Gait Training, PT Therapeutic Exercises, PT Neuro Re-Ed/Balance, PT Aquatic Therapy, PT Therapeutic Activity, PT Manual Therapy, and PT Evaluation            Goal: LTG-By discharge, patient will transfer one surface to another     Dates: Start: 10/08/20       Description: 1) Individualized goal:  SPT with LRD and SPV  2) Interventions:  PT Group Therapy, PT E Stim Attended, PT Gait Training, PT Therapeutic Exercises, PT Neuro Re-Ed/Balance, PT Aquatic Therapy, PT Therapeutic Activity, PT Manual Therapy, and PT Evaluation            Goal: LTG-By discharge, patient will ambulate up/down 4-6 stairs     Dates: Start: 10/08/20       Description: 1) Individualized goal:  Up/down 4 steps with BHR and CGA  2) Interventions:  PT Group Therapy, PT E Stim Attended, PT Gait Training, PT Therapeutic Exercises, PT Neuro Re-Ed/Balance, PT Aquatic Therapy, PT Therapeutic Activity, PT Manual Therapy, and PT Evaluation            Goal: LTG-By discharge, patient will transfer in/out of a car     Dates: Start: 10/08/20       Description: 1) Individualized goal:  Car transfer with LRD and SPV  2) Interventions:  PT Group Therapy, PT E Stim Attended, PT Gait Training, PT Therapeutic Exercises, PT Neuro Re-Ed/Balance, PT Aquatic Therapy, PT Therapeutic Activity, PT Manual Therapy, and PT Evaluation

## 2020-10-25 NOTE — CARE PLAN
Problem: Safety  Goal: Will remain free from injury  Outcome: PROGRESSING AS EXPECTED  Goal: Will remain free from falls  Outcome: PROGRESSING AS EXPECTED   Pt education given regarding fall prevention and fall precautions and pt safety , pt shows good understanding, has not attempted to self transfer this shift, will continue to reinforce education, and will continue to monitor.

## 2020-10-25 NOTE — THERAPY
Physical Therapy   Daily Treatment     Patient Name: Obdulia High  Age:  45 y.o., Sex:  female  Medical Record #: [id number]  Today's Date: 10/25/2020     Precautions  Precautions: Fall Risk  Comments: dizzy, diplopia    Subjective    Pt was sitting in w/c upon arrival and agreeable to tx     Objective       10/25/20 1431   Precautions   Precautions Fall Risk   Comments dizzy, diplopia   Gait Functional Level of Assist    Gait Level Of Assist Supervised   Assistive Device Front Wheel Walker   Distance (Feet) 240  (AMB no AD 120ft x 4 SBA with intermittent CGA)   # of Times Distance was Traveled 2   Supine Lower Body Exercise   Comments Marching 6 x 10 on 1/2 foam roller for thoracic ext, 1 x 10 posterior pelvic tilts 10 sec hold, 1 x 10 R/L clamshells, R/L hip abduction at 45 degree angle, 2 x 10 march in hip bridge, 1 x 10 10sec holds posterior pelvic tilt, 10ft x 5 fwd/ 10ft x 2 retro hip abduction walking with FWW   Standing Lower Body Exercises   Comments Standing marching with no AD 2 x 3 x 10 with TCs for core/ glute med activation   Interdisciplinary Plan of Care Collaboration   Patient Position at End of Therapy Seated;Call Light within Reach;Tray Table within Reach;Phone within Reach   PT Total Time Spent   PT Individual Total Time Spent (Mins) 60   PT Charge Group   PT Gait Training 2   PT Therapeutic Exercise 2       Assessment    Pt with within session improvement in gait speed, step length and arm swing. Continues to be limited by proximal stability. Able to demonstrate HEP.     Strengths: Independent prior level of function, Making steady progress towards goals, Motivated for self care and independence, Pleasant and cooperative, Supportive family, Willingly participates in therapeutic activities  Barriers: Decreased endurance, Fatigue, Impaired activity tolerance, Impaired balance, Visual impairment, Limited mobility, Impulsive    Plan    Continue gait training with FWW, gait without AD for dynamic  balance, RLE proximal strengthening/motor control, balance training    Physical Therapy Problems     Problem: Mobility     Dates: Start: 10/19/20       Goal: STG-Within one week, patient will ambulate community distances     Dates: Start: 10/19/20       Description: 1) Individualized goal:  Patient will amb with FWW SBA indoors, CGA outdoors >150 ft  2) Interventions:  PT Group Therapy, PT Gait Training, PT Self Care/Home Eval, PT Therapeutic Exercises, PT Neuro Re-Ed/Balance, PT Aquatic Therapy, PT Therapeutic Activity, and PT Manual Therapy            Goal: STG-Within one week, patient will ambulate up/down a curb     Dates: Start: 10/19/20       Description: 1) Individualized goal:  Patient will amb up/down curb 2x with FWW SBA  2) Interventions:  PT Group Therapy, PT Gait Training, PT Self Care/Home Eval, PT Therapeutic Exercises, PT Neuro Re-Ed/Balance, PT Aquatic Therapy, PT Therapeutic Activity, and PT Manual Therapy                  Problem: Mobility Transfers     Dates: Start: 10/12/20       Goal: STG-Within one week, patient will transfer bed to chair     Dates: Start: 10/12/20       Description: 1) Individualized goal:  SBA reach pivot + set up wc <> bed right/ left  2) Interventions:  PT Orthotics Training, PT Gait Training, PT Self Care/Home Eval, PT Therapeutic Exercises, PT Neuro Re-Ed/Balance, PT Aquatic Therapy, PT Therapeutic Activity, and PT Manual Therapy      Note:     Goal Note filed on 10/19/20 1233 by ZARI WayneT    CGA-SBA, PROCTOR 8/56, fall risk                        Problem: PT-Long Term Goals     Dates: Start: 10/08/20       Goal: LTG-By discharge, patient will ambulate     Dates: Start: 10/08/20       Description: 1) Individualized goal:  AMB x 150 feet with LRD and SPV  2) Interventions:  PT Group Therapy, PT E Stim Attended, PT Gait Training, PT Therapeutic Exercises, PT Neuro Re-Ed/Balance, PT Aquatic Therapy, PT Therapeutic Activity, PT Manual Therapy, and PT Evaluation             Goal: LTG-By discharge, patient will transfer one surface to another     Dates: Start: 10/08/20       Description: 1) Individualized goal:  SPT with LRD and SPV  2) Interventions:  PT Group Therapy, PT E Stim Attended, PT Gait Training, PT Therapeutic Exercises, PT Neuro Re-Ed/Balance, PT Aquatic Therapy, PT Therapeutic Activity, PT Manual Therapy, and PT Evaluation            Goal: LTG-By discharge, patient will ambulate up/down 4-6 stairs     Dates: Start: 10/08/20       Description: 1) Individualized goal:  Up/down 4 steps with BHR and CGA  2) Interventions:  PT Group Therapy, PT E Stim Attended, PT Gait Training, PT Therapeutic Exercises, PT Neuro Re-Ed/Balance, PT Aquatic Therapy, PT Therapeutic Activity, PT Manual Therapy, and PT Evaluation            Goal: LTG-By discharge, patient will transfer in/out of a car     Dates: Start: 10/08/20       Description: 1) Individualized goal:  Car transfer with LRD and SPV  2) Interventions:  PT Group Therapy, PT E Stim Attended, PT Gait Training, PT Therapeutic Exercises, PT Neuro Re-Ed/Balance, PT Aquatic Therapy, PT Therapeutic Activity, PT Manual Therapy, and PT Evaluation

## 2020-10-25 NOTE — CARE PLAN
Problem: Safety  Goal: Will remain free from injury  Outcome: PROGRESSING AS EXPECTED  Goal: Will remain free from falls  Outcome: PROGRESSING AS EXPECTED   Pt education reinforced regarding fall prevention and fall precautions and pt safety, pt shows good understanding, has not attempted to self transfer this shift, will continue to reinforce education, and will continue to monitor.

## 2020-10-25 NOTE — CARE PLAN
Problem: Communication  Goal: The ability to communicate needs accurately and effectively will improve  Outcome: PROGRESSING AS EXPECTED     Problem: Safety  Goal: Will remain free from injury  Outcome: PROGRESSING AS EXPECTED     Problem: Bowel/Gastric:  Goal: Normal bowel function is maintained or improved  Outcome: PROGRESSING AS EXPECTED  Intervention: Educate patient and significant other/support system about diet, fluid intake, medications and activity to promote bowel function  Note: Pt reported bm during previous shift, states  assisted her to restroom.

## 2020-10-25 NOTE — PROGRESS NOTES
Inpatient Anticoagulation Service Note    Date: 10/25/2020    Reason for Anticoagulation: Stroke   Target INR: 2.0 to 3.0         Hemoglobin Value: 12.3  Hematocrit Value: 38  Lab Platelet Value: 432    INR from last 7 days     Date/Time INR Value    10/25/20 0743  (!) 2.29    10/24/20 0658  (!) 2.52    10/23/20 0643  (!) 2.29    10/22/20 0735  (!) 2.2    10/21/20 0721  (!) 2.19    10/20/20 0713  (!) 2.49    10/19/20 0715  (!) 2.21        Dose from last 7 days     Date/Time Dose (mg)    10/23/20 1216  6    10/22/20 0735  6    10/21/20 0721  6    10/20/20 0713  5    10/19/20 0715  6        Significant Interactions: Statin  Bridge Therapy: No(Lovenox 80 mg SQ Q12H d/c on 10/15 INR > 2 x 2 days)  Bridge Therapy Start Date: 10/07/20  Days of Overlap Therapy: 5 (If less than 5 days and overlap therapy discontinued -- document reason (i.e. Bleed Risk))    (If still on overlap therapy, if No -- document reason (i.e. Bleed Risk))         Plan:  6 mg PO warfarin x 1  Education Material Provided?: No  Pharmacist suggested discharge dosing: VANDANA Garcia, PetraD

## 2020-10-26 DIAGNOSIS — I63.212 ACUTE ISCHEMIC VERTEBROBASILAR ARTERY BRAINSTEM STROKE INVOLVING LEFT-SIDED VESSEL (HCC): ICD-10-CM

## 2020-10-26 DIAGNOSIS — I63.22 ACUTE ISCHEMIC VERTEBROBASILAR ARTERY BRAINSTEM STROKE INVOLVING LEFT-SIDED VESSEL (HCC): ICD-10-CM

## 2020-10-26 PROBLEM — R13.19 OTHER DYSPHAGIA: Status: RESOLVED | Noted: 2020-10-07 | Resolved: 2020-10-26

## 2020-10-26 PROBLEM — R42 DIZZINESS: Status: ACTIVE | Noted: 2020-10-26

## 2020-10-26 PROBLEM — D72.829 LEUKOCYTOSIS: Status: RESOLVED | Noted: 2020-10-08 | Resolved: 2020-10-26

## 2020-10-26 PROBLEM — Z74.09 IMPAIRED MOBILITY AND ADLS: Status: ACTIVE | Noted: 2020-10-26

## 2020-10-26 PROBLEM — N39.0 UTI (URINARY TRACT INFECTION): Status: RESOLVED | Noted: 2020-10-09 | Resolved: 2020-10-26

## 2020-10-26 PROBLEM — Z78.9 IMPAIRED MOBILITY AND ADLS: Status: ACTIVE | Noted: 2020-10-26

## 2020-10-26 PROBLEM — R51.9 HEADACHE: Status: RESOLVED | Noted: 2020-09-29 | Resolved: 2020-10-26

## 2020-10-26 LAB
INR PPP: 2.4 (ref 0.87–1.13)
PROTHROMBIN TIME: 26.9 SEC (ref 12–14.6)

## 2020-10-26 PROCEDURE — A9270 NON-COVERED ITEM OR SERVICE: HCPCS | Performed by: PHYSICAL MEDICINE & REHABILITATION

## 2020-10-26 PROCEDURE — 770010 HCHG ROOM/CARE - REHAB SEMI PRIVAT*

## 2020-10-26 PROCEDURE — 97110 THERAPEUTIC EXERCISES: CPT

## 2020-10-26 PROCEDURE — 85610 PROTHROMBIN TIME: CPT

## 2020-10-26 PROCEDURE — 36415 COLL VENOUS BLD VENIPUNCTURE: CPT

## 2020-10-26 PROCEDURE — 97129 THER IVNTJ 1ST 15 MIN: CPT

## 2020-10-26 PROCEDURE — 97112 NEUROMUSCULAR REEDUCATION: CPT

## 2020-10-26 PROCEDURE — 97116 GAIT TRAINING THERAPY: CPT

## 2020-10-26 PROCEDURE — 700102 HCHG RX REV CODE 250 W/ 637 OVERRIDE(OP): Performed by: PHYSICAL MEDICINE & REHABILITATION

## 2020-10-26 PROCEDURE — 97530 THERAPEUTIC ACTIVITIES: CPT

## 2020-10-26 PROCEDURE — 99233 SBSQ HOSP IP/OBS HIGH 50: CPT | Performed by: PHYSICAL MEDICINE & REHABILITATION

## 2020-10-26 PROCEDURE — 97130 THER IVNTJ EA ADDL 15 MIN: CPT

## 2020-10-26 RX ORDER — ACETAMINOPHEN 325 MG/1
650 TABLET ORAL EVERY 4 HOURS PRN
Qty: 30 TAB | Refills: 0 | COMMUNITY
Start: 2020-10-26

## 2020-10-26 RX ORDER — SCOLOPAMINE TRANSDERMAL SYSTEM 1 MG/1
1 PATCH, EXTENDED RELEASE TRANSDERMAL
Status: DISCONTINUED | OUTPATIENT
Start: 2020-10-27 | End: 2020-10-27 | Stop reason: HOSPADM

## 2020-10-26 RX ORDER — LEVETIRACETAM 500 MG/1
500 TABLET ORAL EVERY 12 HOURS
Qty: 60 TAB | Refills: 2 | Status: SHIPPED | OUTPATIENT
Start: 2020-10-26 | End: 2021-01-13

## 2020-10-26 RX ORDER — WARFARIN SODIUM 3 MG/1
6 TABLET ORAL
Status: COMPLETED | OUTPATIENT
Start: 2020-10-26 | End: 2020-10-26

## 2020-10-26 RX ORDER — WARFARIN SODIUM 6 MG/1
6 TABLET ORAL EVERY EVENING
Qty: 30 TAB | Refills: 3 | Status: SHIPPED | OUTPATIENT
Start: 2020-10-26 | End: 2021-01-13

## 2020-10-26 RX ORDER — ATORVASTATIN CALCIUM 40 MG/1
40 TABLET, FILM COATED ORAL EVERY EVENING
Qty: 30 TAB | Refills: 2 | Status: SHIPPED | OUTPATIENT
Start: 2020-10-26 | End: 2021-01-13

## 2020-10-26 RX ORDER — LANOLIN ALCOHOL/MO/W.PET/CERES
3 CREAM (GRAM) TOPICAL
Qty: 30 TAB | Refills: 2 | COMMUNITY
Start: 2020-10-26 | End: 2021-01-13

## 2020-10-26 RX ORDER — AMOXICILLIN 250 MG
2 CAPSULE ORAL 2 TIMES DAILY
Qty: 30 TAB | Refills: 0 | COMMUNITY
Start: 2020-10-26 | End: 2020-11-11

## 2020-10-26 RX ORDER — TRAZODONE HYDROCHLORIDE 50 MG/1
50 TABLET ORAL
Qty: 30 TAB | Refills: 2 | Status: SHIPPED | OUTPATIENT
Start: 2020-10-26 | End: 2021-01-13

## 2020-10-26 RX ORDER — SCOLOPAMINE TRANSDERMAL SYSTEM 1 MG/1
1 PATCH, EXTENDED RELEASE TRANSDERMAL
Qty: 10 PATCH | Refills: 0 | Status: SHIPPED | OUTPATIENT
Start: 2020-10-27 | End: 2020-11-11

## 2020-10-26 RX ADMIN — MELATONIN TAB 3 MG 3 MG: 3 TAB at 20:20

## 2020-10-26 RX ADMIN — LEVETIRACETAM 500 MG: 500 TABLET, FILM COATED ORAL at 20:21

## 2020-10-26 RX ADMIN — ATORVASTATIN CALCIUM 40 MG: 40 TABLET, FILM COATED ORAL at 20:19

## 2020-10-26 RX ADMIN — WARFARIN SODIUM 6 MG: 3 TABLET ORAL at 18:50

## 2020-10-26 RX ADMIN — TRAZODONE HYDROCHLORIDE 50 MG: 50 TABLET ORAL at 20:19

## 2020-10-26 RX ADMIN — DOCUSATE SODIUM 50 MG AND SENNOSIDES 8.6 MG 2 TABLET: 8.6; 5 TABLET, FILM COATED ORAL at 08:30

## 2020-10-26 RX ADMIN — LEVETIRACETAM 500 MG: 500 TABLET, FILM COATED ORAL at 08:30

## 2020-10-26 ASSESSMENT — ACTIVITIES OF DAILY LIVING (ADL)
SHOWER_TRANSFER_LEVEL_OF_ASSIST: REQUIRES SUPERVISION WITH SHOWER TRANSFER
TOILET_TRANSFER_LEVEL_OF_ASSIST: REQUIRES SUPERVISION WITH TOILET TRANSFER
BED_CHAIR_WHEELCHAIR_TRANSFER_DESCRIPTION: ADAPTIVE EQUIPMENT
TOILETING_LEVEL_OF_ASSIST: REQUIRES SUPERVISION WITH TOILETING

## 2020-10-26 ASSESSMENT — GAIT ASSESSMENTS
DISTANCE (FEET): 200
ASSISTIVE DEVICE: FRONT WHEEL WALKER
GAIT LEVEL OF ASSIST: STAND BY ASSIST
DEVIATION: BRADYKINETIC

## 2020-10-26 NOTE — THERAPY
Physical Therapy   Daily Treatment     Patient Name: Obdulia High  Age:  45 y.o., Sex:  female  Medical Record #: [id number]  Today's Date: 10/26/2020     Precautions  Precautions: (P) Fall Risk  Comments: (P) dizzy, diplopia    Subjective    Patient feels prepared to D/C; no further questions.      Objective       10/26/20 1501   Precautions   Precautions Fall Risk   Comments dizzy, diplopia   Gait Functional Level of Assist    Gait Level Of Assist Stand by Assist   Assistive Device Front Wheel Walker   Distance (Feet) 200   # of Times Distance was Traveled 2   Deviation Bradykinetic   Wheelchair Functional Level of Assist   Wheelchair Assist Refused  (prefers ambulation )   Stairs Functional Level of Assist   Level of Assist with Stairs Stand by Assist   # of Stairs Climbed 12   Stairs Description   (Step to, BHR)   Transfer Functional Level of Assist   Bed, Chair, Wheelchair Transfer Supervised   Bed Chair Wheelchair Transfer Description Adaptive equipment  (SPT FWW)   Standing Lower Body Exercises   Hamstring Curl 1 set of 15;Bilateral    Hip Extension 1 set of 15;Bilateral    Hip Abduction 1 set of 15;Bilateral   Marching 1 set of 15   Heel Rise 1 set of 15;Bilateral   Mini Squat Partial;1 set of 15   Bed Mobility    Supine to Sit Independent   Sit to Supine Independent   Sit to Stand Stand by Assist   Scooting Independent   Rolling Independent   Neuro-Muscular Treatments   Neuro-Muscular Treatments Weight Shift Left;Weight Shift Right;Verbal Cuing;Sequencing;Postural Changes   Comments Cues for postural stability during HEP. Car transfer SBA SPT FWW.  object from standing, no UE support CGA.    Strengths & Barriers   Strengths Independent prior level of function;Making steady progress towards goals;Motivated for self care and independence;Pleasant and cooperative;Supportive family;Willingly participates in therapeutic activities   Barriers Decreased endurance;Fatigue;Impaired activity tolerance;Impaired  balance;Visual impairment;Limited mobility;Impulsive   PT Total Time Spent   PT Individual Total Time Spent (Mins) 60   PT Charge Group   PT Gait Training 1   PT Therapeutic Exercise 2   PT Neuromuscular Re-Education / Balance 1     Curb with FWW 2x CGA.     Assessment    Patient demonstrated recall of prior training, and preparedness for D/C.     Strengths: (P) Independent prior level of function, Making steady progress towards goals, Motivated for self care and independence, Pleasant and cooperative, Supportive family, Willingly participates in therapeutic activities  Barriers: (P) Decreased endurance, Fatigue, Impaired activity tolerance, Impaired balance, Visual impairment, Limited mobility, Impulsive    Plan    D/C patient home tomorrow with support from spouse,  services, FWW, and HEP.     Physical Therapy Problems     Problem: Mobility     Dates: Start: 10/19/20       Goal: STG-Within one week, patient will ambulate up/down a curb     Dates: Start: 10/19/20       Description: 1) Individualized goal:  Patient will amb up/down curb 2x with FWW SBA  2) Interventions:  PT Group Therapy, PT Gait Training, PT Self Care/Home Eval, PT Therapeutic Exercises, PT Neuro Re-Ed/Balance, PT Aquatic Therapy, PT Therapeutic Activity, and PT Manual Therapy      Note:     Goal Note filed on 10/26/20 0857 by Rakel Mcneal, DPT    CGA 2x with FWW, PROCTOR = risk for falls                   Goal: STG-Within one week, patient will ambulate community distances     Dates: Start: 10/26/20       Description:   2) Interventions:  PT Gait Training, PT Self Care/Home Eval, PT Therapeutic Exercises, PT Neuro Re-Ed/Balance, PT Therapeutic Activity, and PT Manual Therapy  SPV amb over various surfaces with FWW >200 ft                Problem: Mobility Transfers     Dates: Start: 10/26/20       Goal: STG-Within one week, patient will sit to stand     Dates: Start: 10/26/20       Description: 1) Individualized goal:  SPV-mod I transfers FWW  2)  Interventions:  PT Gait Training, PT Self Care/Home Eval, PT Therapeutic Exercises, PT Neuro Re-Ed/Balance, PT Aquatic Therapy, and PT Therapeutic Activity                  Problem: PT-Long Term Goals     Dates: Start: 10/08/20       Goal: LTG-By discharge, patient will transfer in/out of a car     Dates: Start: 10/08/20       Description: 1) Individualized goal:  Car transfer with LRD and SPV  2) Interventions:  PT Group Therapy, PT E Stim Attended, PT Gait Training, PT Therapeutic Exercises, PT Neuro Re-Ed/Balance, PT Aquatic Therapy, PT Therapeutic Activity, PT Manual Therapy, and PT Evaluation

## 2020-10-26 NOTE — THERAPY
"Occupational Therapy  Daily Treatment     Patient Name: Obdulia High  Age:  45 y.o., Sex:  female  Medical Record #: [id number]  Today's Date: 10/26/2020     Precautions  Precautions: Fall Risk  Comments: dizzy, diplopia    Safety   ADL Safety : Requires Supervision for Safety  Bathroom Safety: Requires Supervision for Safety    Subjective    \"my neck is doing much better\"     Objective       10/26/20 0831   Sitting Upper Body Exercises   Other Exercise BUE green resistance band; Postural BUE \"Y\" pull, external rotation pulls 3 sets x 15   Standing Upper Body Exercises   Other Exercises warm up: postural BUE \"Y\", shoulder flexion, modified cat/cow, trunk spinal stretch, lower back foward stretch, levator scapulae stretch 1 set x 15 or 15 sec hold   Interdisciplinary Plan of Care Collaboration   Patient Position at End of Therapy Call Light within Reach;Tray Table within Reach;In Bed   OT Total Time Spent   OT Individual Total Time Spent (Mins) 30   OT Charge Group   OT Therapeutic Exercise  2       Assessment    Pt completed UB/postural strengthening therex and UB stretches to the best of her abilities with no complaints of pain. Pt self reported neck spasms no longer an issue, although cervical stretches felt good, mild dizziness when turning head during trunk spinal stretch    Strengths: Alert and oriented, Able to follow instructions, Effective communication skills, Good insight into deficits/needs, Independent prior level of function, Manages pain appropriately, Motivated for self care and independence, Pleasant and cooperative, Supportive family, Willingly participates in therapeutic activities  Barriers: Decreased endurance, Fatigue, Generalized weakness, Hemiparesis, Impaired activity tolerance, Impaired balance, Limited mobility, Visual impairment(double vision)    Plan    Refer to Primary OT POC/goals    Occupational Therapy Goals     Problem: OT Long Term Goals     Dates: Start: 10/08/20       Goal: LTG-By " discharge, patient will complete basic self care tasks     Dates: Start: 10/08/20       Description: 1) Individualized Goal:  with mod I using AE/AD/techniques as needed  2) Interventions:  OT Self Care/ADL, OT Manual Ther Technique, OT Neuro Re-Ed/Balance, OT Sensory Int Techniques, OT Therapeutic Activity, OT Evaluation, and OT Therapeutic Exercise          Goal: LTG-By discharge, patient will perform bathroom transfers     Dates: Start: 10/08/20       Description: 1) Individualized Goal:  with mod I using AE/AD/techniques as needed  2) Interventions:  OT Self Care/ADL, OT Manual Ther Technique, OT Neuro Re-Ed/Balance, OT Sensory Int Techniques, OT Therapeutic Activity, OT Evaluation, and OT Therapeutic Exercise          Goal: LTG-By discharge, patient will complete basic home management     Dates: Start: 10/08/20       Description: 1) Individualized Goal:  with supervision using AE/AD/techniques as needed  2) Interventions:  OT Self Care/ADL, OT Manual Ther Technique, OT Neuro Re-Ed/Balance, OT Sensory Int Techniques, OT Therapeutic Activity, OT Evaluation, and OT Therapeutic Exercise

## 2020-10-26 NOTE — DISCHARGE PLANNING
Followed up with patient and . Discharge set up for tomorrow. Went over follow up appointments with . Discussing home health drawing INR. Received referral for clinic.

## 2020-10-26 NOTE — DISCHARGE PLANNING
Case Management Discharge Instructions  Discharge: 10/27/2020      Discharge Location: Home with Home Health     Agency Name / Phone: [facility]  [phone] Will call to schedule time     Home Health: Registered Nurse, Occupational Therapist, Physical Therapist, Speech Therapist(Home Health to draw INR)     DME Provider / Phone: [facility] [phone]      Medical Equipment Ordered: Front Wheel Walker          Follow-up Information:     Agustín Toscano  [address]  [place] [address]  [phone]     On Wednesday, 10/28/2020  Check in at 2pm      Srikanth Rosas M.D.  [address]  [place] [address] [phone]    Doctors office to contact you for appointment.     Nam Freed O.D.  [address] [facility] [address]  [address]  [place] [address]  [phone]     On Wednesday 12/2/2020  Check in at 2:30pm     Gallito Albert M.D.  [address] [facility]  [address]  [place] [address]  [phone]     On Monday 11/2/2020  Check in at 10:30 AM for 11AM appointment - at [facility]      Yovana Becker D.O.  [address]  [place] [address]  [phone]     On Wednesday 11/11/2020  Check in at 1:40pm. First you will see Neurology then Dr. Becker.

## 2020-10-26 NOTE — CARE PLAN
Problem: Problem Solving STGs  Goal: STG-Within one week, patient will  Description: 1) Individualized goal:  complete complex organization/executive function tasks with >90% accuracy provided SPV  2) Interventions:  SLP Cognitive Skill Development and SLP Group Treatment      Outcome: NOT MET  Note: Min cues needed for 100% acc, Patient performing at 85% indep

## 2020-10-26 NOTE — THERAPY
Speech Language Pathology  Daily Treatment     Patient Name: Obdulia High  Age:  45 y.o., Sex:  female  Medical Record #: [id number]  Today's Date: 10/26/2020     Precautions  Precautions: Fall Risk  Comments: dizzy, diplopia    Subjective    Pt pleasant and cooperative. Spouse present and supportive for ST session.      Objective       10/25/20 1301   Cognition   Insight into Deficits Minimal (4)   Executive Functioning / Organization Minimal (4)   Sleep/Wake Cycle   Sleep & Rest Awake   Interdisciplinary Plan of Care Collaboration   IDT Collaboration with  Family / Caregiver   Patient Position at End of Therapy Seated;Call Light within Reach;Tray Table within Reach;Phone within Reach;Family / Friend in Room   Collaboration Comments Practicing logging in and out of email as well as sending emails.    SLP Total Time Spent   SLP Individual Total Time Spent (Mins) 30   Treatment Charges   SLP Cognitive Skill Development First 15 Minutes 1   SLP Cognitive Skill Development Additional 15 Minutes 1       Assessment    Pt completed Map Construction task w/ 100% acc and MIN A. Pt benefiting from positive verbal encouragement throughout session. Education provided to spouse and pt to begin working on computer tasks, logging in and out of email, sending emails.     Strengths: Making steady progress towards goals, Pleasant and cooperative, Supportive family, Willingly participates in therapeutic activities, Motivated for self care and independence, Independent prior level of function, Effective communication skills, Alert and oriented, Able to follow instructions  Barriers: Visual impairment, Hemiparesis    Plan    Continue to address complex executive functioning.     Speech Therapy Problems     Problem: Problem Solving STGs     Dates: Start: 10/21/20       Goal: STG-Within one week, patient will     Dates: Start: 10/21/20       Description: 1) Individualized goal:  complete complex organization/executive function tasks with  >90% accuracy provided SPV  2) Interventions:  SLP Cognitive Skill Development and SLP Group Treatment        Note:     Goal Note filed on 10/26/20 0812 by Ny Lockhart MS,CCC-SLP    Min cues needed for 100% acc, Patient performing at 85% indep                        Problem: Speech/Swallowing LTGs     Dates: Start: 10/08/20       Goal: LTG-By discharge, patient will safely swallow     Dates: Start: 10/08/20       Description: 1) Individualized goal:  regular textures/thin liquids for safe return to PLOF.   2) Interventions:  SLP Swallowing Dysfunction Treatment, SLP Oral Pharyngeal Evaluation, SLP Video Swallow Evaluation, SLP Cognitive Skill Development, and SLP Group Treatment              Goal: LTG-By discharge, patient will solve complex problem     Dates: Start: 10/08/20       Description: 1) Individualized goal: By utilizing compensatory intervention for memory and problem-solving to allow for safe completion of daily activities with MOD I in order to prepare for safe d/c home  2) Interventions:  SLP Cognitive Skill Development and SLP Group Treatment

## 2020-10-26 NOTE — PROGRESS NOTES
Pharmacy Warfarin Consult   10/26/2020     45 y.o.   female     Indication for anticoagulation: Stroke    Goal INR = 2 to 3     Recent Labs     10/24/20  0658 10/25/20  0743 10/26/20  0627   INR 2.52* 2.29* 2.40*       Pertinent Drug/Drug Interactions:  Statin, prn trazadone  Outpatient Warfarin Regimen:  New Start  Recent Warfarin Dosing:    Dose from last 7 days     Date/Time Dose (mg)    10/26/20 0627  6    10/23/20 1216  6    10/22/20 0735  6    10/21/20 0721  6    10/20/20 0713  5          Bridge Therapy: None        1.  Coumadin 6mg for INR = 2.4           [facility]

## 2020-10-26 NOTE — CARE PLAN
Problem: Mobility  Goal: STG-Within one week, patient will ambulate up/down a curb  Description: 1) Individualized goal:  Patient will amb up/down curb 2x with FWW SBA  2) Interventions:  PT Group Therapy, PT Gait Training, PT Self Care/Home Eval, PT Therapeutic Exercises, PT Neuro Re-Ed/Balance, PT Aquatic Therapy, PT Therapeutic Activity, and PT Manual Therapy    Outcome: PROGRESSING AS EXPECTED  Note: CGA 2x with FWW, PROCTOR = risk for falls      Problem: Mobility Transfers  Goal: STG-Within one week, patient will transfer bed to chair  Description: 1) Individualized goal:  SBA reach pivot + set up wc <> bed right/ left  2) Interventions:  PT Orthotics Training, PT Gait Training, PT Self Care/Home Eval, PT Therapeutic Exercises, PT Neuro Re-Ed/Balance, PT Aquatic Therapy, PT Therapeutic Activity, and PT Manual Therapy    Outcome: MET     Problem: Mobility  Goal: STG-Within one week, patient will ambulate community distances  Description: 1) Individualized goal:  Patient will amb with FWW SBA indoors, CGA outdoors >150 ft  2) Interventions:  PT Group Therapy, PT Gait Training, PT Self Care/Home Eval, PT Therapeutic Exercises, PT Neuro Re-Ed/Balance, PT Aquatic Therapy, PT Therapeutic Activity, and PT Manual Therapy    Outcome: MET

## 2020-10-26 NOTE — CARE PLAN
Problem: PT-Long Term Goals  Goal: LTG-By discharge, patient will transfer in/out of a car  Description: 1) Individualized goal:  Car transfer with LRD and SPV  2) Interventions:  PT Group Therapy, PT E Stim Attended, PT Gait Training, PT Therapeutic Exercises, PT Neuro Re-Ed/Balance, PT Aquatic Therapy, PT Therapeutic Activity, PT Manual Therapy, and PT Evaluation    Outcome: PROGRESSING AS EXPECTED     Problem: PT-Long Term Goals  Goal: LTG-By discharge, patient will ambulate  Description: 1) Individualized goal:  AMB x 150 feet with LRD and SPV  2) Interventions:  PT Group Therapy, PT E Stim Attended, PT Gait Training, PT Therapeutic Exercises, PT Neuro Re-Ed/Balance, PT Aquatic Therapy, PT Therapeutic Activity, PT Manual Therapy, and PT Evaluation    Outcome: MET     Problem: PT-Long Term Goals  Goal: LTG-By discharge, patient will transfer one surface to another  Description: 1) Individualized goal:  SPT with LRD and SPV  2) Interventions:  PT Group Therapy, PT E Stim Attended, PT Gait Training, PT Therapeutic Exercises, PT Neuro Re-Ed/Balance, PT Aquatic Therapy, PT Therapeutic Activity, PT Manual Therapy, and PT Evaluation    Outcome: MET     Problem: PT-Long Term Goals  Goal: LTG-By discharge, patient will ambulate up/down 4-6 stairs  Description: 1) Individualized goal:  Up/down 4 steps with BHR and CGA  2) Interventions:  PT Group Therapy, PT E Stim Attended, PT Gait Training, PT Therapeutic Exercises, PT Neuro Re-Ed/Balance, PT Aquatic Therapy, PT Therapeutic Activity, PT Manual Therapy, and PT Evaluation    Outcome: MET

## 2020-10-26 NOTE — PROGRESS NOTES
"Rehab Progress Note     Date of Service: 10/26/2020  Chief Complaint: follow up stroke    Interval Events (Subjective)    Patient seen and examined today in her room.  Her  is present.  She is very excited about her discharge which is planned for tomorrow.  She continues to report double vision as well as abnormal sensation on her right face and right arm.  She was able to do some ambulation without an assistive device yesterday with physical therapy.  Her left shoulder pain resolved over the weekend.  She is slightly worried about the drive home tomorrow and getting motion sickness.  Patient has no new complaints today.    Objective:  VITAL SIGNS: /75   Pulse 68   Temp 36.4 °C (97.5 °F) (Oral)   Resp 20   Ht 1.702 m (5' 7.01\")   Wt 83 kg (182 lb 15.7 oz)   SpO2 97%   BMI 28.65 kg/m²   Gen: alert, no apparent distress  CV: regular rate and rhythm, no murmurs, no peripheral edema  Resp: clear to ascultation bilaterally, normal respiratory effort  GI: soft, non-tender abdomen, bowel sounds present  Neuro: notable for left ptosis, inability to abduct left eye, abnormal sensation on the right face and arm    Recent Results (from the past 72 hour(s))   Prothrombin Time    Collection Time: 10/24/20  6:58 AM   Result Value Ref Range    PT 27.9 (H) 12.0 - 14.6 sec    INR 2.52 (H) 0.87 - 1.13   Prothrombin Time    Collection Time: 10/25/20  7:43 AM   Result Value Ref Range    PT 25.9 (H) 12.0 - 14.6 sec    INR 2.29 (H) 0.87 - 1.13   Prothrombin Time    Collection Time: 10/26/20  6:27 AM   Result Value Ref Range    PT 26.9 (H) 12.0 - 14.6 sec    INR 2.40 (H) 0.87 - 1.13       Current Facility-Administered Medications   Medication Frequency   • warfarin (COUMADIN) tablet 6 mg ONCE AT 1800   • methocarbamol (ROBAXIN) tablet 500 mg 4X/DAY PRN   • lidocaine (LIDODERM) 5 % 2 Patch Q24HR   • meclizine (ANTIVERT) tablet 25 mg TID PRN   • levETIRAcetam (KEPPRA) tablet 500 mg Q12HRS   • traZODone (DESYREL) tablet " 50 mg QHS   • melatonin tablet 3 mg QHS   • hydrOXYzine HCl (ATARAX) tablet 50 mg Q6HRS PRN   • Respiratory Therapy Consult Continuous RT   • Pharmacy Consult Request ...Pain Management Review 1 Each PHARMACY TO DOSE   • hydrALAZINE (APRESOLINE) tablet 10 mg Q8HRS PRN   • artificial tears ophthalmic solution 1 Drop PRN   • benzocaine-menthol (CEPACOL) lozenge 1 Lozenge Q2HRS PRN   • mag hydrox-al hydrox-simeth (MAALOX PLUS ES or MYLANTA DS) suspension 20 mL Q2HRS PRN   • ondansetron (ZOFRAN ODT) dispertab 4 mg 4X/DAY PRN    Or   • ondansetron (ZOFRAN) syringe/vial injection 4 mg 4X/DAY PRN   • sodium chloride (OCEAN) 0.65 % nasal spray 2 Spray PRN   • lactulose 20 GM/30ML solution 30 mL QDAY PRN   • docusate sodium (ENEMEEZ) enema 283 mg QDAY PRN   • fleet enema 133 mL QDAY PRN   • atorvastatin (LIPITOR) tablet 40 mg Q EVENING   • senna-docusate (PERICOLACE or SENOKOT S) 8.6-50 MG per tablet 2 Tab BID    And   • polyethylene glycol/lytes (MIRALAX) PACKET 1 Packet QDAY PRN    And   • magnesium hydroxide (MILK OF MAGNESIA) suspension 30 mL QDAY PRN    And   • bisacodyl (DULCOLAX) suppository 10 mg QDAY PRN   • acetaminophen (TYLENOL) tablet 650 mg Q4HRS PRN   • acetaminophen/caffeine/butalbital 325-40-50 mg (FIORICET) -40 MG per tablet 1 Tab Q6HRS PRN   • MD Alert...Warfarin per Pharmacy PHARMACY TO DOSE       Orders Placed This Encounter   Procedures   • Diet Order Regular (meds whole 1:1 with thins)     Standing Status:   Standing     Number of Occurrences:   1     Order Specific Question:   Diet:     Answer:   Regular [1]     Comments:   meds whole 1:1 with thins       Assessment:  Active Hospital Problems    Diagnosis   • *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    I led and attended the weekly conference, and agree with the IDT conference documentation and plan of care as noted below.    Date of conference: 10/26/2020    Goals and barriers: See IDT note.    Biggest barriers: visual deficits/double vision    CM/social support:  very supportive    Anticipated DC date: 10/27    Home health: PT/OT/SLP/RN    Equip: shower chair, grab bars, FWW    Follow up: PCP, stroke bridge clinic, Dr. Becker - rehab clinic, neuro-ophthalmology, optometry, ENT, Dr. Albert       Medical Decision Making and Plan:    Midbrain and khang left ischemic stroke  Left vertebral artery dissection  S/p thrombectomy Dr. 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 Albert   Outpatient follow up with stroke bridge clinic, referral made  Outpatient follow up with Dr. Becker, rehab clinic referral made  Outpatient follow up with Dr. Rosas, neurophthalmology referral made  Can also follow up with Clinton Freed, optometry, may benefit from prism glasses    Left neck pain/spasm, resolved  PRN Robaxin  PRN tylenol  Lidoderm patch  Heat PRN    Dizziness, continues  Likely from double vision/cerebellar stroke  Continue eye patch  Orthostatics negative   PRN meclizine  As needed Scopolamine on discharge    Anxiety/depression  Dr. Hutchins consulted, appreciate assistance  No  need for medications at this time    Hypophonia, improved  Suspect from intubation, not from stroke location  Speech therapy  FEES with impaired mobility of cords, and possible pathology on cords  Follow up with outpatient ENT, referral made    Hypertension, resolved    Hypotension  Negative orthostatics  BMP without signs of dehydration  Likely close to her baseline given her age     History of migraines  Intermittent headache  PRN tylenol or Fioricet     Insomnia, improved/resolved  Scheduled melatonin and trazodone    Oral thrush , resolved    S/p nystatin     Seizure prophylaxis  [facility]  Outpatient follow up with stroke bridge clinic    Leukocytosis, resolved  UTI  Negative CXR    UTI, E Coli, treated  S/p cefuroxime    Bowel  Meds as needed  Last BM 10/26    Bladder  Check PVRs - 23, 17, 79  Patient not retaining   ICP for over 400 cc  Scheduled toileting    DVT prophylaxis  S/p Lovenox bridge to coumadin  Pharmacy dosing coumadin, 6 mg on discharge    Total time:  40 minutes.  I spent greater than 50% of the time for patient care, counseling, and coordination on this date, including patient face-to face time, unit/floor time with review of records/pertinent lab data and studies, as well as discussing diagnostic evaluation/work up, planned therapeutic interventions, and future disposition of care, as per the interval events/subjective and the assessment and plan as noted above.      Eden Mendoza M.D.   Physical Medicine and Rehabilitation

## 2020-10-26 NOTE — CONSULTS
Neuro Interventional Service Follow Up     Re: Obdulia High     MRN: [id number]   : 1975    Obdulia High was seen today in follow up after emergent endovascular neurointervention performed by Gallito Albert MD at [facility] on 2020.  She was referred to our service by Eusebio Fischer MD and is also under the care of the [facility] and has upcoming appointments with Yovana Becker DO, the Stroke Bridge Clinic, and a pending referral to neuro ophthalmology.    History of Present Illness:   has a history of complex migraine headaches. She presented to her local ED on 2020 with initial complaints of 2 day history of left retro-orbital headache, nausea, scotoma, and left facial numbness. She then developed a seizure with respiratory failure requiring intubation and was transferred to [facility] that evening.  Imaging revealed left pontine and midbrain ischemia and thrombosis of the left and right posterior cerebral arteries, left vertebral artery, and basilar artery with dissection at the left vertebral C2 level and the patient was referred for emergent intervention for NIH stroke scale of 28. Dr. Albert performed successful mechanical thrombectomy on . The patient's clinical recovery included inpatient rehabilitation services and she was discharged to home on  with outpatient follow ups arranged.     She is seen today for evaluation after the procedure. Today, the patient continues to recover. She has double vision with a left eye patch in place. She continues to have right side weakness. She is ambulating with a walker and gait belt if needed for assistance. She states she feels dizzy all the time. She does not recall many events between her presenting headache and rehab. She denies any past history of vascular injury and specifically did not sustain a known trauma such as MVC or chiropractic manipulation, however she thinks a new,  strenuous exercise regimen may have been the cause of the dissection. She is accompanied by her  Keith, who is a [employer] officer and stationed in [place] for the next 3 years.      Past Medical History:   Diagnosis Date   • Migraine    • Polycystic ovarian syndrome      No past surgical history on file.  Social History     Socioeconomic History   • Marital status:      Spouse name: Not on file   • Number of children: Not on file   • Years of education: Not on file   • Highest education level: Not on file   Occupational History   • Not on file   Social Needs   • Financial resource strain: Not on file   • Food insecurity     Worry: Never true     Inability: Never true   • Transportation needs     Medical: No     Non-medical: No   Tobacco Use   • Smoking status: Never Smoker   • Smokeless tobacco: Never Used   Substance and Sexual Activity   • Alcohol use: Never     Binge frequency: Never   • Drug use: Never   • Sexual activity: Not on file   Lifestyle   • Physical activity     Days per week: Not on file     Minutes per session: Not on file   • Stress: Not on file   Relationships   • Social connections     Talks on phone: Not on file     Gets together: Not on file     Attends Judaism service: Not on file     Active member of club or organization: Not on file     Attends meetings of clubs or organizations: Not on file     Relationship status: Not on file   • Intimate partner violence     Fear of current or ex partner: Not on file     Emotionally abused: Not on file     Physically abused: Not on file     Forced sexual activity: Not on file   Other Topics Concern   • Not on file   Social History Narrative   • Not on file     Family History   Problem Relation Age of Onset   • Diabetes Mother    • Hypertension Mother    • Diabetes Father    • Hypertension Father        Review of Systems   Constitutional: Negative for malaise/fatigue.   HENT: Positive for hearing loss.    Eyes: Positive for blurred vision and  double vision.   Neurological: Positive for focal weakness, seizures and weakness. Negative for sensory change and speech change.   Psychiatric/Behavioral: Negative for substance abuse.     A comprehensive 14-point review of systems was negative except as described above.     Labs:      Ref. Range 10/12/2020 06:46 10/20/2020 07:13   WBC Latest Ref Range: 4.8 - 10.8 K/uL 8.7 5.1   RBC Latest Ref Range: 4.20 - 5.40 M/uL 3.94 (L) 4.05 (L)   Hemoglobin Latest Ref Range: 12.0 - 16.0 g/dL 12.2 12.3   Hematocrit Latest Ref Range: 37.0 - 47.0 % 36.8 (L) 38.0   MCV Latest Ref Range: 81.4 - 97.8 fL 93.4 93.8   MCH Latest Ref Range: 27.0 - 33.0 pg 31.0 30.4   MCHC Latest Ref Range: 33.6 - 35.0 g/dL 33.2 (L) 32.4 (L)   RDW Latest Ref Range: 35.9 - 50.0 fL 42.6 45.1   Platelet Count Latest Ref Range: 164 - 446 K/uL 453 (H) 432   MPV Latest Ref Range: 9.0 - 12.9 fL 8.5 (L) 8.9 (L)   Neutrophils-Polys Latest Ref Range: 44.00 - 72.00 % 71.70    Neutrophils (Absolute) Latest Ref Range: 2.00 - 7.15 K/uL 6.25    Lymphocytes Latest Ref Range: 22.00 - 41.00 % 19.70 (L)    Lymphs (Absolute) Latest Ref Range: 1.00 - 4.80 K/uL 1.72    Monocytes Latest Ref Range: 0.00 - 13.40 % 5.20    Monos (Absolute) Latest Ref Range: 0.00 - 0.85 K/uL 0.45    Eosinophils Latest Ref Range: 0.00 - 6.90 % 1.80    Eos (Absolute) Latest Ref Range: 0.00 - 0.51 K/uL 0.16    Basophils Latest Ref Range: 0.00 - 1.80 % 0.50    Baso (Absolute) Latest Ref Range: 0.00 - 0.12 K/uL 0.04    Immature Granulocytes Latest Ref Range: 0.00 - 0.90 % 1.10 (H)    Immature Granulocytes (abs) Latest Ref Range: 0.00 - 0.11 K/uL 0.10    Nucleated RBC Latest Units: /100 WBC 0.00    NRBC (Absolute) Latest Units: K/uL 0.00       Ref. Range 10/16/2020 05:40   Sodium Latest Ref Range: 135 - 145 mmol/L 138   Potassium Latest Ref Range: 3.6 - 5.5 mmol/L 4.3   Chloride Latest Ref Range: 96 - 112 mmol/L 102   Co2 Latest Ref Range: 20 - 33 mmol/L 25   Anion Gap Latest Ref Range: 7.0 - 16.0   11.0   Glucose Latest Ref Range: 65 - 99 mg/dL 107 (H)   Bun Latest Ref Range: 8 - 22 mg/dL 19   Creatinine Latest Ref Range: 0.50 - 1.40 mg/dL 0.61   GFR If  Latest Ref Range: >60 mL/min/1.73 m 2 >60   GFR If Non  Latest Ref Range: >60 mL/min/1.73 m 2 >60   Calcium Latest Ref Range: 8.5 - 10.5 mg/dL 9.3      Ref. Range 10/26/2020 06:27   PT Latest Ref Range: 12.0 - 14.6 sec 26.9 (H)   INR Latest Ref Range: 0.87 - 1.13  2.40 (H)       Radiology:   CT head w/o 10/5/20 at [facility]:  1.  Diminishing parenchymal and sulcal contrast staining and/or hemorrhage in the parasagittal high frontal lobes. No evidence of new/progressive hemorrhage.  2.  Acute lacunar size infarct left thalamus.  3.  Acute brainstem infarct evident in the right side of the khang, best seen on MRI.    MRI brain w/o 9/30/20 at [facility]:  1.  Subarachnoid hemorrhage within sulci near the vertex, left greater than right  2.  Acute mid brain and punctate left pontine infarct without mass effect  3.  No other significant finding       MRV brain 9/30/20 at [facility]:  Cerebral magnetic resonance venogram within normal limits with no evidence of dural venous sinus thrombosis.     MRI brain w/ and w/o 9/30/20 at [facility]:  1.  Findings suggesting distal basilar artery and left posterior cerebral artery thrombosis.  2.  Acute ischemia within the midbrain/khang.  3.  Partially empty sella and slightly prominent fluid about the optic nerve sheaths. Correlate for any evidence of intracranial hypertension.       CT head w/o 9/30/20 at [facility]:  Addendum:  Please note that the patient has had a CTA head and neck as well as angiogram with thrombectomy earlier today. A portion of the high attenuation along the arterial vascular structures and dural venous sinuses is likely related to contrast staining from the previous contrast-enhanced procedures. However, the high attenuation in the sulci over the vertex is suspicious for a small amount of  new subarachnoid hemorrhage. Follow-up MRI could be performed for confirmation.  1.  There has been interval development of a small amount of subarachnoid hemorrhage bilaterally.  2.  There is no significant midline shift.  3.  The areas of acute thrombus/occlusion in the basilar artery and proximal posterior cerebral arteries are less apparent.    Neurointerventional procedure 9/30/20 at [facility]:  1.  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  2.  Basilar artery thrombosis.  3.  Successful mechanical thrombectomy of a basilar artery thrombosis.  4.  The final angiogram demonstrates patent basilar, bilateral superior cerebellar and right posterior cerebral arteries. The left P1 segment is patent. The left distal P2 segment is not visualized.        CTA head 9/30/20 at [facility]:  1.  There is acute mid and distal basilar artery occlusion extending into the left greater than right posterior cerebral arteries.  2.  Findings have already been discussed with the ordering physician and neurologist by Dr. Albert of the  radiology service.      CTA neck 9/30/20 at [facility]:  1.  There is abnormality involving the distal left vertebral artery at the level of C2 which may represent combination of focal dissection/thrombus with possible less likely partially thrombosed vascular malformation.  2.  There is an occlusion of the mid and distal basilar artery extending into the left greater than right posterior cerebral arteries, P1 segments.  3.  These findings have already been discussed with the clinical service by Dr. Albert of the IR radiology service.    CTP 9/30/20 at [facility]:  1.  Cerebral blood flow less than 30% likely representing completed infarct = 0 mL.  2.  T Max more than 6 seconds likely representing combination of completed infarct and ischemia = 100 mL.  3.  Mismatched volume likely representing ischemic brain/penumbra = 100 mL  4.  Please note that the cerebral perfusion was performed on the  limited brain tissue around the basal ganglia region. Infarct/ischemia outside the CT perfusion sections can be missed in this study.    No current facility-administered medications for this encounter.      No current outpatient medications on file.     Facility-Administered Medications Ordered in Other Encounters   Medication Dose Route Frequency Provider Last Rate Last Dose   • warfarin (COUMADIN) tablet 6 mg  6 mg Oral ONCE AT 1800 Eden Mendoza M.D.       • methocarbamol (ROBAXIN) tablet 500 mg  500 mg Oral 4X/DAY PRN Eden Mendoza M.D.   500 mg at 10/23/20 1347   • lidocaine (LIDODERM) 5 % 2 Patch  2 Patch Transdermal Q24HR Eden Mendoza M.D.   Stopped at 10/24/20 1330   • meclizine (ANTIVERT) tablet 25 mg  25 mg Oral TID PRN Eden Mendoza M.D.       • levETIRAcetam (KEPPRA) tablet 500 mg  500 mg Oral Q12HRS Eden Mendoza M.D.   500 mg at 10/26/20 0830   • traZODone (DESYREL) tablet 50 mg  50 mg Oral QHS Eden Mendoza M.D.   50 mg at 10/25/20 2110   • melatonin tablet 3 mg  3 mg Oral QHS Eden Mendoza M.D.   3 mg at 10/25/20 2110   • hydrOXYzine HCl (ATARAX) tablet 50 mg  50 mg Oral Q6HRS PRN Eden Mendoza M.D.       • Respiratory Therapy Consult   Nebulization Continuous RT Eden Mendoza M.D.       • Pharmacy Consult Request ...Pain Management Review 1 Each  1 Each Other PHARMACY TO DOSE Eden Mendoza M.D.       • hydrALAZINE (APRESOLINE) tablet 10 mg  10 mg Oral Q8HRS PRN Eden Mendoza M.D.       • artificial tears ophthalmic solution 1 Drop  1 Drop Both Eyes PRN Eden Mendoza M.D.       • benzocaine-menthol (CEPACOL) lozenge 1 Lozenge  1 Lozenge Mouth/Throat Q2HRS PRN Eden Mendoza M.D.       • mag hydrox-al hydrox-simeth (MAALOX PLUS ES or MYLANTA DS) suspension 20 mL  20 mL Oral Q2HRS PRN Eden Mendoza M.D.       • ondansetron (ZOFRAN ODT) dispertab 4 mg  4 mg Oral 4X/DAY PRN Eden Mendoza M.D.        Or   • ondansetron (ZOFRAN)  syringe/vial injection 4 mg  4 mg Intramuscular 4X/DAY PRN Eden Mendoza M.D.       • sodium chloride (OCEAN) 0.65 % nasal spray 2 Spray  2 Spray Nasal PRN Eden Mendoza M.D.       • lactulose 20 GM/30ML solution 30 mL  30 mL Oral QDAY PRN Eden Mendoza M.D.       • docusate sodium (ENEMEEZ) enema 283 mg  283 mg Rectal QDAY PRN Eden Mendoza M.D.       • fleet enema 133 mL  1 Each Rectal QDAY PRN Eden Mendoza M.D.       • atorvastatin (LIPITOR) tablet 40 mg  40 mg Oral Q EVENING Eden Mendoza M.D.   40 mg at 10/25/20 2110   • senna-docusate (PERICOLACE or SENOKOT S) 8.6-50 MG per tablet 2 Tab  2 Tab Oral BID Eden Mendoza M.D.   2 Tab at 10/26/20 0830    And   • polyethylene glycol/lytes (MIRALAX) PACKET 1 Packet  1 Packet Oral QDAY PRN Eden Mendoza M.D.   Stopped at 10/25/20 1432    And   • magnesium hydroxide (MILK OF MAGNESIA) suspension 30 mL  30 mL Oral QDAY PRN Eden Mendoza M.D.        And   • bisacodyl (DULCOLAX) suppository 10 mg  10 mg Rectal QDAY PRN Eden Mendoza M.D.       • acetaminophen (TYLENOL) tablet 650 mg  650 mg Oral Q4HRS PRN Eden Mendoza M.D.   650 mg at 10/23/20 1233   • acetaminophen/caffeine/butalbital 325-40-50 mg (FIORICET) -40 MG per tablet 1 Tab  1 Tab Oral Q6HRS PRN Eden Mendoza M.D.       • MD Alert...Warfarin per Pharmacy   Other PHARMACY TO DOSE Eden Mendoza M.D.           No Known Allergies    Physical Exam   Constitutional: She is oriented to person, place, and time and well-developed, well-nourished, and in no distress. No distress.   Eyes: No scleral icterus. Left eye abnormal extraocular motion: patch in place.   Pulmonary/Chest: Effort normal. No respiratory distress.   Abdominal: She exhibits no distension.   Neurological: She is alert and oriented to person, place, and time. She has normal sensation. She is not agitated and not disoriented. She displays weakness and facial asymmetry. She displays no  tremor and normal speech. A cranial nerve deficit is present. Gait abnormal.   Skin: Skin is warm and dry. No rash noted. She is not diaphoretic. No erythema. No pallor.   Psychiatric: Mood, memory, affect and judgment normal.     Impression:   1. Left vertebral artery dissection, on warfarin.  2. Stroke, secondary to above.  3. Hypertension.    Plan:   Gallito Albert MD additionally evaluated the patient today. We discussed the method of the procedure at length and reviewed imaging studies. All questions were answered. We explained that the patient will need to have surveillance imaging after the procedure, which will be managed by us, and that future treatment depends on multiple factors including lab studies, imaging, and performance status. We have planned that the next imaging study will a CTA performed at 6 months (March 2021). Warfarin until her imaging study is done to ensure the vessel has healed. After she is finished with her warfarin, she should be on life long aspirin.     ALLEN Marcial with Gallito Albert MD  Neuro Interventional Service   [address] ([id number])  [place] [address]  [phone]

## 2020-10-26 NOTE — THERAPY
Speech Language Pathology  Daily Treatment     Patient Name: Obdulia High  Age:  45 y.o., Sex:  female  Medical Record #: [id number]  Today's Date: 10/26/2020     Precautions  Precautions: Fall Risk  Comments: dizzy, diplopia    Subjective    Pt pleasant and cooperative during tx.      Objective       10/26/20 0931   Cognition   Executive Functioning / Organization Minimal (4)   SLP Total Time Spent   SLP Individual Total Time Spent (Mins) 30   Treatment Charges   SLP Cognitive Skill Development First 15 Minutes 1   SLP Cognitive Skill Development Additional 15 Minutes 1       Assessment    Garden plot planning task: 65% independent; 100% [place].  Deductive reasoning (4x5 puzzle): 100% independent.     Strengths: Making steady progress towards goals, Pleasant and cooperative, Supportive family, Willingly participates in therapeutic activities, Motivated for self care and independence, Independent prior level of function, Effective communication skills, Alert and oriented, Able to follow instructions  Barriers: Visual impairment, Hemiparesis    Plan    Discharge scheduled tomorrow, 10/27    Speech Therapy Problems     Problem: Problem Solving STGs     Dates: Start: 10/21/20       Goal: STG-Within one week, patient will     Dates: Start: 10/21/20       Description: 1) Individualized goal:  complete complex organization/executive function tasks with >90% accuracy provided SPV  2) Interventions:  SLP Cognitive Skill Development and SLP Group Treatment        Note:     Goal Note filed on 10/26/20 0812 by Ny Lockhart MS,CCC-SLP    Min cues needed for 100% acc, Patient performing at 85% indep                        Problem: Speech/Swallowing LTGs     Dates: Start: 10/08/20       Goal: LTG-By discharge, patient will safely swallow     Dates: Start: 10/08/20       Description: 1) Individualized goal:  regular textures/thin liquids for safe return to PLOF.   2) Interventions:  SLP Swallowing Dysfunction Treatment, SLP Oral  Pharyngeal Evaluation, SLP Video Swallow Evaluation, SLP Cognitive Skill Development, and SLP Group Treatment              Goal: LTG-By discharge, patient will solve complex problem     Dates: Start: 10/08/20       Description: 1) Individualized goal: By utilizing compensatory intervention for memory and problem-solving to allow for safe completion of daily activities with MOD I in order to prepare for safe d/c home  2) Interventions:  SLP Cognitive Skill Development and SLP Group Treatment

## 2020-10-26 NOTE — THERAPY
"Occupational Therapy  Daily Treatment     Patient Name: Obdulia High  Age:  45 y.o., Sex:  female  Medical Record #: [id number]  Today's Date: 10/26/2020     Precautions  Precautions: (P) Fall Risk  Comments: dizzy, diplopia    Safety   ADL Safety : Requires Supervision for Safety  Bathroom Safety: Requires Supervision for Safety    Subjective    Patient lying in bed awake getting blood pressure checked. Patient declined offer to shower, stating she would take one tomorrow when she gets home. Wanted to work on her right UE.     Objective       10/26/20 1231   Precautions   Precautions Fall Risk   Fine Motor / Dexterity    Fine Motor / Dexterity Yes   Fine Motor / Dexterity Interventions Bilateral integration and FMC activities R hand, including translation of pen in fingertips, rotations of pen in fingertips, shuffling cards, Purdue Pegboard and playing the game \"Operation.\"  Issued more handouts for HEP for FMC.   Bed Mobility    Supine to Sit Independent   Sit to Supine Independent   Scooting Independent   Rolling Independent   Interdisciplinary Plan of Care Collaboration   Patient Position at End of Therapy In Bed;Call Light within Reach;Tray Table within Reach;Phone within Reach   OT Total Time Spent   OT Individual Total Time Spent (Mins) 60   OT Charge Group   OT Therapy Activity 4     Functional mobility with FWW CGA from room <> family lounge gym.      Assessment    Patient has greatly increased her speed of walking with FWW, but still drags R toe frequently and with discoordination when stepping onto R foot.  R hand FMC has greatly improved.  Strengths: Alert and oriented, Able to follow instructions, Effective communication skills, Good insight into deficits/needs, Independent prior level of function, Manages pain appropriately, Motivated for self care and independence, Pleasant and cooperative, Supportive family, Willingly participates in therapeutic activities  Barriers: Decreased endurance, Fatigue, " Generalized weakness, Hemiparesis, Impaired activity tolerance, Impaired balance, Limited mobility, Visual impairment(double vision)    Plan    D/C home tomorrow    Occupational Therapy Goals     Problem: OT Long Term Goals     Dates: Start: 10/08/20       Goal: LTG-By discharge, patient will complete basic self care tasks     Dates: Start: 10/08/20       Description: 1) Individualized Goal:  with mod I using AE/AD/techniques as needed  2) Interventions:  OT Self Care/ADL, OT Manual Ther Technique, OT Neuro Re-Ed/Balance, OT Sensory Int Techniques, OT Therapeutic Activity, OT Evaluation, and OT Therapeutic Exercise          Goal: LTG-By discharge, patient will perform bathroom transfers     Dates: Start: 10/08/20       Description: 1) Individualized Goal:  with mod I using AE/AD/techniques as needed  2) Interventions:  OT Self Care/ADL, OT Manual Ther Technique, OT Neuro Re-Ed/Balance, OT Sensory Int Techniques, OT Therapeutic Activity, OT Evaluation, and OT Therapeutic Exercise          Goal: LTG-By discharge, patient will complete basic home management     Dates: Start: 10/08/20       Description: 1) Individualized Goal:  with supervision using AE/AD/techniques as needed  2) Interventions:  OT Self Care/ADL, OT Manual Ther Technique, OT Neuro Re-Ed/Balance, OT Sensory Int Techniques, OT Therapeutic Activity, OT Evaluation, and OT Therapeutic Exercise

## 2020-10-26 NOTE — CARE PLAN
Problem: Dressing  Goal: STG-Within one week, patient will dress LB  Description: 1) Individualized Goal:  with CGA for standing portion using grab bar  2) Interventions:  OT Self Care/ADL, OT Manual Ther Technique, OT Neuro Re-Ed/Balance, OT Sensory Int Techniques, OT Therapeutic Activity, OT Evaluation, and OT Therapeutic Exercise  Outcome: MET  Note: Supervised     Problem: Functional Transfers  Goal: STG-Within one week, patient will transfer to toilet  Description: 1) Individualized Goal:  with SBA using grab bar  2) Interventions:  OT Self Care/ADL, OT Manual Ther Technique, OT Neuro Re-Ed/Balance, OT Sensory Int Techniques, OT Therapeutic Activity, OT Evaluation, and OT Therapeutic Exercise  Outcome: MET  Note: Supervised

## 2020-10-26 NOTE — CARE PLAN
Problem: OT Long Term Goals  Goal: LTG-By discharge, patient will complete basic self care tasks  Description: 1) Individualized Goal:  with mod I using AE/AD/techniques as needed  2) Interventions:  OT Self Care/ADL, OT Manual Ther Technique, OT Neuro Re-Ed/Balance, OT Sensory Int Techniques, OT Therapeutic Activity, OT Evaluation, and OT Therapeutic Exercise  Outcome: NOT MET  Goal: LTG-By discharge, patient will perform bathroom transfers  Description: 1) Individualized Goal:  with mod I using AE/AD/techniques as needed  2) Interventions:  OT Self Care/ADL, OT Manual Ther Technique, OT Neuro Re-Ed/Balance, OT Sensory Int Techniques, OT Therapeutic Activity, OT Evaluation, and OT Therapeutic Exercise  Outcome: NOT MET

## 2020-10-26 NOTE — PROGRESS NOTES
Received patient during shift change, report rec'd from day shift RN. Resting in bed, VS stable on room air. Continent of B&B, stand by - contact guard assist for transfers. A&O x 4, able to make needs known. Bed in low position, call light within reach.

## 2020-10-27 VITALS
RESPIRATION RATE: 18 BRPM | HEART RATE: 78 BPM | WEIGHT: 182.98 LBS | HEIGHT: 67 IN | TEMPERATURE: 97.6 F | SYSTOLIC BLOOD PRESSURE: 102 MMHG | DIASTOLIC BLOOD PRESSURE: 68 MMHG | BODY MASS INDEX: 28.72 KG/M2 | OXYGEN SATURATION: 94 %

## 2020-10-27 LAB
INR PPP: 2.28 (ref 0.87–1.13)
PROTHROMBIN TIME: 25.8 SEC (ref 12–14.6)

## 2020-10-27 PROCEDURE — 36415 COLL VENOUS BLD VENIPUNCTURE: CPT

## 2020-10-27 PROCEDURE — A9270 NON-COVERED ITEM OR SERVICE: HCPCS | Performed by: PHYSICAL MEDICINE & REHABILITATION

## 2020-10-27 PROCEDURE — 85610 PROTHROMBIN TIME: CPT

## 2020-10-27 PROCEDURE — 99239 HOSP IP/OBS DSCHRG MGMT >30: CPT | Performed by: PHYSICAL MEDICINE & REHABILITATION

## 2020-10-27 PROCEDURE — 700102 HCHG RX REV CODE 250 W/ 637 OVERRIDE(OP): Performed by: PHYSICAL MEDICINE & REHABILITATION

## 2020-10-27 RX ADMIN — SCOPALAMINE 1 PATCH: 1 PATCH, EXTENDED RELEASE TRANSDERMAL at 09:01

## 2020-10-27 RX ADMIN — LEVETIRACETAM 500 MG: 500 TABLET, FILM COATED ORAL at 09:00

## 2020-10-27 RX ADMIN — DOCUSATE SODIUM 50 MG AND SENNOSIDES 8.6 MG 2 TABLET: 8.6; 5 TABLET, FILM COATED ORAL at 09:00

## 2020-10-27 NOTE — CARE PLAN
Problem: Safety  Goal: Will remain free from injury  Outcome: PROGRESSING AS EXPECTED  Goal: Will remain free from falls  Outcome: PROGRESSING AS EXPECTED   Pt education given regarding fall prevention and fall precautions and pt safety, pt shows good understanding, has not attempted to self transfer this shift, spouse present for education and pt discharge instructions, both show good understanding, will continue to reinforce education, and will continue to monitor.

## 2020-10-27 NOTE — DISCHARGE SUMMARY
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 Conde's consult \"The patient is a 45 y.o. left hand dominant female with no known past medical history;  who presented on 9/29/2020  8:22 PM as a transfer from outside hospital where she initially presented with a 2-day history of retro-orbital headache, left facial numbness and seizure-like activity.  Patient received Ativan and was intubated for airway protection prior to being transferred to [facility].  Work-up with MRI brain found brainstem/pontine/midbrain ischemia, and follow-up CTA showed diffuse thrombosis in the left and right PCAs, left vertebral, and basilar arteries. Patient then received basilar artery thrombectomy with Dr. Albert. NIH SS 28 on admission, and NIH SS of 9 today 10/2/2020.   Patient is feeling better today, however does report right arm and leg numbness and tingling, constipation, Faust dependency, some ptosis, lethargy and weakness.\"     In addition to the above history, patient had 10 minutes of seizure at the outside hospital - [facility], requiring Valium, Ativan, prior to intubation. EEG on acute was " negative for seizures, though patient is on anti-seizure medication. COVID negative.      Post thrombectomy TICI 3 basilar, and per neurology, for high risk of re-thrombosing due to other nearby occluded arteries - left vertebral and bilateral PCAs. Repeat imagining also with small amount of subarachnoid hemorrhage versus contrast staining. She was extubated on 10/1. Repeat MRI with known stroke in midbrain as well as small new punctate infarctions in the left internal capsule, right cerebellum, and left khang. Per review of notes, patient apparently had started a strenuous workout program several days prior to her stroke, which is thought to be the etiology of her dissection. She was initially on heparin drip, now on a Lovenox bridge to coumadin. Plan for repeat imaging in 3 months (CTA) to determine whether patient can discontinue warfarin and switch to aspirin. Plan to also wean Keppra in the outpatient stroke bridge clinic. Patient had repeat Head CT on 10/5 without any new findings of hemorrhage. Patient initially had a Cortrak for nutrition, now removed.      Patient current reports right sided weakness, right face and right hand paresthesias, hoarse voice, and double vision. She denies any pain or headache. She moved her bowels yesterday. Her dueñas was removed this morning and she has been able to urinate without retention. She reports her mood is stable, though she is tearful, and is eager to get started with therapy and her recovery. She also reports her thinking is off. Her  Keith will be in tomorrow.      Patient was evaluated by Rehab Medicine physician and Physical Therapy, Occupational Therapy and Speech Therapy and determined to be appropriate for acute inpatient rehab and was transferred to [facility] on 10/7/2020  2:32 PM.    Rehab Hospital Course by Problem List:    Midbrain and khang left ischemic stroke  Left vertebral artery dissection  S/p thrombectomy Dr. 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 Albert, referral made  Outpatient follow up with stroke bridge clinic, referral made  Outpatient follow up with Dr. Becker, rehab clinic referral made  Outpatient follow up with Dr. Rosas, neurophthalmology referral made  Can also follow up with Clinton Freed, optometry, may benefit from prism glasses     Left neck pain/spasm, resolved  PRN Robaxin  PRN tylenol  Lidoderm patch  Heat PRN     Dizziness, continues  Likely from double vision/cerebellar stroke  Continue eye patch  Orthostatics negative   PRN meclizine  As needed Scopolamine on discharge     Anxiety/depression, improved  Dr. Hutchins consulted, appreciate assistance  No need for medications at this time     Hypophonia, improved  Suspect from intubation, not from stroke location  Speech therapy  FEES with impaired mobility of cords, and possible pathology on cords  Follow up with outpatient ENT, referral made     Hypertension, resolved     Hypotension  Negative orthostatics  BMP without signs of dehydration  Likely close to her baseline given her age     History of migraines  Intermittent headache  PRN tylenol or Fioricet     Insomnia, improved/resolved  Scheduled melatonin and trazodone     Oral thrush , resolved    S/p nystatin     Seizure prophylaxis  Continue Keppra  Outpatient follow up with stroke bridge clinic     Leukocytosis, resolved  UTI  Negative CXR     UTI, E Coli, treated  S/p cefuroxime     Bowel  Meds as needed  Last BM 10/26     Bladder  Check PVRs - 23, 17, 79  Patient not retaining      DVT  prophylaxis  S/p Lovenox bridge to coumadin    Functional Status at Discharge  Eating:  Independent  Eating Description:  (sitting up in bed )  Grooming:  Modified Independent  Grooming Description:  Seated in wheelchair at sink  Bathing:  Supervision  Bathing Description:  Set-up of equipment, Grab bar, Tub bench  Upper Body Dressing:  Supervision(supervised standing at closet to retrieve, mod I to don/doff)  Upper Body Dressing Description:  Supervision for safety  Lower Body Dressing:  Supervision  Lower Body Dressing Description:  Supervision for safety(supervised standing at closet to retrieve, mod I to don/doff)  Discharge Location : Home  Patient Discharging with Assist of: Family ;Spouse / Significant Other  Level of Supervision Required: Intermittent Supervision  Recommended Equipment for Discharge: Front-Wheeled Walker;Shower Chair;Grab Bars by Toilet;Grab Bars in Tub / Shower;Hand Held Shower Head  Recommended Services Upon Discharge: Home Health Occupational Therapy  Long Term Goals Met: 0  Long Term Goals Not Met: 2  Reason(s) for Goals Not Met: impaired balance, dizziness  Criteria for Termination of Services: Maximum Function Achieved for Inpatient Rehabilitation  Walk:  Stand by Assist  Distance Walked:  200  Number of Times Distance Was Traveled:  2  Assistive Device:  Front Wheel Walker  Gait Deviation:  Bradykinetic  Wheelchair:  Refused(prefers ambulation )  Distance Propelled:  300   Wheelchair Description:  Limited by fatigue  Stairs Stand by Assist  Stairs Description (Step to, BHR)  Discharge Location: Home  Patient Discharging with Assist of: Family  Level of Supervision Required Upon Discharge: Intermittent Supervision  Recommended Equipment for Discharge: Front-Wheeled Walker  Recommeded Services Upon Discharge: Home Health Physical Therapy;Outpatient Physical Therapy(Vestibular referral? )  Long Term Goals Met: Met 3, progressing on car transfer (trunk with running board CGA)  Long Term  Goals Not Met: Car transfer CGA, see above  Reason(s) for Goals Not Met: See above - inc level of difficulty with running board/ truck height, safety concerns  Comprehension:  Modified Independent  Comprehension Description:  Verbal cues  Expression:  Modified Independent  Expression Description:  Verbal cueing  Social Interaction:  Independent  Social Interaction Description:  Increased time  Problem Solving:  Supervision  Problem Solving Description:  Increased time, Seat belt, Supervision, Therapy schedule, Verbal cueing  Memory:  Supervision  Memory Description:  Seat belt, Supervision, Therapy schedule, Verbal cueing  Progress since Admit: Pt has made great progress since admission. Pt is currently supervision to MOD I for all IADLs related to meds/finances and complex organization. Pt's largest barrier to completing complex task is visual impairment. Recommend short term ST via home health to target return to work tasks in home environment. Pt to d/c home with support and supervision of spouse.  Discharge Location : Home  Level of Supervision Required: Intermittent Supervision  Recommended Services Upon Discharge: Home Health Speech Therapy  Long Term Goals Met: 2/2  Long Term Goals Not Met: 0/2  Reason(s) for Goals Not Met: NA  Criteria for Termination of Services: Maximum Function Achieved for Inpatient Rehabilitation    IEden M.D., personally performed a complete drug regimen review and no potential clinically significant medication issues were identified.     Discharge Medication:     Medication List      START taking these medications      Instructions   acetaminophen 325 MG Tabs  Commonly known as: TYLENOL   Take 2 Tabs by mouth every four hours as needed.  Dose: 650 mg     melatonin 3 MG Tabs   Take 1 Tab by mouth every bedtime.  Dose: 3 mg     scopolamine 1 mg/72hr Pt72  Commonly known as: TRANSDERM-SCOP   Apply 1 Patch to skin as directed every 72 hours.  Dose: 1 Patch      senna-docusate 8.6-50 MG Tabs  Commonly known as: PERICOLACE or SENOKOT S   Take 2 Tabs by mouth 2 Times a Day.  Dose: 2 Tab     traZODone 50 MG Tabs  Commonly known as: DESYREL   Take 1 Tab by mouth every bedtime.  Dose: 50 mg        CHANGE how you take these medications      Instructions   levETIRAcetam 500 MG Tabs  What changed: when to take this  Commonly known as: KEPPRA   Take 1 Tab by mouth every 12 hours.  Dose: 500 mg     warfarin 6 MG Tabs  What changed:   · medication strength  · how much to take  · when to take this  Commonly known as: COUMADIN   Take 1 Tab by mouth every evening.  Dose: 6 mg        CONTINUE taking these medications      Instructions   atorvastatin 40 MG Tabs  Commonly known as: LIPITOR   Take 1 Tab by mouth every evening.  Dose: 40 mg            Discharge Diet:  Regular    Discharge Activity:  Do not return to work or driving until cleared by a physician.        Disposition:  Patient to discharge ome with family support and community resources.     Discharge Location: Home with Home Health     Agency Name /  Phone: [facility] [employer]    [phone]   They will call to schedule time.     Home Health:   Registered Nurse, Occupational Therapist, Physical Therapist, Speech Therapist  Home Health to draw INR      DME Provider / Phone: [facility] [phone]     Medical Equipment Ordered: Front Wheel Walker      Follow-up Information:   Agustín Toscano  [address] [facility] [address]  [phone]   On 10/28/2020  Wednesday, 2pm      Gallito Albert M.D.  [address] - Radiology  [place] [address]  [phone]   On 11/2/2020  Check in at 10:30 AM for 11AM appointment - at [facility]      [facility]; Dr. Yovana Becker  [address]  [facility] [address]  [place] [id number] [phone]  On 11/11/2020  @ 1:40pm with the APRN for Neurology and will see Dr. Becker (Physical Medicine physician) after the Neurology appointment.     Nam Freed O.D.  [id number]  [address] [facility] [address]  [place] [address]  [phone]   On 12/2/2020 @ 2:30pm     Srikanth Rosas M.D.  [address] [facility] [address]  [place] [address]  [phone]   Doctors office to contact you for appointment.    Condition on Discharge:  Good    More than 35 minutes was spent on discharging this patient, including face-to-face time, prescription management, and the dictation of this note.    Eden Mendoza M.D.    Date of Service: 10/27/2020

## 2020-10-27 NOTE — CARE PLAN
Problem: Safety  Goal: Will remain free from injury  Outcome: PROGRESSING AS EXPECTED  Goal: Will remain free from falls  Outcome: PROGRESSING AS EXPECTED   Pt education reinforced regarding fall prevention and fall precautions and pt safety, pt shows good understanding, spouse with pt and shows good understanding, pt has not attempted to self transfer this shift, will continue to reinforce education, and will continue to monitor.

## 2020-10-27 NOTE — CARE PLAN
Problem: Speech/Swallowing LTGs  Goal: LTG-By discharge, patient will safely swallow  Description: 1) Individualized goal:  regular textures/thin liquids for safe return to PLOF.   2) Interventions:  SLP Swallowing Dysfunction Treatment, SLP Oral Pharyngeal Evaluation, SLP Video Swallow Evaluation, SLP Cognitive Skill Development, and SLP Group Treatment      Outcome: MET  Goal: LTG-By discharge, patient will solve complex problem  Description: 1) Individualized goal: By utilizing compensatory intervention for memory and problem-solving to allow for safe completion of daily activities with MOD I in order to prepare for safe d/c home  2) Interventions:  SLP Cognitive Skill Development and SLP Group Treatment      Outcome: MET     Problem: Problem Solving STGs  Goal: STG-Within one week, patient will  Description: 1) Individualized goal:  complete complex organization/executive function tasks with >90% accuracy provided SPV  2) Interventions:  SLP Cognitive Skill Development and SLP Group Treatment      Outcome: MET

## 2020-10-27 NOTE — CARE PLAN
Problem: Discharge Barriers/Planning  Goal: Patient's continuum of care needs will be met  Outcome: PROGRESSING AS EXPECTED  Note: Patient verbalized that she will ask question tomorrow about discharged. For now patient was encouraged to ask question or verbalized concerns but patient verbalized she does not have on right now.    Problem: Safety  Goal: Will remain free from injury  Outcome: PROGRESSING AS EXPECTED  Note: Patient verbalized she will not get up without assist. Call button within reach

## 2020-10-27 NOTE — DISCHARGE INSTRUCTIONS
Speech Therapy Discharge Instructions for Obdulia High    10/27/2020    Diet: Regular (7), Thin (0)  Swallow Strategies: NA  Other Diet Instructions: NA  Supervision: 24-7 supervision recommended for safety at this time.  Cognition / Communication: Arin, thank you for all your hardwork! You have made tremendous progress, keep up the great work at home! Just a couple things to remember:  Executive function is a set of mental skills that help you get things done. These skills are controlled by an area of the brain called the frontal lobe.    Executive function helps you:  • Manage time  • Pay attention  • Switch focus  • Plan and organize  • Remember details  • Avoid saying or doing the wrong thing  • Do things based on your experience  • Multitask    When executive function isn’t working as it should, your behavior is less controlled. This can affect your ability to:  • Work or go to school  • Do things independently  • Maintain relationships    How to Manage Executive Function Problems  • Take a step-by-step approach to work.  • Rely on visual organizational aids.  • Use tools like time organizers, computers, or watches with alarms.  • Make schedules and look at them several times a day.  • Ask for written and oral instructions whenever possible.  • Plan for transition times and shifts in activities.  • Allow and budget extra time to get tasks done.    • When you are about to try a task that you haven’t done in a while (or struggled with the last time), think about the steps involved, try to anticipate possible challenges and identify what might give you trouble, come up with a plan to compensate of those challenges, and evaluate after the fact - to determine if your plan worked or if it needs adjustment.  •  If you experience an outcome that you didn’t expect, think back to what may have contributed to the problem.   • Break complex problems down into smaller parts.   Sometimes a complicated task can be  overwhelming, but if you break it down into components, you will be able to find a place where you can cope with the information.    •  Be patient and persistent.    • Develop tools and strategies that will help organize, filter and narrow down information so that you can deal with it effectively.    To increase your ability to pay attention and concentrate it is recommended you follow these strategies:     Monitor your fatigue: Monitoring our fatigue is probably one of the most important strategies we can use. Many people suffer from higher levels of fatigue than normal and must be aware that fatigue will have a major impact on attention capabilities. This means scheduling activities that require your attention at times when you feel at your best.  Make a schedule: Choose a time that’s quiet and unhurried -- maybe at night before you go to bed -- and plan out the next day, down to the task. Use a reminder teri, timer, or alarm to help you stick to that schedule. Alternate things you want to do with ones you don’t to help your mind stay engaged.  Allow plenty of time to complete tasks.  Be realistic about time: Your brain is wired differently than other people’s, and it may take you longer to get things done. That’s OK. Figure out a realistic time frame for your daily tasks -- and don’t forget to build in time for breaks if you think you’ll need them.  Quiet your mind by quieting your space: When it’s time to buckle down and get something done, take away the distractions. Use noise-canceling headphones to drown out sounds. Put your phone on silent. Work in a room with a door you can close. If you can do your job from home, set up the space in a way that helps you focus.  Control clutter: Another way to quiet your brain is to clear your space of things you don’t need. It can prevent distractions, and it can help you stay organized because you’ll have fewer things to tidy up.  Get some organizational helpers like  under-the-bed containers or over-the-door holders. Ask a friend to help if it seems like you’re swimming in a sea of debris and you don’t know where to start.    Best of luck in your continued recovery! You have done great! ~ Patricia Long, MS, CCC-SLP  Physical Therapy Discharge Instructions for Obdulia High    10/26/2020    Level of Assist Required for Ambulation: Supervision on Flat Surfaces, Assist for Balance on Curbs, Assist for Balance on Stairs  Distance Patient May Ambulate: >200 ft or until fatigued  Device Recommended for Ambulation: Front-Wheeled Walker  Level of Assist Required for Transfers: Supervision  Device Recommended for Transfers: Front-Wheeled Walker  Home Exercise Program: Refer to Home Exercise Program Handout for Details    It was so nice meeting you!!! Thanks for all of your hard work! Best wishes on your continued recovery!  Rakel GALAVIZ, DPT    Occupational Therapy Discharge Instructions for Obdulia High    10/26/2020    Level of Assist Required for Eating: Able to Complete Eating without Assist  Level of Assist Required for Grooming: Able to Complete Grooming without Assist  Level of Assist Required for Dressing: Able to Complete Dressing without Assist  Level of Assist Required for Toileting: Requires Supervision with Toileting  Level of Assist Required for Toilet Transfer: Requires Supervision with Toilet Transfer  Equipment for Toilet Transfer: Grab Bars by Toilet, Other(or walker)  Level of Assist Required for Bathing: Requires Supervision with Bathing  Equipment for Bathing: Shower Chair, Grab Bars in Tub / Shower, Hand Held Shower Head  Level of Assist Required for Shower Transfer: Requires Supervision with Shower Transfer  Equipment for Shower Transfer: Grab Bars in Tub / Shower, Shower Chair(or walker, or  to assist)  Level of Assist Required for Home Mgmt: Requires Physical Assist with Home Management  Level of Assist Required for Meal Prep: Requires Supervision with Meal  "Preparation  Driving: May not Drive, Please Contact Physician for Further Information  Home Exercise Program: Refer to Home Exercise Program Handout for Details    ______________________________________________________________________________________    Prevent Falls in Your Home    \"Falling once doubles your chance of falling again\"        -Center for Disease Control and Prevention    Falls in the home can lead to serious injury (fractures, brain injuries), hospitalizations, increased medical costs, and could even be fatal.  The good news is, there are many precautions you can take to avoid falls in your home and help keep you safe:     · If prescribed an assistive device (walker, crutches), use as instructed by the healthcare provider\"   · Remove any tripping hazards from your home, including loose cords, throw rugs and clutter  · Keep a nightlight on in dark (hallways, bathrooms, etc)   · Get up slowly, to make sure you feel okay before getting up  · Be aware of any side effects of your medications: some medications may make you dizzy  · Place a non-skid rubber mat in your shower or tub-consider a shower bench or chair if unsteady on your feet  · Wear supportive shoes or non-skid socks when moving around  · Start an exercise program once approved by your provider.  If you are feeling weak following a hospital stay, talk to your doctor about home health or outpatient therapy programs designed to help rebuild your strength and endurance      Fall Prevention in the Home, Adult  Falls can cause injuries. They can happen to people of all ages. There are many things you can do to make your home safe and to help prevent falls. Ask for help when making these changes, if needed.  What actions can I take to prevent falls?  General Instructions  · Use good lighting in all rooms. Replace any light bulbs that burn out.  · Turn on the lights when you go into a dark area. Use night-lights.  · Keep items that you use often in " easy-to-reach places. Lower the shelves around your home if necessary.  · Set up your furniture so you have a clear path. Avoid moving your furniture around.  · Do not have throw rugs and other things on the floor that can make you trip.  · Avoid walking on wet floors.  · If any of your floors are uneven, fix them.  · Add color or contrast paint or tape to clearly mikala and help you see:  ? Any grab bars or handrails.  ? First and last steps of stairways.  ? Where the edge of each step is.  · If you use a stepladder:  ? Make sure that it is fully opened. Do not climb a closed stepladder.  ? Make sure that both sides of the stepladder are locked into place.  ? Ask someone to hold the stepladder for you while you use it.  · If there are any pets around you, be aware of where they are.  What can I do in the bathroom?         · Keep the floor dry. Clean up any water that spills onto the floor as soon as it happens.  · Remove soap buildup in the tub or shower regularly.  · Use non-skid mats or decals on the floor of the tub or shower.  · Attach bath mats securely with double-sided, non-slip rug tape.  · If you need to sit down in the shower, use a plastic, non-slip stool.  · Install grab bars by the toilet and in the tub and shower. Do not use towel bars as grab bars.  What can I do in the bedroom?  · Make sure that you have a light by your bed that is easy to reach.  · Do not use any sheets or blankets that are too big for your bed. They should not hang down onto the floor.  · Have a firm chair that has side arms. You can use this for support while you get dressed.  What can I do in the kitchen?  · Clean up any spills right away.  · If you need to reach something above you, use a strong step stool that has a grab bar.  · Keep electrical cords out of the way.  · Do not use floor polish or wax that makes floors slippery. If you must use wax, use non-skid floor wax.  What can I do with my stairs?  · Do not leave any items  on the stairs.  · Make sure that you have a light switch at the top of the stairs and the bottom of the stairs. If you do not have them, ask someone to add them for you.  · Make sure that there are handrails on both sides of the stairs, and use them. Fix handrails that are broken or loose. Make sure that handrails are as long as the stairways.  · Install non-slip stair treads on all stairs in your home.  · Avoid having throw rugs at the top or bottom of the stairs. If you do have throw rugs, attach them to the floor with carpet tape.  · Choose a carpet that does not hide the edge of the steps on the stairway.  · Check any carpeting to make sure that it is firmly attached to the stairs. Fix any carpet that is loose or worn.  What can I do on the outside of my home?  · Use bright outdoor lighting.  · Regularly fix the edges of walkways and driveways and fix any cracks.  · Remove anything that might make you trip as you walk through a door, such as a raised step or threshold.  · Trim any bushes or trees on the path to your home.  · Regularly check to see if handrails are loose or broken. Make sure that both sides of any steps have handrails.  · Install guardrails along the edges of any raised decks and porches.  · Clear walking paths of anything that might make someone trip, such as tools or rocks.  · Have any leaves, snow, or ice cleared regularly.  · Use sand or salt on walking paths during winter.  · Clean up any spills in your garage right away. This includes grease or oil spills.  What other actions can I take?  · Wear shoes that:  ? Have a low heel. Do not wear high heels.  ? Have rubber bottoms.  ? Are comfortable and fit you well.  ? Are closed at the toe. Do not wear open-toe sandals.  · Use tools that help you move around (mobility aids) if they are needed. These include:  ? Canes.  ? Walkers.  ? Scooters.  ? Crutches.  · Review your medicines with your doctor. Some medicines can make you feel dizzy. This can  increase your chance of falling.  Ask your doctor what other things you can do to help prevent falls.  Where to find more information  · Centers for Disease Control and Prevention, [place]: https://cdc.gov  · National [place] on Aging: https://vm0kxqy.ab.nih.gov  Contact a doctor if:  · You are afraid of falling at home.  · You feel weak, drowsy, or dizzy at home.  · You fall at home.  Summary  · There are many simple things that you can do to make your home safe and to help prevent falls.  · Ways to make your home safe include removing tripping hazards and installing grab bars in the bathroom.  · Ask for help when making these changes in your home.  This information is not intended to replace advice given to you by your health care provider. Make sure you discuss any questions you have with your health care provider.  Document Released: 10/14/2010 Document Revised: 04/09/2020 Document Reviewed: 08/02/2018  [employer] Patient Education © 2020 [employer]      Depression / Suicide Risk    As you are discharged from this [facility] facility, it is important to learn how to keep safe from harming yourself.    Recognize the warning signs:  · Abrupt changes in personality, positive or negative- including increase in energy   · Giving away possessions  · Change in eating patterns- significant weight changes-  positive or negative  · Change in sleeping patterns- unable to sleep or sleeping all the time   · Unwillingness or inability to communicate  · Depression  · Unusual sadness, discouragement and loneliness  · Talk of wanting to die  · Neglect of personal appearance   · Rebelliousness- reckless behavior  · Withdrawal from people/activities they love  · Confusion- inability to concentrate     If you or a loved one observes any of these behaviors or has concerns about self-harm, here's what you can do:  · Talk about it- your feelings and reasons for harming yourself  · Remove any means that you might use to hurt  yourself (examples: pills, rope, extension cords, firearm)  · Get professional help from the community (Mental Health, Substance Abuse, psychological counseling)  · Do not be alone:Call your Safe Contact- someone whom you trust who will be there for you.  · Call your local CRISIS HOTLINE 814-2061 or 131-477-4167  · Call your local Children's Mobile Crisis Response Team [place] (175) 846-1440 or wwwAsker  · Call the toll free National Suicide Prevention Hotlines   · National Suicide Prevention Lifeline 098-063-DCKV (6076)  · National Hope Line Network 800-SUICIDE (265-6372)      Helping Someone Who Is Suicidal  Suicide is the act of ending (taking) one's own life. Someone who is thinking about suicide needs immediate help. Even if you do not know what to say or do to help, you can start by letting the person know that you care. Listen to him or her. Then talk about how to get help. Help is available through suicide hotlines, therapy, and other treatments.  What are signs that someone is suicidal?  Common signs include:  · Signs of depression, such as:  ? Tearfulness or sadness.  ? Irritability or rage.  ? Problems with eating or sleeping.  ? Feeling guilty or worthless.  ? Loss of interest in things that a person used to enjoy.  ? Feelings of hopelessness or helplessness.  ? Recurrent thoughts of death or suicide.  · Changes in social behaviors and relationships, including:  ? Isolating oneself.  ? Withdrawing from friends and family.  ? Giving away possessions.  ? Saying goodbye.  ? Acting aggressively.  ? Sleeping more or less than usual.  ? Having trouble managing school or work.  ? Talking about feeling hopeless or being a burden.  ? Engaging in risky behaviors, such as drinking more alcohol or using more drugs.  What are the risk factors for suicide?  Risk factors for suicide include:  · Having a friend or family member who has  by suicide.  · A history of attempted suicide.  · Depression  "or other mental health problems.  · Being exposed to graphic stories of suicide in the media.  · Alcohol or drug abuse, especially when combined with a mental illness.  · A serious physical problem, such as chronic pain.  · A stressful life event, now or in the past, such as:  ? Divorce or social rejection.  ? Childhood abuse or neglect.  ? Sudden life changes, such as financial crisis or going to [facility].  What should I do if someone is suicidal?  If you think someone may be suicidal:  · Ask him or her directly: \"Are you thinking about suicide or hurting yourself?\" Asking that question does not make someone more likely to make a suicide attempt.  · Avoid giving advice or arguing with the person about the value of his or her life.  If a person confides in you that he or she is considering suicide:  · Take the person seriously. Never ignore comments about suicide.  · Listen to the person's thoughts and concerns with compassion.  · Let the person know that you will stay with him or her.  · Offer to help the person get to a doctor or mental health professional.  · Remove all weapons and medicines from the person's living area.  · Do not promise to keep his or her thoughts of suicide a secret.  · Call a suicide crisis helpline, such as the National Suicide Prevention Lifeline at 1-936.188.1738 or text TALK to 853074.  Get help right away if:  You believe that a person is in immediate danger of hurting himself or herself, or may have thoughts of taking his or her own life. You can:  · Call a crisis center or a local suicide prevention center. These are often located at hospitals, clinics, community service organizations, social service providers, or health departments.  · Call a suicide crisis helpline, such as the National Suicide Prevention Lifeline at 1-899.719.9734, or text TALK to 818841. This is open 24 hours a day.  · Take the person to the nearest emergency department.  · Call your local emergency services (291 in " "the [place]  · The [facility] health and human services helpline (211 in the [place]  Summary  · Suicide is the act of ending (taking) one's own life.  · Suicide can be prevented by knowing the signs and taking action.  · If you know someone who is showing risk factors for suicide, ask if he or she is thinking about hurting himself or herself. Take all concerns about suicide seriously, and get support from experts in mental illness or suicide.  · If you believe that a person is in immediate danger of hurting himself or herself, or may have thoughts of taking his or her own life, get help right away.  This information is not intended to replace advice given to you by your health care provider. Make sure you discuss any questions you have with your health care provider.  Document Released: 06/23/2004 Document Revised: 02/20/2020 Document Reviewed: 10/19/2018  [employer] Patient Education © 2020 [employer]        Warning Signs of Opioid Misuse    Opioids are powerful substances that relieve pain. Opioids include illegal drugs, such as heroin, as well as prescription pain medicines, such as codeine, morphine, hydrocodone, oxycodone, and fentanyl.  Opioid misuse happens when opioids are used in a way that is different from how they were prescribed. Opioid misuse can lead to addiction (opioid use disorder) or taking too much at one time (opioid overdose).  It is important to recognize signs of opioid misuse to help your loved one get the right treatment. In the case of an overdose, emergency treatment can save your loved one's life if given soon enough.  How can opioid misuse affect my loved one?  Signs of opioid misuse  · Taking another person's prescription opioid.  · Getting and taking an opioid without a legal prescription.  · Taking more of an opioid than prescribed.  · Taking an opioid in a different way than it was prescribed, such as snorting, crushing, or injecting it.  · Taking an opioid to feel \"high,\" " relaxed, or energized.  · Taking other medicines or drugs with an opioid.  Signs of opioid use disorder  · Having an uncontrollable need for the opioid.  · Taking the opioid despite the harmful effects.  · Needing more of the opioid to get the same effect (tolerance).  · Finding illegal ways to get a prescription opioid. These may include:  ? Finding new health care providers to write a prescription (doctor shopping).  ? Taking another person's medicine.  ? Stealing.  ? Buying the medicine illegally.  · Feeling like the opioid use is out of control.  · Feeling sick when not taking the opioid (withdrawal).  · Having mental, social, emotional, and behavioral warning signs of opioid abuse. These include:  ? Depression, anxiety, restlessness, or irritability.  ? Trouble sleeping and daytime fatigue.  ? Itchy, flushed skin.  ? Changes in:  § Appetite.  § Appearance.  § Mood and behavior.  § Relationships and social groups.  ? Trouble with:  § School or work.  § Finances.  § Law enforcement.  ? Use of other medicines or drugs.  Where to find more information  Centers for Disease Control and Prevention: www.cdc.gov  [place] Department of Health and Human Services: hhs.gov/opioids  National [place] on Drug Abuse: www.drugabuse.gov  Contact a health care provider if:  · Your loved one is misusing an opioid medicine.  · Your loved one has signs of opioid use disorder.  · Your loved one needs help or treatment for opioid withdrawal or opioid use disorder.  · Your loved one is taking a prescription opioid and is becoming too dependent on them.  Get help right away for signs of an opioid overdose:  · Very slow and irregular breathing.  · Being unaware of surroundings, unable to wake up, and unable to talk or move.  · Slowed heartbeats.  · Extreme drowsiness.  · Seizures.  · Very small pupils.  · Needle marks on the skin, recent or old.  · Pale, cool skin.  · Blue lips or fingernails.  · Frothy or bubbly mucus coming out of the  mouth.  Opioid overdose is an emergency. Do not wait to see if the symptoms will go away. Get medical help right away. Call your local emergency services (911 in the [place]   Summary  · Opioid misuse can lead to opioid use disorder and opioid overdose.  · Your loved one may be at risk if he or she takes a prescription opioid in a way that is not prescribed by a health care provider.  · It is important to recognize signs of opioid misuse, opioid use disorder, and opioid overdose to get the right treatment.  · Opioid overdose is an emergency. Do not wait to see if the symptoms will go away. Get medical help right away.  This information is not intended to replace advice given to you by your health care provider. Make sure you discuss any questions you have with your health care provider.  Document Released: 04/03/2019 Document Revised: 04/07/2020 Document Reviewed: 04/03/2019  [employer] Patient Education © 2020 [employer]        Constipation, Adult  Constipation is when a person:  · Poops (has a bowel movement) fewer times in a week than normal.  · Has a hard time pooping.  · Has poop that is dry, hard, or bigger than normal.  Follow these instructions at home:  Eating and drinking    · Eat foods that have a lot of fiber, such as:  ? Fresh fruits and vegetables.  ? Whole grains.  ? Beans.  · Eat less of foods that are high in fat, low in fiber, or overly processed, such as:  ? French fries.  ? Hamburgers.  ? Cookies.  ? Candy.  ? Soda.  · Drink enough fluid to keep your pee (urine) clear or pale yellow.  General instructions  · Exercise regularly or as told by your doctor.  · Go to the restroom when you feel like you need to poop. Do not hold it in.  · Take over-the-counter and prescription medicines only as told by your doctor. These include any fiber supplements.  · Do pelvic floor retraining exercises, such as:  ? Doing deep breathing while relaxing your lower belly (abdomen).  ? Relaxing your pelvic floor while  pooping.  · Watch your condition for any changes.  · Keep all follow-up visits as told by your doctor. This is important.  Contact a doctor if:  · You have pain that gets worse.  · You have a fever.  · You have not pooped for 4 days.  · You throw up (vomit).  · You are not hungry.  · You lose weight.  · You are bleeding from the anus.  · You have thin, pencil-like poop (stool).  Get help right away if:  · You have a fever, and your symptoms suddenly get worse.  · You leak poop or have blood in your poop.  · Your belly feels hard or bigger than normal (is bloated).  · You have very bad belly pain.  · You feel dizzy or you faint.  This information is not intended to replace advice given to you by your health care provider. Make sure you discuss any questions you have with your health care provider.  Document Released: 06/05/2009 Document Revised: 11/30/2018 Document Reviewed: 06/07/2017  [employer] Patient Education © 2020 [employer]        Vertigo  Vertigo is the feeling that you or the things around you are moving when they are not. This feeling can come and go at any time. Vertigo often goes away on its own. This condition can be dangerous if it happens when you are doing activities like driving or working with machines.  Your doctor will do tests to find the cause of your vertigo. These tests will also help your doctor decide on the best treatment for you.  Follow these instructions at home:  Eating and drinking         · Drink enough fluid to keep your pee (urine) pale yellow.  · Do not drink alcohol.  Activity  · Return to your normal activities as told by your doctor. Ask your doctor what activities are safe for you.  · In the morning, first sit up on the side of the bed. When you feel okay, stand slowly while you hold onto something until you know that your balance is fine.  · Move slowly. Avoid sudden body or head movements or certain positions, as told by your doctor.  · Use a cane if you have trouble  standing or walking.  · Sit down right away if you feel dizzy.  · Avoid doing any tasks or activities that can cause danger to you or others if you get dizzy.  · Avoid bending down if you feel dizzy. Place items in your home so that they are easy for you to reach without leaning over.  · Do not drive or use heavy machinery if you feel dizzy.  General instructions  · Take over-the-counter and prescription medicines only as told by your doctor.  · Keep all follow-up visits as told by your doctor. This is important.  Contact a doctor if:  · Your medicine does not help your vertigo.  · You have a fever.  · Your problems get worse or you have new symptoms.  · Your family or friends see changes in your behavior.  · The feeling of being sick to your stomach gets worse.  · Your vomiting gets worse.  · You lose feeling (have numbness) in part of your body.  · You feel prickling and tingling in a part of your body.  Get help right away if:  · You have trouble moving or talking.  · You are always dizzy.  · You pass out (faint).  · You get very bad headaches.  · You feel weak in your hands, arms, or legs.  · You have changes in your hearing.  · You have changes in how you see (vision).  · You get a stiff neck.  · Bright light starts to bother you.  Summary  · Vertigo is the feeling that you or the things around you are moving when they are not.  · Your doctor will do tests to find the cause of your vertigo.  · You may be told to avoid some tasks, positions, or movements.  · Contact a doctor if your medicine is not helping, or if you have a fever, new symptoms, or a change in behavior.  · Get help right away if you get very bad headaches, or if you have changes in how you speak, hear, or see.  This information is not intended to replace advice given to you by your health care provider. Make sure you discuss any questions you have with your health care provider.  Document Released: 09/26/2009 Document Revised: 11/11/2019 Document  Reviewed: 11/11/2019  [employer] Patient Education © 2020 [employer] Inc.    Stroke Awareness     Common Risk Factors for Stroke include: Age, Atrial Fibrillation, Carotid Artery Stenosis, Diabetes Mellitus, Excessive Alcohol Consumption, High Blood Pressure, Overweight, Physical inactivity, and Smoking.     Warning signs and symptoms of a stroke include:  Sudden numbness or weakness of the face, arm or leg (especially on one side of the body)  Sudden confusion, trouble speaking or understanding  Sudden trouble seeing in one or both eyes  Sudden trouble walking, dizziness, loss of balance or coordination.   Sudden severe headache with no known cause    IT IS VERY IMPORTANT TO GET TREATMENT QUICKLY WHEN A STROKE OCCURS. IF YOU EXPERIENCE ANY OF THE ABOVE WARNING SIGNS, CALL 911 IMMEDIATELY.    Stroke Prevention  Some medical conditions and lifestyle choices can lead to a higher risk for a stroke. You can help to prevent a stroke by making nutrition, lifestyle, and other changes.  What nutrition changes can be made?    · Eat healthy foods.  ? Choose foods that are high in fiber. These include:  § Fresh fruits.  § Fresh vegetables.  § Whole grains.  ? Eat at least 5 or more servings of fruits and vegetables each day. Try to fill half of your plate at each meal with fruits and vegetables.  ? Choose lean protein foods. These include:  § Lowfat (lean) cuts of meat.  § Chicken without skin.  § Fish.  § Tofu.  § Beans.  § Nuts.  ? Eat low-fat dairy products.  ? Avoid foods that:  § Are high in salt (sodium).  § Have saturated fat.  § Have trans fat.  § Have cholesterol.  § Are processed.  § Are premade.  · Follow eating guidelines as told by your doctor. These may include:  ? Reducing how many calories you eat and drink each day.  ? Limiting how much salt you eat or drink each day to 1,500 milligrams (mg).  ? Using only healthy fats for cooking. These include:  § Olive oil.  § Canola oil.  § Sunflower oil.  ? Counting how many  carbohydrates you eat and drink each day.  What lifestyle changes can be made?  · Try to stay at a healthy weight. Talk to your doctor about what a good weight is for you.  · Get at least 30 minutes of moderate physical activity at least 5 days a week. This can include:  ? Fast walking.  ? Biking.  ? Swimming.  · Do not use any products that have nicotine or tobacco. This includes cigarettes and e-cigarettes. If you need help quitting, ask your doctor. Avoid being around tobacco smoke in general.  · Limit how much alcohol you drink to no more than 1 drink a day for nonpregnant women and 2 drinks a day for men. One drink equals 12 oz of beer, 5 oz of wine, or 1½ oz of hard liquor.  · Do not use drugs.  · Avoid taking birth control pills. Talk to your doctor about the risks of taking birth control pills if:  ? You are over 35 years old.  ? You smoke.  ? You get migraines.  ? You have had a blood clot.  What other changes can be made?  · Manage your cholesterol.  ? It is important to eat a healthy diet.  ? If your cholesterol cannot be managed through your diet, you may also need to take medicines. Take medicines as told by your doctor.  · Manage your diabetes.  ? It is important to eat a healthy diet and to exercise regularly.  ? If your blood sugar cannot be managed through diet and exercise, you may need to take medicines. Take medicines as told by your doctor.  · Control your high blood pressure (hypertension).  ? Try to keep your blood pressure below 130/80. This can help lower your risk of stroke.  ? It is important to eat a healthy diet and to exercise regularly.  ? If your blood pressure cannot be managed through diet and exercise, you may need to take medicines. Take medicines as told by your doctor.  ? Ask your doctor if you should check your blood pressure at home.  ? Have your blood pressure checked every year. Do this even if your blood pressure is normal.  · Talk to your doctor about getting checked for a  "sleep disorder. Signs of this can include:  ? Snoring a lot.  ? Feeling very tired.  · Take over-the-counter and prescription medicines only as told by your doctor. These may include aspirin or blood thinners (antiplatelets or anticoagulants).  · Make sure that any other medical conditions you have are managed.  Where to find more information  · American Stroke Association: www.strokeassociation.org  · National Stroke Association: www.stroke.org  Get help right away if:  · You have any symptoms of stroke. \"BE FAST\" is an easy way to remember the main warning signs:  ? B - Balance. Signs are dizziness, sudden trouble walking, or loss of balance.  ? E - Eyes. Signs are trouble seeing or a sudden change in how you see.  ? F - Face. Signs are sudden weakness or loss of feeling of the face, or the face or eyelid drooping on one side.  ? A - Arms. Signs are weakness or loss of feeling in an arm. This happens suddenly and usually on one side of the body.  ? S - Speech. Signs are sudden trouble speaking, slurred speech, or trouble understanding what people say.  ? T - Time. Time to call emergency services. Write down what time symptoms started.  · You have other signs of stroke, such as:  ? A sudden, very bad headache with no known cause.  ? Feeling sick to your stomach (nausea).  ? Throwing up (vomiting).  ? Jerky movements you cannot control (seizure).  These symptoms may represent a serious problem that is an emergency. Do not wait to see if the symptoms will go away. Get medical help right away. Call your local emergency services (911 in the [place] Do not drive yourself to the hospital.  Summary  · You can prevent a stroke by eating healthy, exercising, not smoking, drinking less alcohol, and treating other health problems, such as diabetes, high blood pressure, or high cholesterol.  · Do not use any products that contain nicotine or tobacco, such as cigarettes and e-cigarettes.  · Get help right away if you have any " signs or symptoms of a stroke.  This information is not intended to replace advice given to you by your health care provider. Make sure you discuss any questions you have with your health care provider.  Document Released: 06/18/2013 Document Revised: 02/13/2020 Document Reviewed: 03/21/2018  Elsevier Patient Education © 2020 Elsevier Inc.

## 2020-10-27 NOTE — PROGRESS NOTES
Received patient on bed. Patient alert and oriented. Patient verbalized no pain and no SOB. Patient was able to swallow pills whole with thin liquid wash. Patient verbalized she will not get up without assist. Safety and fall precaution reinforced.patient verbalized looking forward for discharged tomorrow.

## 2020-10-27 NOTE — PROGRESS NOTES
Patient discharged to home per order.  Discharge instructions reviewed with patient and spouse; they verbalize understanding and signed copies placed in chart.  Patient has all belongings; signed copy of form in chart. Patient left facility at 1145 via wheelchair to private vehicle accompanied by rehab staff and spouse. Have enjoyed working with this pleasant patient.

## 2020-10-27 NOTE — DISCHARGE PLANNING
Case management Summary:   Met with patient and her  prior to discharge.   Reviewed all follow up appointments.   Referral made to [facility]. They have accepted referral, are waiting for insurance authorization, and they will continue to f/u on this.  indicated he would f/u on authorization if there is any delay in getting this authorized.    [facility] has delivered a FWW to patient.   Phone call to patient attempted after her discharge to ensure shower chair was purchased by . Phone number [phone] is the only number if have. Mailbox is full so unable to leave a message.     During hospitalization, I have provided support and education and have been available for questions and information during hours of operation, communicated with therapy team and MD along with providing links/resources  to outside services.    Patient verbalizes agreement with all plans and has an understanding of the next steps within the post acute services.     Individualized Goals:   1. Return Home  2. Be able to get back to work  3. Be able to see 3 mo old grand daughter soon    Outcome:   1. Met  2. Not met -To continue to work toward this goal  3. Free to do so after discharge

## 2020-10-27 NOTE — DISCHARGE PLANNING
Case Management Discharge Instructions      Discharge Location: Home with Home Health     Agency Name /  Phone: [facility]    [phone]   They will call to schedule time.     Home Health:   Registered Nurse, Occupational Therapist, Physical Therapist, Speech Therapist  Home Health to draw INR      DME Provider / Phone: [facility] [phone]     Medical Equipment Ordered: Front Wheel Walker      Follow-up Information:   [facility]  [address]  [place] [address]  [phone]   On 10/28/2020  Wednesday, 2pm     Gallito Albert M.D.  [address]  [place] [address]  [phone]   On 11/2/2020  Check in at 10:30 AM for 11AM appointment - at [place] [facility]      [facility]; Dr. Yovana Becker  [address]  [place] [address]  [phone]  On 11/11/2020  @ 1:40pm with the APRN for Neurology and will see Dr. Becker (Physical Medicine physician) after the Neurology appointment.    Nam Freed O.D.  [address]  [place] [address]  [phone]   On 12/2/2020 @ 2:30pm     Srikanth Rosas M.D.  [address]  [place] [address]  [phone]   Doctors office to contact you for appointment.

## 2020-10-27 NOTE — DISCHARGE PLANNING
Per Amy at [facility], they are anticipating that they will see patient on Thursday, 10/29 pending receipt of authorization.  They will continue to follow up with insurance.  CM notified.

## 2020-10-28 ENCOUNTER — TELEPHONE (OUTPATIENT)
Dept: VASCULAR LAB | Facility: MEDICAL CENTER | Age: 45
End: 2020-10-28

## 2020-10-28 NOTE — TELEPHONE ENCOUNTER
Received anticoagulation referral for warfarin due to ischemic stroke from Dr. Mendoza on 10/26/2020    Per chart review, pt was d/c'd to [facility] services. S/w rep at [facility] in [place], who informed me first visit is tomorrow but they are awaiting orders from pt's PCP.     Provided her with PCP information that was listed in the D/C Summary:  Agustín [facility]  [address] [address]  [phone]       Will send INR order for tomorrow 10/29.    Insurance: [employer]  PCP: non [facility]  Locations to be seen: manage via phone    [facility] at [phone], fax [phone]    Khadijah Booth, PetraD

## 2020-10-29 ENCOUNTER — TELEPHONE (OUTPATIENT)
Dept: VASCULAR LAB | Facility: MEDICAL CENTER | Age: 45
End: 2020-10-29

## 2020-10-29 ENCOUNTER — TELEPHONE (OUTPATIENT)
Dept: MEDICAL GROUP | Facility: MEDICAL CENTER | Age: 45
End: 2020-10-29

## 2020-10-29 DIAGNOSIS — Z79.01 CHRONIC ANTICOAGULATION: ICD-10-CM

## 2020-10-29 NOTE — TELEPHONE ENCOUNTER
Paged the on call for [facility] [place] office.    INR not yet received for this patient  Shawnee Luis, Clinical Pharmacist, CDE, CACP

## 2020-10-29 NOTE — TELEPHONE ENCOUNTER
Verified with [facility] in [place] that this patient will be seen today 10/29  INR ordered  Order faxed to [phone]    Shawnee Luis, Clinical Pharmacist, CDE, CACP

## 2020-10-30 ENCOUNTER — ANTICOAGULATION MONITORING (OUTPATIENT)
Dept: VASCULAR LAB | Facility: MEDICAL CENTER | Age: 45
End: 2020-10-30

## 2020-10-30 DIAGNOSIS — I63.22 ACUTE ISCHEMIC VERTEBROBASILAR ARTERY BRAINSTEM STROKE INVOLVING LEFT-SIDED VESSEL (HCC): ICD-10-CM

## 2020-10-30 DIAGNOSIS — I63.212 ACUTE ISCHEMIC VERTEBROBASILAR ARTERY BRAINSTEM STROKE INVOLVING LEFT-SIDED VESSEL (HCC): ICD-10-CM

## 2020-10-30 LAB — INR PPP: 2.4 (ref 2–3.5)

## 2020-10-30 NOTE — PROGRESS NOTES
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 Referring [facility] [phone]    [facility] Responsible  [phone]        Anticoagulation Patient Findings    Pt is new to warfarin and new to [facility].  Discussed indication for warfarin therapy and INR goal range. Explained our services, hours of operation, warfarin therapy, potential SE, potential DI. Discussed diet at length, with an emphasis on foods rich in vitamin K.  Discussed monitoring parameters, such as blood in urine, blood in stool, discussed what to do if a dose is missed, or suspected as missed.  Emphasized importance of compliance including follow up. Discussed lifestyle choices of ETOH & smoking and its impact on therapy.       Discussed risks of pregnancy while on anticoagulation.  Pt will abstain for contraception.    Pt is NOT on ASA.  Can pt be transitioned to DOAC? No  Pt signed new patient contract.  Copy will be scanned into media.      Pt denies any unusual s/s of bleeding, bruising, clotting or any changes to diet or medications.    CHADSVASC = n/a    HASBLED= 0    HTN  Abnormal  Liver (cirrhosis, bilirubin 2x ULN, AST/ALT)    Kidney (SCr >2.6)  Stroke  Bleeding predisposition  Labile INR  Elderly (>65)  Drugs (Nsaid, antiplatelet, alcohol)    Added [facility] to care  team    S/w patient's  today for initial contact.  He is family medicine physician.  Patient discharged home this week after lengthy hospital and rehab admission following acute stroke.  Per discharge notes:  Work-up with MRI brain found brainstem/pontine/midbrain ischemia, and follow-up CTA showed diffuse thrombosis in the left and right PCAs, left vertebral, and basilar arteries. Patient then received basilar artery thrombectomy with Dr. Albert  In addition to the above history, patient had 10 minutes of seizure at the outside hospital - [facility], requiring Valium, Ativan, prior to intubation. EEG on acute was negative for seizures, though patient is on anti-seizure medication. COVID negative.  Post thrombectomy TICI 3 basilar, and per neurology, for high risk of re-thrombosing due to other nearby occluded arteries - left vertebral and bilateral PCAs. Repeat imagining also with small amount of subarachnoid hemorrhage versus contrast staining. She was extubated on 10/1. Repeat MRI with known stroke in midbrain as well as small new punctate infarctions in the left internal capsule, right cerebellum, and left khang.    Discussed role of anticoagulation clinic with , who states he is comfortable with education given thus far by rehab pharmacist and his own personal experience.  He declines further education.  He denies prior hx of stroke (personal or family), VTE, or any other preexisting condition that may have precipitated event, other than a strenuous workout in the days prior.    INR therapeutic at 2.4.  Verified current warfarin regimen, INR's have been stable at this dosing for the last week of rehab admission.    Follow up in 4 days (this is next day they are able to coordinate with  nurse due to leaving home for other appointments), to reduce risk of adverse events related to this high risk medication,  Warfarin.    Calixto Jacobson, PharmD, BCACP    CC  Dr Michael Bloch

## 2020-10-30 NOTE — TELEPHONE ENCOUNTER
Attempted to call pt to find out if [facility] visited today to check INR.     Upon 2 attempts to reach pt went to VM both times. But her VM box is full and unable to reach her or leave a message.     Sent [employer] message to pt, asking to please reach out to our on call pharmacist at [phone] regarding warfarin and INR from [facility].     Airam Parker, PharmD

## 2020-11-02 ENCOUNTER — HOSPITAL ENCOUNTER (OUTPATIENT)
Dept: RADIOLOGY | Facility: MEDICAL CENTER | Age: 45
End: 2020-11-02
Attending: NURSE PRACTITIONER
Payer: OTHER GOVERNMENT

## 2020-11-02 ENCOUNTER — TELEPHONE (OUTPATIENT)
Dept: VASCULAR LAB | Facility: MEDICAL CENTER | Age: 45
End: 2020-11-02

## 2020-11-02 DIAGNOSIS — I63.212 ACUTE ISCHEMIC VERTEBROBASILAR ARTERY BRAINSTEM STROKE INVOLVING LEFT-SIDED VESSEL (HCC): ICD-10-CM

## 2020-11-02 DIAGNOSIS — I63.22 ACUTE ISCHEMIC VERTEBROBASILAR ARTERY BRAINSTEM STROKE INVOLVING LEFT-SIDED VESSEL (HCC): ICD-10-CM

## 2020-11-02 ASSESSMENT — ENCOUNTER SYMPTOMS
SPEECH CHANGE: 0
SENSORY CHANGE: 0
WEAKNESS: 1
BLURRED VISION: 1
FOCAL WEAKNESS: 1
SEIZURES: 1
DOUBLE VISION: 1

## 2020-11-02 ASSESSMENT — LIFESTYLE VARIABLES: SUBSTANCE_ABUSE: 0

## 2020-11-03 ENCOUNTER — ANTICOAGULATION MONITORING (OUTPATIENT)
Dept: MEDICAL GROUP | Facility: PHYSICIAN GROUP | Age: 45
End: 2020-11-03

## 2020-11-03 DIAGNOSIS — I63.22 ACUTE ISCHEMIC VERTEBROBASILAR ARTERY BRAINSTEM STROKE INVOLVING LEFT-SIDED VESSEL (HCC): ICD-10-CM

## 2020-11-03 DIAGNOSIS — I63.212 ACUTE ISCHEMIC VERTEBROBASILAR ARTERY BRAINSTEM STROKE INVOLVING LEFT-SIDED VESSEL (HCC): ICD-10-CM

## 2020-11-03 LAB — INR PPP: 2.9 (ref 2–3.5)

## 2020-11-03 NOTE — TELEPHONE ENCOUNTER
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  Given risk of rethrombosis is recommended that she continue anticoagulation as an outpatient    We will continue with 6 months of anticoagulation after which time we will check back with neurology and neuro interventional radiology to determine whether or not anticoagulation should be continued.    We will defer all further care, aside from day to day management of anticoagulation to neurology    Michael Bloch, MD  Anticoagulation Clinic    Cc:  JESSICA Butterfield

## 2020-11-03 NOTE — PROGRESS NOTES
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 [facility] [phone]    [facility] Responsible  [phone]        Anticoagulation Patient Findings  Patient Findings     Negatives:  Signs/symptoms of thrombosis, Signs/symptoms of bleeding, Laboratory test error suspected, Change in health, Change in alcohol use, Change in activity, Upcoming invasive procedure, Emergency department visit, Upcoming dental procedure, Missed doses, Extra doses, Change in medications, Change in diet/appetite, Hospital admission, Bruising, Other complaints        Spoke with patient today regarding therapeutic INR of 2.9.  Patient denies any signs/symptoms of bruising or bleeding or any changes in diet and medications.  Instructed patient to call clinic with any questions or concerns.  Pt is to continue with current warfarin dosing regimen.  Follow up in 2 days (next  visit per patient), to reduce risk of adverse events related to this high risk medication,  Warfarin.    Calixto Jacobson, PetraD, BCACP

## 2020-11-05 ENCOUNTER — ANTICOAGULATION MONITORING (OUTPATIENT)
Dept: MEDICAL GROUP | Facility: MEDICAL CENTER | Age: 45
End: 2020-11-05

## 2020-11-05 DIAGNOSIS — I63.212 ACUTE ISCHEMIC VERTEBROBASILAR ARTERY BRAINSTEM STROKE INVOLVING LEFT-SIDED VESSEL (HCC): ICD-10-CM

## 2020-11-05 DIAGNOSIS — I63.22 ACUTE ISCHEMIC VERTEBROBASILAR ARTERY BRAINSTEM STROKE INVOLVING LEFT-SIDED VESSEL (HCC): ICD-10-CM

## 2020-11-05 LAB — INR PPP: 3.5 (ref 2–3.5)

## 2020-11-05 NOTE — PROGRESS NOTES
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 Referring [facility] [phone]    [facility] Responsible  [phone]        Anticoagulation Patient Findings      INR SUPRA-therapeutic at 3.5.  Spoke w/ pt on phone.  Verified regimen w/ pt.  Instructed pt to take a decreased dose of 3 mg today and to eat more greens.  NO s/s bleeding reported per pt.  Pt endorses eating less greens lately - she plans to get back on track to her baseline consumption.   NO changes in medications reported per pt.  Check INR in 3 days.  Instructed pt to call clinic at [phone] if there are any questions.  Pt stated understanding.    Derrick Chapin, PharmD    Faxed orders to [facility]

## 2020-11-11 ENCOUNTER — OFFICE VISIT (OUTPATIENT)
Dept: NEUROLOGY | Facility: MEDICAL CENTER | Age: 45
End: 2020-11-11
Payer: OTHER GOVERNMENT

## 2020-11-11 VITALS
TEMPERATURE: 97.5 F | BODY MASS INDEX: 29.1 KG/M2 | WEIGHT: 185.41 LBS | HEART RATE: 93 BPM | HEIGHT: 67 IN | RESPIRATION RATE: 14 BRPM | DIASTOLIC BLOOD PRESSURE: 78 MMHG | SYSTOLIC BLOOD PRESSURE: 110 MMHG

## 2020-11-11 DIAGNOSIS — I77.74 VERTEBRAL ARTERY DISSECTION (HCC): ICD-10-CM

## 2020-11-11 DIAGNOSIS — I69.30 LATE EFFECT OF STROKE: ICD-10-CM

## 2020-11-11 PROCEDURE — 99215 OFFICE O/P EST HI 40 MIN: CPT | Performed by: NURSE PRACTITIONER

## 2020-11-11 ASSESSMENT — ENCOUNTER SYMPTOMS
HEARTBURN: 0
NAUSEA: 0
COUGH: 0
MYALGIAS: 0
DIZZINESS: 1
HEADACHES: 1
DOUBLE VISION: 1
SENSORY CHANGE: 1
BRUISES/BLEEDS EASILY: 0
CHILLS: 0
NERVOUS/ANXIOUS: 0
DEPRESSION: 0
PALPITATIONS: 0
FEVER: 0

## 2020-11-11 ASSESSMENT — FIBROSIS 4 INDEX: FIB4 SCORE: 0.28

## 2020-11-11 NOTE — PROGRESS NOTES
Subjective:      HPI  Obdulia High is a 45 y.o. left handed female who presents to [facility] for evaluation of infarcts of midbrain (L>R), punctate infarct of left thalamus, small right cerebellar infarct. She was found to have a vertebral artery dissection     She presented [facility] on 9/29/2020 as a transfer from [place] ED where she presented with a 2 day history of retro-orbital headache and left sided facial numbness.  In the ED in [place] she had a seizure and required intubation for airway protection  She had started a strenuous work-out program (HIT)  several days prior to the onset of her symptoms     PMH :  NO past medical history.   Social History:  Denies smoking, alcohol and drug use.  She is  has a 15 year old, she is not working outside of the home.     She has a sister that has autoimmune disorder, no connective tissue disorders in the family.     She was discharged on warfarin for vertebral artery dissection.     She is here today with .  She is at home, she is getting therapy via home health.   No falls.  No signs of excessive bleeding with the warfarin.  She continues to have right sided numbness and weakness and balance issues.  She ambulates with walker.      Review of Systems   Constitutional: Negative for chills and fever.   HENT: Negative for hearing loss.    Eyes: Positive for double vision.   Respiratory: Negative for cough.    Cardiovascular: Negative for chest pain and palpitations.   Gastrointestinal: Negative for heartburn and nausea.   Genitourinary: Negative for dysuria.   Musculoskeletal: Negative for myalgias.   Skin: Negative for rash.   Neurological: Positive for dizziness, sensory change and headaches.        Lightheadedness.   Endo/Heme/Allergies: Does not bruise/bleed easily.   Psychiatric/Behavioral: Negative for depression. The patient is not nervous/anxious.           Objective:   I personally reviewed imaging below and  "agree with the findings:    MRI brain     1 Acute mid brain and punctate left pontine infarct without mass effect    CTA neck  1 There is abnormality involving the distal left vertebral artery at the level of C2 which may represent combination of focal dissection/thrombus with possible less likely partially thrombosed vascular malformation.  2.  There is an occlusion of the mid and distal basilar artery extending into the left greater than right posterior cerebral arteries, P1 segments.  CTA head  1.  There is abnormality involving the distal left vertebral artery at the level of C2 which may represent combination of focal dissection/thrombus with possible less likely partially thrombosed vascular malformation.  2.  There is an occlusion of the mid and distal basilar artery extending into the left greater than right posterior cerebral arteries, P1 segments.    Angio:  1.  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  2.  Basilar artery thrombosis.  3.  Successful mechanical thrombectomy of a basilar artery thrombosis.  4.  The final angiogram demonstrates patent basilar, bilateral superior cerebellar and right posterior cerebral arteries.    TTE;  LVEF 60%, bubble negative, LA size WNL< FORTUNATO 14.      Stroke Labs:  LDL 32, creat 0.61, A1C 5.0     Encounter Vitals  Standard Vitals  Vitals  Blood Pressure: 110/78  Temperature: 36.4 °C (97.5 °F)  Pulse: 93  Respiration: 14  Height: 170.2 cm (5' 7\")  Weight: 84.1 kg (185 lb 6.5 oz)  Encounter Vitals  Temperature: 36.4 °C (97.5 °F)  Blood Pressure: 110/78  Pulse: 93  Respiration: 14  Weight: 84.1 kg (185 lb 6.5 oz)  Height: 170.2 cm (5' 7\")  BMI (Calculated): 29.04    Physical Exam.    Constitutional:  Alert, no apparent distress,  Psych:   mood and affect WNL  Neuro:  Oriented X 4, speech fluent, naming and memory intact  Muskuloskeletal:  Moves all extremities equally, strength 5/5 bilaterally, no drift  CN II: Visual fields are full to confrontation. " Fundoscopic exam is normal with sharp discs and no vascular changes. Pupils are 4 mm and briskly reactive to light.   CN III, IV, VI  EOMs intact, no ptosis  CN V: Facial sensation is intact to pinprick in all 3 divisions bilaterally. Corneal responses are intact.  CN VII: Face is symmetric with normal eye closure and smile.  CN VII: Hearing is normal to rubbing fingers  CN IX, X: Palate elevates symmetrically. Phonation is normal.  CN XI: Head turning and shoulder shrug are intact  CN XII: Tongue is midline with normal movements and no atrophy.              Strength 5/5 BUE/BLE,                  Sensation to PP equal bilaterally               some pass pointing with finger to nose to finger on RUE                 ambualtes with walker                Biceps,brachioradialis, tricep, patellar and ankle reflex all 2+     Cardiovascular:    S1S2, no abnormal rhythm auscultated, no peripheral edema  Neck:                     No carotid bruits noted   Pulmonary:            Respirations easy, lungs clear to auscultation all fields.     Skin:                     No obvious rashes.          Current NIHSS    1a. LOC: 0  1b. LOC Questions: 0  1c. LOC Commands: 0  2. Best Gaze:0  3. Visual Fields: 0  4. Facial Paresis: 0  5a. Motor arm left: 0  5b. Motor arm right: 0  6a. Motor leg left: 0  6b. Motor leg right: 0  7. Sensory: 0  8. Best Language: 0  9. Limb Ataxia: 1  10. Dysarthria: 0  11. Extinction/Inattention: 0    Total Score Current  1          Current mRS  3        Assessment/Plan:   1. Late effect of stroke  Posterior circulation infarcts secondary to vertebral artery dissection in the setting of trauma.       Plan:  Presumed Mechanism by Toast:  __  Large Artery Atherosclerosis  __  Small Vessel (lacunar)  __   Cardioembolic  __XX   Other (Sickle cell, vasculitis, hypercoagulable)  Vertebral artery dissection.   __   Unknown  __   ESUS    Stroke risk factors :  None     Blood pressure goal less than 130/80, currently  at goal     Follow up with neuro-ophthalmology for diplopia, (apt scheduled 1/27/2020)    LDL goal < 70, current  continue atorvastatin 40mg discussed medication side effects, will need follow up with primary care evaluate liver function and intervals and refill          2. Vertebral artery dissection (HCC)  Continue warfarin, follow up with coag clinic.  Will continue until we get results of the CTA     Repeat head and neck CTA around 12/30/2020  Follow up around 12/30/2020 for 90 day post thrombectomy , will do virtual visit if needed, she is in [place], this is not ideal as we would like to get an accurate assessment; but definitely can be done if necessary.       Avoid roller coasters, myke diving, no lifting weights above the head.    I have counseled patient on stroke prevention strategies, stroke symptoms and mimics.  Diet and exercise modifications.  We discussed medication side effects and instructions.     .

## 2020-11-11 NOTE — PATIENT INSTRUCTIONS
Avoid roller coasters, myke diving, no lifting weights above the head.    Repeat CT of the head and neck towards the end of December, I will see you after t livia.      Stroke Prevention  Some medical conditions and lifestyle choices can lead to a higher risk for a stroke. You can help to prevent a stroke by making nutrition, lifestyle, and other changes.  What nutrition changes can be made?    · Eat healthy foods.  ? Choose foods that are high in fiber. These include:  § Fresh fruits.  § Fresh vegetables.  § Whole grains.  ? Eat at least 5 or more servings of fruits and vegetables each day. Try to fill half of your plate at each meal with fruits and vegetables.  ? Choose lean protein foods. These include:  § Lowfat (lean) cuts of meat.  § Chicken without skin.  § Fish.  § Tofu.  § Beans.  § Nuts.  ? Eat low-fat dairy products.  ? Avoid foods that:  § Are high in salt (sodium).  § Have saturated fat.  § Have trans fat.  § Have cholesterol.  § Are processed.  § Are premade.  · Follow eating guidelines as told by your doctor. These may include:  ? Reducing how many calories you eat and drink each day.  ? Limiting how much salt you eat or drink each day to 1,500 milligrams (mg).  ? Using only healthy fats for cooking. These include:  § Olive oil.  § Canola oil.  § Sunflower oil.  ? Counting how many carbohydrates you eat and drink each day.  What lifestyle changes can be made?  · Try to stay at a healthy weight. Talk to your doctor about what a good weight is for you.  · Get at least 30 minutes of moderate physical activity at least 5 days a week. This can include:  ? Fast walking.  ? Biking.  ? Swimming.  · Do not use any products that have nicotine or tobacco. This includes cigarettes and e-cigarettes. If you need help quitting, ask your doctor. Avoid being around tobacco smoke in general.  · Limit how much alcohol you drink to no more than 1 drink a day for nonpregnant women and 2 drinks a day for men. One drink equals  "12 oz of beer, 5 oz of wine, or 1½ oz of hard liquor.  · Do not use drugs.  · Avoid taking birth control pills. Talk to your doctor about the risks of taking birth control pills if:  ? You are over 35 years old.  ? You smoke.  ? You get migraines.  ? You have had a blood clot.  What other changes can be made?  · Manage your cholesterol.  ? It is important to eat a healthy diet.  ? If your cholesterol cannot be managed through your diet, you may also need to take medicines. Take medicines as told by your doctor.  · Manage your diabetes.  ? It is important to eat a healthy diet and to exercise regularly.  ? If your blood sugar cannot be managed through diet and exercise, you may need to take medicines. Take medicines as told by your doctor.  · Control your high blood pressure (hypertension).  ? Try to keep your blood pressure below 130/80. This can help lower your risk of stroke.  ? It is important to eat a healthy diet and to exercise regularly.  ? If your blood pressure cannot be managed through diet and exercise, you may need to take medicines. Take medicines as told by your doctor.  ? Ask your doctor if you should check your blood pressure at home.  ? Have your blood pressure checked every year. Do this even if your blood pressure is normal.  · Talk to your doctor about getting checked for a sleep disorder. Signs of this can include:  ? Snoring a lot.  ? Feeling very tired.  · Take over-the-counter and prescription medicines only as told by your doctor. These may include aspirin or blood thinners (antiplatelets or anticoagulants).  · Make sure that any other medical conditions you have are managed.  Where to find more information  · American Stroke Association: www.strokeassociation.org  · National Stroke Association: www.stroke.org  Get help right away if:  · You have any symptoms of stroke. \"BE FAST\" is an easy way to remember the main warning signs:  ? B - Balance. Signs are dizziness, sudden trouble walking, " or loss of balance.  ? E - Eyes. Signs are trouble seeing or a sudden change in how you see.  ? F - Face. Signs are sudden weakness or loss of feeling of the face, or the face or eyelid drooping on one side.  ? A - Arms. Signs are weakness or loss of feeling in an arm. This happens suddenly and usually on one side of the body.  ? S - Speech. Signs are sudden trouble speaking, slurred speech, or trouble understanding what people say.  ? T - Time. Time to call emergency services. Write down what time symptoms started.  · You have other signs of stroke, such as:  ? A sudden, very bad headache with no known cause.  ? Feeling sick to your stomach (nausea).  ? Throwing up (vomiting).  ? Jerky movements you cannot control (seizure).  These symptoms may represent a serious problem that is an emergency. Do not wait to see if the symptoms will go away. Get medical help right away. Call your local emergency services (911 in the [place] Do not drive yourself to the hospital.  Summary  · You can prevent a stroke by eating healthy, exercising, not smoking, drinking less alcohol, and treating other health problems, such as diabetes, high blood pressure, or high cholesterol.  · Do not use any products that contain nicotine or tobacco, such as cigarettes and e-cigarettes.  · Get help right away if you have any signs or symptoms of a stroke.  This information is not intended to replace advice given to you by your health care provider. Make sure you discuss any questions you have with your health care provider.  Document Released: 06/18/2013 Document Revised: 02/13/2020 Document Reviewed: 03/21/2018  Elsevier Patient Education © 2020 Elsevier Inc.

## 2020-11-12 ENCOUNTER — TELEPHONE (OUTPATIENT)
Dept: MEDICAL GROUP | Facility: MEDICAL CENTER | Age: 45
End: 2020-11-12

## 2020-11-12 DIAGNOSIS — I63.212 ACUTE ISCHEMIC VERTEBROBASILAR ARTERY BRAINSTEM STROKE INVOLVING LEFT-SIDED VESSEL (HCC): ICD-10-CM

## 2020-11-12 DIAGNOSIS — I63.22 ACUTE ISCHEMIC VERTEBROBASILAR ARTERY BRAINSTEM STROKE INVOLVING LEFT-SIDED VESSEL (HCC): ICD-10-CM

## 2020-11-12 NOTE — TELEPHONE ENCOUNTER
Spoke with pt on the phone regarding no INR received [facility]. Per pt [facility] said they did not have an order and they would not check the INR.     Called [facility] regarding needing an INR this week. They will try to go today or tomorrow, but it may take until Saturday as there main nurse is out sick right now.     Airam Parker, PharmD

## 2020-11-13 ENCOUNTER — ANTICOAGULATION MONITORING (OUTPATIENT)
Dept: VASCULAR LAB | Facility: MEDICAL CENTER | Age: 45
End: 2020-11-13

## 2020-11-13 DIAGNOSIS — I63.212 ACUTE ISCHEMIC VERTEBROBASILAR ARTERY BRAINSTEM STROKE INVOLVING LEFT-SIDED VESSEL (HCC): ICD-10-CM

## 2020-11-13 DIAGNOSIS — I63.22 ACUTE ISCHEMIC VERTEBROBASILAR ARTERY BRAINSTEM STROKE INVOLVING LEFT-SIDED VESSEL (HCC): ICD-10-CM

## 2020-11-13 LAB — INR PPP: 1.8 (ref 2–3.5)

## 2020-11-13 NOTE — PROGRESS NOTES
OP   Telephone Anticoagulation Service Note      Anticoagulation Summary  As of 2020    INR goal:  2.0-3.0   TTR:  76.5 % (4 d)   INR used for dosin.80 (2020)   Warfarin maintenance plan:  6 mg (6 mg x 1) every day   Weekly warfarin total:  42 mg   Plan last modified:  Petra KennedyD (10/30/2020)   Next INR check:  2020   Target end date:  3/29/2021    Indications    Acute ischemic vertebrobasilar artery brainstem stroke involving left-sided vessel (HCC) [I63.212  I63.22]             Anticoagulation Episode Summary     INR check location:      Preferred lab:      Send INR reminders to:      Comments:  [facility]  - [phone]      Anticoagulation Care Providers     Provider Role Specialty Phone number    Eden Mendoza M.D. Referring [facility] [phone]    [facility] Anticoagulation Services Responsible  [phone]        Anticoagulation Patient Findings  Patient Findings     Positives:  Change in diet/appetite         Spoke with the patient on the phone today, reporting a SUB-therapeutic INR of 1.8. Confirmed the current warfarin dosing regimen and patient compliance. Patient reports eating more veggies (Vit K) over the last week.   Patient denies any interval changes to diet and/or medications. Patient denies any signs/symptoms of bleeding or clotting.  Patient will bolus with 9mg TONIGHT ONLY, then resume her current dosing regimen. Patient will retest again in 4 days. Orders sent to [facility].     González Salas PharmD

## 2020-11-17 LAB — INR PPP: 2.3 (ref 2–3.5)

## 2020-11-18 ENCOUNTER — ANTICOAGULATION MONITORING (OUTPATIENT)
Dept: VASCULAR LAB | Facility: MEDICAL CENTER | Age: 45
End: 2020-11-18

## 2020-11-18 DIAGNOSIS — I63.22 ACUTE ISCHEMIC VERTEBROBASILAR ARTERY BRAINSTEM STROKE INVOLVING LEFT-SIDED VESSEL (HCC): ICD-10-CM

## 2020-11-18 DIAGNOSIS — I63.212 ACUTE ISCHEMIC VERTEBROBASILAR ARTERY BRAINSTEM STROKE INVOLVING LEFT-SIDED VESSEL (HCC): ICD-10-CM

## 2020-11-18 NOTE — PROGRESS NOTES
Anticoagulation Summary  As of 2020    INR goal:  2.0-3.0   TTR:  68.2 % (1.1 wk)   INR used for dosin.30 (2020)   Warfarin maintenance plan:  6 mg (6 mg x 1) every day   Weekly warfarin total:  42 mg   Plan last modified:  Calixto Jacobson PharmD (10/30/2020)   Next INR check:  2020   Target end date:  3/29/2021    Indications    Acute ischemic vertebrobasilar artery brainstem stroke involving left-sided vessel (HCC) [I63.212  I63.22]             Anticoagulation Episode Summary     INR check location:      Preferred lab:      Send INR reminders to:      Comments:  [facility] [phone]      Anticoagulation Care Providers     Provider Role Specialty Phone number    Eden Mendoza M.D. [facility] [phone]    [facility] Anticoagulation Services Responsible  [phone]        Anticoagulation Patient Findings  Patient Findings     Negatives:  Signs/symptoms of thrombosis, Signs/symptoms of bleeding, Laboratory test error suspected, Change in health, Change in alcohol use, Change in activity, Upcoming invasive procedure, Emergency department visit, Upcoming dental procedure, Missed doses, Extra doses, Change in medications, Change in diet/appetite, Hospital admission, Bruising, Other complaints        Spoke with patient today regarding therapeutic INR of 2.3.  Patient denies any signs/symptoms of bruising or bleeding or any changes in diet and medications.  Instructed patient to call clinic with any questions or concerns.  Pt is to continue with current warfarin dosing regimen.  Follow up in 1 weeks, to reduce risk of adverse events related to this high risk medication,  Warfarin.    Calixto Jacobson, PharmD, BCACP

## 2020-11-24 ENCOUNTER — TELEPHONE (OUTPATIENT)
Dept: PHYSICAL THERAPY | Facility: MEDICAL CENTER | Age: 45
End: 2020-11-24

## 2020-11-30 ENCOUNTER — ANTICOAGULATION MONITORING (OUTPATIENT)
Dept: MEDICAL GROUP | Facility: PHYSICIAN GROUP | Age: 45
End: 2020-11-30

## 2020-11-30 DIAGNOSIS — I63.212 ACUTE ISCHEMIC VERTEBROBASILAR ARTERY BRAINSTEM STROKE INVOLVING LEFT-SIDED VESSEL (HCC): ICD-10-CM

## 2020-11-30 DIAGNOSIS — I63.22 ACUTE ISCHEMIC VERTEBROBASILAR ARTERY BRAINSTEM STROKE INVOLVING LEFT-SIDED VESSEL (HCC): ICD-10-CM

## 2020-11-30 LAB — INR PPP: 2.1 (ref 2–3.5)

## 2020-11-30 NOTE — PROGRESS NOTES
OP   Telephone Anticoagulation Service Note      Anticoagulation Summary  As of 2020    INR goal:  2.0-3.0   TTR:  87.9 % (3 wk)   INR used for dosin.10 (2020)   Warfarin maintenance plan:  9 mg (6 mg x 1.5) every Wed; 6 mg (6 mg x 1) all other days   Weekly warfarin total:  45 mg   Plan last modified:  Becca Salas (2020)   Next INR check:  2020   Target end date:  3/29/2021    Indications    Acute ischemic vertebrobasilar artery brainstem stroke involving left-sided vessel (HCC) [I63.212  I63.22]             Anticoagulation Episode Summary     INR check location:      Preferred lab:      Send INR reminders to:      Comments:  [facility] [phone]      Anticoagulation Care Providers     Provider Role Specialty Phone number    Eden Mendoza M.D. Referring [facility] Re[facility] [phone]    [facility] Anticoagulation Services Responsible  [phone]        Anticoagulation Patient Findings  Patient Findings     Negatives:  Signs/symptoms of thrombosis, Signs/symptoms of bleeding, Laboratory test error suspected, Change in health, Change in alcohol use, Change in activity, Upcoming invasive procedure, Emergency department visit, Upcoming dental procedure, Missed doses, Extra doses, Change in medications, Change in diet/appetite, Hospital admission, Bruising, Other complaints        Spoke with the patient on the phone today, reporting a therapeutic INR of 2.1.   Confirmed the current warfarin dosing regimen and patient compliance.  Patient reports having an INR of 1.6 last Tuesday that went unreported. She self dosed to 9mg last week Tuesday as she did not get a call. Recommend patient please call clinic if happens again in the future, as we did not get result.     Patient denies any interval changes to diet and/or medications. Patient denies any signs/symptoms of bleeding or clotting.  Patient instructed to begin increased regimen of 9mg on Wed and 6mg ROW. Patient will  retest again in 1 week. Orders sent to Tomy Salas PharmD

## 2020-12-07 ENCOUNTER — ANTICOAGULATION MONITORING (OUTPATIENT)
Dept: VASCULAR LAB | Facility: MEDICAL CENTER | Age: 45
End: 2020-12-07

## 2020-12-07 DIAGNOSIS — I63.212 ACUTE ISCHEMIC VERTEBROBASILAR ARTERY BRAINSTEM STROKE INVOLVING LEFT-SIDED VESSEL (HCC): ICD-10-CM

## 2020-12-07 DIAGNOSIS — I63.22 ACUTE ISCHEMIC VERTEBROBASILAR ARTERY BRAINSTEM STROKE INVOLVING LEFT-SIDED VESSEL (HCC): ICD-10-CM

## 2020-12-07 LAB — INR PPP: 1.9 (ref 2–3.5)

## 2020-12-08 NOTE — PROGRESS NOTES
Anticoagulation Summary  As of 2020    INR goal:  2.0-3.0   TTR:  78.4 % (4 wk)   INR used for dosin.90 (2020)   Warfarin maintenance plan:  9 mg (6 mg x 1.5) every Wed; 6 mg (6 mg x 1) all other days   Weekly warfarin total:  45 mg   Plan last modified:  Becca Salas (2020)   Next INR check:  2020   Target end date:  3/29/2021    Indications    Acute ischemic vertebrobasilar artery brainstem stroke involving left-sided vessel (HCC) [I63.212  I63.22]             Anticoagulation Episode Summary     INR check location:      Preferred lab:      Send INR reminders to:      Comments:  [facility]  - [phone]      Anticoagulation Care Providers     Provider Role Specialty Phone number    Eden Mendoza M.D. Referring [facility] [phone]    [facility] Anticoagulation Services Responsible  [phone]        Anticoagulation Patient Findings    Spoke to patient on the phone.   INR  sub-therapeutic.   Denies signs/symptoms of bleeding and/or thrombosis.   Denies changes to diet or medications.   Follow up appointment in 1 week(s).    9mg today then continue the same warfarin dose, as noted above.       David Servin, PharmD, MS, BCACP, C    This note was created using voice recognition software (Dragon). The accuracy of the dictation is limited by the abilities of the software. I have reviewed the note prior to signing, however some errors in grammar and context are still possible. If you have any questions related to this note please do not hesitate to contact our office.

## 2020-12-14 ENCOUNTER — ANTICOAGULATION MONITORING (OUTPATIENT)
Dept: MEDICAL GROUP | Facility: MEDICAL CENTER | Age: 45
End: 2020-12-14

## 2020-12-14 DIAGNOSIS — I63.212 ACUTE ISCHEMIC VERTEBROBASILAR ARTERY BRAINSTEM STROKE INVOLVING LEFT-SIDED VESSEL (HCC): ICD-10-CM

## 2020-12-14 DIAGNOSIS — I63.22 ACUTE ISCHEMIC VERTEBROBASILAR ARTERY BRAINSTEM STROKE INVOLVING LEFT-SIDED VESSEL (HCC): ICD-10-CM

## 2020-12-14 LAB — INR PPP: 2.8 (ref 2–3.5)

## 2020-12-14 NOTE — PROGRESS NOTES
Anticoagulation Summary  As of 2020    INR goal:  2.0-3.0   TTR:  80.5 % (1.2 mo)   INR used for dosin.80 (2020)   Warfarin maintenance plan:  9 mg (6 mg x 1.5) every Mon, Wed; 6 mg (6 mg x 1) all other days   Weekly warfarin total:  48 mg   Plan last modified:  David Servin PharmD (2020)   Next INR check:  2020   Target end date:  3/29/2021    Indications    Acute ischemic vertebrobasilar artery brainstem stroke involving left-sided vessel (HCC) [I63.212  I63.22]             Anticoagulation Episode Summary     INR check location:      Preferred lab:      Send INR reminders to:      Comments:  [facility] [phone]      Anticoagulation Care Providers     Provider Role Specialty Phone number    Eden Mendoza M.D. Referring [facility] Med [facility] Rehab [phone]    [facility] Anticoagulation Services Responsible  [phone]        Anticoagulation Patient Findings        Spoke to patient on the phone.   INR  therapeutic.   Denies signs/symptoms of bleeding and/or thrombosis.   Denies changes to diet or medications.   Follow up appointment in 1 week(s).    Continue the same warfarin dose, as noted above.       David Servin, PharmD, MS, BCACP, C    This note was created using voice recognition software (Dragon). The accuracy of the dictation is limited by the abilities of the software. I have reviewed the note prior to signing, however some errors in grammar and context are still possible. If you have any questions related to this note please do not hesitate to contact our office.

## 2020-12-23 ENCOUNTER — ANTICOAGULATION MONITORING (OUTPATIENT)
Dept: VASCULAR LAB | Facility: MEDICAL CENTER | Age: 45
End: 2020-12-23

## 2020-12-23 DIAGNOSIS — I63.212 ACUTE ISCHEMIC VERTEBROBASILAR ARTERY BRAINSTEM STROKE INVOLVING LEFT-SIDED VESSEL (HCC): ICD-10-CM

## 2020-12-23 DIAGNOSIS — I63.22 ACUTE ISCHEMIC VERTEBROBASILAR ARTERY BRAINSTEM STROKE INVOLVING LEFT-SIDED VESSEL (HCC): ICD-10-CM

## 2020-12-23 LAB — INR PPP: 3 (ref 2–3.5)

## 2020-12-24 NOTE — PROGRESS NOTES
OP   Telephone Anticoagulation Service Note      Anticoagulation Summary  As of 12/23/2020    INR goal:  2.0-3.0   TTR:  84.5 % (1.5 mo)   INR used for dosing:  3.00 (12/23/2020)   Warfarin maintenance plan:  9 mg (3 mg x 3) every Mon, Wed; 6 mg (3 mg x 2) all other days   Weekly warfarin total:  48 mg   Plan last modified:  Becca Salas (12/23/2020)   Next INR check:  12/30/2020   Target end date:  3/29/2021    Indications    Acute ischemic vertebrobasilar artery brainstem stroke involving left-sided vessel (HCC) [I63.212  I63.22]             Anticoagulation Episode Summary     INR check location:      Preferred lab:      Send INR reminders to:      Comments:  [facility] - [phone]      Anticoagulation Care Providers     Provider Role Specialty Phone number    Eden Mendoza M.D. Referring [facility] [phone]    [facility] Anticoagulation Services Responsible  [phone]        Anticoagulation Patient Findings  Patient Findings     Positives:  Change in diet/appetite         Spoke with the patient on the phone today, reporting a therapeutic INR of 3.0.   Confirmed the current warfarin dosing regimen and patient compliance.  Patient reports low appetite and not eating very many greens.   Patient denies any interval changes to diet and/or medications. Patient denies any signs/symptoms of bleeding or clotting.  Patient will reduce dose down to 6mg TONIGHT, since patient reports low appetite and already at high end of therapeutic range. Patient will continue with the current regimen tomorrow and retest again in 1 week.   Orders sent to [facility] COLLINS.     González LambertD

## 2020-12-30 ENCOUNTER — ANTICOAGULATION MONITORING (OUTPATIENT)
Dept: VASCULAR LAB | Facility: MEDICAL CENTER | Age: 45
End: 2020-12-30

## 2020-12-30 DIAGNOSIS — I63.22 ACUTE ISCHEMIC VERTEBROBASILAR ARTERY BRAINSTEM STROKE INVOLVING LEFT-SIDED VESSEL (HCC): ICD-10-CM

## 2020-12-30 DIAGNOSIS — I63.212 ACUTE ISCHEMIC VERTEBROBASILAR ARTERY BRAINSTEM STROKE INVOLVING LEFT-SIDED VESSEL (HCC): ICD-10-CM

## 2020-12-30 LAB — INR PPP: 2.7 (ref 2–3.5)

## 2020-12-31 NOTE — PROGRESS NOTES
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 Referring [facility] [phone]    [facility] Responsible  [phone]        Anticoagulation Patient Findings      HPI:     This appointment was conducted over the phone today.    The reason for today's call is to prevent morbidity and mortality from a blood clot and/or stroke and to reduce the risk of bleeding while on a anticoagulant.     PCP:  Pcp Pt States None  No address on file      Assessment:     • INR  therapeutic.   • Confirmed warfarin dosing regimen: Yes  • Interval Changes with foods rich in vitamin K: No  • Interval Changes in ETOH or cranberries:   No  • Interval Changes in smoking status:  No  • S/S of bleeding or bruising:  No  • Signs/symptoms  thrombosis since the last appt:  No  • Any upcoming procedures that require stopping warfarin and/or using bridge therapy: None  • Interval Changes in medication:  Yes. Patient reported started taking Vit.D supplement last week. Counseled on importance to report any new medication to the clinic.         Current Outpatient Medications:   •  atorvastatin, 40 mg, Oral, Q EVENING  •  levETIRAcetam, 500 mg, Oral, Q12HRS  •   warfarin, 6 mg, Oral, Q EVENING  •  acetaminophen, 650 mg, Oral, Q4HRS PRN  •  melatonin, 3 mg, Oral, QHS  •  traZODone, 50 mg, Oral, QHS      Plan:     • Pt is to continue with current warfarin dosing regimen.      Follow-up:     • Our protocol suggests we test in 1 week.       • This decision was made using shared decision making with the pt and benefits vs risks were discussed.      Additional information discussed with patient:     • Pt educated to contact our clinic with any changes in medications or s/s of bleeding or thrombosis.  • Education was provided today regarding tips to reduce their bleed risk and dietary constraints while on a anticoagulant.    National recommendations regarding anticoagulation therapy:     The CHEST guidelines recommends frequent INR monitoring at regular intervals (a few days up to a max of 12 weeks) to ensure patients are on the proper dose of warfarin, and patients are not having any complications from therapy.  INRs can dramatically change over a short time period due to diet, medications, and medical conditions.     Justo Zimmerman, Pharmacy Intern  [facility]  Phone [phone] fax [phone]    This note was created using voice recognition software (Dragon). The accuracy of the dictation is limited by the abilities of the software. I have reviewed the note prior to signing, however some errors in grammar and context are still possible. If you have any questions related to this note please do not hesitate to contact our office.

## 2021-01-04 ENCOUNTER — HOSPITAL ENCOUNTER (OUTPATIENT)
Dept: RADIOLOGY | Facility: MEDICAL CENTER | Age: 46
End: 2021-01-04
Attending: NURSE PRACTITIONER
Payer: OTHER GOVERNMENT

## 2021-01-04 DIAGNOSIS — I77.74 VERTEBRAL ARTERY DISSECTION (HCC): ICD-10-CM

## 2021-01-04 PROCEDURE — 70498 CT ANGIOGRAPHY NECK: CPT

## 2021-01-04 PROCEDURE — 70496 CT ANGIOGRAPHY HEAD: CPT

## 2021-01-04 PROCEDURE — 700117 HCHG RX CONTRAST REV CODE 255: Performed by: NURSE PRACTITIONER

## 2021-01-04 RX ADMIN — IOHEXOL 100 ML: 350 INJECTION, SOLUTION INTRAVENOUS at 09:44

## 2021-01-06 ENCOUNTER — ANTICOAGULATION MONITORING (OUTPATIENT)
Dept: VASCULAR LAB | Facility: MEDICAL CENTER | Age: 46
End: 2021-01-06

## 2021-01-06 DIAGNOSIS — I63.212 ACUTE ISCHEMIC VERTEBROBASILAR ARTERY BRAINSTEM STROKE INVOLVING LEFT-SIDED VESSEL (HCC): ICD-10-CM

## 2021-01-06 DIAGNOSIS — I63.22 ACUTE ISCHEMIC VERTEBROBASILAR ARTERY BRAINSTEM STROKE INVOLVING LEFT-SIDED VESSEL (HCC): ICD-10-CM

## 2021-01-06 LAB — INR PPP: 2.9 (ref 2–3.5)

## 2021-01-06 NOTE — PROGRESS NOTES
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 Referring [facility] [phone]    [facility] Responsible  [phone]        Anticoagulation Patient Findings      HPI:     This appointment was conducted over the phone today.    The reason for today's call is to prevent morbidity and mortality from a blood clot and/or stroke and to reduce the risk of bleeding while on a anticoagulant.     PCP:  Pcp Pt States None  No address on file      Assessment:     • INR  therapeutic.   • Confirmed warfarin dosing regimen: Yes  • Interval Changes with foods rich in vitamin K: No  • Interval Changes in ETOH or cranberries:   No  • Interval Changes in smoking status:  No  • S/S of bleeding or bruising:  No  • Signs/symptoms  thrombosis since the last appt:  No  • Any upcoming procedures that require stopping warfarin and/or using bridge therapy: None  • Interval Changes in medication:  No       Current Outpatient Medications:   •  atorvastatin, 40 mg, Oral, Q EVENING  •  levETIRAcetam, 500 mg, Oral, Q12HRS  •  warfarin, 6 mg, Oral, Q EVENING  •  acetaminophen, 650 mg, Oral, Q4HRS PRN  •  melatonin, 3 mg, Oral, QHS  •  traZODone, 50 mg, Oral,  [facility]      Plan:     • Pt is to continue with current warfarin dosing regimen.    Follow-up:     • Our protocol suggests we test in 1 week.        • This decision was made using shared decision making with the pt and benefits vs risks were discussed.      Additional information discussed with patient:     • Pt educated to contact our clinic with any changes in medications or s/s of bleeding or thrombosis.  • Education was provided today regarding tips to reduce their bleed risk and dietary constraints while on a anticoagulant.    National recommendations regarding anticoagulation therapy:     The CHEST guidelines recommends frequent INR monitoring at regular intervals (a few days up to a max of 12 weeks) to ensure patients are on the proper dose of warfarin, and patients are not having any complications from therapy.  INRs can dramatically change over a short time period due to diet, medications, and medical conditions.     Justo Zimmerman, Pharmacy Intern  [facility]  Phone [phone] fax [phone]    This note was created using voice recognition software (Dragon). The accuracy of the dictation is limited by the abilities of the software. I have reviewed the note prior to signing, however some errors in grammar and context are still possible. If you have any questions related to this note please do not hesitate to contact our office.

## 2021-01-11 ENCOUNTER — TELEPHONE (OUTPATIENT)
Dept: NEUROLOGY | Facility: MEDICAL CENTER | Age: 46
End: 2021-01-11

## 2021-01-12 NOTE — TELEPHONE ENCOUNTER
Called left patient message to return call. Shanae stated telemed appointment would be ok. Patient canot

## 2021-01-13 ENCOUNTER — TELEMEDICINE (OUTPATIENT)
Dept: NEUROLOGY | Facility: MEDICAL CENTER | Age: 46
End: 2021-01-13
Attending: NURSE PRACTITIONER
Payer: OTHER GOVERNMENT

## 2021-01-13 VITALS
DIASTOLIC BLOOD PRESSURE: 82 MMHG | HEIGHT: 67 IN | WEIGHT: 177 LBS | BODY MASS INDEX: 27.78 KG/M2 | SYSTOLIC BLOOD PRESSURE: 118 MMHG

## 2021-01-13 DIAGNOSIS — I77.74 VERTEBRAL ARTERY DISSECTION (HCC): ICD-10-CM

## 2021-01-13 DIAGNOSIS — I69.30 LATE EFFECT OF STROKE: ICD-10-CM

## 2021-01-13 PROCEDURE — 99214 OFFICE O/P EST MOD 30 MIN: CPT | Mod: GT,CR | Performed by: NURSE PRACTITIONER

## 2021-01-13 RX ORDER — WARFARIN SODIUM 3 MG/1
TABLET ORAL
COMMUNITY
Start: 2020-12-15 | End: 2021-01-13

## 2021-01-13 ASSESSMENT — PATIENT HEALTH QUESTIONNAIRE - PHQ9: CLINICAL INTERPRETATION OF PHQ2 SCORE: 0

## 2021-01-13 ASSESSMENT — FIBROSIS 4 INDEX: FIB4 SCORE: 0.28

## 2021-01-13 NOTE — PROGRESS NOTES
Subjective:    Virtual visit today, patient consented to Virtual visit using Zoom format, patient was located in [place], provider located in [place].   Her identity was verified via ID.    HPI  Obdulia High is a 45 y.o. left handed female who presents to [facility] for evaluation of infarcts of midbrain (L>R), punctate infarct of left thalamus, small right cerebellar infarct. She was found to have a vertebral artery dissection     She is here today for follow up and to review CTA.  I last saw her on 11/11/2020     She presented [facility] on 9/29/2020 as a transfer from [place] ED where she presented with a 2 day history of retro-orbital headache and left sided facial numbness.  In the ED in [place] she had a seizure and required intubation for airway protection  She had started a strenuous work-out program (HIT)  several days prior to the onset of her symptoms   She had been doing sit up with her hands behind her head.      PMH :  NO past medical history.   Social History:  Denies smoking, alcohol and drug use.  She is  has a 15 year old, she is not working outside of the home.      She has a sister that has autoimmune disorder, no connective tissue disorders in the family.      She was discharged on warfarin for vertebral artery dissection.      She is here today via Zoom.  She feels like endurance has improved.  She is getting therapy for her diplopia and she wears the patch.  She continues on warfarin, no problems with bleeding.  She is walking on her own some and some with the walker.  She has minimal numbness on the right face and arm.     .  Review of Systems   Constitutional: Negative for chills and fever.   HENT: Negative for hearing loss.    Eyes: Positive for double vision.   Respiratory: Negative for cough.    Cardiovascular: Negative for chest pain and palpitations.   Gastrointestinal: Negative for heartburn and nausea.   Genitourinary: Negative for dysuria.    Musculoskeletal: Negative for myalgias.   Skin: Negative for rash.   Neurological: Positive for occasional dizziness, sensory change and headaches.        Lightheadedness.   Endo/Heme/Allergies: Does not bruise/bleed easily.   Psychiatric/Behavioral: Negative for depression. The patient is not nervous/anxious.       Current Outpatient Medications on File Prior to Visit   Medication Sig Dispense Refill   • acetaminophen (TYLENOL) 325 MG Tab Take 2 Tabs by mouth every four hours as needed. 30 Tab 0     No current facility-administered medications on file prior to visit.      Past Medical History:   Diagnosis Date   • Migraine    • Polycystic ovarian syndrome         Objective:   I personally reviewed imaging below and agree with the findings:     MRI brain 9/30/2020     1 Acute mid brain and punctate left pontine infarct without mass effect     CTA neck 9/30/2020  1 There is abnormality involving the distal left vertebral artery at the level of C2 which may represent combination of focal dissection/thrombus with possible less likely partially thrombosed vascular malformation.  2.  There is an occlusion of the mid and distal basilar artery extending into the left greater than right posterior cerebral arteries, P1 segments.  CTA head 9/30/2020  1.  There is abnormality involving the distal left vertebral artery at the level of C2 which may represent combination of focal dissection/thrombus with possible less likely partially thrombosed vascular malformation.  2.  There is an occlusion of the mid and distal basilar artery extending into the left greater than right posterior cerebral arteries, P1 segments.    CTA head 1/4/2020    1 No occlusion or hemodynamically significant narrowing of the major intracranial vessels.     2. Patent basilar artery and bilateral posterior cerebral arteries.      CTA neck 1/4/2020     1. There is a small residual dissection flap seen in the distal left vertebral artery at the level of  C1-C2, measuring up to 7 mm in length. No residual thrombus or occlusion.  Angio: 10/1/2020  1.  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  2.  Basilar artery thrombosis.  3.  Successful mechanical thrombectomy of a basilar artery thrombosis.  4.  The final angiogram demonstrates patent basilar, bilateral superior cerebellar and right posterior cerebral arteries.     TTE;  LVEF 60%, bubble negative, LA size WNL< FORTUNATO 14.       Stroke Labs:  LDL 32, creat 0.61, A1C 5.0     Physical Exam  Constitutional: Alert, no distress, well-groomed.  Skin: No rashes in visible areas.  Eye: Round. Conjunctiva clear, lids normal. No icterus.   Eye patch left eye.  ENMT: Lips pink without lesions, good dentition, moist mucous membranes. Phonation normal.  Neck: No masses, no thyromegaly. Moves freely without pain.  Respiratory: Unlabored respiratory effort, no cough or audible wheeze  Psych: Alert and oriented x4, normal affect and mood.   Neuro:  EOMs intact, moves all extremities  equally,  Face is symmetrical.          Assessment/Plan:   1. Late effect of stroke  Posterior circulation infarcts secondary to vertebral artery dissection in the setting of trauma.      Plan:  Presumed Mechanism by Toast:  __  Large Artery Atherosclerosis  __  Small Vessel (lacunar)  __   Cardioembolic  __XX   Other (Sickle cell, vasculitis, hypercoagulable)  Vertebral artery dissection.   __   Unknown  __   ESUS     Stroke risk factors :  None      Blood pressure goal less than 130/80, currently at goal      Follow up with neuro-ophthalmology for diplopia, (apt scheduled 1/27/2020)     LDL goal < 70, current , may stop atorvastatin, no need for statin at this point as stroke was caused by trauamtic dissection.  , will need follow up with primary care evaluate liver function and intervals and refill              2. Vertebral artery dissection (HCC)    At follow up CTA in January 2021, the dissection has resolved, there is a small flap.       May stop coumadin, recommend full dose (325mg) aspirin for life.        Avoid roller coasters, myke diving, no lifting weights above the head.  Sit ups should be performed with hands across chest, neve with hands behind head.     She has not had any seizures, now that we are     In 3 days  May start weaning the Keppra 500mg at night x 7 nights, and then stop.      I have counseled patient on stroke prevention strategies, stroke symptoms and mimics.  Diet and exercise modifications.  We discussed medication side effects and instructions.         Follow up PRN.

## 2021-01-13 NOTE — PATIENT INSTRUCTIONS
Neck Dissection, Care After  This sheet gives you information about how to care for yourself after your procedure. Your health care provider may also give you more specific instructions. If you have problems or questions, contact your health care provider.  What can I expect after the procedure?  After the procedure, it is common to have:  · Pain or soreness.  · Stiffness in your neck or shoulder.  · Burning or tingling sensations in the area where you had surgery.  · Weakness or numbness if any nerves or muscles were removed.  Follow these instructions at home:  Medicines  · Take over-the-counter and prescription medicines only as told by your health care provider.  · Ask your health care provider if the medicine prescribed to you:  ? Requires you to avoid driving or using heavy machinery.  ? Can cause constipation. You may need to take actions to prevent or treat constipation, such as:  § Drink enough fluid to keep your urine pale yellow.  § Take over-the-counter or prescription medicines.  § Eat foods that are high in fiber, such as fresh fruits and vegetables, whole grains, and beans.  § Limit foods that are high in fat and processed sugars, such as fried and sweet foods.  Incision care    · Follow instructions from your health care provider about how to take care of your incision. Make sure you:  ? Wash your hands with soap and water before and after you change your bandage (dressing). If soap and water are not available, use hand .  ? Change your dressing as told by your health care provider.  ? Leave stitches (sutures), skin glue, or adhesive strips in place. These skin closures may need to stay in place for 2 weeks or longer. If adhesive strip edges start to loosen and curl up, you may trim the loose edges. Do not remove adhesive strips completely unless your health care provider tells you to do that.  · Check your incision area every day for signs of infection. Check for:  ? Redness, swelling, or  increasing pain.  ? Fluid or blood.  ? Warmth.  ? Pus or a bad smell.  · If you have drainage tubes, follow instructions from your health care provider about how to care for them.  Bathing  · Do not take baths, swim, or use a hot tub until your health care provider approves. Ask your health care provider if you may take showers. You may only be allowed to take sponge baths.  · If your health care provider approves bathing and showering, cover the dressing with a watertight covering to protect it from water. Do not let the bandage get wet.  · Keep the dressing dry until your health care provider says it can be removed.  Activity  · Return to your normal activities as told by your health care provider. Ask your health care provider what activities are safe for you.  · Do not lift anything that is heavier than 10 lb (4.5 kg), or the limit that you are told, until your health care provider says that it is safe.  · Exercise only as told by your health care provider or physical therapist.  ? Do not exercise the neck area until your health care provider approves.  ? If muscles or nerves were removed during your procedure, your health care provider may recommend certain exercises or physical therapy to help you regain some of the motion in your upper body.  General instructions  · Try eating soft foods for a while after the procedure. This can help reduce pain if your throat is sore or if you have difficulty swallowing.  · Do not use any products that contain nicotine or tobacco, such as cigarettes, e-cigarettes, and chewing tobacco. These can delay incision healing after surgery. If you need help quitting, ask your health care provider.  · Sleep and rest with your head raised (elevated) to help reduce swelling.  · Stay out of direct sunlight. Your skin is more sensitive to the sun after surgery. Use sunblock and wear a wide-brimmed hat to protect your skin.  · Keep all follow-up visits as told by your health care provider.  This is important.  Contact a health care provider if:  · You have pain that is not relieved by pain medicine.  · Your neck or shoulder gets weaker or stiffer.  · You have a fever.  · You have redness, swelling, or increasing pain at the site of your incision.  · You have fluid or blood coming from your incision.  · Your incision feels warm to the touch.  · You have pus or a bad smell coming from your incision.  Get help right away if:  · Your drain comes out or gets blocked.  · You have difficulty breathing.  · Your incision starts to open or come apart.  Summary  · After neck dissection surgery, it is common to have pain, soreness, and stiffness in your neck or shoulder.  · Follow instructions from your health care provider about how to take care of your incision.  · Try eating soft foods for a while after the procedure. This can help reduce pain if your throat is sore or if you have difficulty swallowing.  · Keep all follow-up visits as told by your health care provider. This is important.  This information is not intended to replace advice given to you by your health care provider. Make sure you discuss any questions you have with your health care provider.  Document Released: 03/11/2013 Document Revised: 09/10/2019 Document Reviewed: 09/10/2019  Elsevier Patient Education © 2020 [employer]

## 2021-01-18 ENCOUNTER — OFFICE VISIT (OUTPATIENT)
Dept: PHYSICAL MEDICINE AND REHAB | Facility: REHABILITATION | Age: 46
End: 2021-01-18
Payer: OTHER GOVERNMENT

## 2021-01-18 VITALS
DIASTOLIC BLOOD PRESSURE: 88 MMHG | RESPIRATION RATE: 16 BRPM | SYSTOLIC BLOOD PRESSURE: 137 MMHG | HEIGHT: 67 IN | HEART RATE: 77 BPM | WEIGHT: 179 LBS | OXYGEN SATURATION: 100 % | BODY MASS INDEX: 28.09 KG/M2 | TEMPERATURE: 97.1 F

## 2021-01-18 DIAGNOSIS — G81.91 RIGHT HEMIPARESIS (HCC): ICD-10-CM

## 2021-01-18 DIAGNOSIS — Z78.9 IMPAIRED INSTRUMENTAL ACTIVITIES OF DAILY LIVING (IADL): ICD-10-CM

## 2021-01-18 DIAGNOSIS — M79.2 NEUROPATHIC PAIN: ICD-10-CM

## 2021-01-18 DIAGNOSIS — I63.212 ACUTE ISCHEMIC VERTEBROBASILAR ARTERY BRAINSTEM STROKE INVOLVING LEFT-SIDED VESSEL (HCC): Primary | ICD-10-CM

## 2021-01-18 DIAGNOSIS — H53.2 DOUBLE VISION: ICD-10-CM

## 2021-01-18 DIAGNOSIS — R49.8 HYPOPHONIA: ICD-10-CM

## 2021-01-18 DIAGNOSIS — I63.22 ACUTE ISCHEMIC VERTEBROBASILAR ARTERY BRAINSTEM STROKE INVOLVING LEFT-SIDED VESSEL (HCC): Primary | ICD-10-CM

## 2021-01-18 PROCEDURE — 99213 OFFICE O/P EST LOW 20 MIN: CPT | Performed by: PHYSICAL MEDICINE & REHABILITATION

## 2021-01-18 ASSESSMENT — ENCOUNTER SYMPTOMS
DIARRHEA: 0
BRUISES/BLEEDS EASILY: 0
PALPITATIONS: 0
SHORTNESS OF BREATH: 0
COUGH: 0
MEMORY LOSS: 0
FALLS: 0
CONSTIPATION: 0
FEVER: 0
SORE THROAT: 0
FOCAL WEAKNESS: 1
DOUBLE VISION: 1
CHILLS: 0

## 2021-01-18 ASSESSMENT — FIBROSIS 4 INDEX: FIB4 SCORE: 0.28

## 2021-01-18 ASSESSMENT — PATIENT HEALTH QUESTIONNAIRE - PHQ9: CLINICAL INTERPRETATION OF PHQ2 SCORE: 0

## 2021-01-18 NOTE — PROGRESS NOTES
[facility]  PM&R Neuro Rehabilitation Clinic  [facility] [place] [address]  Ph: [phone]    FOLLOW UP PATIENT EVALUATION    Patient Name: Obdulia High   Patient : 1975  Patient Age: 45 y.o.     Examining Physician: Dr. Yovana Becker, DO    PM&R History to Date - Background Information:  Dates of Admission/Discharge to ARU: 10/7/2020-10/27/2020    SUBJECTIVE:   Patient Identification: Obdulia High is a 45 y.o. left-hand-dominant female without significant past medical history and rehabilitation history significant for diffuse brainstem, specifically midbrain and khang left ischemic stroke 20 s/p thrombectomy Dr. Albert in setting of diffuse thrombosis in basilar, B/L PCA and vertebral artery and is presenting to PM&R clinic for a FOLLOW UP OUTPATIENT evaluation with the following chief complaint/s:    Chief Complaint: Stroke    Accompanied by Today:   History of Present Illness:    - Still getting HH. Getting OT in conjunction with optometry.   - Off of Keppra. Off of Coumadin, now on ASA 325mg for life.   - Optometry  and neurophthalmology .   - PT will see patient one more time and balance has improved so will be d/c soon.   - Function: Using walker only for safety.   - Denies vertigo and still sleeping well.   - Has speech therapy and using Vitastim. Likely to d/c soon.   - Has returned to work to a part time degree. Working about 1-15%. Making meals for herself and bathed herself completely.   - Continued R hemiparesis.   - Has R numbness in face, back, arm but not painful.   -Continues to have double vision.    Review of Systems:  Review of Systems   Constitutional: Negative for chills and fever.   HENT: Negative for congestion and sore throat.    Eyes: Positive for double vision.   Respiratory: Negative for cough and shortness of breath.    Cardiovascular: Negative for palpitations and leg swelling.   Gastrointestinal: Negative for constipation and diarrhea.    Musculoskeletal: Negative for falls and joint pain.   Neurological: Positive for focal weakness.        + Numbness.   Endo/Heme/Allergies: Does not bruise/bleed easily.   Psychiatric/Behavioral: Negative for memory loss.      All other pertinent positive review of systems are noted above in HPI.   All other systems reviewed and are negative.    Past Medical History:  Past Medical History:   Diagnosis Date   • Migraine    • Polycystic ovarian syndrome       History reviewed. No pertinent surgical history.     Current Outpatient Medications:   •  aspirin EC (ECOTRIN) 325 MG Tablet Delayed Response, Take 1 Tab by mouth every day., Disp: 90 Tab, Rfl: 3  •  acetaminophen (TYLENOL) 325 MG Tab, Take 2 Tabs by mouth every four hours as needed., Disp: 30 Tab, Rfl: 0  Not on File     Past Social History:  Social History     Socioeconomic History   • Marital status:      Spouse name: Not on file   • Number of children: Not on file   • Years of education: Not on file   • Highest education level: Not on file   Occupational History   • Not on file   Social Needs   • Financial resource strain: Not on file   • Food insecurity     Worry: Never true     Inability: Never true   • Transportation needs     Medical: No     Non-medical: No   Tobacco Use   • Smoking status: Never Smoker   • Smokeless tobacco: Never Used   Substance and Sexual Activity   • Alcohol use: Never     Binge frequency: Never   • Drug use: Never   • Sexual activity: Not on file   Lifestyle   • Physical activity     Days per week: Not on file     Minutes per session: Not on file   • Stress: Not on file   Relationships   • Social connections     Talks on phone: Not on file     Gets together: Not on file     Attends Yazdanism service: Not on file     Active member of club or organization: Not on file     Attends meetings of clubs or organizations: Not on file     Relationship status: Not on file   • Intimate partner violence     Fear of current or ex partner:  Not on file     Emotionally abused: Not on file     Physically abused: Not on file     Forced sexual activity: Not on file   Other Topics Concern   •  Service No   • Blood Transfusions No   • Caffeine Concern No   • Occupational Exposure No   • Hobby Hazards No   • Sleep Concern No   • Stress Concern No   • Weight Concern Yes   • Special Diet No   • Back Care No   • Exercise Yes   • Bike Helmet No   • Seat Belt Yes   • Self-Exams Yes   Social History Narrative   • Not on file        Family History:  Family History   Problem Relation Age of Onset   • Diabetes Mother    • Hypertension Mother    • Diabetes Father    • Hypertension Father        Depression and Opioid Screening  PHQ-9:  Depression Screen (PHQ-2/PHQ-9) 11/11/2020 1/13/2021 1/18/2021   PHQ-2 Total Score - - -   PHQ-2 Total Score 0 0 0     Interpretation of PHQ-9 Total Score   Score Severity   1-4 No Depression   5-9 Mild Depression   10-14 Moderate Depression   15-19 Moderately Severe Depression   20-27 Severe Depression     OBJECTIVE:   Vital Signs:  Vitals:    01/18/21 1401   BP: 137/88   Pulse: 77   Resp: 16   Temp: 36.2 °C (97.1 °F)   SpO2: 100%        Pertinent Labs:  Lab Results   Component Value Date/Time    SODIUM 138 10/16/2020 05:40 AM    POTASSIUM 4.3 10/16/2020 05:40 AM    CHLORIDE 102 10/16/2020 05:40 AM    CO2 25 10/16/2020 05:40 AM    GLUCOSE 107 (H) 10/16/2020 05:40 AM    BUN 19 10/16/2020 05:40 AM    CREATININE 0.61 10/16/2020 05:40 AM       Lab Results   Component Value Date/Time    HBA1C 5.0 10/01/2020 12:40 PM       Lab Results   Component Value Date/Time    WBC 5.1 10/20/2020 07:13 AM    RBC 4.05 (L) 10/20/2020 07:13 AM    HEMOGLOBIN 12.3 10/20/2020 07:13 AM    HEMATOCRIT 38.0 10/20/2020 07:13 AM    MCV 93.8 10/20/2020 07:13 AM    MCH 30.4 10/20/2020 07:13 AM    MCHC 32.4 (L) 10/20/2020 07:13 AM    MPV 8.9 (L) 10/20/2020 07:13 AM    NEUTSPOLYS 71.70 10/12/2020 06:46 AM    LYMPHOCYTES 19.70 (L) 10/12/2020 06:46 AM    MONOCYTES  5.20 10/12/2020 06:46 AM    EOSINOPHILS 1.80 10/12/2020 06:46 AM    BASOPHILS 0.50 10/12/2020 06:46 AM       Lab Results   Component Value Date/Time    ASTSGOT 18 10/08/2020 07:07 AM    ALTSGPT 42 10/08/2020 07:07 AM        Physical Exam:   GEN: No apparent distress  HEENT: Head normocephalic, atraumatic.  Dyssynchronous eye movements.  Wearing patch initially, removed for exam.  CV: Extremities warm and well-perfused, no peripheral edema appreciated bilaterally.  PULMONARY: Breathing nonlabored on room air, no respiratory accessory muscle use.  Not requiring supplemental oxygen.  ABD: Soft, nontender.  SKIN: No appreciable skin breakdown on exposed areas of skin.  PSYCH: Mood and affect within normal limits.  NEURO: Awake alert.  Conversational.  Logical thought content.    Motor Exam Upper Extremities   ? Myotome R L   Shoulder Abduction C5 5 5   Elbow flexion C5 5 5   Wrist extension C6 5 5   Elbow extension C7 5 5   Finger flexion C8 5 5   Finger abduction T1 5 5     Motor Exam Lower Extremities  ? Myotome R L   Hip flexion L2 5 5   Knee extension L3 5 5   Ankle dorsiflexion L4 5 5   Toe extension L5 5 5   Ankle plantarflexion S1 5 5     Tylor's negative bilaterally  No clonus at either ankle  Ambulatory without assistive device    Imaging:   CTA 1/4/2021  1. There is a small residual dissection flap seen in the distal left vertebral artery at the level of C1-C2, measuring up to 7 mm in length. No residual thrombus or occlusion.    ASSESSMENT/PLAN: Obdulia High  is a 45 y.o. female with rehabilitation history significant for diffuse brainstem, specifically midbrain and khang left ischemic stroke 9/29/20 s/p thrombectomy Dr. Albert in setting of diffuse thrombosis in basilar, b/l PCA and vertebral artery, here for evaluation. The following plan was discussed with the patient who is in agreement.     Visit Diagnoses     ICD-10-CM   1. Acute ischemic vertebrobasilar artery brainstem stroke involving  left-sided vessel (HCC)  I63.212    I63.22   2. Neuropathic pain  M79.2   3. Impaired instrumental activities of daily living (IADL)  Z78.9   4. Hypophonia  R49.8   5. Double vision  H53.2   6. Right hemiparesis (HCC)  G81.91        Rehab/Neuro:   1. Left midbrain, khang ischemic stroke + left internal capsule/thalamic stroke + right cerebellar ischemic stroke s/p thrombectomy with Dr. Lubin 9/30 in setting of left vertebral artery dissection, left vertebral artery and bilateral PCA occlusions  2. Seizures  3. Cranial nerve III palsy, ptosis  -Therapy: Continue home health therapy through [place], will offer new referral if needed.  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  Has returned to work to some degree (10-15%), tries to avoid days where she has therapy so as not to over exert herself  -Driving status: Per neurology, had seizures, and neuro-ophthalmology given cranial nerve III palsy, ptosis.  -Records reviewed: Neurology note dated 1/13/2020  -Consultants: Optometry, neuro-ophthalmology, IR  -Med management: Stopped Coumadin, now on full dose aspirin for life.  Off of Keppra.  -Counseling/education: Counseled on need to continue therapy for maximal nerve recovery.  Counseled on return to driving protocol.    Assessment of Current Functional Status: 11/2020 Ambulatory with walker.  Balance is more her issue than it is strength.  Continues to have some cognitive deficits such as problems with multitasking, problem solving, , but is better than immediately after her stroke. 1/18/21 improved significantly functionally.  Still has diplopia.  Ambulating without assistive device for the most part.  Has returned to work in a minimal capacity.    HEENT: History of hypophonia.  Improved.  -Consultants: Has not gone to ENT evaluation  -Counseling/education: Given significant improvement in hypophonia counseled okay to postpone ENT evaluation.    Spasticity: No tone appreciated on  exam.    Neuropathic Pain: Continues to have numbness right upper extremity, right face, right back.  Nonpainful.  Minimal in right lower extremity.  -Counseling/education: Counseled on possible resurgence of painful paresthesias.  Can be treated pharmacologically but for now we will avoid pharmacologic intervention.    Sleep: Improved.  -Med management: Stop trazodone + melatonin.      Follow up: 6 months virtually or sooner if needed.    Please note that this dictation was created using voice recognition software. I have made every reasonable attempt to correct obvious errors but there may be errors of grammar and content that I may have overlooked prior to finalization of this note.    Dr. Yovana Becker DO, MS  Department of Physical Medicine & Rehabilitation  Neuro Rehabilitation Clinic  [facility]

## 2021-01-27 ENCOUNTER — APPOINTMENT (OUTPATIENT)
Dept: OPHTHALMOLOGY | Facility: MEDICAL CENTER | Age: 46
End: 2021-01-27
Payer: OTHER GOVERNMENT

## 2021-02-11 DIAGNOSIS — Z79.01 CHRONIC ANTICOAGULATION: ICD-10-CM

## 2021-02-11 NOTE — PROGRESS NOTES
Pt lost to follow up.  Sent INR orders to [facility] at home, [facility] office to check INR 2/17/2021  Shawnee Luis, Clinical Pharmacist, CDE, CACP

## 2021-02-25 ENCOUNTER — TELEPHONE (OUTPATIENT)
Dept: VASCULAR LAB | Facility: MEDICAL CENTER | Age: 46
End: 2021-02-25

## 2021-02-26 ENCOUNTER — TELEPHONE (OUTPATIENT)
Dept: VASCULAR LAB | Facility: MEDICAL CENTER | Age: 46
End: 2021-02-26

## 2021-02-26 NOTE — TELEPHONE ENCOUNTER
[facility]      Pt is over due for PT/INR for warfarin monitoring. Left message for patient to have INR checked ASAP.     Appears orders were sent to [facility] in [place] last week, but no results were sent in.   Unsure if patient is still on service with [facility] and they were closed when I tried to call them to verify, but did speak with oncall nurse who said she would have to page someone else as she did not have access to verify the information desired at this time.   LVM with the patient and asked her to call us if she is no longer on service with [facility] and we can send in a standing order to lab of her choice.   Faxed in new orders to have [facility] draw INR on patient tomorrow.   Message sent to the pool to have someone follow up with [facility] in [facility] tomorrow if no results received. [phone]  Clinic phone number left for any questions or concerns.      González Salas  PharmD

## 2021-02-26 NOTE — TELEPHONE ENCOUNTER
Received call from [facility] nurse. Pt was asked to have INR drawn, however warfarin was discontinued 1/18/2021 and to continue full-dose ASA per Neuro Rehab Clinic.   Original end date was 3/29/21 following 6 months of anticoagulation - notifying [facility] MD for anticoag clinic discharge.    Neri Levin, PharmD      CC:  Michael Bloch, M.D.

## 2021-03-12 ENCOUNTER — ANTICOAGULATION MONITORING (OUTPATIENT)
Dept: VASCULAR LAB | Facility: MEDICAL CENTER | Age: 46
End: 2021-03-12

## 2021-03-12 DIAGNOSIS — I63.22 ACUTE ISCHEMIC VERTEBROBASILAR ARTERY BRAINSTEM STROKE INVOLVING LEFT-SIDED VESSEL (HCC): ICD-10-CM

## 2021-03-12 DIAGNOSIS — I63.212 ACUTE ISCHEMIC VERTEBROBASILAR ARTERY BRAINSTEM STROKE INVOLVING LEFT-SIDED VESSEL (HCC): ICD-10-CM

## 2021-03-13 NOTE — PROGRESS NOTES
Discharged from [facility].  Pt is no longer anticoagulated  Shawnee Luis, Clinical Pharmacist, CDE, CACP

## 2021-03-17 ENCOUNTER — OFFICE VISIT (OUTPATIENT)
Dept: OPHTHALMOLOGY | Facility: MEDICAL CENTER | Age: 46
End: 2021-03-17
Payer: OTHER GOVERNMENT

## 2021-03-17 DIAGNOSIS — H49.9 OPHTHALMOPLEGIA: ICD-10-CM

## 2021-03-17 PROCEDURE — 99205 OFFICE O/P NEW HI 60 MIN: CPT | Performed by: OPHTHALMOLOGY

## 2021-03-17 PROCEDURE — 92060 SENSORIMOTOR EXAMINATION: CPT | Performed by: OPHTHALMOLOGY

## 2021-03-17 RX ORDER — LISINOPRIL 5 MG/1
5 TABLET ORAL DAILY
COMMUNITY

## 2021-03-17 RX ORDER — ATORVASTATIN CALCIUM 40 MG/1
40 TABLET, FILM COATED ORAL NIGHTLY
COMMUNITY

## 2021-03-17 ASSESSMENT — VISUAL ACUITY
OS_CC: 20/20
CORRECTION_TYPE: GLASSES
OD_PH_CC: 20/25-2
OD_CC: 20/60
METHOD: SNELLEN - LINEAR
OD_CC: J3
OS_CC: J3

## 2021-03-17 ASSESSMENT — REFRACTION_MANIFEST
OS_SPHERE: -1.00
OS_AXIS: 149
OD_SPHERE: -1.50
METHOD_AUTOREFRACTION: 1
OD_CYLINDER: +1.00
OD_AXIS: 176
OS_CYLINDER: +0.25

## 2021-03-17 ASSESSMENT — TONOMETRY
OD_IOP_MMHG: 20
OS_IOP_MMHG: 20

## 2021-03-17 ASSESSMENT — REFRACTION_WEARINGRX
OD_AXIS: 045
OD_SPHERE: -0.75
SPECS_TYPE: SVL
OS_AXIS: 090
OS_CYLINDER: +0.25
OS_SPHERE: -1.00
OD_CYLINDER: +2.00

## 2021-03-17 ASSESSMENT — CONF VISUAL FIELD
OD_NORMAL: 1
OS_NORMAL: 1

## 2021-03-17 ASSESSMENT — CUP TO DISC RATIO
OD_RATIO: 0.1
OS_RATIO: 0.1

## 2021-03-17 ASSESSMENT — EXTERNAL EXAM - LEFT EYE: OS_EXAM: NORMAL

## 2021-03-17 ASSESSMENT — SLIT LAMP EXAM - LIDS
COMMENTS: NORMAL
COMMENTS: NORMAL

## 2021-03-17 ASSESSMENT — ENCOUNTER SYMPTOMS: DOUBLE VISION: 1

## 2021-03-17 ASSESSMENT — EXTERNAL EXAM - RIGHT EYE: OD_EXAM: NORMAL

## 2021-03-17 NOTE — PROGRESS NOTES
Peds/Neuro Ophthalmology:   Srikanth Rosas M.D.    Date & Time note created:    3/17/2021   4:42 PM     Referring MD / APRN:  Pcp Pt States None, No att. providers found    Patient ID:  Name:             Obdulia High     YOB: 1975  Age:                 45 y.o.  female   MRN:               [id number]    Chief Complaint/Reason for Visit:     Other (double vision/post stroke)      History of Present Illness:    Obdulia High is a 45 y.o. female   Pt referred from gita Mendoza at rehab for double vision post stroke 9-.Bad headaches. Occasionally gets together, but then goes out. Pt has stick on prism right eye which restores.No eye pain or headaches.Pt Sees Dr Freed for vision therapy and he is the one who put on prism.Pt says she has decreased prism diopters 5. Put on sometime in January. (now 5 months since stroke) Left eye was to the left, could not adduct and has improved. Numbness in the right arm and right cheek. Eyelid was dropooinb and has gotten beter.       Review of Systems:  Review of Systems   Eyes: Positive for double vision.   All other systems reviewed and are negative.      Past Medical History:   Past Medical History:   Diagnosis Date   • Migraine    • Polycystic ovarian syndrome    • Stroke (HCC)        Past Surgical History:  History reviewed. No pertinent surgical history.    Current Outpatient Medications:  Current Outpatient Medications   Medication Sig Dispense Refill   • lisinopril (PRINIVIL) 5 MG Tab Take 5 mg by mouth every day.     • atorvastatin (LIPITOR) 40 MG Tab Take 40 mg by mouth every evening.     • aspirin EC (ECOTRIN) 325 MG Tablet Delayed Response Take 1 Tab by mouth every day. 90 Tab 3   • acetaminophen (TYLENOL) 325 MG Tab Take 2 Tabs by mouth every four hours as needed. 30 Tab 0     No current facility-administered medications for this visit.       Allergies:  No Known Allergies    Family History:  Family History   Problem Relation Age of  Onset   • Diabetes Mother    • Hypertension Mother    • Diabetes Father    • Hypertension Father        Social History:  Social History     Socioeconomic History   • Marital status:      Spouse name: Not on file   • Number of children: Not on file   • Years of education: Not on file   • Highest education level: Not on file   Occupational History   • Not on file   Tobacco Use   • Smoking status: Never Smoker   • Smokeless tobacco: Never Used   Substance and Sexual Activity   • Alcohol use: Never   • Drug use: Never   • Sexual activity: Not on file   Other Topics Concern   •  Service No   • Blood Transfusions No   • Caffeine Concern No   • Occupational Exposure No   • Hobby Hazards No   • Sleep Concern No   • Stress Concern No   • Weight Concern Yes   • Special Diet No   • Back Care No   • Exercise Yes   • Bike Helmet No   • Seat Belt Yes   • Self-Exams Yes   Social History Narrative   • Not on file     Social Determinants of Health     Financial Resource Strain:    • Difficulty of Paying Living Expenses:    Food Insecurity: No Food Insecurity   • Worried About Running Out of Food in the Last Year: Never true   • Ran Out of Food in the Last Year: Never true   Transportation Needs: No Transportation Needs   • Lack of Transportation (Medical): No   • Lack of Transportation (Non-Medical): No   Physical Activity:    • Days of Exercise per Week:    • Minutes of Exercise per Session:    Stress:    • Feeling of Stress :    Social Connections:    • Frequency of Communication with Friends and Family:    • Frequency of Social Gatherings with Friends and Family:    • Attends Latter day Services:    • Active Member of Clubs or Organizations:    • Attends Club or Organization Meetings:    • Marital Status:    Intimate Partner Violence:    • Fear of Current or Ex-Partner:    • Emotionally Abused:    • Physically Abused:    • Sexually Abused:           Physical Exam:  Physical Exam    Oriented x 3  Weight/BMI: There is  no height or weight on file to calculate BMI.  There were no vitals taken for this visit.    Base Eye Exam     Visual Acuity (Snellen - Linear)       Right Left    Dist cc 20/60 20/20    Dist ph cc 20/25-2     Near cc J3 J3    Correction: Glasses          Tonometry (i care, 2:22 PM)       Right Left    Pressure 20 20          Pupils       Pupils APD    Right PERRL     Left PERRL slow          Visual Fields       Right Left     Full Full          Neuro/Psych     Oriented x3: Yes    Mood/Affect: Normal          Dilation     able to view wihtout dilation             Additional Tests     Color       Right Left    Ishihara 9/9 9/9          Stereo     Fly: +    Animals: 3/3    Circles: 6/9            Strabismus Exam       0 0 0  XT 12 -1 -1 -1            LHypo 14          XT 14 0  0  XT 12 -1  0  XT 12     LHypo 14     LHypo 14     LHypo 14      0 0 0  XT 12 0 0 0          LHypo 14         Slit Lamp and Fundus Exam     External Exam       Right Left    External Normal Normal          Slit Lamp Exam       Right Left    Lids/Lashes Normal Normal    Conjunctiva/Sclera White and quiet White and quiet    Cornea Clear Clear    Anterior Chamber Deep and quiet Deep and quiet    Iris Round and reactive Round and reactive    Lens Clear Clear    Vitreous Normal Normal          Fundus Exam       Right Left    Disc Normal Normal    C/D Ratio 0.1 0.1    Macula Normal Normal    Vessels Normal Normal    Periphery Normal Normal            Refraction     Wearing Rx       Sphere Cylinder Axis Horz Prism Vert Prism    Right -0.75 +2.00 045 7.0I 12.50D    Left -1.00 +0.25 090      Age: 3m    Type: SVL          Manifest Refraction (Auto)       Sphere Cylinder Axis    Right -1.50 +1.00 176    Left -1.00 +0.25 149                Pertinent Lab/Test/Imaging Review:  [facility] Records including MRI images    Assessment and Plan:     Ophthalmoplegia  3/17/2021 - 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 Reviewed  the MRI images with patient and demonstrated the region of the lesion. Appears to have improved since the stroke in 9/2020 since now can adduct almost fully and her lid is elevating. There does not appear to be any pupil involvement at this time. Still has a mild elevation deficit. Since right now the deviation is relatively small and she tolerates the press on prism that Dr Freed places, would continue to monitor. Discussed that if not fully recovered by 8 months or more of observation could consider a LLR recession and LIR recession with adjustable suture if the amout of prism is too large to grind in. Gave information to read regarding the risks and benefits of the surgery.     Greater than 60 minute were spent examining the patient, reviewing records from referring providers including MRI images if available and records within the EPIC system, counselling the patient and family regarding the examination findings, diagnostic testing preformed, recommendations for management, prognosis, further testing and referrals as appropriate and follow up. This in addition to time spent interpreting separate billable tests done during the visit.       Srikanth Rosas M.D.

## 2021-03-17 NOTE — ASSESSMENT & PLAN NOTE
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 Reviewed the MRI images with patient and demonstrated the region of the lesion. Appears to have improved since the stroke in 9/2020 since now can adduct almost fully and her lid is elevating. There does not appear to be any pupil involvement at this time. Still has a mild elevation deficit. Since right now the deviation is relatively small and she tolerates the press on prism that Dr Freed places, would continue to monitor. Discussed that if not fully recovered by 8 months or more of observation could consider a LLR recession and LIR recession with adjustable suture if the amout of prism is too large to grind in. Gave information to read regarding the risks and benefits of the surgery.

## 2021-04-01 ENCOUNTER — HOSPITAL ENCOUNTER (OUTPATIENT)
Dept: RADIOLOGY | Facility: MEDICAL CENTER | Age: 46
End: 2021-04-01
Payer: OTHER GOVERNMENT

## 2021-04-07 ENCOUNTER — HOSPITAL ENCOUNTER (OUTPATIENT)
Dept: RADIOLOGY | Facility: MEDICAL CENTER | Age: 46
End: 2021-04-07
Attending: NURSE PRACTITIONER
Payer: OTHER GOVERNMENT

## 2021-04-07 DIAGNOSIS — I77.74 DISSECTION, VERTEBRAL ARTERY (HCC): ICD-10-CM

## 2021-04-07 ASSESSMENT — ENCOUNTER SYMPTOMS
BLURRED VISION: 1
FOCAL WEAKNESS: 1
DOUBLE VISION: 1

## 2021-04-07 ASSESSMENT — LIFESTYLE VARIABLES: SUBSTANCE_ABUSE: 0

## 2021-04-07 NOTE — CONSULTS
Telemedicine: Established Patient   This evaluation was conducted via Zoom using secure and encrypted videoconferencing technology. The patient was in a private location in the [facility].    The patient's identity was confirmed and verbal consent was obtained for this virtual visit.    Subjective:   CC: imaging follow up of a left vertebral C2 dissection     PCP Nam Soliman MD  Neuro: Shanae VILLA  Physiatry: Yovana Becker DO  NeuroOphthalmology: Srikanth Rosas MD     has a history of complex migraine headaches. She presented to her local ED on September 29, 2020 with initial complaints of 2 day history of left retro-orbital headache, nausea, scotoma, and left facial numbness. She then developed a seizure with respiratory failure requiring intubation and was transferred to [facility] that evening.  Imaging revealed left pontine and midbrain ischemia and thrombosis of the left and right posterior cerebral arteries, left vertebral artery, and basilar artery with dissection at the left vertebral C2 level and the patient was referred for emergent intervention for NIH stroke scale of 28. Dr. Albert performed successful mechanical thrombectomy on September 30. The patient's clinical recovery included inpatient rehabilitation services and she was discharged to home on October 27 with outpatient follow ups arranged. She denies any past history of vascular injury and specifically did not sustain a known trauma such as MVC or chiropractic manipulation, however she thinks a new, strenuous exercise regimen may have been the cause of the dissection.     She is seen today for imaging follow up to evaluate the dissection. Today, the patient continues to recover, however her described gains are slower. She is using glasses with prism lenses for her vision. Her  describes an ongoing left cranial nerve palsy. She complains of right face and arm numbness. She is ambulating with a walker. She is still  unable to drive. Her blood pressure is controlled on lisinopril. She is taking a statin and a daily aspirin 325 mg and has questions about duration of that therapy. She is accompanied by her  Keith, who is a [employer] officer and physician and stationed in [place] for the next couple of years.      Review of Systems   Eyes: Positive for blurred vision and double vision.   Neurological: Positive for focal weakness.   Psychiatric/Behavioral: Negative for substance abuse.     No Known Allergies    Current medicines (including changes today)  Current Outpatient Medications   Medication Sig Dispense Refill   • lisinopril (PRINIVIL) 5 MG Tab Take 5 mg by mouth every day.     • atorvastatin (LIPITOR) 40 MG Tab Take 40 mg by mouth every evening.     • aspirin EC (ECOTRIN) 325 MG Tablet Delayed Response Take 1 Tab by mouth every day. 90 Tab 3   • acetaminophen (TYLENOL) 325 MG Tab Take 2 Tabs by mouth every four hours as needed. 30 Tab 0     No current facility-administered medications for this encounter.       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) 09/30/2020     Priority: High   • Acute respiratory failure with hypoxia and hypercapnia (HCC) 09/29/2020     Priority: High   • Seizure (HCC) 09/29/2020     Priority: High   • Essential hypertension 09/29/2020     Priority: Medium   • Ophthalmoplegia 03/17/2021   • Late effect of stroke 01/13/2021   • Dizziness 10/26/2020   • Impaired mobility and ADLs 10/26/2020   • Adjustment disorder with mixed anxiety and depressed mood 10/15/2020   • Double vision 10/07/2020   • Hypophonia 10/07/2020   • Vertebral artery dissection (HCC) 10/07/2020       Family History   Problem Relation Age of Onset   • Diabetes Mother    • Hypertension Mother    • Diabetes Father    • Hypertension Father        She  has a past medical history of Migraine, Polycystic ovarian syndrome, and Stroke  (HCC).  She  has no past surgical history on file.       Objective:   There were no vitals taken for this visit.    Radiology:   CTA head and neck 3/1/21 at [facility]:        CTA head 1/4/21 at [facility]:  1. No occlusion or hemodynamically significant narrowing of the major intracranial vessels.  2. Patent basilar artery and bilateral posterior cerebral arteries.    CTA neck 1/4/21 at [facility]:  1. There is a small residual dissection flap seen in the distal left vertebral artery at the level of C1-C2, measuring up to 7 mm in length. No residual thrombus or occlusion.      CT head w/o 10/5/20 at [facility]:  1.  Diminishing parenchymal and sulcal contrast staining and/or hemorrhage in the parasagittal high frontal lobes. No evidence of new/progressive hemorrhage.  2.  Acute lacunar size infarct left thalamus.  3.  Acute brainstem infarct evident in the right side of the khang, best seen on MRI.     MRI brain w/o 9/30/20 at [facility]:  1.  Subarachnoid hemorrhage within sulci near the vertex, left greater than right  2.  Acute mid brain and punctate left pontine infarct without mass effect  3.  No other significant finding        MRV brain 9/30/20 at [facility]:  Cerebral magnetic resonance venogram within normal limits with no evidence of dural venous sinus thrombosis.     MRI brain w/ and w/o 9/30/20 at [facility]:  1.  Findings suggesting distal basilar artery and left posterior cerebral artery thrombosis.  2.  Acute ischemia within the midbrain/khang.  3.  Partially empty sella and slightly prominent fluid about the optic nerve sheaths. Correlate for any evidence of intracranial hypertension.        CT head w/o 9/30/20 at [facility]:  Addendum:  Please note that the patient has had a CTA head and neck as well as angiogram with thrombectomy earlier today. A portion of the high attenuation along the arterial vascular structures and dural venous sinuses is likely related to contrast staining from the previous contrast-enhanced  procedures. However, the high attenuation in the sulci over the vertex is suspicious for a small amount of new subarachnoid hemorrhage. Follow-up MRI could be performed for confirmation.  1.  There has been interval development of a small amount of subarachnoid hemorrhage bilaterally.  2.  There is no significant midline shift.  3.  The areas of acute thrombus/occlusion in the basilar artery and proximal posterior cerebral arteries are less apparent.     Neurointerventional procedure 9/30/20 at [facility]:  1.  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  2.  Basilar artery thrombosis.  3.  Successful mechanical thrombectomy of a basilar artery thrombosis.  4.  The final angiogram demonstrates patent basilar, bilateral superior cerebellar and right posterior cerebral arteries. The left P1 segment is patent. The left distal P2 segment is not visualized.         CTA head 9/30/20 at [facility]:  1.  There is acute mid and distal basilar artery occlusion extending into the left greater than right posterior cerebral arteries.  2.  Findings have already been discussed with the ordering physician and neurologist by Dr. Albert of the IR radiology service.       CTA neck 9/30/20 at [facility]:  1.  There is abnormality involving the distal left vertebral artery at the level of C2 which may represent combination of focal dissection/thrombus with possible less likely partially thrombosed vascular malformation.  2.  There is an occlusion of the mid and distal basilar artery extending into the left greater than right posterior cerebral arteries, P1 segments.  3.  These findings have already been discussed with the clinical service by Dr. Albert of the IR radiology service.     CTP 9/30/20 at [facility]:  1.  Cerebral blood flow less than 30% likely representing completed infarct = 0 mL.  2.  T Max more than 6 seconds likely representing combination of completed infarct and ischemia = 100 mL.  3.  Mismatched volume likely  representing ischemic brain/penumbra = 100 mL  4.  Please note that the cerebral perfusion was performed on the limited brain tissue around the basal ganglia region. Infarct/ischemia outside the CT perfusion sections can be missed in this study.    Physical Exam   Constitutional: She is oriented to person, place, and time and well-developed, well-nourished, and in no distress. No distress.   HENT:   Head: Atraumatic.   Lips pink without lesions, good dentition, moist mucous membranes. Phonation normal.   Eyes: No scleral icterus.   Conjunctiva clear, lids normal.     Neck:   Moves freely without pain.   Pulmonary/Chest: Breath sounds normal. No respiratory distress.   Unlabored respiratory effort, no cough or audible wheeze     Neurological: She is alert and oriented to person, place, and time.   No tremor noted. Seated for telehealth appointment, unable to assess gait.   Skin: Skin is dry. No rash noted. She is not diaphoretic. No erythema. No pallor.   No rashes in visible areas.   Psychiatric: Mood, memory, affect and judgment normal.       Assessment and Plan:   Gallito Albert MD was personally present for this telehealth appointment. The following treatment plan was discussed: The CTA shows the dissection flap is not yet resolved. She has no new symptoms. The false lumen is slightly more prominent compared to the January scan but there is no stenosis or pseudoaneurysm. Recommend watchful waiting and follow up in 6 months. No indication for stent at this time but it could be considered in the future. Imaging was reviewed with the patient and her  and all questions were answered.    Impression:   1. Left vertebral artery dissection, on warfarin.  2. Stroke, secondary to above.  3. Hypertension.     Follow-up:   CTA head and neck in 6 months (late September/ early October 2021), local facility is [facility]. We will order.  Continue daily aspirin for life unless side effects manifest.  Defer statin  therapy duration to her stroke specialists.  OK to resume normal activities from a [facility]LE[facility] standpoint.

## 2021-06-29 ENCOUNTER — OFFICE VISIT (OUTPATIENT)
Dept: OPHTHALMOLOGY | Facility: MEDICAL CENTER | Age: 46
End: 2021-06-29
Payer: OTHER GOVERNMENT

## 2021-06-29 DIAGNOSIS — H49.9 OPHTHALMOPLEGIA: ICD-10-CM

## 2021-06-29 PROCEDURE — 92060 SENSORIMOTOR EXAMINATION: CPT | Performed by: OPHTHALMOLOGY

## 2021-06-29 PROCEDURE — 92014 COMPRE OPH EXAM EST PT 1/>: CPT | Performed by: OPHTHALMOLOGY

## 2021-06-29 RX ORDER — VITAMIN B COMPLEX
1000 TABLET ORAL DAILY
COMMUNITY

## 2021-06-29 RX ORDER — MULTIVIT WITH MINERALS/LUTEIN
TABLET ORAL DAILY
COMMUNITY

## 2021-06-29 RX ORDER — M-VIT,TX,IRON,MINS/CALC/FOLIC 27MG-0.4MG
1 TABLET ORAL DAILY
COMMUNITY

## 2021-06-29 RX ORDER — ZINC SULFATE 50(220)MG
220 CAPSULE ORAL DAILY
COMMUNITY

## 2021-06-29 ASSESSMENT — CONF VISUAL FIELD
OD_NORMAL: 1
OS_NORMAL: 1

## 2021-06-29 ASSESSMENT — REFRACTION_WEARINGRX
OS_CYLINDER: +0.25
OD_SPHERE: -0.50
OS_AXIS: 099
SPECS_TYPE: SVL
OD_HPRISM: 1.5
OD_VPRISM: 9.75
OS_SPHERE: -1.00
OD_HBASE: OUT
OD_VBASE: DOWN
OD_AXIS: 082

## 2021-06-29 ASSESSMENT — CUP TO DISC RATIO
OS_RATIO: 0.1
OD_RATIO: 0.1

## 2021-06-29 ASSESSMENT — VISUAL ACUITY
OS_CC: 20/20
OD_CC+: -1
OD_CC: 20/30
METHOD: SNELLEN - LINEAR

## 2021-06-29 ASSESSMENT — SLIT LAMP EXAM - LIDS
COMMENTS: NORMAL
COMMENTS: NORMAL

## 2021-06-29 ASSESSMENT — TONOMETRY
OD_IOP_MMHG: 18
OS_IOP_MMHG: 18
IOP_METHOD: I-CARE

## 2021-06-29 ASSESSMENT — EXTERNAL EXAM - LEFT EYE: OS_EXAM: NORMAL

## 2021-06-29 ASSESSMENT — EXTERNAL EXAM - RIGHT EYE: OD_EXAM: NORMAL

## 2021-06-29 ASSESSMENT — ENCOUNTER SYMPTOMS: BLURRED VISION: 1

## 2021-06-29 NOTE — PROGRESS NOTES
Peds/Neuro Ophthalmology:   Srikanth Rosas M.D.    Date & Time note created:    6/29/2021   3:08 PM     Referring MD / APRN:  Pcp Pt States None, No att. providers found    Patient ID:  Name:             Obdulia High     YOB: 1975  Age:                 45 y.o.  female   MRN:               [id number]    Chief Complaint/Reason for Visit:     Other (ophthalmopledia left eye secondary to left 3rd nerve palsy from brainstem stroke.)      History of Present Illness:    Obdulia High is a 45 y.o. female   Pt states vision has improved since last visit in [place]. She states she went down two prisms. She currently is on prism 10. Pt denies pain or discomfort. Pt denies HA's.       Review of Systems:  Review of Systems   Eyes: Positive for blurred vision.   All other systems reviewed and are negative.      Past Medical History:   Past Medical History:   Diagnosis Date   • Migraine    • Polycystic ovarian syndrome    • Stroke (HCC)        Past Surgical History:  History reviewed. No pertinent surgical history.    Current Outpatient Medications:  Current Outpatient Medications   Medication Sig Dispense Refill   • Ascorbic Acid (VITAMIN C) 1000 MG Tab Take  by mouth every day.     • vitamin D (CHOLECALCIFEROL) 1000 Unit (25 mcg) Tab Take 1,000 Units by mouth every day.     • zinc sulfate (ZINCATE) 220 (50 Zn) MG Cap Take 220 mg by mouth every day.     • therapeutic multivitamin-minerals (THERAGRAN-M) Tab Take 1 tablet by mouth every day.     • aspirin EC (ECOTRIN) 325 MG Tablet Delayed Response Take 1 Tab by mouth every day. 90 Tab 3   • acetaminophen (TYLENOL) 325 MG Tab Take 2 Tabs by mouth every four hours as needed. 30 Tab 0   • lisinopril (PRINIVIL) 5 MG Tab Take 5 mg by mouth every day. (Patient not taking: Reported on 6/29/2021)     • atorvastatin (LIPITOR) 40 MG Tab Take 40 mg by mouth every evening. (Patient not taking: Reported on 6/29/2021)       No current facility-administered medications for  this visit.       Allergies:  No Known Allergies    Family History:  Family History   Problem Relation Age of Onset   • Diabetes Mother    • Hypertension Mother    • Diabetes Father    • Hypertension Father        Social History:  Social History     Socioeconomic History   • Marital status:      Spouse name: Not on file   • Number of children: Not on file   • Years of education: Not on file   • Highest education level: Not on file   Occupational History   • Not on file   Tobacco Use   • Smoking status: Never Smoker   • Smokeless tobacco: Never Used   Vaping Use   • Vaping Use: Never used   Substance and Sexual Activity   • Alcohol use: Never   • Drug use: Never   • Sexual activity: Not on file   Other Topics Concern   •  Service No   • Blood Transfusions No   • Caffeine Concern No   • Occupational Exposure No   • Hobby Hazards No   • Sleep Concern No   • Stress Concern No   • Weight Concern Yes   • Special Diet No   • Back Care No   • Exercise Yes   • Bike Helmet No   • Seat Belt Yes   • Self-Exams Yes   Social History Narrative    Business owner- [employer]      Social Determinants of Health     Financial Resource Strain:    • Difficulty of Paying Living Expenses:    Food Insecurity: No Food Insecurity   • Worried About Running Out of Food in the Last Year: Never true   • Ran Out of Food in the Last Year: Never true   Transportation Needs: No Transportation Needs   • Lack of Transportation (Medical): No   • Lack of Transportation (Non-Medical): No   Physical Activity:    • Days of Exercise per Week:    • Minutes of Exercise per Session:    Stress:    • Feeling of Stress :    Social Connections:    • Frequency of Communication with Friends and Family:    • Frequency of Social Gatherings with Friends and Family:    • Attends Tenri[employer] Services:    • Active Member of Clubs or Organizations:    • Attends Club or Organization Meetings:    • Marital Status:    Intimate Partner Violence:    • Fear of Current  or Ex-Partner:    • Emotionally Abused:    • Physically Abused:    • Sexually Abused:           Physical Exam:  Physical Exam    Oriented x 3  Weight/BMI: There is no height or weight on file to calculate BMI.  There were no vitals taken for this visit.    Base Eye Exam     Visual Acuity (Snellen - Linear)       Right Left    Dist cc 20/30 -1 20/20          Tonometry (I-care, 2:31 PM)       Right Left    Pressure 18 18          Pupils       Pupils    Right PERRL    Left PERRL          Visual Fields       Right Left     Full Full          Neuro/Psych     Oriented x3: Yes    Mood/Affect: Normal          Dilation     Both eyes: able to view without dilation @ 3:02 PM            Additional Tests     Stereo     Fly: +    Animals: 3/3    Circles: 4/9            Strabismus Exam       0 0 0  XT 2 -1 -1 -1            LHypo 14          XT 2 0  0  XT 2 -1/2  0  XT 2     LHypo 14     LHypo 14     LHypo 14      0 0 0  XT 2 0 0 0          LHypo 14         Slit Lamp and Fundus Exam     External Exam       Right Left    External Normal Normal          Slit Lamp Exam       Right Left    Lids/Lashes Normal Normal    Conjunctiva/Sclera White and quiet White and quiet    Cornea Clear Clear    Anterior Chamber Deep and quiet Deep and quiet    Iris Round and reactive Round and reactive    Lens Clear Clear    Vitreous Normal Normal          Fundus Exam       Right Left    Disc Normal Normal    C/D Ratio 0.1 0.1    Macula Normal Normal    Vessels Normal Normal    Periphery Normal Normal            Refraction     Wearing Rx       Sphere Cylinder Axis Horz Prism Vert Prism    Right -0.50 +0.25+0.25 082 1.5 out 9.75 down    Left -1.00 +0.25 099      Age: 6 mths    Type: SVL                Pertinent Lab/Test/Imaging Review:      Assessment and Plan:     Ophthalmoplegia  3/17/2021 - 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 (September 2020) Reviewed the MRI images with patient and demonstrated the region  of the lesion. Appears to have improved since the stroke in 9/2020 since now can adduct almost fully and her lid is elevating. There does not appear to be any pupil involvement at this time. Still has a mild elevation deficit. Since right now the deviation is relatively small and she tolerates the press on prism that Dr Freed places, would continue to monitor. Discussed that if not fully recovered by 8 months or more of observation could consider a LLR recession and LIR recession with adjustable suture if the amout of prism is too large to grind in. Gave information to read regarding the risks and benefits of the surgery.   6/29/2021 - Has gone down to 10 prisms (manged by Dr Freed, was at 20). Showing improvement in adduction and elevation. Edelmira,ost resolved XT, but still 14 left hypo. Re-evaluting 3 months. Discussed if measurements stabilize could consider a LIR rectus recession for 14 with adjustment        Srikanth Rosas M.D.

## 2021-06-29 NOTE — ASSESSMENT & PLAN NOTE
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 (September 2020) Reviewed the MRI images with patient and demonstrated the region of the lesion. Appears to have improved since the stroke in 9/2020 since now can adduct almost fully and her lid is elevating. There does not appear to be any pupil involvement at this time. Still has a mild elevation deficit. Since right now the deviation is relatively small and she tolerates the press on prism that Dr Freed places, would continue to monitor. Discussed that if not fully recovered by 8 months or more of observation could consider a LLR recession and LIR recession with adjustable suture if the amout of prism is too large to grind in. Gave information to read regarding the risks and benefits of the surgery.   6/29/2021 - Has gone down to 10 prisms (manged by Dr Freed, was at 20). Showing improvement in adduction and elevation. Edelmira,ost resolved XT, but still 14 left hypo. Re-evaluting 3 months. Discussed if measurements stabilize could consider a LIR rectus recession for 14 with adjustment

## 2021-07-28 ENCOUNTER — TELEMEDICINE (OUTPATIENT)
Dept: PHYSICAL MEDICINE AND REHAB | Facility: REHABILITATION | Age: 46
End: 2021-07-28
Payer: OTHER GOVERNMENT

## 2021-07-28 VITALS — SYSTOLIC BLOOD PRESSURE: 95 MMHG | DIASTOLIC BLOOD PRESSURE: 73 MMHG | HEART RATE: 85 BPM

## 2021-07-28 DIAGNOSIS — H53.2 DOUBLE VISION: ICD-10-CM

## 2021-07-28 DIAGNOSIS — M79.2 NEUROPATHIC PAIN: ICD-10-CM

## 2021-07-28 DIAGNOSIS — I77.74 VERTEBRAL ARTERY DISSECTION (HCC): ICD-10-CM

## 2021-07-28 DIAGNOSIS — Z86.73 HISTORY OF STROKE: Primary | ICD-10-CM

## 2021-07-28 PROCEDURE — 99213 OFFICE O/P EST LOW 20 MIN: CPT | Performed by: PHYSICAL MEDICINE & REHABILITATION

## 2021-07-28 NOTE — PROGRESS NOTES
[facility]  PM&R Neuro Rehabilitation Clinic  [facility] [place] [address]  Ph: [phone]    FOLLOW UP PATIENT EVALUATION    This evaluation was conducted via Zoom using secure and encrypted videoconferencing technology. The patient was in a private location in the [facility].  The patient's identity was confirmed and verbal consent was obtained for this virtual visit.    Patient Name: Obdulia High   Patient : 1975  Patient Age: 45 y.o.     Examining Physician: Dr. Yovana Becker, DO    PM&R History to Date - Background Information:  Dates of Admission/Discharge to ARU: 10/7/2020-10/27/2020    SUBJECTIVE:   Patient Identification: Obdulia High is a 45 y.o. left-hand-dominant female without significant past medical history and rehabilitation history significant for diffuse brainstem, specifically midbrain and khang left ischemic stroke 20 s/p thrombectomy Dr. Albert in setting of diffuse thrombosis in basilar, B/L PCA and vertebral artery and is presenting to PM&R clinic for a FOLLOW UP OUTPATIENT evaluation with the following chief complaint/s:    Chief Complaint: Stroke follow-up    History of Present Illness:    - Vision therapy is helping with double vision. Still has has it, but improved.   - Goes in Oct to see the ophthalmologist to see about surgery.   - Eyes get measured by Optometrist Aug to see about improvements.   - D/c from  about 2 months ago.   - Numbness has reduced but still has it under her cheek   - Off of the blood pressure medication.   - Only on ASA and MVN.   - Started driving in town.   - Has been out about 3 times and had no issues.   - Didn't feel like the vision was an issue.   - Had CT done a while ago, all seems like healing but there looks like a false lumen and will recheck in Sept.   - Back to work 90% not getting to fatigued. Does get tired and stamina not totally back to normal. Can do most of her job.   - D/c from stroke bridge clinic.      Review of Systems:  All other pertinent positive review of systems are noted above in HPI.   All other systems reviewed and are negative.    Past Medical History:  Past Medical History:   Diagnosis Date   • Migraine    • Polycystic ovarian syndrome    • Stroke (HCC)       History reviewed. No pertinent surgical history.     Current Outpatient Medications:   •  Ascorbic Acid (VITAMIN C) 1000 MG Tab, Take  by mouth every day., Disp: , Rfl:   •  vitamin D (CHOLECALCIFEROL) 1000 Unit (25 mcg) Tab, Take 1,000 Units by mouth every day., Disp: , Rfl:   •  zinc sulfate (ZINCATE) 220 (50 Zn) MG Cap, Take 220 mg by mouth every day., Disp: , Rfl:   •  therapeutic multivitamin-minerals (THERAGRAN-M) Tab, Take 1 tablet by mouth every day., Disp: , Rfl:   •  lisinopril (PRINIVIL) 5 MG Tab, Take 5 mg by mouth every day. (Patient not taking: Reported on 6/29/2021), Disp: , Rfl:   •  atorvastatin (LIPITOR) 40 MG Tab, Take 40 mg by mouth every evening. (Patient not taking: Reported on 6/29/2021), Disp: , Rfl:   •  aspirin EC (ECOTRIN) 325 MG Tablet Delayed Response, Take 1 Tab by mouth every day., Disp: 90 Tab, Rfl: 3  •  acetaminophen (TYLENOL) 325 MG Tab, Take 2 Tabs by mouth every four hours as needed., Disp: 30 Tab, Rfl: 0  No Known Allergies     Past Social History:  Social History     Socioeconomic History   • Marital status:      Spouse name: Not on file   • Number of children: Not on file   • Years of education: Not on file   • Highest education level: Not on file   Occupational History   • Not on file   Tobacco Use   • Smoking status: Never Smoker   • Smokeless tobacco: Never Used   Vaping Use   • Vaping Use: Never used   Substance and Sexual Activity   • Alcohol use: Never   • Drug use: Never   • Sexual activity: Not on file   Other Topics Concern   •  Service No   • Blood Transfusions No   • Caffeine Concern No   • Occupational Exposure No   • Hobby Hazards No   • Sleep Concern No   • Stress Concern No    • Weight Concern Yes   • Special Diet No   • Back Care No   • Exercise Yes   • Bike Helmet No   • Seat Belt Yes   • Self-Exams Yes   Social History Narrative    Business owner- [employer]      Social Determinants of Health     Financial Resource Strain:    • Difficulty of Paying Living Expenses:    Food Insecurity: No Food Insecurity   • Worried About Running Out of Food in the Last Year: Never true   • Ran Out of Food in the Last Year: Never true   Transportation Needs: No Transportation Needs   • Lack of Transportation (Medical): No   • Lack of Transportation (Non-Medical): No   Physical Activity:    • Days of Exercise per Week:    • Minutes of Exercise per Session:    Stress:    • Feeling of Stress :    Social Connections:    • Frequency of Communication with Friends and Family:    • Frequency of Social Gatherings with Friends and Family:    • Attends [employer] Services:    • Active Member of Clubs or Organizations:    • Attends Club or Organization Meetings:    • Marital Status:    Intimate Partner Violence:    • Fear of Current or Ex-Partner:    • Emotionally Abused:    • Physically Abused:    • Sexually Abused:         Family History:  Family History   Problem Relation Age of Onset   • Diabetes Mother    • Hypertension Mother    • Diabetes Father    • Hypertension Father        Depression and Opioid Screening  PHQ-9:  Depression Screen (PHQ-2/PHQ-9) 11/11/2020 1/13/2021 1/18/2021   PHQ-2 Total Score - - -   PHQ-2 Total Score 0 0 0     Interpretation of PHQ-9 Total Score   Score Severity   1-4 No Depression   5-9 Mild Depression   10-14 Moderate Depression   15-19 Moderately Severe Depression   20-27 Severe Depression     OBJECTIVE:   Vital Signs:  Vitals:    07/28/21 1013   BP: (!) 95/73   Pulse: 85        Pertinent Labs:  Lab Results   Component Value Date/Time    SODIUM 138 10/16/2020 05:40 AM    POTASSIUM 4.3 10/16/2020 05:40 AM    CHLORIDE 102 10/16/2020 05:40 AM    CO2 25 10/16/2020 05:40 AM    GLUCOSE  107 (H) 10/16/2020 05:40 AM    BUN 19 10/16/2020 05:40 AM    CREATININE 0.61 10/16/2020 05:40 AM       Lab Results   Component Value Date/Time    HBA1C 5.0 10/01/2020 12:40 PM       Lab Results   Component Value Date/Time    WBC 5.1 10/20/2020 07:13 AM    RBC 4.05 (L) 10/20/2020 07:13 AM    HEMOGLOBIN 12.3 10/20/2020 07:13 AM    HEMATOCRIT 38.0 10/20/2020 07:13 AM    MCV 93.8 10/20/2020 07:13 AM    MCH 30.4 10/20/2020 07:13 AM    MCHC 32.4 (L) 10/20/2020 07:13 AM    MPV 8.9 (L) 10/20/2020 07:13 AM    NEUTSPOLYS 71.70 10/12/2020 06:46 AM    LYMPHOCYTES 19.70 (L) 10/12/2020 06:46 AM    MONOCYTES 5.20 10/12/2020 06:46 AM    EOSINOPHILS 1.80 10/12/2020 06:46 AM    BASOPHILS 0.50 10/12/2020 06:46 AM       Lab Results   Component Value Date/Time    ASTSGOT 18 10/08/2020 07:07 AM    ALTSGPT 42 10/08/2020 07:07 AM        Physical Exam:   GEN: No apparent distress  HEENT: Head normocephalic, atraumatic.  Sclera nonicteric bilaterally, no ocular discharge appreciated bilaterally.  PULMONARY: Breathing nonlabored on room air, no respiratory accessory muscle use.  Not requiring supplemental oxygen.  SKIN: No appreciable skin breakdown on exposed areas of skin.  PSYCH: Mood and affect within normal limits.  NEURO: Awake alert.  Conversational.  Logical thought content.      ASSESSMENT/PLAN: Obdulia High  is a 45 y.o. female with rehabilitation history significant for diffuse brainstem, specifically midbrain and khang left ischemic stroke 9/29/20 s/p thrombectomy Dr. Albert in setting of diffuse thrombosis in basilar, b/l PCA and vertebral artery, here for evaluation. The following plan was discussed with the patient who is in agreement.     Visit Diagnoses     ICD-10-CM   1. History of stroke  Z86.73   2. Neuropathic pain  M79.2   3. Double vision  H53.2   4. Vertebral artery dissection (HCC)  I77.74        Rehab/Neuro:   1. Left midbrain, khang ischemic stroke + left internal capsule/thalamic stroke + right cerebellar  ischemic stroke s/p thrombectomy with Dr. Lubin 9/30 in setting of left vertebral artery dissection, left vertebral artery and bilateral PCA occlusions  2. Seizures  3. Cranial nerve III palsy, ptosis  -Therapy: Discharged from home health therapy but continues with vision therapy once weekly in [place].  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  Has returned to work at 90% capacity.  -Driving status: Has been cleared to slowly return to driving around town.  -Ophthalmology follow-up in October and potential surgical intervention if indicated.  -Radiology follow-up after repeat CTA in September to reevaluate if need for stent  -Med management: Full dose aspirin for life  -Function: Patient has made a very good recovery thus far and is not quite 1 year post stroke.  She is ambulatory without assistive device, has returned to work in 90% capacity and has slowly started to return to driving.  While she still has ongoing medical issues physiatry needs are currently met.    HEENT: History of hypophonia.  Resolved.    Neuropathic Pain: Numbness along right side.  Nonpainful.  -No pharmacologic agents indicated.    Follow up: As needed.  Patient has made a remarkable recovery.  Happy to see her back at any point in future if needed.    Total time spent was 22 minutes.  Included in this time is the time spent preparing for the visit including record review, my exam and evaluation, counseling and education regarding that which is aforementioned in the assessment and plan. Discussion involved the patient.    Please note that this dictation was created using voice recognition software. I have made every reasonable attempt to correct obvious errors but there may be errors of grammar and content that I may have overlooked prior to finalization of this note.    Dr. Yovana Becker DO, MS  Department of Physical Medicine & Rehabilitation  Neuro Rehabilitation Clinic  [facility]

## 2021-08-16 NOTE — PROGRESS NOTES
[address]  [address]  [phone]    8/16/2021       Benjamin Figueroa  [address] [place] [address]      Dear Benjamin:    You are scheduled for Mohs Surgery on: 10/14/21 @8:45am.    Please check in at [facility], [address].     You don't need to arrive more than 5-10 minutes prior to your appointment time.     Be sure to eat a good breakfast and bathe and wash your hair prior to surgery.     If you are taking any anti-coagulants that are prescribed by your Doctor (such as Coumadin/Warfarin, Plavix, Aspirin, Ibuprofen), please continue taking them.     However, if you are taking anti-coagulants over the counter without a Doctor's order for a medical condition, please discontinue them 10 days prior to surgery.     Please wear loose comfortable clothing as it could possibly be 4-6 hours until your surgery is completed depending upon how many layers of tissue need to be removed.      Thank you,    EMILY Quiñones MD   Pharmacy Warfarin Consult   10/15/2020     45 y.o.   female     Indication for anticoagulation: Stroke    Goal INR =  2 to 3     Recent Labs     10/13/20  0515 10/14/20  0527 10/15/20  0659   INR 1.97* 2.00* 2.35*       Pertinent Drug/Drug Interactions:  Antibiotics, Statin, prn trazadone  Outpatient Warfarin Regimen:  New start  Recent Warfarin Dosing:    Dose from last 7 days     Date/Time Dose (mg)    10/15/20 0659  6    10/14/20 0527  7.5    10/13/20 0515  7.5    10/12/20 0646  6    10/11/20 1444  5    10/10/20 1300  5    10/09/20 0721  5        Bridge Therapy: Lovenox discontinued on 10/15. INR > 2 x 2 days.        1.  Coumadin 6mg tonight for INR = 2.35        [facility]

## 2021-09-21 ENCOUNTER — HOSPITAL ENCOUNTER (OUTPATIENT)
Dept: RADIOLOGY | Facility: MEDICAL CENTER | Age: 46
End: 2021-09-21
Payer: OTHER GOVERNMENT

## 2021-09-23 ENCOUNTER — HOSPITAL ENCOUNTER (OUTPATIENT)
Dept: RADIOLOGY | Facility: MEDICAL CENTER | Age: 46
End: 2021-09-23
Attending: NURSE PRACTITIONER
Payer: OTHER GOVERNMENT

## 2021-09-23 DIAGNOSIS — I77.74 DISSECTION, VERTEBRAL ARTERY (HCC): ICD-10-CM

## 2021-10-04 ENCOUNTER — OFFICE VISIT (OUTPATIENT)
Dept: OPHTHALMOLOGY | Facility: MEDICAL CENTER | Age: 46
End: 2021-10-04
Payer: OTHER GOVERNMENT

## 2021-10-04 DIAGNOSIS — H49.9 OPHTHALMOPLEGIA: ICD-10-CM

## 2021-10-04 PROCEDURE — 92060 SENSORIMOTOR EXAMINATION: CPT | Performed by: OPHTHALMOLOGY

## 2021-10-04 PROCEDURE — 99213 OFFICE O/P EST LOW 20 MIN: CPT | Performed by: OPHTHALMOLOGY

## 2021-10-04 ASSESSMENT — CONF VISUAL FIELD
OS_NORMAL: 1
OD_NORMAL: 1

## 2021-10-04 ASSESSMENT — REFRACTION_WEARINGRX
OS_CYLINDER: +0.25
OS_AXIS: 090
SPECS_TYPE: SVL
OD_AXIS: 083
OD_CYLINDER: +0.25
OD_SPHERE: -0.50
OS_SPHERE: -1.00

## 2021-10-04 ASSESSMENT — TONOMETRY
OS_IOP_MMHG: 17
OD_IOP_MMHG: 15

## 2021-10-04 ASSESSMENT — SLIT LAMP EXAM - LIDS
COMMENTS: NORMAL
COMMENTS: NORMAL

## 2021-10-04 ASSESSMENT — REFRACTION_MANIFEST
OS_CYLINDER: +0.25
OD_CYLINDER: +0.75
OD_SPHERE: -1.25
OS_AXIS: 080
OS_SPHERE: -1.50
OD_AXIS: 166

## 2021-10-04 ASSESSMENT — VISUAL ACUITY
OD_CC: 20/25-2
OS_CC: 20/20
OD_PH_CC: 20/20-2
CORRECTION_TYPE: GLASSES
METHOD: SNELLEN - LINEAR

## 2021-10-04 ASSESSMENT — EXTERNAL EXAM - RIGHT EYE: OD_EXAM: NORMAL

## 2021-10-04 ASSESSMENT — EXTERNAL EXAM - LEFT EYE: OS_EXAM: NORMAL

## 2021-10-04 NOTE — ASSESSMENT & PLAN NOTE
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 (September 2020) Reviewed the MRI images with patient and demonstrated the region of the lesion. Appears to have improved since the stroke in 9/2020 since now can adduct almost fully and her lid is elevating. There does not appear to be any pupil involvement at this time. Still has a mild elevation deficit. Since right now the deviation is relatively small and she tolerates the press on prism that Dr Freed places, would continue to monitor. Discussed that if not fully recovered by 8 months or more of observation could consider a LLR recession and LIR recession with adjustable suture if the amout of prism is too large to grind in. Gave information to read regarding the risks and benefits of the surgery.   6/29/2021 - Has gone down to 10 prisms (manged by Dr Freed, was at 20). Showing improvement in adduction and elevation. Edelmira,ost resolved XT, but still 14 left hypo. Re-evaluting 3 months. Discussed if measurements stabilize could consider a LIR rectus recession for 14 with adjustment  10/4/2021 - In PAT 18 left hypo. Overall stable. Dicussed that could precede with surgery, but since has improved slightly could also wait. After discussed she wished to wait at least 6 months prior to preceding with surgery. Therefore will monitor and re-eval

## 2021-10-04 NOTE — PROGRESS NOTES
Peds/Neuro Ophthalmology:   Srikanth Rosas M.D.    Date & Time note created:    10/4/2021   2:05 PM     Referring MD / APRN:  Nam Soliman M.D., No att. providers found    Patient ID:  Name:             Obdulia High     YOB: 1975  Age:                 46 y.o.  female   MRN:               [id number]    Chief Complaint/Reason for Visit:     Other (Ophthalmoplegia)      History of Present Illness:    Obdulia High is a 46 y.o. female   Follow up ophthalmoplegia with double vision resolved with prism glasses.No headaches and pt says symptoms better since last visit.      Review of Systems:  Review of Systems   All other systems reviewed and are negative.      Past Medical History:   Past Medical History:   Diagnosis Date   • Migraine    • Polycystic ovarian syndrome    • Stroke (HCC)        Past Surgical History:  History reviewed. No pertinent surgical history.    Current Outpatient Medications:  Current Outpatient Medications   Medication Sig Dispense Refill   • Ascorbic Acid (VITAMIN C) 1000 MG Tab Take  by mouth every day.     • vitamin D (CHOLECALCIFEROL) 1000 Unit (25 mcg) Tab Take 1,000 Units by mouth every day.     • zinc sulfate (ZINCATE) 220 (50 Zn) MG Cap Take 220 mg by mouth every day.     • therapeutic multivitamin-minerals (THERAGRAN-M) Tab Take 1 tablet by mouth every day.     • aspirin EC (ECOTRIN) 325 MG Tablet Delayed Response Take 1 Tab by mouth every day. 90 Tab 3   • acetaminophen (TYLENOL) 325 MG Tab Take 2 Tabs by mouth every four hours as needed. 30 Tab 0   • lisinopril (PRINIVIL) 5 MG Tab Take 5 mg by mouth every day. (Patient not taking: Reported on 6/29/2021)     • atorvastatin (LIPITOR) 40 MG Tab Take 40 mg by mouth every evening. (Patient not taking: Reported on 6/29/2021)       No current facility-administered medications for this visit.       Allergies:  No Known Allergies    Family History:  Family History   Problem Relation Age of Onset   • Diabetes Mother     • Hypertension Mother    • Diabetes Father    • Hypertension Father        Social History:  Social History     Socioeconomic History   • Marital status:      Spouse name: Not on file   • Number of children: Not on file   • Years of education: Not on file   • Highest education level: Not on file   Occupational History   • Not on file   Tobacco Use   • Smoking status: Never Smoker   • Smokeless tobacco: Never Used   Vaping Use   • Vaping Use: Never used   Substance and Sexual Activity   • Alcohol use: Never   • Drug use: Never   • Sexual activity: Not on file   Other Topics Concern   •  Service No   • Blood Transfusions No   • Caffeine Concern No   • Occupational Exposure No   • Hobby Hazards No   • Sleep Concern No   • Stress Concern No   • Weight Concern Yes   • Special Diet No   • Back Care No   • Exercise Yes   • Bike Helmet No   • Seat Belt Yes   • Self-Exams Yes   Social History Narrative    Business owner- [employer]      Social Determinants of Health     Financial Resource Strain:    • Difficulty of Paying Living Expenses:    Food Insecurity: No Food Insecurity   • Worried About Running Out of Food in the Last Year: Never true   • Ran Out of Food in the Last Year: Never true   Transportation Needs: No Transportation Needs   • Lack of Transportation (Medical): No   • Lack of Transportation (Non-Medical): No   Physical Activity:    • Days of Exercise per Week:    • Minutes of Exercise per Session:    Stress:    • Feeling of Stress :    Social Connections:    • Frequency of Communication with Friends and Family:    • Frequency of Social Gatherings with Friends and Family:    • Attends [employer] Services:    • Active Member of Clubs or Organizations:    • Attends Club or Organization Meetings:    • Marital Status:    Intimate Partner Violence:    • Fear of Current or Ex-Partner:    • Emotionally Abused:    • Physically Abused:    • Sexually Abused:           Physical Exam:  Physical  Exam    Oriented x 3  Weight/BMI: There is no height or weight on file to calculate BMI.  There were no vitals taken for this visit.    Base Eye Exam     Visual Acuity (Snellen - Linear)       Right Left    Dist cc 20/25-2 20/20    Dist ph cc 20/20-2     Correction: Glasses          Tonometry (i care, 1:38 PM)       Right Left    Pressure 15 17          Pupils       Pupils    Right PERRL    Left PERRL          Visual Fields       Right Left     Full Full          Neuro/Psych     Oriented x3: Yes    Mood/Affect: Normal            Additional Tests     Color       Right Left    Ishihara 9/9 9/9          Stereo     Fly: +    Animals: 3/3    Circles: 9/9            Strabismus Exam       - - - - - -   - - - - - -                       - -  - -  LHypoT 20 - -  - -            X(T) 2           - - - - - -   - - - - - -                   Slit Lamp and Fundus Exam     External Exam       Right Left    External Normal Normal          Slit Lamp Exam       Right Left    Lids/Lashes Normal Normal    Conjunctiva/Sclera White and quiet White and quiet    Cornea Clear Clear    Anterior Chamber Deep and quiet Deep and quiet    Iris Round and reactive Round and reactive    Lens Clear Clear    Vitreous Normal Normal            Refraction     Wearing Rx       Sphere Cylinder Axis Horz Prism    Right -0.50 +0.25 083 7.25D    Left -1.00 +0.25 090     Age: 9m    Type: SVL          Manifest Refraction       Sphere Cylinder Axis    Right -1.25 +0.75 166    Left -1.50 +0.25 080                Pertinent Lab/Test/Imaging Review:      Assessment and Plan:     Ophthalmoplegia  3/17/2021 - 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 (September 2020) Reviewed the MRI images with patient and demonstrated the region of the lesion. Appears to have improved since the stroke in 9/2020 since now can adduct almost fully and her lid is elevating. There does not appear to be any pupil involvement at this time. Still has  a mild elevation deficit. Since right now the deviation is relatively small and she tolerates the press on prism that Dr Freed places, would continue to monitor. Discussed that if not fully recovered by 8 months or more of observation could consider a LLR recession and LIR recession with adjustable suture if the amout of prism is too large to grind in. Gave information to read regarding the risks and benefits of the surgery.   6/29/2021 - Has gone down to 10 prisms (manged by Dr Freed, was at 20). Showing improvement in adduction and elevation. Edelmira,ost resolved XT, but still 14 left hypo. Re-evaluting 3 months. Discussed if measurements stabilize could consider a LIR rectus recession for 14 with adjustment  10/4/2021 - In PAT 18 left hypo. Overall stable. Dicussed that could precede with surgery, but since has improved slightly could also wait. After discussed she wished to wait at least 6 months prior to preceding with surgery. Therefore will monitor and re-mickey Rosas M.D.

## 2022-07-21 ENCOUNTER — APPOINTMENT (OUTPATIENT)
Dept: OPHTHALMOLOGY | Facility: MEDICAL CENTER | Age: 47
End: 2022-07-21
Payer: OTHER GOVERNMENT

## 2022-08-29 ENCOUNTER — HOSPITAL ENCOUNTER (OUTPATIENT)
Dept: RADIOLOGY | Facility: MEDICAL CENTER | Age: 47
End: 2022-08-29
Payer: OTHER GOVERNMENT

## 2022-08-31 ASSESSMENT — LIFESTYLE VARIABLES: SUBSTANCE_ABUSE: 0

## 2022-08-31 NOTE — PROGRESS NOTES
Telemedicine: Established Patient   This evaluation was conducted via Zoom using secure and encrypted videoconferencing technology. The patient was in a private location in the [facility].    The patient's identity was confirmed and verbal consent was obtained for this virtual visit. Gallito Albert MD also present and co-conducted the telehealth appointment.     Subjective:   CC: imaging follow up of a left vertebral C2 dissection     PCP Nam Soliman MD  Neuro: Shanea VILLA  Physiatry: Yovana Becker DO  NeuroOphthalmology: Srikanth Rosas MD     has a history of complex migraine headaches. She presented to her local ED on September 29, 2020 with initial complaints of 2 day history of left retro-orbital headache, nausea, scotoma, and left facial numbness. She then developed a seizure with respiratory failure requiring intubation and was transferred to [facility] that evening.  Imaging revealed left pontine and midbrain ischemia and thrombosis of the left and right posterior cerebral arteries, left vertebral artery, and basilar artery with dissection at the left vertebral C2 level and the patient was referred for emergent intervention for NIH stroke scale of 28. Dr. Albert performed successful mechanical thrombectomy on September 30. The patient's clinical recovery included inpatient rehabilitation services and she was discharged to home on October 27, 2020 with outpatient follow ups arranged. She denies any past history of vascular injury and specifically did not sustain a known trauma such as MVC or chiropractic manipulation, however she thinks a new, strenuous exercise regimen may have been the cause of the dissection.     She is seen today for imaging follow up to evaluate the dissection. Today, the patient is doing well. She indicates she has unilateral right face numbness and upper arm numbness but has been doing eye therapy and no longer has a left cranial nerve palsy or requires  the specialized glasses. She is taking aspirin 325 mg and has questions about stepping it down to 81 mg. She is unaccompanied by her  Keith, who is a [employer] officer and physician and stationed in [place] for the next couple of years.      Review of Systems   Eyes:  Negative for blurred vision and double vision.   Neurological:  Positive for sensory change (right face/ upper right arm). Negative for focal weakness.   Psychiatric/Behavioral:  Negative for substance abuse.    No Known Allergies    Current medicines (including changes today)  Current Outpatient Medications   Medication Sig Dispense Refill    Ascorbic Acid (VITAMIN C) 1000 MG Tab Take  by mouth every day.      vitamin D (CHOLECALCIFEROL) 1000 Unit (25 mcg) Tab Take 1,000 Units by mouth every day.      zinc sulfate (ZINCATE) 220 (50 Zn) MG Cap Take 220 mg by mouth every day.      therapeutic multivitamin-minerals (THERAGRAN-M) Tab Take 1 tablet by mouth every day.      lisinopril (PRINIVIL) 5 MG Tab Take 5 mg by mouth every day. (Patient not taking: Reported on 6/29/2021)      atorvastatin (LIPITOR) 40 MG Tab Take 40 mg by mouth every evening. (Patient not taking: Reported on 6/29/2021)      acetaminophen (TYLENOL) 325 MG Tab Take 2 Tabs by mouth every four hours as needed. 30 Tab 0     No current facility-administered medications for this visit.       Patient Active Problem List    Diagnosis Date Noted    Ophthalmoplegia 03/17/2021    Late effect of stroke 01/13/2021    Dizziness 10/26/2020    Impaired mobility and ADLs 10/26/2020    Adjustment disorder with mixed anxiety and depressed mood 10/15/2020    Double vision 10/07/2020    Hypophonia 10/07/2020    Vertebral artery dissection (HCC) 10/07/2020    Acute ischemic vertebrobasilar artery brainstem stroke involving left-sided vessel (HCC) 09/30/2020    Acute respiratory failure with hypoxia and hypercapnia (HCC) 09/29/2020    Seizure (HCC) 09/29/2020    Essential hypertension 09/29/2020        Family History   Problem Relation Age of Onset    Diabetes Mother     Hypertension Mother     Diabetes Father     Hypertension Father        She  has a past medical history of Migraine, Polycystic ovarian syndrome, and Stroke (HCC).  She  has no past surgical history on file.       Objective:   There were no vitals taken for this visit.    Radiology:   CTA head 8/26/22 at [facility]:      CTA neck 8/26/22 at [facility]:      CTA head and neck 3/1/21 at [facility]:        CTA head 1/4/21 at [facility]:  1. No occlusion or hemodynamically significant narrowing of the major intracranial vessels.  2. Patent basilar artery and bilateral posterior cerebral arteries.    CTA neck 1/4/21 at [facility]:  1. There is a small residual dissection flap seen in the distal left vertebral artery at the level of C1-C2, measuring up to 7 mm in length. No residual thrombus or occlusion.      CT head w/o 10/5/20 at [facility]:  1.  Diminishing parenchymal and sulcal contrast staining and/or hemorrhage in the parasagittal high frontal lobes. No evidence of new/progressive hemorrhage.  2.  Acute lacunar size infarct left thalamus.  3.  Acute brainstem infarct evident in the right side of the khang, best seen on MRI.     MRI brain w/o 9/30/20 at [facility]:  1.  Subarachnoid hemorrhage within sulci near the vertex, left greater than right  2.  Acute mid brain and punctate left pontine infarct without mass effect  3.  No other significant finding        MRV brain 9/30/20 at [facility]:  Cerebral magnetic resonance venogram within normal limits with no evidence of dural venous sinus thrombosis.     MRI brain w/ and w/o 9/30/20 at [facility]:  1.  Findings suggesting distal basilar artery and left posterior cerebral artery thrombosis.  2.  Acute ischemia within the midbrain/khang.  3.  Partially empty sella and slightly prominent fluid about the optic nerve sheaths. Correlate for any evidence of intracranial hypertension.        CT head w/o  9/30/20 at [facility]:  Addendum:  Please note that the patient has had a CTA head and neck as well as angiogram with thrombectomy earlier today. A portion of the high attenuation along the arterial vascular structures and dural venous sinuses is likely related to contrast staining from the previous contrast-enhanced procedures. However, the high attenuation in the sulci over the vertex is suspicious for a small amount of new subarachnoid hemorrhage. Follow-up MRI could be performed for confirmation.  1.  There has been interval development of a small amount of subarachnoid hemorrhage bilaterally.  2.  There is no significant midline shift.  3.  The areas of acute thrombus/occlusion in the basilar artery and proximal posterior cerebral arteries are less apparent.     Neurointerventional procedure 9/30/20 at [facility]:  1.  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  2.  Basilar artery thrombosis.  3.  Successful mechanical thrombectomy of a basilar artery thrombosis.  4.  The final angiogram demonstrates patent basilar, bilateral superior cerebellar and right posterior cerebral arteries. The left P1 segment is patent. The left distal P2 segment is not visualized.         CTA head 9/30/20 at [facility]:  1.  There is acute mid and distal basilar artery occlusion extending into the left greater than right posterior cerebral arteries.  2.  Findings have already been discussed with the ordering physician and neurologist by Dr. Albert of the IR radiology service.       CTA neck 9/30/20 at [facility]:  1.  There is abnormality involving the distal left vertebral artery at the level of C2 which may represent combination of focal dissection/thrombus with possible less likely partially thrombosed vascular malformation.  2.  There is an occlusion of the mid and distal basilar artery extending into the left greater than right posterior cerebral arteries, P1 segments.  3.  These findings have already been discussed with the  clinical service by Dr. Albert of the IR radiology service.     CTP 9/30/20 at [facility]:  1.  Cerebral blood flow less than 30% likely representing completed infarct = 0 mL.  2.  T Max more than 6 seconds likely representing combination of completed infarct and ischemia = 100 mL.  3.  Mismatched volume likely representing ischemic brain/penumbra = 100 mL  4.  Please note that the cerebral perfusion was performed on the limited brain tissue around the basal ganglia region. Infarct/ischemia outside the CT perfusion sections can be missed in this study.    Physical Exam  Constitutional:       General: She is not in acute distress.     Appearance: She is not diaphoretic.   HENT:      Head: Atraumatic.   Eyes:      General: No scleral icterus.     Comments: Gaze conjugate, lids normal.     Neck:      Comments: Moves freely without pain.  Pulmonary:      Effort: No respiratory distress.      Breath sounds: Normal breath sounds.   Skin:     General: Skin is dry.      Coloration: Skin is not pale.      Findings: No erythema or rash.      Comments: No rashes in visible areas.   Neurological:      Mental Status: She is alert and oriented to person, place, and time.      Comments: No tremor noted. Seated for telehealth appointment, unable to assess gait.   Psychiatric:         Mood and Affect: Mood and affect normal.         Behavior: Behavior normal.         Thought Content: Thought content normal.         Cognition and Memory: Memory normal.         Judgment: Judgment normal.       Assessment and Plan:   Gallito Albert MD was personally present for this telehealth appointment. The following treatment plan was discussed: The CTA shows the dissection flap is stable. It appears to be a fenestrated artery at this point. She has no new symptoms. No indication for intervention at this time. She could consider a CTA head and neck in 3-5 years. She will likely no longer be in the area but this could be arranged wherever she is  stationed. OK to decrease aspirin to 81 mg. Imaging was reviewed with the patient and all questions were answered.    Impression:   1. Left vertebral artery dissection, stable as fenestrated vessel, on aspirin.  2. Stroke, secondary to above.  3. Hypertension.     Follow-up:   Consider CTA head/ neck in 3-5 years.  Continue daily aspirin 81 mg or life unless side effects manifest.  OK to resume normal activities from a [place]LE[place] standpoint, with caution to sudden sharp neck movements such as chiropractic adjustments.

## 2022-09-01 ENCOUNTER — HOSPITAL ENCOUNTER (OUTPATIENT)
Dept: RADIOLOGY | Facility: MEDICAL CENTER | Age: 47
End: 2022-09-01
Attending: NURSE PRACTITIONER
Payer: OTHER GOVERNMENT

## 2022-09-01 DIAGNOSIS — I77.74 DISSECTION, VERTEBRAL ARTERY (HCC): ICD-10-CM

## 2022-09-01 ASSESSMENT — ENCOUNTER SYMPTOMS
SENSORY CHANGE: 1
FOCAL WEAKNESS: 0
DOUBLE VISION: 0
BLURRED VISION: 0

## 2023-07-14 NOTE — PROGRESS NOTES
Received patient during shift change, report rec'd from day shift RN. Resting in bed, VS stable on room air. Per report continent of B&B, contact guard assist for transfers. A&O x 4, able to make needs known. Bed in low position, call light within reach.   Yes

## 2025-02-06 ENCOUNTER — APPOINTMENT (OUTPATIENT)
Dept: URBAN - NONMETROPOLITAN AREA CLINIC 15 | Facility: CLINIC | Age: 50
Setting detail: DERMATOLOGY
End: 2025-02-06

## 2025-02-06 DIAGNOSIS — D22 MELANOCYTIC NEVI: ICD-10-CM

## 2025-02-06 DIAGNOSIS — L57.8 OTHER SKIN CHANGES DUE TO CHRONIC EXPOSURE TO NONIONIZING RADIATION: ICD-10-CM

## 2025-02-06 DIAGNOSIS — L81.4 OTHER MELANIN HYPERPIGMENTATION: ICD-10-CM

## 2025-02-06 DIAGNOSIS — L91.8 OTHER HYPERTROPHIC DISORDERS OF THE SKIN: ICD-10-CM

## 2025-02-06 DIAGNOSIS — L92.0 GRANULOMA ANNULARE: ICD-10-CM

## 2025-02-06 DIAGNOSIS — D18.0 HEMANGIOMA: ICD-10-CM

## 2025-02-06 DIAGNOSIS — L82.1 OTHER SEBORRHEIC KERATOSIS: ICD-10-CM

## 2025-02-06 DIAGNOSIS — Z71.89 OTHER SPECIFIED COUNSELING: ICD-10-CM

## 2025-02-06 PROBLEM — D22.71 MELANOCYTIC NEVI OF RIGHT LOWER LIMB, INCLUDING HIP: Status: ACTIVE | Noted: 2025-02-06

## 2025-02-06 PROBLEM — D22.39 MELANOCYTIC NEVI OF OTHER PARTS OF FACE: Status: ACTIVE | Noted: 2025-02-06

## 2025-02-06 PROBLEM — D22.61 MELANOCYTIC NEVI OF RIGHT UPPER LIMB, INCLUDING SHOULDER: Status: ACTIVE | Noted: 2025-02-06

## 2025-02-06 PROBLEM — D22.72 MELANOCYTIC NEVI OF LEFT LOWER LIMB, INCLUDING HIP: Status: ACTIVE | Noted: 2025-02-06

## 2025-02-06 PROBLEM — D18.01 HEMANGIOMA OF SKIN AND SUBCUTANEOUS TISSUE: Status: ACTIVE | Noted: 2025-02-06

## 2025-02-06 PROBLEM — D23.71 OTHER BENIGN NEOPLASM OF SKIN OF RIGHT LOWER LIMB, INCLUDING HIP: Status: ACTIVE | Noted: 2025-02-06

## 2025-02-06 PROBLEM — D22.62 MELANOCYTIC NEVI OF LEFT UPPER LIMB, INCLUDING SHOULDER: Status: ACTIVE | Noted: 2025-02-06

## 2025-02-06 PROBLEM — D22.5 MELANOCYTIC NEVI OF TRUNK: Status: ACTIVE | Noted: 2025-02-06

## 2025-02-06 PROCEDURE — ? PRESCRIPTION

## 2025-02-06 PROCEDURE — ? ADDITIONAL NOTES

## 2025-02-06 PROCEDURE — 99203 OFFICE O/P NEW LOW 30 MIN: CPT

## 2025-02-06 PROCEDURE — ? COUNSELING

## 2025-02-06 PROCEDURE — ? DEFER

## 2025-02-06 ASSESSMENT — LOCATION DETAILED DESCRIPTION DERM
LOCATION DETAILED: RIGHT VENTRAL PROXIMAL FOREARM
LOCATION DETAILED: LEFT CAVUM CONCHA
LOCATION DETAILED: RIGHT POPLITEAL SKIN
LOCATION DETAILED: RIGHT SUPERIOR CENTRAL MALAR CHEEK
LOCATION DETAILED: RIGHT CENTRAL EYEBROW
LOCATION DETAILED: RIGHT ANTERIOR DISTAL THIGH
LOCATION DETAILED: INFERIOR THORACIC SPINE
LOCATION DETAILED: RIGHT SUPERIOR MEDIAL BUCCAL CHEEK
LOCATION DETAILED: RIGHT INFERIOR CENTRAL MALAR CHEEK
LOCATION DETAILED: RIGHT PROXIMAL PRETIBIAL REGION
LOCATION DETAILED: LEFT DISTAL DORSAL FOREARM
LOCATION DETAILED: LEFT SUPERIOR MEDIAL BUCCAL CHEEK
LOCATION DETAILED: LEFT LATERAL ELBOW
LOCATION DETAILED: LEFT VENTRAL LATERAL PROXIMAL FOREARM
LOCATION DETAILED: LEFT PROXIMAL PRETIBIAL REGION
LOCATION DETAILED: SUBXIPHOID
LOCATION DETAILED: LEFT SUPERIOR UPPER BACK
LOCATION DETAILED: RIGHT LATERAL SUPERIOR CHEST
LOCATION DETAILED: RIGHT SUPERIOR UPPER BACK
LOCATION DETAILED: LEFT PROXIMAL DORSAL FOREARM
LOCATION DETAILED: LEFT POPLITEAL SKIN
LOCATION DETAILED: RIGHT PROXIMAL DORSAL FOREARM
LOCATION DETAILED: LEFT SUPERIOR CENTRAL MALAR CHEEK
LOCATION DETAILED: MIDDLE STERNUM
LOCATION DETAILED: RIGHT DISTAL POSTERIOR UPPER ARM
LOCATION DETAILED: LEFT SUPERIOR MEDIAL MALAR CHEEK
LOCATION DETAILED: LEFT CENTRAL MALAR CHEEK

## 2025-02-06 ASSESSMENT — LOCATION ZONE DERM
LOCATION ZONE: TRUNK
LOCATION ZONE: EAR
LOCATION ZONE: ARM
LOCATION ZONE: LEG
LOCATION ZONE: FACE

## 2025-02-06 ASSESSMENT — LOCATION SIMPLE DESCRIPTION DERM
LOCATION SIMPLE: UPPER BACK
LOCATION SIMPLE: ABDOMEN
LOCATION SIMPLE: LEFT PRETIBIAL REGION
LOCATION SIMPLE: RIGHT POSTERIOR UPPER ARM
LOCATION SIMPLE: RIGHT UPPER BACK
LOCATION SIMPLE: RIGHT CHEEK
LOCATION SIMPLE: LEFT CHEEK
LOCATION SIMPLE: CHEST
LOCATION SIMPLE: RIGHT THIGH
LOCATION SIMPLE: RIGHT EYEBROW
LOCATION SIMPLE: LEFT EAR
LOCATION SIMPLE: LEFT FOREARM
LOCATION SIMPLE: RIGHT FOREARM
LOCATION SIMPLE: LEFT ELBOW
LOCATION SIMPLE: RIGHT PRETIBIAL REGION
LOCATION SIMPLE: LEFT UPPER BACK
LOCATION SIMPLE: LEFT POPLITEAL SKIN
LOCATION SIMPLE: RIGHT POPLITEAL SKIN

## 2025-02-06 NOTE — PROCEDURE: ADDITIONAL NOTES
Detail Level: Zone
Render Risk Assessment In Note?: no
Additional Notes: Discussed trying Tofacitinib cream. Will have pt sign for records to obtain path report.
Additional Notes: Recommended Stridex pads.
Additional Notes: Discussed spot correctors and laser therapy. Discussed OTC retinols.

## 2025-02-06 NOTE — PROCEDURE: MIPS QUALITY
Quality 130: Documentation Of Current Medications In The Medical Record: Current Medications Documented
Detail Level: Detailed
Quality 431: Preventive Care And Screening: Unhealthy Alcohol Use - Screening: Patient not identified as an unhealthy alcohol user when screened for unhealthy alcohol use using a systematic screening method
Quality 226: Preventive Care And Screening: Tobacco Use: Screening And Cessation Intervention: Patient screened for tobacco use and is an ex/non-smoker
Odomzo Counseling- I discussed with the patient the risks of Odomzo including but not limited to nausea, vomiting, diarrhea, constipation, weight loss, changes in the sense of taste, decreased appetite, muscle spasms, and hair loss.  The patient verbalized understanding of the proper use and possible adverse effects of Odomzo.  All of the patient's questions and concerns were addressed.

## 2025-02-06 NOTE — PROCEDURE: DEFER
X Size Of Lesion In Cm (Optional): 0
Procedure To Be Performed At Next Visit: Cryotherapy
Introduction Text (Please End With A Colon): Declined treatment.
Detail Level: Zone